# Patient Record
Sex: FEMALE | Race: BLACK OR AFRICAN AMERICAN | NOT HISPANIC OR LATINO | Employment: OTHER | URBAN - METROPOLITAN AREA
[De-identification: names, ages, dates, MRNs, and addresses within clinical notes are randomized per-mention and may not be internally consistent; named-entity substitution may affect disease eponyms.]

---

## 2017-01-10 ENCOUNTER — HOSPITAL ENCOUNTER (OUTPATIENT)
Dept: RADIOLOGY | Facility: HOSPITAL | Age: 77
Discharge: HOME | End: 2017-01-10
Attending: INTERNAL MEDICINE
Payer: MEDICARE

## 2017-01-10 DIAGNOSIS — E34.9 ENDOCRINE DISORDER: ICD-10-CM

## 2017-01-10 PROCEDURE — 77080 DXA BONE DENSITY AXIAL: CPT | Mod: 26,,, | Performed by: RADIOLOGY

## 2017-01-10 PROCEDURE — 77080 DXA BONE DENSITY AXIAL: CPT | Mod: TC

## 2017-01-17 RX ORDER — NIFEDIPINE 90 MG/1
TABLET, EXTENDED RELEASE ORAL
Qty: 90 TABLET | Refills: 0 | Status: SHIPPED | OUTPATIENT
Start: 2017-01-17 | End: 2017-04-18 | Stop reason: SDUPTHER

## 2017-01-17 RX ORDER — ATORVASTATIN CALCIUM 10 MG/1
TABLET, FILM COATED ORAL
Qty: 90 TABLET | Refills: 0 | Status: SHIPPED | OUTPATIENT
Start: 2017-01-17 | End: 2017-04-18 | Stop reason: SDUPTHER

## 2017-01-19 RX ORDER — SYRINGE,SAFETY WITH NEEDLE,1ML 25GX1"
SYRINGE (EA) MISCELLANEOUS
Qty: 100 EACH | Refills: 0 | Status: SHIPPED | OUTPATIENT
Start: 2017-01-19 | End: 2017-03-24 | Stop reason: SDUPTHER

## 2017-03-01 ENCOUNTER — OFFICE VISIT (OUTPATIENT)
Dept: PODIATRY | Facility: CLINIC | Age: 77
End: 2017-03-01
Payer: MEDICARE

## 2017-03-01 VITALS — BODY MASS INDEX: 29.71 KG/M2 | HEIGHT: 64 IN | WEIGHT: 174 LBS

## 2017-03-01 DIAGNOSIS — L84 CORN OR CALLUS: ICD-10-CM

## 2017-03-01 DIAGNOSIS — B35.1 ONYCHOMYCOSIS DUE TO DERMATOPHYTE: ICD-10-CM

## 2017-03-01 DIAGNOSIS — M20.41 HAMMER TOES OF BOTH FEET: ICD-10-CM

## 2017-03-01 DIAGNOSIS — M20.42 HAMMER TOES OF BOTH FEET: ICD-10-CM

## 2017-03-01 DIAGNOSIS — E11.49 TYPE II DIABETES MELLITUS WITH NEUROLOGICAL MANIFESTATIONS: Primary | ICD-10-CM

## 2017-03-01 DIAGNOSIS — M20.10 HALLUX ABDUCTO VALGUS, UNSPECIFIED LATERALITY: ICD-10-CM

## 2017-03-01 PROCEDURE — 99999 PR PBB SHADOW E&M-EST. PATIENT-LVL III: CPT | Mod: PBBFAC,,, | Performed by: PODIATRIST

## 2017-03-01 PROCEDURE — 11056 PARNG/CUTG B9 HYPRKR LES 2-4: CPT | Mod: Q9,S$GLB,, | Performed by: PODIATRIST

## 2017-03-01 PROCEDURE — 99499 UNLISTED E&M SERVICE: CPT | Mod: S$GLB,,, | Performed by: PODIATRIST

## 2017-03-01 PROCEDURE — 99499 UNLISTED E&M SERVICE: CPT | Mod: S$PBB,,, | Performed by: PODIATRIST

## 2017-03-01 PROCEDURE — 11721 DEBRIDE NAIL 6 OR MORE: CPT | Mod: 59,Q9,S$GLB, | Performed by: PODIATRIST

## 2017-03-01 NOTE — PROGRESS NOTES
Subjective:      Patient ID: Joel Condon is a 76 y.o. female.    Chief Complaint: Diabetes Mellitus (pcp Dr. Ley 12-19-16); Diabetic Foot Exam; and Nail Care    Joel is a 76 y.o. female who presents to the clinic for evaluation and treatment of high risk feet. Joel has a past medical history of Arthritis; Hyperlipidemia LDL goal < 100; Hypertension; Hypothyroidism; Osteoporosis, post-menopausal; Overweight(278.02); Proteinuria; and Type II or unspecified type diabetes mellitus with renal manifestations, uncontrolled. The patient's chief complaint is long, thick toenails. She relates that she has been doing vinegar soaks and vicks vapor rub for relief of nails. This patient has documented high risk feet requiring routine maintenance secondary to diabetes mellitis and those secondary complications of diabetes, as mentioned..    PCP: Shelby Ley MD    Date Last Seen by PCP:   Chief Complaint   Patient presents with    Diabetes Mellitus     pcp Dr. Ley 12-19-16    Diabetic Foot Exam    Nail Care     Current shoe gear:  rx marilyn lagos    Hemoglobin A1C   Date Value Ref Range Status   12/15/2016 8.9 (H) 4.5 - 6.2 % Final     Comment:     According to ADA guidelines, hemoglobin A1C <7.0% represents  optimal control in non-pregnant diabetic patients.  Different  metrics may apply to specific populations.   Standards of Medical Care in Diabetes - 2016.  For the purpose of screening for the presence of diabetes:  <5.7%     Consistent with the absence of diabetes  5.7-6.4%  Consistent with increasing risk for diabetes   (prediabetes)  >or=6.5%  Consistent with diabetes  Currently no consensus exists for use of hemoglobin A1C  for diagnosis of diabetes for children.     09/16/2016 8.5 (H) 4.5 - 6.2 % Final     Comment:     According to ADA guidelines, hemoglobin A1C <7.0% represents  optimal control in non-pregnant diabetic patients.  Different  metrics may apply to specific populations.   Standards of Medical  "Care in Diabetes - 2016.  For the purpose of screening for the presence of diabetes:  <5.7%     Consistent with the absence of diabetes  5.7-6.4%  Consistent with increasing risk for diabetes   (prediabetes)  >or=6.5%  Consistent with diabetes  Currently no consensus exists for use of hemoglobin A1C  for diagnosis of diabetes for children.     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 90 tablet 3    atorvastatin (LIPITOR) 10 MG tablet TAKE 1 TABLET BY MOUTH DAILY 90 tablet 0    BD INSULIN PEN NEEDLE UF SHORT 31 gauge x 5/16" Ndle USE THREE TIMES DAILY AS DIRECTED 100 each 11    blood sugar diagnostic Strp True Results; Test tid 300 strip 11    insulin aspart (NOVOLOG FLEXPEN) 100 unit/mL InPn pen ADMINISTER 28 UNITS UNDER THE SKIN THREE TIMES DAILY WITH MEALS 30 mL 3    insulin detemir (LEVEMIR) 100 unit/mL injection ADMINISTER 50 UNITS UNDER THE SKIN EVERY EVENING 30 mL 2    insulin syringe-needle U-100 1 mL 31 gauge x 5/16 Syrg USE THREE TIMES DAILY AS DIRECTED 100 each 0    insulin syringe-needle U-100 1/2 mL 30 Syrg USE NIGHTLY 100 each 0    insulin syringe-needle U-100 1/2 mL 31 x 5/16" Syrg 1 Device by Misc.(Non-Drug; Combo Route) route " nightly. 100 each 6    lancets 26 gauge Misc 1 lancet by Misc.(Non-Drug; Combo Route) route 3 (three) times daily. 200 each 11    levothyroxine (SYNTHROID) 50 MCG tablet TAKE 1 TABLET(50 MCG) BY MOUTH EVERY DAY 90 tablet 0    losartan (COZAAR) 100 MG tablet Take 1 tablet (100 mg total) by mouth once daily. 90 tablet 3    metoprolol succinate (TOPROL-XL) 50 MG 24 hr tablet Take 3 tablets (150 mg total) by mouth once daily. 270 tablet 3    nifedipine (PROCARDIA-XL) 90 MG (OSM) TR24 TAKE 1 TABLET BY MOUTH EVERY DAY 90 tablet 0    NOVOLOG FLEXPEN 100 unit/mL InPn pen ADMINISTER 28 UNITS UNDER THE SKIN THREE TIMES DAILY WITH MEALS 30 mL 2    omeprazole (PRILOSEC) 20 MG capsule Take 1 capsule (20 mg total) by mouth once daily. 90 capsule 1     No current facility-administered medications on file prior to visit.      Review of patient's allergies indicates:   Allergen Reactions    Lisinopril Other (See Comments)     Cough      Pravastatin Other (See Comments)     headaches     Past Surgical History:   Procedure Laterality Date    APPENDECTOMY      CATARACT EXTRACTION W/  INTRAOCULAR LENS IMPLANT Left 4/24/14    OS (Dr. Arce)    CATARACT EXTRACTION W/  INTRAOCULAR LENS IMPLANT Right 06/12/2014    OD (Dr. Arce)    CHOLECYSTECTOMY      EYE SURGERY  04/24/2014 & 06/12/2014    HYSTERECTOMY      total    left eye cataract surgery  4/24/14     Family History   Problem Relation Age of Onset    Diabetes Mother     Cancer Mother     Heart disease Mother     Hypertension Mother     Diabetes Sister     Hypertension Sister     Diabetes Sister     Hypertension Sister     Diabetes Sister     Hypertension Sister     Thyroid disease Sister     Stroke Sister     Hypertension Sister     Diabetes Sister     Heart disease Sister     Diabetes Brother     Diabetes Brother     Amblyopia Neg Hx     Blindness Neg Hx     Cataracts Neg Hx     Glaucoma Neg Hx     Macular degeneration Neg Hx      "Retinal detachment Neg Hx     Strabismus Neg Hx      Social History     Social History    Marital status:      Spouse name: N/A    Number of children: 3    Years of education: N/A     Occupational History    retired      Social History Main Topics    Smoking status: Never Smoker    Smokeless tobacco: Never Used    Alcohol use No    Drug use: No    Sexual activity: Not Currently     Partners: Male     Other Topics Concern    Not on file     Social History Narrative       Review of Systems   Constitution: Negative for chills, fever and weakness.   Cardiovascular: Negative for chest pain, claudication and leg swelling.   Respiratory: Negative for cough and shortness of breath.    Skin: Positive for dry skin and nail changes. Negative for itching and rash.   Musculoskeletal: Positive for arthritis and joint pain. Negative for falls, joint swelling, muscle cramps and muscle weakness.   Gastrointestinal: Negative for diarrhea, nausea and vomiting.   Neurological: Positive for numbness and paresthesias. Negative for tremors.   Psychiatric/Behavioral: Negative for altered mental status and hallucinations.           Objective:       Vitals:    03/01/17 0745   Weight: 78.9 kg (174 lb)   Height: 5' 4" (1.626 m)   PainSc: 0-No pain     Physical Exam   Constitutional:   General: Pt. is well-developed, well-nourished, appears stated age, in no acute distress, alert and oriented x 3. No evidence of depression, anxiety, or agitation. Calm, cooperative, and communicative. Appropriate interactions and affect.       Cardiovascular:   Pulses:       Dorsalis pedis pulses are 1+ on the right side, and 1+ on the left side.        Posterior tibial pulses are 1+ on the right side, and 1+ on the left side.   Dorsalis pedis and posterior tibial pulses are diminished Bilaterally. Toes are cool to touch. Feet are warm proximally.There is decreased digital hair. Skin is atrophic   Musculoskeletal:        Right foot: There is " normal range of motion.        Left foot: There is normal range of motion.   Adequate joint range of motion without pain, limitation, nor crepitation Bilateral feet and ankle joints. Muscle strength is 5/5 in all groups bilaterally.    Patient has hammertoes of digits 2-5 bilateral partially reducible without symptom today.     Neurological: A sensory deficit is present.   Paresthesias, and hyperesthesia bilateral feet with no clearly identified trigger or source.    Decreased/absent vibratory sensation bilateral feet to 128Hz tuning fork.    Summit-Jennifer 5.07 monofilament is intact bilateral feet. Sharp/dull sensation is also intact Bilateral feet.       Skin: Skin is warm, dry and intact. No abrasion, no ecchymosis, no lesion and no rash noted. She is not diaphoretic. No cyanosis or erythema. No pallor. Nails show no clubbing.   Toenails 1-5 bilaterally are elongated by 2-3 mm, thickened by 2-3 mm, discolored/grey, dystrophic, brittle with subungual debris.    Focal hyperkeratotic lesion consisting entirely of hyperkeratotic tissue without open skin, drainage, pus, fluctuance, malodor, or signs of infection distal medial right hallux and sub 3rd MTPJ     Interdigital Spaces clean, dry and without evidence of break in skin integrity   Psychiatric: She has a normal mood and affect. Her speech is normal.   Nursing note and vitals reviewed.        Assessment:       Encounter Diagnoses   Name Primary?    Type II diabetes mellitus with neurological manifestations Yes    Hammer toes of both feet     Hallux abducto valgus, unspecified laterality     Corn or callus     Onychomycosis due to dermatophyte          Plan:       Joel was seen today for diabetes mellitus, diabetic foot exam and nail care.    Diagnoses and all orders for this visit:    Type II diabetes mellitus with neurological manifestations    Hammer toes of both feet    Hallux abducto valgus, unspecified laterality    Corn or callus    Onychomycosis  due to dermatophyte    I counseled the patient on her conditions, their implications and medical management.    - Shoe inspection. Diabetic Foot Education. Patient reminded of the importance of good nutrition and blood sugar control to help prevent podiatric complications of diabetes. Patient instructed on proper foot hygeine. We discussed wearing proper shoe gear, daily foot inspections, never walking without protective shoe gear, never putting sharp instruments to feet. Discussed tight glycemic control.    - With patient's permission, nails were aggressively reduced and debrided x 10 to their soft tissue attachment mechanically and with electric , removing all offending nail and debris. Patient relates relief following the procedure. After cleansing the  area w/ alcohol prep pad the above mentioned hyperkeratosis was trimmed utilizing No 15 scapel, to a smooth base with out incident. Patient tolerated this  well and reported comfort to the area of  distal medial right hallux and sub 3rd MTPJ      She will continue to monitor the areas daily, inspect her feet, wear protective shoe gear when ambulatory, moisturizer to maintain skin integrity and follow in this office in approximately 3-5 months, sooner p.r.n.

## 2017-03-14 ENCOUNTER — LAB VISIT (OUTPATIENT)
Dept: LAB | Facility: HOSPITAL | Age: 77
End: 2017-03-14
Attending: INTERNAL MEDICINE
Payer: MEDICARE

## 2017-03-14 DIAGNOSIS — E78.5 HYPERLIPIDEMIA LDL GOAL <100: ICD-10-CM

## 2017-03-14 DIAGNOSIS — I10 ESSENTIAL HYPERTENSION: ICD-10-CM

## 2017-03-14 DIAGNOSIS — E03.9 HYPOTHYROIDISM (ACQUIRED): ICD-10-CM

## 2017-03-14 LAB
ALBUMIN SERPL BCP-MCNC: 3.5 G/DL
ALP SERPL-CCNC: 81 U/L
ALT SERPL W/O P-5'-P-CCNC: 20 U/L
ANION GAP SERPL CALC-SCNC: 10 MMOL/L
AST SERPL-CCNC: 19 U/L
BASOPHILS # BLD AUTO: 0.06 K/UL
BASOPHILS NFR BLD: 0.6 %
BILIRUB SERPL-MCNC: 0.6 MG/DL
BUN SERPL-MCNC: 16 MG/DL
CALCIUM SERPL-MCNC: 9.4 MG/DL
CHLORIDE SERPL-SCNC: 107 MMOL/L
CHOLEST/HDLC SERPL: 3 {RATIO}
CO2 SERPL-SCNC: 25 MMOL/L
CREAT SERPL-MCNC: 0.9 MG/DL
DIFFERENTIAL METHOD: ABNORMAL
EOSINOPHIL # BLD AUTO: 0.5 K/UL
EOSINOPHIL NFR BLD: 4.8 %
ERYTHROCYTE [DISTWIDTH] IN BLOOD BY AUTOMATED COUNT: 14.6 %
EST. GFR  (AFRICAN AMERICAN): >60 ML/MIN/1.73 M^2
EST. GFR  (NON AFRICAN AMERICAN): >60 ML/MIN/1.73 M^2
GLUCOSE SERPL-MCNC: 154 MG/DL
HCT VFR BLD AUTO: 42.9 %
HDL/CHOLESTEROL RATIO: 33.3 %
HDLC SERPL-MCNC: 144 MG/DL
HDLC SERPL-MCNC: 48 MG/DL
HGB BLD-MCNC: 13.6 G/DL
LDLC SERPL CALC-MCNC: 73.6 MG/DL
LYMPHOCYTES # BLD AUTO: 3.5 K/UL
LYMPHOCYTES NFR BLD: 36.4 %
MCH RBC QN AUTO: 29.9 PG
MCHC RBC AUTO-ENTMCNC: 31.7 %
MCV RBC AUTO: 94 FL
MONOCYTES # BLD AUTO: 0.6 K/UL
MONOCYTES NFR BLD: 6.3 %
NEUTROPHILS # BLD AUTO: 4.9 K/UL
NEUTROPHILS NFR BLD: 51.7 %
NONHDLC SERPL-MCNC: 96 MG/DL
PLATELET # BLD AUTO: 276 K/UL
PMV BLD AUTO: 11.3 FL
POTASSIUM SERPL-SCNC: 3.8 MMOL/L
PROT SERPL-MCNC: 7.3 G/DL
RBC # BLD AUTO: 4.55 M/UL
SODIUM SERPL-SCNC: 142 MMOL/L
TRIGL SERPL-MCNC: 112 MG/DL
TSH SERPL DL<=0.005 MIU/L-ACNC: 3.31 UIU/ML
WBC # BLD AUTO: 9.51 K/UL

## 2017-03-14 PROCEDURE — 80053 COMPREHEN METABOLIC PANEL: CPT

## 2017-03-14 PROCEDURE — 85025 COMPLETE CBC W/AUTO DIFF WBC: CPT

## 2017-03-14 PROCEDURE — 80061 LIPID PANEL: CPT

## 2017-03-14 PROCEDURE — 84443 ASSAY THYROID STIM HORMONE: CPT

## 2017-03-14 PROCEDURE — 36415 COLL VENOUS BLD VENIPUNCTURE: CPT | Mod: PO

## 2017-03-14 PROCEDURE — 83036 HEMOGLOBIN GLYCOSYLATED A1C: CPT

## 2017-03-14 RX ORDER — LEVOTHYROXINE SODIUM 50 UG/1
TABLET ORAL
Qty: 90 TABLET | Refills: 0 | Status: SHIPPED | OUTPATIENT
Start: 2017-03-14 | End: 2017-06-05 | Stop reason: SDUPTHER

## 2017-03-14 NOTE — TELEPHONE ENCOUNTER
----- Message from Chelsey Downs sent at 3/14/2017  9:46 AM CDT -----  Contact: Pharmacy  Pt. Need refill for levothyroxine (SYNTHROID) 50 MCG tablet , sent to [facility]

## 2017-03-15 ENCOUNTER — OFFICE VISIT (OUTPATIENT)
Dept: CARDIOLOGY | Facility: CLINIC | Age: 77
End: 2017-03-15
Payer: MEDICARE

## 2017-03-15 VITALS
WEIGHT: 176 LBS | BODY MASS INDEX: 30.05 KG/M2 | HEART RATE: 71 BPM | HEIGHT: 64 IN | RESPIRATION RATE: 15 BRPM | OXYGEN SATURATION: 95 % | SYSTOLIC BLOOD PRESSURE: 158 MMHG | DIASTOLIC BLOOD PRESSURE: 70 MMHG

## 2017-03-15 DIAGNOSIS — E66.9 NON MORBID OBESITY, UNSPECIFIED OBESITY TYPE: ICD-10-CM

## 2017-03-15 DIAGNOSIS — I10 ESSENTIAL HYPERTENSION: Primary | ICD-10-CM

## 2017-03-15 DIAGNOSIS — E11.319 UNCONTROLLED TYPE 2 DIABETES MELLITUS WITH RETINOPATHY WITHOUT MACULAR EDEMA, WITH LONG-TERM CURRENT USE OF INSULIN, UNSPECIFIED LATERALITY, UNSPECIFIED RETINOPATHY SEVERITY: ICD-10-CM

## 2017-03-15 DIAGNOSIS — E11.65 UNCONTROLLED TYPE 2 DIABETES MELLITUS WITH RETINOPATHY WITHOUT MACULAR EDEMA, WITH LONG-TERM CURRENT USE OF INSULIN, UNSPECIFIED LATERALITY, UNSPECIFIED RETINOPATHY SEVERITY: ICD-10-CM

## 2017-03-15 DIAGNOSIS — Z79.4 UNCONTROLLED TYPE 2 DIABETES MELLITUS WITH RETINOPATHY WITHOUT MACULAR EDEMA, WITH LONG-TERM CURRENT USE OF INSULIN, UNSPECIFIED LATERALITY, UNSPECIFIED RETINOPATHY SEVERITY: ICD-10-CM

## 2017-03-15 DIAGNOSIS — E78.5 HYPERLIPIDEMIA LDL GOAL <100: ICD-10-CM

## 2017-03-15 LAB
ESTIMATED AVG GLUCOSE: 226 MG/DL
HBA1C MFR BLD HPLC: 9.5 %

## 2017-03-15 PROCEDURE — 4010F ACE/ARB THERAPY RXD/TAKEN: CPT | Mod: S$GLB,,, | Performed by: INTERNAL MEDICINE

## 2017-03-15 PROCEDURE — 3046F HEMOGLOBIN A1C LEVEL >9.0%: CPT | Mod: S$GLB,,, | Performed by: INTERNAL MEDICINE

## 2017-03-15 PROCEDURE — 3077F SYST BP >= 140 MM HG: CPT | Mod: S$GLB,,, | Performed by: INTERNAL MEDICINE

## 2017-03-15 PROCEDURE — 1160F RVW MEDS BY RX/DR IN RCRD: CPT | Mod: S$GLB,,, | Performed by: INTERNAL MEDICINE

## 2017-03-15 PROCEDURE — 3078F DIAST BP <80 MM HG: CPT | Mod: S$GLB,,, | Performed by: INTERNAL MEDICINE

## 2017-03-15 PROCEDURE — 1159F MED LIST DOCD IN RCRD: CPT | Mod: S$GLB,,, | Performed by: INTERNAL MEDICINE

## 2017-03-15 PROCEDURE — 99999 PR PBB SHADOW E&M-EST. PATIENT-LVL III: CPT | Mod: PBBFAC,,, | Performed by: INTERNAL MEDICINE

## 2017-03-15 PROCEDURE — 1126F AMNT PAIN NOTED NONE PRSNT: CPT | Mod: S$GLB,,, | Performed by: INTERNAL MEDICINE

## 2017-03-15 PROCEDURE — 99214 OFFICE O/P EST MOD 30 MIN: CPT | Mod: S$GLB,,, | Performed by: INTERNAL MEDICINE

## 2017-03-15 PROCEDURE — 1157F ADVNC CARE PLAN IN RCRD: CPT | Mod: S$GLB,,, | Performed by: INTERNAL MEDICINE

## 2017-03-15 PROCEDURE — 99499 UNLISTED E&M SERVICE: CPT | Mod: S$PBB,,, | Performed by: INTERNAL MEDICINE

## 2017-03-15 RX ORDER — METOPROLOL SUCCINATE 200 MG/1
200 TABLET, EXTENDED RELEASE ORAL DAILY
Qty: 90 TABLET | Refills: 3 | Status: SHIPPED | OUTPATIENT
Start: 2017-03-15 | End: 2018-04-16 | Stop reason: SDUPTHER

## 2017-03-15 NOTE — MR AVS SNAPSHOT
[facility]  [address]  [place] [address]  Phone: [phone]                  Joel Condon   3/15/2017 1:00 PM   Office Visit    Description:  Female : 1940   Provider:  Juan Pablo Ordoñez MD   Department:  [facility]           Reason for Visit     Hypertension     Follow-up                To Do List           Future Appointments        Provider Department Dept Phone    3/15/2017 1:00 PM Juan Pablo Ordoñez MD [facility] [phone]    3/24/2017 8:20 AM Shelby Ley MD [facility] Family Medicine [phone]    2017 8:00 AM Jose R Salazar OD [facility] Optometry [phone]    2017 7:30 AM Sumi Francis DPM 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  Call for this free service at [phone].             Medications           Message regarding Medications     Verify the changes and/or additions to your medication regime listed below are the same as discussed with your clinician today.  If any of these changes or additions are incorrect, please notify your healthcare provider.             Verify that the below list of medications is an accurate representation of the medications you are currently taking.  If none reported, the list may be blank. If incorrect, please contact your healthcare provider. Carry this list with you in case of emergency.           Current Medications     alendronate (FOSAMAX) 70 MG tablet TAKE 1 TABLET BY MOUTH EVERY 7 DAYS    aspirin (ECOTRIN) 81 MG EC tablet Take 1 tablet (81 mg total) by mouth once daily.  "   atorvastatin (LIPITOR) 10 MG tablet TAKE 1 TABLET BY MOUTH DAILY    BD INSULIN PEN NEEDLE UF SHORT 31 gauge x 5/16" Ndle USE THREE TIMES DAILY AS DIRECTED    blood sugar diagnostic Strp True Results; Test tid    insulin aspart (NOVOLOG FLEXPEN) 100 unit/mL InPn pen ADMINISTER 28 UNITS UNDER THE SKIN THREE TIMES DAILY WITH MEALS    insulin detemir (LEVEMIR) 100 unit/mL injection ADMINISTER 50 UNITS UNDER THE SKIN EVERY EVENING    insulin syringe-needle U-100 1 mL 31 gauge x 5/16 Syrg USE THREE TIMES DAILY AS DIRECTED    insulin syringe-needle U-100 1/2 mL 30 Syrg USE NIGHTLY    insulin syringe-needle U-100 1/2 mL 31 x 5/16" Syrg 1 Device by Misc.(Non-Drug; Combo Route) route nightly.    lancets 26 gauge Misc 1 lancet by Misc.(Non-Drug; Combo Route) route 3 (three) times daily.    levothyroxine (SYNTHROID) 50 MCG tablet TAKE 1 TABLET(50 MCG) BY MOUTH EVERY DAY    losartan (COZAAR) 100 MG tablet Take 1 tablet (100 mg total) by mouth once daily.    metoprolol succinate (TOPROL-XL) 50 MG 24 hr tablet Take 3 tablets (150 mg total) by mouth once daily.    nifedipine (PROCARDIA-XL) 90 MG (OSM) TR24 TAKE 1 TABLET BY MOUTH EVERY DAY    NOVOLOG FLEXPEN 100 unit/mL InPn pen ADMINISTER 28 UNITS UNDER THE SKIN THREE TIMES DAILY WITH MEALS    omeprazole (PRILOSEC) 20 MG capsule Take 1 capsule (20 mg total) by mouth once daily.           Clinical Reference Information           Your Vitals Were     BP Pulse Resp Height Weight SpO2    158/70 71 15 5' 4" (1.626 m) 79.8 kg (176 lb) 95%    BMI                30.21 kg/m2          Blood Pressure          Most Recent Value    BP  (!)  158/70      Allergies as of 3/15/2017     Lisinopril    Pravastatin      Immunizations Administered on Date of Encounter - 3/15/2017     None      Language Assistance Services     ATTENTION: Language assistance services are available, free of charge. Please call [phone].      ATENCIÓN: Si habla español, tiene a jara disposición servicios gratuitos de " asistencia lingüística. [employer] lowe [phone].     ELSA Ý: N?u b?n nói Ti?ng Vi?t, có các d?ch v? h? tr? ngôn ng? mi?n phí dành cho b?n. G?i s? [phone].         [facility] complies with applicable Federal civil rights laws and does not discriminate on the basis of race, color, national origin, age, disability, or sex.

## 2017-03-15 NOTE — PROGRESS NOTES
"CARDIOVASCULAR PROGRESS NOTE    REASON FOR CONSULT:   Joel Condon is a 76 y.o. female who presents for follow up of CP/Palps/htn.    PCP: Av  HISTORY OF PRESENT ILLNESS:   The patient returns for follow-up.  She's feeling well and reports no intercurrent angina or dyspnea.  She denies lightheadedness, dizziness, or syncope.  There's been no PND, orthopnea, or lower extremity edema.  She's had no melena, hematuria, or claudicant symptoms.  Her main complaint appears to be related to joint aches and pains today.    CARDIOVASCULAR HISTORY:   PVCs  HTN  DM    PAST MEDICAL HISTORY:     Past Medical History:   Diagnosis Date    Arthritis     Hyperlipidemia LDL goal < 100     Hypertension     Hypothyroidism     Osteoporosis, post-menopausal     Overweight(278.02)     Proteinuria     Type II or unspecified type diabetes mellitus with renal manifestations, uncontrolled        PAST SURGICAL HISTORY:     Past Surgical History:   Procedure Laterality Date    APPENDECTOMY      CATARACT EXTRACTION W/  INTRAOCULAR LENS IMPLANT Left 4/24/14    OS (Dr. Arce)    CATARACT EXTRACTION W/  INTRAOCULAR LENS IMPLANT Right 06/12/2014    OD (Dr. Arce)    CHOLECYSTECTOMY      EYE SURGERY  04/24/2014 & 06/12/2014    HYSTERECTOMY      total    left eye cataract surgery  4/24/14       ALLERGIES AND MEDICATION:     Review of patient's allergies indicates:   Allergen Reactions    Lisinopril Other (See Comments)     Cough      Pravastatin Other (See Comments)     headaches     Previous Medications    ALENDRONATE (FOSAMAX) 70 MG TABLET    TAKE 1 TABLET BY MOUTH EVERY 7 DAYS    ASPIRIN (ECOTRIN) 81 MG EC TABLET    Take 1 tablet (81 mg total) by mouth once daily.    ATORVASTATIN (LIPITOR) 10 MG TABLET    TAKE 1 TABLET BY MOUTH DAILY    BD INSULIN PEN NEEDLE UF SHORT 31 GAUGE X 5/16" NDLE    USE THREE TIMES DAILY AS DIRECTED    BLOOD SUGAR DIAGNOSTIC STRP    True Results; Test tid    INSULIN ASPART (NOVOLOG FLEXPEN) " "100 UNIT/ML INPN PEN    ADMINISTER 28 UNITS UNDER THE SKIN THREE TIMES DAILY WITH MEALS    INSULIN DETEMIR (LEVEMIR) 100 UNIT/ML INJECTION    ADMINISTER 50 UNITS UNDER THE SKIN EVERY EVENING    INSULIN SYRINGE-NEEDLE U-100 1 ML 31 GAUGE X 5/16 SYRG    USE THREE TIMES DAILY AS DIRECTED    INSULIN SYRINGE-NEEDLE U-100 1/2 ML 30 SYRG    USE NIGHTLY    INSULIN SYRINGE-NEEDLE U-100 1/2 ML 31 X 5/16" SYRG    1 Device by Misc.(Non-Drug; Combo Route) route nightly.    LANCETS 26 GAUGE MISC    1 lancet by Misc.(Non-Drug; Combo Route) route 3 (three) times daily.    LEVOTHYROXINE (SYNTHROID) 50 MCG TABLET    TAKE 1 TABLET(50 MCG) BY MOUTH EVERY DAY    LOSARTAN (COZAAR) 100 MG TABLET    Take 1 tablet (100 mg total) by mouth once daily.    METOPROLOL SUCCINATE (TOPROL-XL) 50 MG 24 HR TABLET    Take 3 tablets (150 mg total) by mouth once daily.    NIFEDIPINE (PROCARDIA-XL) 90 MG (OSM) TR24    TAKE 1 TABLET BY MOUTH EVERY DAY    NOVOLOG FLEXPEN 100 UNIT/ML INPN PEN    ADMINISTER 28 UNITS UNDER THE SKIN THREE TIMES DAILY WITH MEALS    OMEPRAZOLE (PRILOSEC) 20 MG CAPSULE    Take 1 capsule (20 mg total) by mouth once daily.       SOCIAL HISTORY:     Social History     Social History    Marital status:      Spouse name: N/A    Number of children: 3    Years of education: N/A     Occupational History    retired      Social History Main Topics    Smoking status: Never Smoker    Smokeless tobacco: Never Used    Alcohol use No    Drug use: No    Sexual activity: Not Currently     Partners: Male     Other Topics Concern    Not on file     Social History Narrative       FAMILY HISTORY:     Family History   Problem Relation Age of Onset    Diabetes Mother     Cancer Mother     Heart disease Mother     Hypertension Mother     Diabetes Sister     Hypertension Sister     Diabetes Sister     Hypertension Sister     Diabetes Sister     Hypertension Sister     Thyroid disease Sister     Stroke Sister     Hypertension " "Sister     Diabetes Sister     Heart disease Sister     Diabetes Brother     Diabetes Brother     Amblyopia Neg Hx     Blindness Neg Hx     Cataracts Neg Hx     Glaucoma Neg Hx     Macular degeneration Neg Hx     Retinal detachment Neg Hx     Strabismus Neg Hx        REVIEW OF SYSTEMS:   Review of Systems   Constitutional: Negative for chills, diaphoresis and fever.   HENT: Negative for nosebleeds.    Eyes: Negative for blurred vision, double vision and photophobia.   Respiratory: Negative for hemoptysis, shortness of breath and wheezing.    Cardiovascular: Negative for chest pain, palpitations, orthopnea, claudication, leg swelling and PND.   Gastrointestinal: Negative for abdominal pain, blood in stool, heartburn, melena, nausea and vomiting.   Genitourinary: Negative for flank pain and hematuria.   Musculoskeletal: Positive for joint pain. Negative for falls, myalgias and neck pain.   Skin: Negative for rash.   Neurological: Negative for dizziness, seizures, loss of consciousness, weakness and headaches.   Endo/Heme/Allergies: Negative for polydipsia. Does not bruise/bleed easily.   Psychiatric/Behavioral: Negative for depression and memory loss. The patient is not nervous/anxious.        PHYSICAL EXAM:     Vitals:    03/15/17 1244   BP: (!) 158/70   Pulse: 71   Resp: 15    Body mass index is 30.21 kg/(m^2).  Weight: 79.8 kg (176 lb)   Height: 5' 4" (162.6 cm)     Physical Exam   Constitutional: She is oriented to person, place, and time. She appears well-developed and well-nourished. She is cooperative.  Non-toxic appearance. No distress.   HENT:   Head: Normocephalic and atraumatic.   Eyes: Conjunctivae and EOM are normal. Pupils are equal, round, and reactive to light. No scleral icterus.   Neck: Trachea normal and normal range of motion. Neck supple. Normal carotid pulses and no JVD present. Carotid bruit is not present. No rigidity. No edema present. No thyromegaly present.   Cardiovascular: " Normal rate, regular rhythm, S1 normal and S2 normal.  PMI is not displaced.  Exam reveals no gallop and no friction rub.    No murmur heard.  Pulses:       Carotid pulses are 2+ on the right side, and 2+ on the left side.  Pulmonary/Chest: Effort normal and breath sounds normal. No respiratory distress. She has no wheezes. She has no rales. She exhibits no tenderness.   Abdominal: Soft. Bowel sounds are normal. She exhibits no distension and no mass. There is no hepatosplenomegaly. There is no tenderness.   Musculoskeletal: She exhibits no edema or tenderness.   Feet:   Right Foot:   Skin Integrity: Negative for ulcer.   Left Foot:   Skin Integrity: Negative for ulcer.   Neurological: She is alert and oriented to person, place, and time. No cranial nerve deficit.   Skin: Skin is warm and dry. No rash noted. No erythema.   Psychiatric: She has a normal mood and affect. Her speech is normal and behavior is normal.   Vitals reviewed.      DATA:   EKG: (personally reviewed tracing)  12/2/16 NSR 66, LAD, PRWP, NSSTTW changes.  Similar to 10/17/16    Laboratory:  CBC:    Recent Labs  Lab 05/18/15  1041 06/15/16  0844 03/14/17  0722   WHITE BLOOD CELL COUNT 10.18 9.95 9.51   HEMOGLOBIN 13.9 13.4 13.6   HEMATOCRIT 42.2 40.6 42.9   PLATELETS 313 315 276       CHEMISTRIES:    Recent Labs  Lab 06/15/16  0844 10/31/16  0805 03/14/17  0722   GLUCOSE 206 H 164 H 154 H   SODIUM 141 144 142   POTASSIUM 4.2 4.0 3.8   BUN BLD 14 14 16   CREATININE 0.9 0.9 0.9   EGFR IF AFRICAN AMERICAN >60.0 >60.0 >60.0   EGFR IF NON- >60.0 >60.0 >60.0   CALCIUM 9.3 9.2 9.4       CARDIAC BIOMARKERS:        COAGS:        LIPIDS/LFTS:    Recent Labs  Lab 12/14/15  0846 06/15/16  0844 03/14/17  0722   CHOLESTEROL 144 139 144   TRIGLYCERIDES 112 114 112   HDL 53 53 48   LDL CHOLESTEROL 68.6 63.2 73.6   NON-HDL CHOLESTEROL 91 86 96   AST 27 20 19   ALT 30 20 20       Cardiovascular Testing:  Holter 3/28/16:  PREDOMINANT RHYTHM  1. Sinus  rhythm with heart rates varying between 65 and 111 bpm with an average of 80 bpm.   VENTRICULAR ARRHYTHMIAS  1. There were occasional PVCs totalling 697 and averaging 29 per hour. There was 1 couplet.  2. There were no episodes of ventricular tachycardia.  SUPRA VENTRICULAR ARRHYTHMIAS  1. There were very rare PACs recorded totalling 1 and averaging less than 1 per hour.   2. There were no episodes of sustained supraventricular tachycardia.  SINUS NODE FUNCTION  1. There was no evidence of high grade SA cristian block.   AV CONDUCTION  1. There was no evidence of high grade AV block.   DIARY  1. The diary was not returned  MISCELLANEOUS  1. There were occasional hookup related artifacts. Overall, the study was of good quality.   2. This was a tape of adequate length (24 hrs).    Echo 3/28/16:  1 - Normal left ventricular systolic function (EF 60-65%). Normal wall motion.   2 - Concentric remodeling.   3 - Trivial tricuspid regurgitation.   4 - The estimated PA systolic pressure is 27 mmHg.     Ex-MPI 3/28/16   Jignesh 2min, 86% MPHR, -CP/EKG  Nuclear Quantitative Functional Analysis:   Quantitative measurements of LV function are over estimated secondary to small ventricular volumes.   Visually estimated LV function is hyperdynamic.   Impression: NORMAL MYOCARDIAL PERFUSION  1. The perfusion scan is free of evidence for myocardial ischemia or injury.   2. Resting wall motion is physiologic.   3. Visually estimated LV function is hyperdynamic.   4. The ventricular volumes are normal at rest and stress.   5. The extracardiac distribution of radioactivity is normal.    ASSESSMENT:   # HTN, uncontrolled  # DM  # HLP, LDL acceptable  # BMI 30    PLAN:   Cont med rx  Titrate toprol XL to 200mg qd  Diet/exercise/weight loss  RTC 3 months  PASCUAL eval left as a contingency    Juan Pablo Ordoñez MD, FACC

## 2017-03-24 ENCOUNTER — OFFICE VISIT (OUTPATIENT)
Dept: FAMILY MEDICINE | Facility: CLINIC | Age: 77
End: 2017-03-24
Payer: MEDICARE

## 2017-03-24 VITALS
WEIGHT: 176.69 LBS | HEART RATE: 64 BPM | SYSTOLIC BLOOD PRESSURE: 140 MMHG | BODY MASS INDEX: 30.17 KG/M2 | OXYGEN SATURATION: 97 % | HEIGHT: 64 IN | DIASTOLIC BLOOD PRESSURE: 60 MMHG | TEMPERATURE: 98 F

## 2017-03-24 DIAGNOSIS — E66.9 OBESITY (BMI 30-39.9): ICD-10-CM

## 2017-03-24 DIAGNOSIS — Z79.4 UNCONTROLLED TYPE 2 DIABETES MELLITUS WITH RETINOPATHY WITHOUT MACULAR EDEMA, WITH LONG-TERM CURRENT USE OF INSULIN, UNSPECIFIED LATERALITY, UNSPECIFIED RETINOPATHY SEVERITY: ICD-10-CM

## 2017-03-24 DIAGNOSIS — E11.65 UNCONTROLLED TYPE 2 DIABETES MELLITUS WITH RETINOPATHY WITHOUT MACULAR EDEMA, WITH LONG-TERM CURRENT USE OF INSULIN, UNSPECIFIED LATERALITY, UNSPECIFIED RETINOPATHY SEVERITY: ICD-10-CM

## 2017-03-24 DIAGNOSIS — E78.5 HYPERLIPIDEMIA LDL GOAL <100: ICD-10-CM

## 2017-03-24 DIAGNOSIS — I10 ESSENTIAL HYPERTENSION: ICD-10-CM

## 2017-03-24 DIAGNOSIS — E03.9 HYPOTHYROIDISM (ACQUIRED): ICD-10-CM

## 2017-03-24 DIAGNOSIS — E11.319 UNCONTROLLED TYPE 2 DIABETES MELLITUS WITH RETINOPATHY WITHOUT MACULAR EDEMA, WITH LONG-TERM CURRENT USE OF INSULIN, UNSPECIFIED LATERALITY, UNSPECIFIED RETINOPATHY SEVERITY: ICD-10-CM

## 2017-03-24 DIAGNOSIS — Z12.11 COLON CANCER SCREENING: ICD-10-CM

## 2017-03-24 PROCEDURE — 1157F ADVNC CARE PLAN IN RCRD: CPT | Mod: S$GLB,,, | Performed by: INTERNAL MEDICINE

## 2017-03-24 PROCEDURE — 99999 PR PBB SHADOW E&M-EST. PATIENT-LVL III: CPT | Mod: PBBFAC,,, | Performed by: INTERNAL MEDICINE

## 2017-03-24 PROCEDURE — 3078F DIAST BP <80 MM HG: CPT | Mod: S$GLB,,, | Performed by: INTERNAL MEDICINE

## 2017-03-24 PROCEDURE — 1126F AMNT PAIN NOTED NONE PRSNT: CPT | Mod: S$GLB,,, | Performed by: INTERNAL MEDICINE

## 2017-03-24 PROCEDURE — 99215 OFFICE O/P EST HI 40 MIN: CPT | Mod: S$GLB,,, | Performed by: INTERNAL MEDICINE

## 2017-03-24 PROCEDURE — 3046F HEMOGLOBIN A1C LEVEL >9.0%: CPT | Mod: S$GLB,,, | Performed by: INTERNAL MEDICINE

## 2017-03-24 PROCEDURE — 4010F ACE/ARB THERAPY RXD/TAKEN: CPT | Mod: S$GLB,,, | Performed by: INTERNAL MEDICINE

## 2017-03-24 PROCEDURE — 3077F SYST BP >= 140 MM HG: CPT | Mod: S$GLB,,, | Performed by: INTERNAL MEDICINE

## 2017-03-24 PROCEDURE — 99499 UNLISTED E&M SERVICE: CPT | Mod: S$PBB,,, | Performed by: INTERNAL MEDICINE

## 2017-03-24 PROCEDURE — 1159F MED LIST DOCD IN RCRD: CPT | Mod: S$GLB,,, | Performed by: INTERNAL MEDICINE

## 2017-03-24 PROCEDURE — 1160F RVW MEDS BY RX/DR IN RCRD: CPT | Mod: S$GLB,,, | Performed by: INTERNAL MEDICINE

## 2017-03-24 RX ORDER — PEN NEEDLE, DIABETIC 30 GX3/16"
NEEDLE, DISPOSABLE MISCELLANEOUS
Qty: 100 EACH | Refills: 11 | Status: SHIPPED | OUTPATIENT
Start: 2017-03-24 | End: 2017-07-31 | Stop reason: SDUPTHER

## 2017-03-24 RX ORDER — SYRINGE,SAFETY WITH NEEDLE,1ML 25GX1"
SYRINGE (EA) MISCELLANEOUS
Qty: 100 EACH | Refills: 0 | Status: SHIPPED | OUTPATIENT
Start: 2017-03-24 | End: 2017-06-29 | Stop reason: SDUPTHER

## 2017-03-24 RX ORDER — INSULIN ASPART 100 [IU]/ML
INJECTION, SOLUTION INTRAVENOUS; SUBCUTANEOUS
Qty: 30 ML | Refills: 2 | Status: SHIPPED | OUTPATIENT
Start: 2017-03-24 | End: 2017-04-26 | Stop reason: SDUPTHER

## 2017-03-24 NOTE — TELEPHONE ENCOUNTER
----- Message from Elisa Bolaños sent at 3/24/2017  9:35 AM CDT -----  Contact: naveen.  Pt is requesting a refill for insulin aspart (NOVOLOG FLEXPEN) 100 unit/mL InPn pen. [phone]

## 2017-03-24 NOTE — Clinical Note
Dr. Ordoñez, I saw our mutual patient today. She tells me that you changed her from metoprolol 50 3 pills a day to the metoprolol  mg once a day. She states this pill is too big for her to swallow. I told her to go back to the other metoprolol but take 2 pills twice a day. Please let me know if this is not ok. Thanks! Shelby

## 2017-03-24 NOTE — PROGRESS NOTES
HISTORY OF PRESENT ILLNESS:  Joel Condon is a 76 y.o. female who presents to the clinic today for Diabetes; Hypertension; and Hyperlipidemia  .  The patient presents to clinic today for follow-up of her type 2 diabetes mellitus complicated by proteinuria, neuropathy, and retinopathy, hypertension, hyperlipidemia, and hypothyroidism.  She reports overall fair dietary habits.  She is active, but does not exercise regularly.  She denies cardiac chest pain or shortness of breath.  She did recently see cardiology.  They changed her metoprolol from 50 mg daily to metoprolol  mg once daily.  She states the pills are too big for her to swallow.  She states blood pressures at home are fair.  They tend to fluctuate.  Usually systolic is in the 140s.  She states her blood sugars have been elevated in the low 200s.  She denies any problems with low blood sugars.  She denies abdominal pain, temperature intolerance, or unexplained changes in her weight.  Her only complaint today is some arthralgia pain, especially in the left hip and hands.      PAST MEDICAL HISTORY:  Past Medical History:   Diagnosis Date    Arthritis     Hyperlipidemia LDL goal < 100     Hypertension     Hypothyroidism     Osteoporosis, post-menopausal     Overweight(278.02)     Proteinuria     Type II or unspecified type diabetes mellitus with renal manifestations, uncontrolled        PAST SURGICAL HISTORY:  Past Surgical History:   Procedure Laterality Date    APPENDECTOMY      CATARACT EXTRACTION W/  INTRAOCULAR LENS IMPLANT Left 4/24/14    OS (Dr. Arce)    CATARACT EXTRACTION W/  INTRAOCULAR LENS IMPLANT Right 06/12/2014    OD (Dr. Arce)    CHOLECYSTECTOMY      EYE SURGERY  04/24/2014 & 06/12/2014    HYSTERECTOMY      total    left eye cataract surgery  4/24/14       SOCIAL HISTORY:  Social History     Social History    Marital status:      Spouse name: N/A    Number of children: 3    Years of education: N/A  "    Occupational History    retired      Social History Main Topics    Smoking status: Never Smoker    Smokeless tobacco: Never Used    Alcohol use No    Drug use: No    Sexual activity: Not Currently     Partners: Male     Other Topics Concern    Not on file     Social History Narrative       FAMILY HISTORY:  Family History   Problem Relation Age of Onset    Diabetes Mother     Cancer Mother     Heart disease Mother     Hypertension Mother     Diabetes Sister     Hypertension Sister     Diabetes Sister     Hypertension Sister     Diabetes Sister     Hypertension Sister     Thyroid disease Sister     Stroke Sister     Hypertension Sister     Diabetes Sister     Heart disease Sister     Diabetes Brother     Diabetes Brother     Amblyopia Neg Hx     Blindness Neg Hx     Cataracts Neg Hx     Glaucoma Neg Hx     Macular degeneration Neg Hx     Retinal detachment Neg Hx     Strabismus Neg Hx        ALLERGIES AND MEDICATIONS: updated and reviewed.  Review of patient's allergies indicates:   Allergen Reactions    Lisinopril Other (See Comments)     Cough      Pravastatin Other (See Comments)     headaches     Medication List with Changes/Refills   Current Medications    ALENDRONATE (FOSAMAX) 70 MG TABLET    TAKE 1 TABLET BY MOUTH EVERY 7 DAYS    ASPIRIN (ECOTRIN) 81 MG EC TABLET    Take 1 tablet (81 mg total) by mouth once daily.    ATORVASTATIN (LIPITOR) 10 MG TABLET    TAKE 1 TABLET BY MOUTH DAILY    BLOOD SUGAR DIAGNOSTIC STRP    True Results; Test tid    INSULIN ASPART (NOVOLOG FLEXPEN) 100 UNIT/ML INPN PEN    ADMINISTER 28 UNITS UNDER THE SKIN THREE TIMES DAILY WITH MEALS    INSULIN SYRINGE-NEEDLE U-100 1 ML 31 GAUGE X 5/16 SYRG    USE THREE TIMES DAILY AS DIRECTED    INSULIN SYRINGE-NEEDLE U-100 1/2 ML 30 SYRG    USE NIGHTLY    INSULIN SYRINGE-NEEDLE U-100 1/2 ML 31 X 5/16" SYRG    1 Device by Misc.(Non-Drug; Combo Route) route nightly.    LANCETS 26 GAUGE MISC    1 lancet by " "Misc.(Non-Drug; Combo Route) route 3 (three) times daily.    LEVOTHYROXINE (SYNTHROID) 50 MCG TABLET    TAKE 1 TABLET(50 MCG) BY MOUTH EVERY DAY    LOSARTAN (COZAAR) 100 MG TABLET    Take 1 tablet (100 mg total) by mouth once daily.    METOPROLOL SUCCINATE (TOPROL-XL) 200 MG 24 HR TABLET    Take 1 tablet (200 mg total) by mouth once daily.    NIFEDIPINE (PROCARDIA-XL) 90 MG (OSM) TR24    TAKE 1 TABLET BY MOUTH EVERY DAY    NOVOLOG FLEXPEN 100 UNIT/ML INPN PEN    ADMINISTER 28 UNITS UNDER THE SKIN THREE TIMES DAILY WITH MEALS    OMEPRAZOLE (PRILOSEC) 20 MG CAPSULE    Take 1 capsule (20 mg total) by mouth once daily.   Changed and/or Refilled Medications    Modified Medication Previous Medication    INSULIN DETEMIR (LEVEMIR) 100 UNIT/ML INJECTION insulin detemir (LEVEMIR) 100 unit/mL injection       ADMINISTER 50 UNITS UNDER THE SKIN EVERY EVENING    ADMINISTER 50 UNITS UNDER THE SKIN EVERY EVENING    PEN NEEDLE, DIABETIC (BD INSULIN PEN NEEDLE UF SHORT) 31 GAUGE X 5/16" NDLE BD INSULIN PEN NEEDLE UF SHORT 31 gauge x 5/16" Ndle       Use 3 times daily    USE THREE TIMES DAILY AS DIRECTED            REVIEW OF SYSTEMS:  General ROS: negative for - chills, fever or malaise  Psychological ROS: negative for - anxiety, depression, sleep disturbances or suicidal ideation  Ophthalmic ROS: negative for - blurry vision or eye pain  ENT ROS: negative for - epistaxis, headaches, nasal congestion, oral lesions or sore throat  Allergy and Immunology ROS: negative for - hives  Hematological and Lymphatic ROS: negative for - swollen lymph nodes  Endocrine ROS: negative for - polydipsia/polyuria or temperature intolerance  Respiratory ROS: no cough, shortness of breath, or wheezing  Cardiovascular ROS: no chest pain or dyspnea on exertion  Gastrointestinal ROS: no abdominal pain, change in bowel habits, or black or bloody stools  Dermatological ROS: negative for mole changes and rash  Musculoskeletal ROS: negative for - gait " "disturbance or muscle pain  Neurological ROS: no TIA or stroke symptoms  Genito-Urinary ROS: no dysuria, trouble voiding, or hematuria      PHYSICAL EXAM:   Vitals:    03/24/17 0744   BP: (!) 140/60   Pulse: 64   Temp: 98 °F (36.7 °C)     Weight: 80.2 kg (176 lb 11.2 oz)   Height: 5' 4" (162.6 cm)   Body mass index is 30.33 kg/(m^2).     General appearance - alert, well appearing, and in no distress and obese  Mental status - alert, oriented to person, place, and time, normal mood, behavior, speech, dress, motor activity, and thought processes  Eyes - pupils equal and reactive, extraocular eye movements intact, sclera anicteric  Mouth - mucous membranes moist, pharynx normal without lesions  Neck - supple, no significant adenopathy, carotids upstroke normal bilaterally, no bruits, thyroid exam: thyroid is normal in size without nodules or tenderness  Lymphatics - no palpable lymphadenopathy  Chest - clear to auscultation, no wheezes, rales or rhonchi, symmetric air entry  Heart - normal rate and regular rhythm, no gallops noted  Neurological - alert, oriented, normal speech, no focal findings or movement disorder noted, cranial nerves II through XII intact  Musculoskeletal - no muscular tenderness noted, osteoarthritic changes noted in both hands  Extremities - pedal edema trace +  Skin - normal coloration and turgor, no rashes, no suspicious skin lesions noted      Results for orders placed or performed in visit on 03/14/17   CBC auto differential   Result Value Ref Range    WBC 9.51 3.90 - 12.70 K/uL    RBC 4.55 4.00 - 5.40 M/uL    Hemoglobin 13.6 12.0 - 16.0 g/dL    Hematocrit 42.9 37.0 - 48.5 %    MCV 94 82 - 98 fL    MCH 29.9 27.0 - 31.0 pg    MCHC 31.7 (L) 32.0 - 36.0 %    RDW 14.6 (H) 11.5 - 14.5 %    Platelets 276 150 - 350 K/uL    MPV 11.3 9.2 - 12.9 fL    Gran # 4.9 1.8 - 7.7 K/uL    Lymph # 3.5 1.0 - 4.8 K/uL    Mono # 0.6 0.3 - 1.0 K/uL    Eos # 0.5 0.0 - 0.5 K/uL    Baso # 0.06 0.00 - 0.20 K/uL    Gran% " 51.7 38.0 - 73.0 %    Lymph% 36.4 18.0 - 48.0 %    Mono% 6.3 4.0 - 15.0 %    Eosinophil% 4.8 0.0 - 8.0 %    Basophil% 0.6 0.0 - 1.9 %    Differential Method Automated    Comprehensive metabolic panel   Result Value Ref Range    Sodium 142 136 - 145 mmol/L    Potassium 3.8 3.5 - 5.1 mmol/L    Chloride 107 95 - 110 mmol/L    CO2 25 23 - 29 mmol/L    Glucose 154 (H) 70 - 110 mg/dL    BUN, Bld 16 8 - 23 mg/dL    Creatinine 0.9 0.5 - 1.4 mg/dL    Calcium 9.4 8.7 - 10.5 mg/dL    Total Protein 7.3 6.0 - 8.4 g/dL    Albumin 3.5 3.5 - 5.2 g/dL    Total Bilirubin 0.6 0.1 - 1.0 mg/dL    Alkaline Phosphatase 81 55 - 135 U/L    AST 19 10 - 40 U/L    ALT 20 10 - 44 U/L    Anion Gap 10 8 - 16 mmol/L    eGFR if African American >60.0 >60 mL/min/1.73 m^2    eGFR if non African American >60.0 >60 mL/min/1.73 m^2   Lipid panel   Result Value Ref Range    Cholesterol 144 120 - 199 mg/dL    Triglycerides 112 30 - 150 mg/dL    HDL 48 40 - 75 mg/dL    LDL Cholesterol 73.6 63.0 - 159.0 mg/dL    HDL/Chol Ratio 33.3 20.0 - 50.0 %    Total Cholesterol/HDL Ratio 3.0 2.0 - 5.0    Non-HDL Cholesterol 96 mg/dL   Hemoglobin A1c   Result Value Ref Range    Hemoglobin A1C 9.5 (H) 4.5 - 6.2 %    Estimated Avg Glucose 226 (H) 68 - 131 mg/dL   TSH   Result Value Ref Range    TSH 3.315 0.400 - 4.000 uIU/mL          ASSESSMENT AND PLAN:  1. 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 Type 2 diabetes, uncontrolled, with neuropathy/3. 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  She will continue current regimen for now.  We will consult diabetes management for further evaluation and treatment.  Neuropathy pain control.  She is up-to-date on her eye exam.  - insulin detemir (LEVEMIR) 100 unit/mL injection; ADMINISTER 50 UNITS UNDER THE SKIN EVERY EVENING   "Dispense: 60 mL; Refill: 2  - pen needle, diabetic (BD INSULIN PEN NEEDLE UF SHORT) 31 gauge x 5/16" Ndle; Use 3 times daily  Dispense: 100 each; Refill: 11  - Ambulatory referral to Endocrinology    4. Essential hypertension  Diabetes is borderline control.  We will see if cardiology is okay with her taking 250 mg tablets twice a day on the metoprolol.  She will follow-up with cardiology as per their recommendations for continued help with blood pressure management.  We discussed low-salt diet and regular exercise.    5. Hyperlipidemia LDL goal <100  We discussed low fat diet and regular exercise.The current medical regimen is effective;  continue present plan and medications.      6. Hypothyroidism (acquired)  Patient is clinically euthyroid. Continue current regimen.     7. Obesity (BMI 30-39.9)  The patient is asked to make an attempt to improve diet and exercise patterns to aid in medical management of this problem.     8. Colon cancer screening    - Case request GI: COLONOSCOPY          Return in about 3 months (around 6/24/2017), or if symptoms worsen or fail to improve, for follow up chronic medical conditions.. or sooner as needed.  "

## 2017-03-24 NOTE — MR AVS SNAPSHOT
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, with long-term current use of insulin, unspecified laterality, unspecified retinopathy severity         Essential hypertension         Hyperlipidemia LDL goal <100         Hypothyroidism (acquired)         Obesity (BMI 30-39.9)         Colon cancer screening                To Do List           Future Appointments        Provider Department Dept Phone    2017 8:00 AM Jose R Salazar OD [facility] - Optometry [phone]    2017 7:30 AM Sumi Francis DPM [facility] Podiatry [phone]    2017 1:20 PM Juan Pablo Ordoñez MD [facility] - Cardiology [phone]      Goals (5 Years of Data)              3/14/17    12/15/16    9/16/16    HEMOGLOBIN A1C < 7.0   9.5  8.9  8.5    Related Problems    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       These Medications        Disp Refills Start End    insulin detemir (LEVEMIR) 100 unit/mL injection 60 mL 2 3/24/2017     ADMINISTER 50 UNITS UNDER THE SKIN EVERY EVENING    Pharmacy: [id number] - [place] - [address] AT [facility] Ph #: [phone]       pen needle, diabetic (BD INSULIN PEN NEEDLE UF  "SHORT) 31 gauge x 5/16" Ndle 100 each 11 3/24/2017     Use 3 times daily    Pharmacy: [id number]  [place] [facility] #: [phone]         [facility] On Call     [facility] On Call Nurse Care Line - 24/7 Assistance  Registered nurses in the [facility] Center provide clinical advisement, health education, appointment booking, and other advisory services.  Call for this free service at [phone].             Medications           Message regarding Medications     Verify the changes and/or additions to your medication regime listed below are the same as discussed with your clinician today.  If any of these changes or additions are incorrect, please notify your healthcare provider.        START taking these NEW medications        Refills    pen needle, diabetic (BD INSULIN PEN NEEDLE UF SHORT) 31 gauge x 5/16" Ndle 11    Sig: Use 3 times daily    Class: Normal           Verify that the below list of medications is an accurate representation of the medications you are currently taking.  If none reported, the list may be blank. If incorrect, please contact your healthcare provider. Carry this list with you in case of emergency.           Current Medications     alendronate (FOSAMAX) 70 MG tablet TAKE 1 TABLET BY MOUTH EVERY 7 DAYS    aspirin (ECOTRIN) 81 MG EC tablet Take 1 tablet (81 mg total) by mouth once daily.    atorvastatin (LIPITOR) 10 MG tablet TAKE 1 TABLET BY MOUTH DAILY    levothyroxine (SYNTHROID) 50 MCG tablet TAKE 1 TABLET(50 MCG) BY MOUTH EVERY DAY    losartan (COZAAR) 100 MG tablet Take 1 tablet (100 mg total) by mouth once daily.    nifedipine (PROCARDIA-XL) 90 MG (OSM) TR24 TAKE 1 TABLET BY MOUTH EVERY DAY    NOVOLOG FLEXPEN 100 unit/mL InPn pen ADMINISTER 28 UNITS UNDER THE SKIN THREE TIMES DAILY WITH MEALS    omeprazole (PRILOSEC) 20 MG capsule Take 1 capsule (20 mg total) by mouth once daily.    blood sugar diagnostic Strp True Results; " "Test tid    insulin aspart (NOVOLOG FLEXPEN) 100 unit/mL InPn pen ADMINISTER 28 UNITS UNDER THE SKIN THREE TIMES DAILY WITH MEALS    insulin detemir (LEVEMIR) 100 unit/mL injection ADMINISTER 50 UNITS UNDER THE SKIN EVERY EVENING    insulin syringe-needle U-100 1 mL 31 gauge x 5/16 Syrg USE THREE TIMES DAILY AS DIRECTED    insulin syringe-needle U-100 1/2 mL 30 Syrg USE NIGHTLY    insulin syringe-needle U-100 1/2 mL 31 x 5/16" Syrg 1 Device by Misc.(Non-Drug; Combo Route) route nightly.    lancets 26 gauge Misc 1 lancet by Misc.(Non-Drug; Combo Route) route 3 (three) times daily.    metoprolol succinate (TOPROL-XL) 200 MG 24 hr tablet Take 1 tablet (200 mg total) by mouth once daily.    pen needle, diabetic (BD INSULIN PEN NEEDLE UF SHORT) 31 gauge x 5/16" Ndle Use 3 times daily           Clinical Reference Information           Your Vitals Were     BP Pulse Temp Height Weight SpO2    140/60 64 98 °F (36.7 °C) 5' 4" (1.626 m) 80.2 kg (176 lb 11.2 oz) 97%    BMI                30.33 kg/m2          Blood Pressure          Most Recent Value    BP  (!)  140/60      Allergies as of 3/24/2017     Lisinopril    Pravastatin      Immunizations Administered on Date of Encounter - 3/24/2017     None      Orders Placed During Today's Visit      Normal Orders This Visit    Ambulatory referral to Endocrinology     Case request GI: COLONOSCOPY       Language Assistance Services     ATTENTION: Language assistance services are available, free of charge. Please call [phone].      ATENCIÓN: Si habla español, tiene a jara disposición servicios gratuitos de asistencia lingüística. Llame al [phone].     [facility] Ý: N?u b?n nói Ti?ng Vi?t, có các d?ch v? h? tr? ngôn ng? mi?n phí dành cho b?n. G?i s? [phone].         [facility] complies with applicable Federal civil rights laws and does not discriminate on the basis of race, color, national origin, age, disability, or sex.        "

## 2017-04-17 DIAGNOSIS — K21.9 GASTROESOPHAGEAL REFLUX DISEASE WITHOUT ESOPHAGITIS: Chronic | ICD-10-CM

## 2017-04-17 RX ORDER — OMEPRAZOLE 20 MG/1
20 CAPSULE, DELAYED RELEASE ORAL DAILY PRN
Qty: 90 CAPSULE | Refills: 0 | Status: SHIPPED | OUTPATIENT
Start: 2017-04-17 | End: 2017-07-31 | Stop reason: SDUPTHER

## 2017-04-18 DIAGNOSIS — E78.5 HYPERLIPIDEMIA LDL GOAL <100: ICD-10-CM

## 2017-04-18 DIAGNOSIS — I10 ESSENTIAL HYPERTENSION: Primary | ICD-10-CM

## 2017-04-18 RX ORDER — NIFEDIPINE 90 MG/1
90 TABLET, EXTENDED RELEASE ORAL DAILY
Qty: 90 TABLET | Refills: 0 | Status: SHIPPED | OUTPATIENT
Start: 2017-04-18 | End: 2017-07-17 | Stop reason: SDUPTHER

## 2017-04-18 RX ORDER — ATORVASTATIN CALCIUM 10 MG/1
10 TABLET, FILM COATED ORAL DAILY
Qty: 90 TABLET | Refills: 0 | Status: SHIPPED | OUTPATIENT
Start: 2017-04-18 | End: 2017-07-17 | Stop reason: SDUPTHER

## 2017-04-18 NOTE — TELEPHONE ENCOUNTER
----- Message from Chelsey Downs sent at 4/18/2017  3:03 PM CDT -----  Contact: self  nifedipine (PROCARDIA-XL) 90 MG (OSM) TR24  atorvastatin (LIPITOR) 10 MG tablet    Pt calling to request a refill of the above. Pt put this request in Friday with [facility]. Pt is out of medication. Please send to [facility]. THanks

## 2017-04-21 ENCOUNTER — HOSPITAL ENCOUNTER (OUTPATIENT)
Facility: HOSPITAL | Age: 77
Discharge: HOME | End: 2017-04-21
Attending: INTERNAL MEDICINE | Admitting: INTERNAL MEDICINE
Payer: MEDICARE

## 2017-04-21 ENCOUNTER — ANESTHESIA EVENT (OUTPATIENT)
Dept: ENDOSCOPY | Facility: HOSPITAL | Age: 77
End: 2017-04-21
Payer: MEDICARE

## 2017-04-21 ENCOUNTER — ANESTHESIA (OUTPATIENT)
Dept: ENDOSCOPY | Facility: HOSPITAL | Age: 77
End: 2017-04-21
Payer: MEDICARE

## 2017-04-21 VITALS
HEART RATE: 66 BPM | OXYGEN SATURATION: 97 % | DIASTOLIC BLOOD PRESSURE: 77 MMHG | TEMPERATURE: 98 F | SYSTOLIC BLOOD PRESSURE: 162 MMHG | RESPIRATION RATE: 18 BRPM

## 2017-04-21 DIAGNOSIS — Z12.11 SCREENING FOR COLON CANCER: ICD-10-CM

## 2017-04-21 PROCEDURE — 88305 TISSUE EXAM BY PATHOLOGIST: CPT | Performed by: PATHOLOGY

## 2017-04-21 PROCEDURE — D9220A PRA ANESTHESIA: Mod: PT,ANES,, | Performed by: ANESTHESIOLOGY

## 2017-04-21 PROCEDURE — D9220A PRA ANESTHESIA: Mod: PT,CRNA,, | Performed by: NURSE ANESTHETIST, CERTIFIED REGISTERED

## 2017-04-21 PROCEDURE — 27201089 HC SNARE, DISP (ANY): Performed by: INTERNAL MEDICINE

## 2017-04-21 PROCEDURE — 63600175 PHARM REV CODE 636 W HCPCS

## 2017-04-21 PROCEDURE — 37000009 HC ANESTHESIA EA ADD 15 MINS: Performed by: INTERNAL MEDICINE

## 2017-04-21 PROCEDURE — 37000008 HC ANESTHESIA 1ST 15 MINUTES: Performed by: INTERNAL MEDICINE

## 2017-04-21 PROCEDURE — 45380 COLONOSCOPY AND BIOPSY: CPT | Performed by: INTERNAL MEDICINE

## 2017-04-21 PROCEDURE — 25000003 PHARM REV CODE 250

## 2017-04-21 PROCEDURE — 27201012 HC FORCEPS, HOT/COLD, DISP: Performed by: INTERNAL MEDICINE

## 2017-04-21 PROCEDURE — 25000003 PHARM REV CODE 250: Performed by: INTERNAL MEDICINE

## 2017-04-21 PROCEDURE — 45385 COLONOSCOPY W/LESION REMOVAL: CPT | Performed by: INTERNAL MEDICINE

## 2017-04-21 PROCEDURE — 88305 TISSUE EXAM BY PATHOLOGIST: CPT | Mod: 26,,, | Performed by: PATHOLOGY

## 2017-04-21 RX ORDER — LIDOCAINE HYDROCHLORIDE 20 MG/ML
INJECTION, SOLUTION EPIDURAL; INFILTRATION; INTRACAUDAL; PERINEURAL
Status: COMPLETED
Start: 2017-04-21 | End: 2017-04-21

## 2017-04-21 RX ORDER — SODIUM CHLORIDE 9 MG/ML
INJECTION, SOLUTION INTRAVENOUS CONTINUOUS
Status: DISCONTINUED | OUTPATIENT
Start: 2017-04-21 | End: 2017-04-21 | Stop reason: HOSPADM

## 2017-04-21 RX ORDER — PROPOFOL 10 MG/ML
VIAL (ML) INTRAVENOUS
Status: COMPLETED
Start: 2017-04-21 | End: 2017-04-21

## 2017-04-21 RX ADMIN — LIDOCAINE HYDROCHLORIDE 60 MG: 20 INJECTION, SOLUTION INTRAVENOUS at 07:04

## 2017-04-21 RX ADMIN — PROPOFOL 20 MG: 10 INJECTION, EMULSION INTRAVENOUS at 07:04

## 2017-04-21 RX ADMIN — PROPOFOL 40 MG: 10 INJECTION, EMULSION INTRAVENOUS at 07:04

## 2017-04-21 RX ADMIN — SODIUM CHLORIDE: 0.9 INJECTION, SOLUTION INTRAVENOUS at 07:04

## 2017-04-21 RX ADMIN — PROPOFOL 60 MG: 10 INJECTION, EMULSION INTRAVENOUS at 07:04

## 2017-04-21 NOTE — TRANSFER OF CARE
Anesthesia Transfer of Care Note    Patient: Joel Condon    Procedure(s) Performed: Procedure(s) (LRB):  COLONOSCOPY (N/A)    Patient location: GI    Anesthesia Type: general    Transport from OR: Transported from OR on room air with adequate spontaneous ventilation    Post pain: adequate analgesia    Post assessment: no apparent anesthetic complications    Post vital signs: stable    Level of consciousness: sedated and responds to stimulation    Nausea/Vomiting: no nausea/vomiting    Complications: none          Last vitals:   Visit Vitals    BP (!) 148/65 (BP Location: Left arm, Patient Position: Lying, BP Method: Automatic)    Pulse 66    Temp 36.4 °C (97.5 °F) (Oral)    Resp 15    SpO2 95%    Breastfeeding No

## 2017-04-21 NOTE — IP AVS SNAPSHOT
[place] [address] [place] [address]  Phone: [phone]           Patient Discharge Instructions   Our goal is to set you up for success. This packet includes information on your condition, medications, and your home care.  It will help you care for yourself to prevent having to return to the hospital.     Please ask your nurse if you have any questions.      There are many details to remember when preparing to leave the hospital. Here is what you will need to do:    1. Take your medicine. If you are prescribed medications, review your Medication List on the following pages. You may have new medications to  at the pharmacy and others that you'll need to stop taking. Review the instructions for how and when to take your medications. Talk with your doctor or nurses if you are unsure of what to do.     2. Go to your follow-up appointments. Specific follow-up information is listed in the following pages. Your may be contacted by a nurse or clinical provider about future appointments. Be sure we have all of the phone numbers to reach you. Please contact your provider's office if you are unable to make an appointment.     3. Watch for warning signs. Your doctor or nurse will give you detailed warning signs to watch for and when to call for assistance. These instructions may also include educational information about your condition. If you experience any of warning signs to your health, call your doctor.               ** Verify the list of medication(s) below is accurate and up to date. Carry this with you in case of emergency. If your medications have changed, please notify your healthcare provider.             Medication List      CONTINUE taking these medications        Additional Info                      alendronate 70 MG tablet   Commonly known as:  FOSAMAX   Quantity:  12 tablet   Refills:  3   Comments:  **Patient requests 90 days supply**    Instructions:  TAKE 1  TABLET BY MOUTH EVERY 7 DAYS     Begin Date    AM    Noon    PM    Bedtime       aspirin 81 MG EC tablet   Commonly known as:  ECOTRIN   Quantity:  90 tablet   Refills:  3   Dose:  81 mg    Instructions:  Take 1 tablet (81 mg total) by mouth once daily.     Begin Date    AM    Noon    PM    Bedtime       atorvastatin 10 MG tablet   Commonly known as:  LIPITOR   Quantity:  90 tablet   Refills:  0   Dose:  10 mg    Instructions:  Take 1 tablet (10 mg total) by mouth once daily.     Begin Date    AM    Noon    PM    Bedtime       blood sugar diagnostic Strp   Quantity:  300 strip   Refills:  11    Instructions:  True Results; Test tid     Begin Date    AM    Noon    PM    Bedtime       insulin aspart 100 unit/mL Inpn pen   Commonly known as:  NOVOLOG FLEXPEN   Quantity:  30 mL   Refills:  2    Instructions:  ADMINISTER 28 UNITS UNDER THE SKIN THREE TIMES DAILY WITH MEALS     Begin Date    AM    Noon    PM    Bedtime       insulin detemir 100 unit/mL injection   Commonly known as:  LEVEMIR   Quantity:  60 mL   Refills:  2    Instructions:  ADMINISTER 50 UNITS UNDER THE SKIN EVERY EVENING     Begin Date    AM    Noon    PM    Bedtime       * insulin syringe-needle U-100 0.5 mL 31 gauge x 5/16 Syrg   Quantity:  100 each   Refills:  6   Dose:  1 Device    Instructions:  1 Device by Misc.(Non-Drug; Combo Route) route nightly.     Begin Date    AM    Noon    PM    Bedtime       * insulin syringe-needle U-100 1/2 mL 30 gauge Syrg   Quantity:  100 each   Refills:  0    Instructions:  USE NIGHTLY     Begin Date    AM    Noon    PM    Bedtime       insulin syringe-needle U-100 1 mL 31 gauge x 5/16 Syrg   Quantity:  100 each   Refills:  0    Instructions:  USE THREE TIMES DAILY AS DIRECTED     Begin Date    AM    Noon    PM    Bedtime       lancets 26 gauge Misc   Quantity:  200 each   Refills:  11   Dose:  1 lancet    Instructions:  1 lancet by Misc.(Non-Drug; Combo Route) route 3 (three) times daily.     Begin Date    AM     "Noon    PM    Bedtime       levothyroxine 50 MCG tablet   Commonly known as:  SYNTHROID   Quantity:  90 tablet   Refills:  0    Instructions:  TAKE 1 TABLET(50 MCG) BY MOUTH EVERY DAY     Begin Date    AM    Noon    PM    Bedtime       losartan 100 MG tablet   Commonly known as:  COZAAR   Quantity:  90 tablet   Refills:  3   Dose:  100 mg    Instructions:  Take 1 tablet (100 mg total) by mouth once daily.     Begin Date    AM    Noon    PM    Bedtime       metoprolol succinate 200 MG 24 hr tablet   Commonly known as:  TOPROL-XL   Quantity:  90 tablet   Refills:  3   Dose:  200 mg    Instructions:  Take 1 tablet (200 mg total) by mouth once daily.     Begin Date    AM    Noon    PM    Bedtime       nifedipine 90 MG (OSM) Tr24   Commonly known as:  PROCARDIA-XL   Quantity:  90 tablet   Refills:  0   Dose:  90 mg    Instructions:  Take 1 tablet (90 mg total) by mouth once daily.     Begin Date    AM    Noon    PM    Bedtime       omeprazole 20 MG capsule   Commonly known as:  PRILOSEC   Quantity:  90 capsule   Refills:  0   Dose:  20 mg    Instructions:  Take 1 capsule (20 mg total) by mouth daily as needed (heartburn).     Begin Date    AM    Noon    PM    Bedtime       pen needle, diabetic 31 gauge x 5/16" Ndle   Commonly known as:  BD INSULIN PEN NEEDLE UF SHORT   Quantity:  100 each   Refills:  11    Instructions:  Use 3 times daily     Begin Date    AM    Noon    PM    Bedtime       polyethylene glycol 240-22.72-6.72 -5.84 gram Solr   Commonly known as:  COLYTE   Quantity:  1 Bottle   Refills:  0   Dose:  4 L    Instructions:  Take 4,000 mLs (4 L total) by mouth as needed.     Begin Date    AM    Noon    PM    Bedtime       * Notice:  This list has 2 medication(s) that are the same as other medications prescribed for you. Read the directions carefully, and ask your doctor or other care provider to review them with you.               Please bring to all follow up appointments:    1. A copy of your discharge " instructions.  2. All medicines you are currently taking in their original bottles.  3. Identification and insurance card.    Please arrive 15 minutes ahead of scheduled appointment time.    Please call 24 hours in advance if you must reschedule your appointment and/or time.        Your Scheduled Appointments     May 02, 2017  8:00 AM CDT   Established Patient Visit with Jose R Salazar OD   [facility] ([facility])    [address]  [place] [address]   [phone]            Jun 07, 2017  7:30 AM CDT   Established Patient Visit with Sumi Francis DPM   [facility] ([facility])    [address]  [place] [address] [phone]            Jun 22, 2017  1:20 PM CDT   Established Patient Visit with Juan Pablo Ordoñez MD   [facility] ([facility])    [address]  [place] [address] [phone]            Jul 31, 2017  8:00 AM CDT   Established Patient Visit with Shelby Ley MD   [facility] ([facility])    [address]  [place] [address] [phone]                Discharge Instructions     Future Orders    Activity as tolerated     Call MD for:  difficulty breathing, headache or visual disturbances     Call MD for:  persistent nausea and vomiting     Call MD for:  severe uncontrolled pain     Call MD for:  temperature >100.4     Diet general     Questions:    Total calories:      Fat restriction, if any:      Protein restriction, if any:      Na restriction, if any:      Fluid restriction:      Additional restrictions:          Discharge Instructions         Understanding Colon and Rectal Polyps    The colon (also called the large intestine) is a muscular tube that forms the last part of the digestive tract. It absorbs water and stores food waste. The colon is about 4 to 6 feet long. The rectum is the last 6 inches of the colon. The colon and rectum have a smooth lining composed of millions of cells. Changes in  these cells can lead to growths in the colon that can become cancerous and should be removed. Multiple tests are available to screen for colon cancer, but the colonoscopy is the most recommended test. During colonoscopy, these polyps can be removed. How often you need this test depends on many things including your condition, your family history, symptoms, and what the findings were at the previous colonoscopy.   When the colon lining changes  Changes that happen in the cells that line the colon or rectum can lead to growths called polyps. Over a period of years, polyps can turn cancerous. Removing polyps early may prevent cancer from ever forming.  Polyps  Polyps are fleshy clumps of tissue that form on the lining of the colon or rectum. Small polyps are usually benign (not cancerous). However, over time, cells in a polyp can change and become cancerous. Certain types of polyps known as adenomatous polyps are premalignant. The risk for invasive cancer increases with the size of the polyp and certain cell and gene features. This means that they can become cancerous if they're not removed. Hyperplastic polyps are benign. They can grow quite large and not turn cancerous.   Cancer  Almost all colorectal cancers start when polyp cells begin growing abnormally. As a cancerous tumor grows, it may involve more and more of the colon or rectum. In time, cancer can also grow beyond the colon or rectum and spread to nearby organs or to glands called lymph nodes. The cells can also travel to other parts of the body. This is known as metastasis. The earlier a cancerous tumor is removed, the better the chance of preventing its spread.    Date Last Reviewed: 8/1/2016  © 5762-9457 [employer]. [address] [place] [address], [place] [address]. All rights reserved. This information is not intended as a substitute for professional medical care. Always follow your healthcare professional's  instructions.        Diverticulosis    Diverticulosis means that small pouches have formed in the wall of your large intestine (colon). Most often, this problem causes no symptoms and is common as people age. But the pouches in the colon are at risk of becoming infected. When this happens, the condition is called diverticulitis. Although most people with diverticulosis never develop diverticulitis, it is still not uncommon. Rectal bleeding can also occur and in less common situations, a type of colon inflammation called colitis.  While most people do not have symptoms, some people with diverticulosis may have:  · Abdominal cramps and pain  · Bloating  · Constipation  · Change in bowel habits  Causes  The exact cause of diverticulosis (and diverticulitis) has not been proved, but a few things are associated with the condition:  · Low-fiber diet  · Constipation  · Lack of exercise  Your healthcare provider will talk with you about how to manage your condition. Diet changes may be all that are needed to help control diverticulosis and prevent progression to diverticulitis. If you develop diverticulitis, you will likely need other treatments.  Home care  You may be told to take fiber supplements daily. Fiber adds bulk to the stool so that it passes through the colon more easily. Stool softeners may be recommended. You may also be given medications for pain relief. Be sure to take all medications as directed.  In the past, people were told to avoid corn, nuts, and seeds. This is no longer necessary.  Follow these guidelines when caring for yourself at home:  · Eat unprocessed foods that are high in fiber. Whole grains, fruits, and vegetables are good choices.  · Drink 6 to 8 glasses of water every day unless your healthcare provider has you limit how much fluid you should have.  · Watch for changes in your bowel movements. Tell your provider if you notice any changes.  · Begin an exercise program. Ask your provider how  to get started. Generally, walking is the best.  · Get plenty of rest and sleep.  Follow-up care  Follow up with your healthcare provider, or as advised. Regular visits may be needed to check on your health. Sometimes special procedures such as colonoscopy, are needed after an episode of diverticulitis or blooding. Be sure to keep all your appointments.  If a stool sample was taken, or cultures were done, you should be told if they are positive, or if your treatment needs to be changed. You can call as directed for the results.  If X-rays were done, a radiologist will look at them. You will be told if there is a change in your treatment.  If antibiotics were prescribed, be sure to finish them all.  When to seek medical advice  Call your healthcare provider right away if any of these occur:  · Fever of 100.4°F (38°C) or higher, or as directed by your healthcare provider  · Severe cramps in the lower left side of the abdomen or pain that is getting worse  · Tenderness in the lower left side of the abdomen or worsening pain throughout the abdomen  · Diarrhea or constipation that doesn't get better within 24 hours  · Nausea and vomiting  · Bleeding from the rectum  Call 911  Call emergency services if any of the following occur:  · Trouble breathing  · Confusion  · Very drowsy or trouble awakening  · Fainting or loss of consciousness  · Rapid heart rate  · Chest pain  Date Last Reviewed: 12/30/2015 © 2000-2016 [employer]. [address] [place] [address]. All rights reserved. This information is not intended as a substitute for professional medical care. Always follow your healthcare professional's instructions.        High-Fiber Diet  Fiber is in fruits, vegetables, cereals, and grains. Fiber passes through your body undigested. A high-fiber diet helps food move through your intestinal tract. The added bulk is helpful in preventing constipation. In people with diverticulosis, fiber helps clean  out the pouches along the colon wall. It also prevents new pouches from forming. A high-fiber diet reduces the risk of colon cancer. It also lowers blood cholesterol and prevents high blood sugar in people with diabetes.    The fiber-rich foods listed below should be part of your diet. If you are not used to high-fiber foods, start with 1 or 2 foods from this list. Every 3 to 4 days add a new one to your diet. Do this until you are eating 4 high-fiber foods per day. This should give you 20 to 35 grams of fiber a day. It is also important to drink a lot of water when you are on this diet. You should have 6 to 8 glasses of water a day. Water makes the fiber swell and increases the benefit.  Foods high in dietary fiber  The following foods are high in dietary fiber:  · Breads. Breads made with 100% whole-wheat flour; herminia, wheat, or rye crackers; whole-grain tortillas, bran muffins.  · Cereals. Whole-grain and bran cereals with bran (shredded wheat, wheat flakes, raisin bran, corn bran); oatmeal, rolled oats, granola, and brown rice.  · Fruits. Fresh fruits and their edible skins (pears, prunes, raisins, berries, apples, and apricots); bananas, citrus fruit, mangoes, pineapple; and prune juice.  · Nuts. Any nuts and seeds.  · Vegetables. Best served raw or lightly cooked. All types, especially: green peas, celery, eggplant, potatoes, spinach, broccoli, [place] sprouts, winter squash, carrots, cauliflower, soybeans, lentils, and fresh and dried beans of all kinds.  · Other. Popcorn, any spices.  Date Last Reviewed: 8/1/2016  © 0420-9214 [employer]. [address] [place] [address], [place] [address]. All rights reserved. This information is not intended as a substitute for professional medical care. Always follow your healthcare professional's instructions.        Understanding Hemorrhoids    Hemorrhoid tissues are cushions of blood vessels that swell slightly during bowel movements. Too much pressure on the  anal canal can make these tissues remain enlarged, become inflamed, and cause symptoms. This can happen both inside and outside the anal canal.  Parts of the anal canal  The parts of the anal canal are:  · Internal hemorrhoid tissue is in the upper area of the anal canal.  · The rectum is the last several inches of the colon. This is where stool is stored prior to bowel movements.  · Anal sphincters are ring-shaped muscles that expand and contract to control the anal opening.  · External hemorrhoid tissue lies under the anal skin.  · The anus is the passage between the rectum and the outside of the body.  Normal hemorrhoid tissue  Hemorrhoid tissues play an important role in helping your body eliminate waste. Food passes from the stomach through the intestines. The waste (stool) then travels through the colon to the rectum. It is stored in the rectum until its ready to be passed from the anus. During bowel movements, hemorrhoids swell with blood and become slightly larger. This swelling helps protect and cushion the anal canal as stool passes from the body. Once the stool has passed, the tissues stop swelling and return to normal.  Problem hemorrhoids  Pressure due to straining or other factors can cause hemorrhoid tissues to remain swollen. When this happens to the hemorrhoid tissues in the anal canal theyre called internal hemorrhoids. Swollen tissues around the anal opening are called external hemorrhoids. Depending on the location, your symptoms can differ.  · Internal hemorrhoids often happen in clusters around the wall of the anal canal. They are usually painless. But they may prolapse (protrude out of the anus) due to straining or pressure from hard stool. After the bowel movement is over, they may then reduce (return inside the body). Internal hemorrhoids often bleed. They can also discharge mucus.  ·   External hemorrhoids are located at the anal opening, just beneath the skin. These tissues rarely cause  problems unless they thrombose (form a blood clot). When this happens, a hard, bluish lump may appear. A thrombosed hemorrhoid also causes sudden, severe pain. In time, the clot may go away on its own. This sometimes leaves a skin tag of tissue stretched by the clot.  Hemorrhoid symptoms  Hemorrhoid symptoms may include:  · Pain or a burning sensation  · Bleeding during bowel movements  · Protrusion of tissue from the anus  · Itching around the anus  Causes of hemorrhoids  Theres no single cause of hemorrhoids. Most often, though, they are caused by too much pressure on the anal canal. This can be due to:  · Chronic (ongoing) constipation  · Straining during bowel movements  · Sitting too long on the toilet  · Strenuous exercise or heavy lifting  · Pregnancy and childbirth  · Aging  · Diarrhea  Date Last Reviewed: 7/1/2016  © 9791-3569 [employer]. [address] [place] [address]. All rights reserved. This information is not intended as a substitute for professional medical care. Always follow your healthcare professional's instructions.            Admission Information     Date & Time Provider Department CSN    4/21/2017  6:34 AM Homar Payan MD [facility] [id number]      Care Providers     Provider Role Specialty Primary office phone    Homar Payan MD Attending Provider Gastroenterology [phone]    Mykel Boles MD Surgeon  Gastroenterology [phone]    Homar Payan MD Surgeon  Gastroenterology [phone]      Your Vitals Were     BP Pulse Temp Resp SpO2       148/65 (BP Location: Left arm, Patient Position: Lying, BP Method: Automatic) 66 97.5 °F (36.4 °C) (Oral) 15 95%       Recent Lab Values        5/18/2015 9/9/2015 12/14/2015 3/18/2016 6/15/2016 9/16/2016 12/15/2016 3/14/2017     10:41 AM  3:54 AM  8:46 AM 10:40 AM  8:44 AM  7:03 AM  7:26 AM  7:22 AM    A1C 8.8 (H) 8.6 (H) 9.4 (H) 9.2 (H) 9.2 (H) 8.5 (H) 8.9 (H) 9.5 (H)    Comment for A1C at  7:03 AM  on 9/16/2016:  According to ADA guidelines, hemoglobin A1C <7.0% represents  optimal control in non-pregnant diabetic patients.  Different  metrics may apply to specific populations.   Standards of Medical Care in Diabetes - 2016.  For the purpose of screening for the presence of diabetes:  <5.7%     Consistent with the absence of diabetes  5.7-6.4%  Consistent with increasing risk for diabetes   (prediabetes)  >or=6.5%  Consistent with diabetes  Currently no consensus exists for use of hemoglobin A1C  for diagnosis of diabetes for children.      Comment for A1C at  7:26 AM on 12/15/2016:  According to ADA guidelines, hemoglobin A1C <7.0% represents  optimal control in non-pregnant diabetic patients.  Different  metrics may apply to specific populations.   Standards of Medical Care in Diabetes - 2016.  For the purpose of screening for the presence of diabetes:  <5.7%     Consistent with the absence of diabetes  5.7-6.4%  Consistent with increasing risk for diabetes   (prediabetes)  >or=6.5%  Consistent with diabetes  Currently no consensus exists for use of hemoglobin A1C  for diagnosis of diabetes for children.      Comment for A1C at  7:22 AM on 3/14/2017:  According to ADA guidelines, hemoglobin A1C <7.0% represents  optimal control in non-pregnant diabetic patients.  Different  metrics may apply to specific populations.   Standards of Medical Care in Diabetes - 2016.  For the purpose of screening for the presence of diabetes:  <5.7%     Consistent with the absence of diabetes  5.7-6.4%  Consistent with increasing risk for diabetes   (prediabetes)  >or=6.5%  Consistent with diabetes  Currently no consensus exists for use of hemoglobin A1C  for diagnosis of diabetes for children.        Pending Labs     Order Current Status    Specimen to Pathology - Surgery Collected (04/21/17 0827)      Allergies as of 4/21/2017        Reactions    Lisinopril Other (See Comments)    Cough    Pravastatin Other (See Comments)     headaches      [facility]     [facility] Nurse Care Line - 24/7 Assistance  Unless otherwise directed by your provider, please contact [facility], our nurse care line that is available for 24/7 assistance.     Registered nurses in the [facility] provide clinical advisement, health education, appointment booking, and other advisory services.  Call for this free service at [phone].        Advance Directives     An advance directive is a document which, in the event you are no longer able to make decisions for yourself, tells your healthcare team what kind of treatment you do or do not want to receive, or who you would like to make those decisions for you.  If you do not currently have an advance directive, [facility] encourages you to create one.  For more information call:  [phone]), [phone] ([phone]),  or log on to [email or web address].        Language Assistance Services     ATTENTION: Language assistance services are available, free of charge. Please call [phone].      ATENCIÓN: Si habla español, tiene a jara disposición servicios gratuitos de asistencia lingüística. Llame al [phone].     CHÚ Ý: N?u b?n nói Ti?ng Vi?t, có các d?ch v? h? tr? ngôn ng? mi?n phí dành cho b?n. G?i s? [phone].        Diabetes Discharge Instructions                                    [facility] complies with applicable Federal civil rights laws and does not discriminate on the basis of race, color, national origin, age, disability, or sex.

## 2017-04-21 NOTE — H&P
Gastroenterology    CC: Screening    HPI 76 y.o. female with no reported polyps in past and no FH colon cancer      Past Medical History:   Diagnosis Date    Arthritis     Hyperlipidemia LDL goal < 100     Hypertension     Hypothyroidism     Osteoporosis, post-menopausal     Overweight     Proteinuria     Type II or unspecified type diabetes mellitus with renal manifestations, uncontrolled          Review of Systems  General ROS: negative for chills, fever or weight loss  Cardiovascular ROS: no chest pain or dyspnea on exertion    Physical Examination  Breastfeeding? No  General appearance: alert, cooperative, no distress  HENT: Normocephalic, atraumatic, neck symmetrical, no nasal discharge   Eyes: conjunctivae/corneas clear, no icterus, EOM's intact  Lungs: resp non-labored  Heart: regular rate   Abdomen: soft, non-tender; bowel sounds normoactive; no organomegaly  Extremities: extremities symmetric; no clubbing, cyanosis, or edema  Neurologic: Alert and oriented X 3, normal strength, normal coordination and gait    Assessment:     Screening    Plan:   Colonoscopy

## 2017-04-21 NOTE — ANESTHESIA PREPROCEDURE EVALUATION
04/21/2017  Joel Condon is a 76 y.o., female.    Anesthesia Evaluation    I have reviewed the Patient Summary Reports.    I have reviewed the Nursing Notes.   I have reviewed the Medications.     Review of Systems  Anesthesia Hx:  No problems with previous Anesthesia  History of prior surgery of interest to airway management or planning: Previous anesthesia: General  Denies Personal Hx of Anesthesia complications.   Cardiovascular:   Hypertension    Pulmonary:  Pulmonary Normal    Renal/:   Chronic Renal Disease proteinuria   Hepatic/GI:   GERD    Neurological:  Neurology Normal    Endocrine:   Diabetes, poorly controlled, type 2 Hypothyroidism  Metabolic Disorders      Physical Exam  General:  Well nourished    Airway/Jaw/Neck:  Airway Findings: Mouth Opening: Normal Tongue: Normal  General Airway Assessment: Adult  Mallampati: II  TM Distance: Normal, at least 6 cm  Jaw/Neck Findings:  Neck ROM: Normal ROM      Dental:  Dental Findings:   Chest/Lungs:  Chest/Lungs Findings: Clear to auscultation     Heart/Vascular:  Heart Findings: Rate: Normal  Rhythm: Regular Rhythm  Sounds: Normal        Mental Status:  Mental Status Findings:  Cooperative, Alert and Oriented         Anesthesia Plan  Type of Anesthesia, risks & benefits discussed:  Anesthesia Type:  general  Patient's Preference: mac  Intra-op Monitoring Plan:   Intra-op Monitoring Plan Comments:   Post Op Pain Control Plan:   Post Op Pain Control Plan Comments:   Induction:   IV  Beta Blocker:         Informed Consent: Patient understands risks and agrees with Anesthesia plan.  Questions answered. Anesthesia consent signed with patient.  ASA Score: 2     Day of Surgery Review of History & Physical:    H&P update referred to the surgeon.         Ready For Surgery From Anesthesia Perspective.

## 2017-04-21 NOTE — DISCHARGE INSTRUCTIONS
Understanding Colon and Rectal Polyps    The colon (also called the large intestine) is a muscular tube that forms the last part of the digestive tract. It absorbs water and stores food waste. The colon is about 4 to 6 feet long. The rectum is the last 6 inches of the colon. The colon and rectum have a smooth lining composed of millions of cells. Changes in these cells can lead to growths in the colon that can become cancerous and should be removed. Multiple tests are available to screen for colon cancer, but the colonoscopy is the most recommended test. During colonoscopy, these polyps can be removed. How often you need this test depends on many things including your condition, your family history, symptoms, and what the findings were at the previous colonoscopy.   When the colon lining changes  Changes that happen in the cells that line the colon or rectum can lead to growths called polyps. Over a period of years, polyps can turn cancerous. Removing polyps early may prevent cancer from ever forming.  Polyps  Polyps are fleshy clumps of tissue that form on the lining of the colon or rectum. Small polyps are usually benign (not cancerous). However, over time, cells in a polyp can change and become cancerous. Certain types of polyps known as adenomatous polyps are premalignant. The risk for invasive cancer increases with the size of the polyp and certain cell and gene features. This means that they can become cancerous if they're not removed. Hyperplastic polyps are benign. They can grow quite large and not turn cancerous.   Cancer  Almost all colorectal cancers start when polyp cells begin growing abnormally. As a cancerous tumor grows, it may involve more and more of the colon or rectum. In time, cancer can also grow beyond the colon or rectum and spread to nearby organs or to glands called lymph nodes. The cells can also travel to other parts of the body. This is known as metastasis. The earlier a cancerous  tumor is removed, the better the chance of preventing its spread.    Date Last Reviewed: 8/1/2016  © 6241-5111 [employer]. [address] [place] [address], [place] [address]. All rights reserved. This information is not intended as a substitute for professional medical care. Always follow your healthcare professional's instructions.        Diverticulosis    Diverticulosis means that small pouches have formed in the wall of your large intestine (colon). Most often, this problem causes no symptoms and is common as people age. But the pouches in the colon are at risk of becoming infected. When this happens, the condition is called diverticulitis. Although most people with diverticulosis never develop diverticulitis, it is still not uncommon. Rectal bleeding can also occur and in less common situations, a type of colon inflammation called colitis.  While most people do not have symptoms, some people with diverticulosis may have:  · Abdominal cramps and pain  · Bloating  · Constipation  · Change in bowel habits  Causes  The exact cause of diverticulosis (and diverticulitis) has not been proved, but a few things are associated with the condition:  · Low-fiber diet  · Constipation  · Lack of exercise  Your healthcare provider will talk with you about how to manage your condition. Diet changes may be all that are needed to help control diverticulosis and prevent progression to diverticulitis. If you develop diverticulitis, you will likely need other treatments.  Home care  You may be told to take fiber supplements daily. Fiber adds bulk to the stool so that it passes through the colon more easily. Stool softeners may be recommended. You may also be given medications for pain relief. Be sure to take all medications as directed.  In the past, people were told to avoid corn, nuts, and seeds. This is no longer necessary.  Follow these guidelines when caring for yourself at home:  · Eat unprocessed foods that are high in  fiber. Whole grains, fruits, and vegetables are good choices.  · Drink 6 to 8 glasses of water every day unless your healthcare provider has you limit how much fluid you should have.  · Watch for changes in your bowel movements. Tell your provider if you notice any changes.  · Begin an exercise program. Ask your provider how to get started. Generally, walking is the best.  · Get plenty of rest and sleep.  Follow-up care  Follow up with your healthcare provider, or as advised. Regular visits may be needed to check on your health. Sometimes special procedures such as colonoscopy, are needed after an episode of diverticulitis or blooding. Be sure to keep all your appointments.  If a stool sample was taken, or cultures were done, you should be told if they are positive, or if your treatment needs to be changed. You can call as directed for the results.  If X-rays were done, a radiologist will look at them. You will be told if there is a change in your treatment.  If antibiotics were prescribed, be sure to finish them all.  When to seek medical advice  Call your healthcare provider right away if any of these occur:  · Fever of 100.4°F (38°C) or higher, or as directed by your healthcare provider  · Severe cramps in the lower left side of the abdomen or pain that is getting worse  · Tenderness in the lower left side of the abdomen or worsening pain throughout the abdomen  · Diarrhea or constipation that doesn't get better within 24 hours  · Nausea and vomiting  · Bleeding from the rectum  Call 911  Call emergency services if any of the following occur:  · Trouble breathing  · Confusion  · Very drowsy or trouble awakening  · Fainting or loss of consciousness  · Rapid heart rate  · Chest pain  Date Last Reviewed: 12/30/2015  © 9090-7367 [employer]. [address] [place] [address], [place] [address]. All rights reserved. This information is not intended as a substitute for professional medical care. Always follow your  healthcare professional's instructions.        High-Fiber Diet  Fiber is in fruits, vegetables, cereals, and grains. Fiber passes through your body undigested. A high-fiber diet helps food move through your intestinal tract. The added bulk is helpful in preventing constipation. In people with diverticulosis, fiber helps clean out the pouches along the colon wall. It also prevents new pouches from forming. A high-fiber diet reduces the risk of colon cancer. It also lowers blood cholesterol and prevents high blood sugar in people with diabetes.    The fiber-rich foods listed below should be part of your diet. If you are not used to high-fiber foods, start with 1 or 2 foods from this list. Every 3 to 4 days add a new one to your diet. Do this until you are eating 4 high-fiber foods per day. This should give you 20 to 35 grams of fiber a day. It is also important to drink a lot of water when you are on this diet. You should have 6 to 8 glasses of water a day. Water makes the fiber swell and increases the benefit.  Foods high in dietary fiber  The following foods are high in dietary fiber:  · Breads. Breads made with 100% whole-wheat flour; herminia, wheat, or rye crackers; whole-grain tortillas, bran muffins.  · Cereals. Whole-grain and bran cereals with bran (shredded wheat, wheat flakes, raisin bran, corn bran); oatmeal, rolled oats, granola, and brown rice.  · Fruits. Fresh fruits and their edible skins (pears, prunes, raisins, berries, apples, and apricots); bananas, citrus fruit, mangoes, pineapple; and prune juice.  · Nuts. Any nuts and seeds.  · Vegetables. Best served raw or lightly cooked. All types, especially: green peas, celery, eggplant, potatoes, spinach, broccoli, Jacobson sprouts, winter squash, carrots, cauliflower, soybeans, lentils, and fresh and dried beans of all kinds.  · Other. Popcorn, any spices.  Date Last Reviewed: 8/1/2016  © 4156-5209 [employer]. [address] [place] [address]  [place] [address]. All rights reserved. This information is not intended as a substitute for professional medical care. Always follow your healthcare professional's instructions.        Understanding Hemorrhoids    Hemorrhoid tissues are cushions of blood vessels that swell slightly during bowel movements. Too much pressure on the anal canal can make these tissues remain enlarged, become inflamed, and cause symptoms. This can happen both inside and outside the anal canal.  Parts of the anal canal  The parts of the anal canal are:  · Internal hemorrhoid tissue is in the upper area of the anal canal.  · The rectum is the last several inches of the colon. This is where stool is stored prior to bowel movements.  · Anal sphincters are ring-shaped muscles that expand and contract to control the anal opening.  · External hemorrhoid tissue lies under the anal skin.  · The anus is the passage between the rectum and the outside of the body.  Normal hemorrhoid tissue  Hemorrhoid tissues play an important role in helping your body eliminate waste. Food passes from the stomach through the intestines. The waste (stool) then travels through the colon to the rectum. It is stored in the rectum until its ready to be passed from the anus. During bowel movements, hemorrhoids swell with blood and become slightly larger. This swelling helps protect and cushion the anal canal as stool passes from the body. Once the stool has passed, the tissues stop swelling and return to normal.  Problem hemorrhoids  Pressure due to straining or other factors can cause hemorrhoid tissues to remain swollen. When this happens to the hemorrhoid tissues in the anal canal theyre called internal hemorrhoids. Swollen tissues around the anal opening are called external hemorrhoids. Depending on the location, your symptoms can differ.  · Internal hemorrhoids often happen in clusters around the wall of the anal canal. They are usually painless. But they may prolapse  (protrude out of the anus) due to straining or pressure from hard stool. After the bowel movement is over, they may then reduce (return inside the body). Internal hemorrhoids often bleed. They can also discharge mucus.  ·   External hemorrhoids are located at the anal opening, just beneath the skin. These tissues rarely cause problems unless they thrombose (form a blood clot). When this happens, a hard, bluish lump may appear. A thrombosed hemorrhoid also causes sudden, severe pain. In time, the clot may go away on its own. This sometimes leaves a skin tag of tissue stretched by the clot.  Hemorrhoid symptoms  Hemorrhoid symptoms may include:  · Pain or a burning sensation  · Bleeding during bowel movements  · Protrusion of tissue from the anus  · Itching around the anus  Causes of hemorrhoids  Theres no single cause of hemorrhoids. Most often, though, they are caused by too much pressure on the anal canal. This can be due to:  · Chronic (ongoing) constipation  · Straining during bowel movements  · Sitting too long on the toilet  · Strenuous exercise or heavy lifting  · Pregnancy and childbirth  · Aging  · Diarrhea  Date Last Reviewed: 7/1/2016  © 0457-7857 [employer]. [address] [place] [address], [place] [address]. All rights reserved. This information is not intended as a substitute for professional medical care. Always follow your healthcare professional's instructions.

## 2017-04-24 LAB — POCT GLUCOSE: 195 MG/DL (ref 70–110)

## 2017-04-24 NOTE — ANESTHESIA POSTPROCEDURE EVALUATION
Anesthesia Post Evaluation    Patient: Joel Condon    Procedure(s) Performed: Procedure(s) (LRB):  COLONOSCOPY (N/A)    Final Anesthesia Type: general  Patient location during evaluation: GI PACU  Patient participation: Yes- Able to Participate  Level of consciousness: awake and alert, oriented and awake  Post-procedure vital signs: reviewed and stable  Airway patency: patent  PONV status at discharge: No PONV  Anesthetic complications: no      Cardiovascular status: blood pressure returned to baseline  Respiratory status: unassisted, spontaneous ventilation and room air  Hydration status: euvolemic  Follow-up not needed.        Visit Vitals    BP (!) 162/77    Pulse 66    Temp 36.4 °C (97.5 °F) (Oral)    Resp 18    SpO2 97%    Breastfeeding No       Pain/Maribeth Score: No Data Recorded

## 2017-04-26 RX ORDER — INSULIN ASPART 100 [IU]/ML
INJECTION, SOLUTION INTRAVENOUS; SUBCUTANEOUS
Qty: 30 ML | Refills: 1 | Status: SHIPPED | OUTPATIENT
Start: 2017-04-26 | End: 2017-07-31 | Stop reason: SDUPTHER

## 2017-04-26 NOTE — TELEPHONE ENCOUNTER
----- Message from Kayla ROBE Fortune sent at 4/26/2017 10:39 AM CDT -----  Contact: self   Request a refill on insulin aspart (NOVOLOG FLEXPEN) 100 unit/mL InPn pen (2 box).     [facility] [id number] [place] [address] [id number] AT [employer] [facility]    Please contact the pt at [phone] if any question. Thanks!

## 2017-05-02 ENCOUNTER — OFFICE VISIT (OUTPATIENT)
Dept: OPTOMETRY | Facility: CLINIC | Age: 77
End: 2017-05-02
Payer: MEDICARE

## 2017-05-02 DIAGNOSIS — H52.203 MYOPIA WITH ASTIGMATISM, BILATERAL: ICD-10-CM

## 2017-05-02 DIAGNOSIS — E11.9 DIABETES MELLITUS TYPE 2 WITHOUT RETINOPATHY: Primary | ICD-10-CM

## 2017-05-02 DIAGNOSIS — H52.13 MYOPIA WITH ASTIGMATISM, BILATERAL: ICD-10-CM

## 2017-05-02 DIAGNOSIS — Z96.1 PSEUDOPHAKIA OF BOTH EYES: ICD-10-CM

## 2017-05-02 DIAGNOSIS — Z13.5 SCREENING FOR GLAUCOMA: ICD-10-CM

## 2017-05-02 PROCEDURE — 92015 DETERMINE REFRACTIVE STATE: CPT | Mod: S$GLB,,, | Performed by: OPTOMETRIST

## 2017-05-02 PROCEDURE — 99999 PR PBB SHADOW E&M-EST. PATIENT-LVL III: CPT | Mod: PBBFAC,,, | Performed by: OPTOMETRIST

## 2017-05-02 PROCEDURE — 92014 COMPRE OPH EXAM EST PT 1/>: CPT | Mod: S$GLB,,, | Performed by: OPTOMETRIST

## 2017-05-02 NOTE — PROGRESS NOTES
HPI     DSL- 4/24/16     Pt states that no [facility] change. Pt did not get last MRX filled. Pt has eye   allergies. Pt occas has floaters. Glare is a bother. BSL was 206 last   night. Pt wear OTC +2.75.     Eyemed  At's OU PRN     Hemoglobin A1C       Date                     Value               Ref Range             Status                03/14/2017               9.5 (H)             4.5 - 6.2 %           Final                 12/15/2016               8.9 (H)             4.5 - 6.2 %           Final                09/16/2016               8.5 (H)             4.5 - 6.2 %           Final            ----------         Last edited by Jose R Salazar, OD on 5/2/2017  7:51 AM.         Assessment /Plan     For exam results, see Encounter Report.    Diabetes mellitus type 2 without retinopathy  -No retinopathy noted today.  Continued control with primary care physician and annual comprehensive eye exam.    Screening for glaucoma  -Monitor with annual eye exam and IOP check    Pseudophakia of both eyes  -Clear, centered    Myopia with astigmatism, bilateral  Eyeglass Final Rx     Eyeglass Final Rx      Sphere Cylinder Axis Add   Right -0.25 +1.00 170 +2.50   Left -0.50 +1.00 155 +2.50       Type:  Bifocal    Expiration Date:  5/3/2018                  RTC 1 yr

## 2017-05-02 NOTE — MR AVS SNAPSHOT
[facility]  [address] [place] [address]  Phone: [phone]  Fax: [phone]                  Joel Condon   2017 8:00 AM   Office Visit    Description:  Female : 1940   Provider:  Jose R Salazar OD   Department:  [facility]           Reason for Visit     Diabetic Eye Exam           Diagnoses this Visit        Comments    Diabetes mellitus type 2 without retinopathy    -  Primary     Screening for glaucoma         Pseudophakia of both eyes         Myopia with astigmatism, bilateral                To Do List           Future Appointments        Provider Department Dept Phone    2017 7:30 AM Sumi Francis DPM [facility] [phone]    2017 1:20 PM Juan Pablo Ordoñez MD [facility] - Cardiology [phone]    2017 8:00 AM Shelby Ley MD [facility] [phone]      Goals (5 Years of Data)              3/14/17    12/15/16    9/16/16    HEMOGLOBIN A1C < 7.0   9.5  8.9  8.5    Related Problems    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      [facility] On Call     [facility] Nurse Care Line -  Assistance  Unless otherwise directed by your provider, please contact [facility], our nurse care line that is available for  assistance.     Registered nurses in the [facility] On Call Center provide: appointment scheduling, clinical advisement, health education, and other advisory services.  Call: [phone] (toll free)               Medications           Message regarding Medications     Verify the changes and/or additions to your medication regime listed below are the same as discussed with your clinician today.  If any of these changes or additions are incorrect, please notify your healthcare provider.             Verify that the below list of medications is an accurate representation of the medications you are  "currently taking.  If none reported, the list may be blank. If incorrect, please contact your healthcare provider. Carry this list with you in case of emergency.           Current Medications     alendronate (FOSAMAX) 70 MG tablet TAKE 1 TABLET BY MOUTH EVERY 7 DAYS    aspirin (ECOTRIN) 81 MG EC tablet Take 1 tablet (81 mg total) by mouth once daily.    atorvastatin (LIPITOR) 10 MG tablet Take 1 tablet (10 mg total) by mouth once daily.    blood sugar diagnostic Strp True Results; Test tid    insulin aspart (NOVOLOG FLEXPEN) 100 unit/mL InPn pen ADMINISTER 28 UNITS UNDER THE SKIN THREE TIMES DAILY WITH MEALS    insulin detemir (LEVEMIR) 100 unit/mL injection ADMINISTER 50 UNITS UNDER THE SKIN EVERY EVENING    insulin syringe-needle U-100 1 mL 31 gauge x 5/16 Syrg USE THREE TIMES DAILY AS DIRECTED    insulin syringe-needle U-100 1/2 mL 30 Syrg USE NIGHTLY    insulin syringe-needle U-100 1/2 mL 31 x 5/16" Syrg 1 Device by Misc.(Non-Drug; Combo Route) route nightly.    lancets 26 gauge Misc 1 lancet by Misc.(Non-Drug; Combo Route) route 3 (three) times daily.    levothyroxine (SYNTHROID) 50 MCG tablet TAKE 1 TABLET(50 MCG) BY MOUTH EVERY DAY    losartan (COZAAR) 100 MG tablet Take 1 tablet (100 mg total) by mouth once daily.    metoprolol succinate (TOPROL-XL) 200 MG 24 hr tablet Take 1 tablet (200 mg total) by mouth once daily.    nifedipine (PROCARDIA-XL) 90 MG (OSM) TR24 Take 1 tablet (90 mg total) by mouth once daily.    omeprazole (PRILOSEC) 20 MG capsule Take 1 capsule (20 mg total) by mouth daily as needed (heartburn).    pen needle, diabetic (BD INSULIN PEN NEEDLE UF SHORT) 31 gauge x 5/16" Ndle Use 3 times daily    polyethylene glycol (COLYTE) 240-22.72-6.72 -5.84 gram SolR Take 4,000 mLs (4 L total) by mouth as needed.           Clinical Reference Information           Allergies as of 5/2/2017     Lisinopril    Pravastatin      Immunizations Administered on Date of Encounter - 5/2/2017     None      Language " Assistance Services     ATTENTION: Language assistance services are available, free of charge. Please call [phone].      ATENCIÓN: Si habla mague, tiene a jara disposición servicios gratuitos de asistencia lingüística. Llame al [phone].     CHÚ Ý: N?u b?n nói Ti?ng Vi?t, có các d?ch v? h? tr? ngôn ng? mi?n phí dành cho b?n. G?i s? [phone].         [facility] complies with applicable Federal civil rights laws and does not discriminate on the basis of race, color, national origin, age, disability, or sex.

## 2017-06-05 DIAGNOSIS — E03.9 HYPOTHYROIDISM (ACQUIRED): ICD-10-CM

## 2017-06-05 RX ORDER — LEVOTHYROXINE SODIUM 50 UG/1
TABLET ORAL
Qty: 90 TABLET | Refills: 0 | Status: SHIPPED | OUTPATIENT
Start: 2017-06-05 | End: 2017-09-11 | Stop reason: SDUPTHER

## 2017-06-08 RX ORDER — ALENDRONATE SODIUM 70 MG/1
TABLET ORAL
Qty: 12 TABLET | Refills: 0 | Status: SHIPPED | OUTPATIENT
Start: 2017-06-08 | End: 2017-06-28 | Stop reason: SDUPTHER

## 2017-06-28 ENCOUNTER — OFFICE VISIT (OUTPATIENT)
Dept: PODIATRY | Facility: CLINIC | Age: 77
End: 2017-06-28
Payer: MEDICARE

## 2017-06-28 VITALS
WEIGHT: 176 LBS | HEIGHT: 64 IN | BODY MASS INDEX: 30.05 KG/M2 | DIASTOLIC BLOOD PRESSURE: 52 MMHG | SYSTOLIC BLOOD PRESSURE: 140 MMHG

## 2017-06-28 DIAGNOSIS — B35.1 ONYCHOMYCOSIS DUE TO DERMATOPHYTE: ICD-10-CM

## 2017-06-28 DIAGNOSIS — M20.42 HAMMER TOES OF BOTH FEET: ICD-10-CM

## 2017-06-28 DIAGNOSIS — L84 CORN OR CALLUS: ICD-10-CM

## 2017-06-28 DIAGNOSIS — M20.41 HAMMER TOES OF BOTH FEET: ICD-10-CM

## 2017-06-28 DIAGNOSIS — M20.10 HALLUX ABDUCTO VALGUS, UNSPECIFIED LATERALITY: ICD-10-CM

## 2017-06-28 DIAGNOSIS — E11.49 TYPE II DIABETES MELLITUS WITH NEUROLOGICAL MANIFESTATIONS: Primary | ICD-10-CM

## 2017-06-28 PROCEDURE — 11056 PARNG/CUTG B9 HYPRKR LES 2-4: CPT | Mod: Q9,S$GLB,, | Performed by: PODIATRIST

## 2017-06-28 PROCEDURE — 11721 DEBRIDE NAIL 6 OR MORE: CPT | Mod: 59,Q9,S$GLB, | Performed by: PODIATRIST

## 2017-06-28 PROCEDURE — 99499 UNLISTED E&M SERVICE: CPT | Mod: S$PBB,,, | Performed by: PODIATRIST

## 2017-06-28 PROCEDURE — 99999 PR PBB SHADOW E&M-EST. PATIENT-LVL III: CPT | Mod: PBBFAC,,, | Performed by: PODIATRIST

## 2017-06-28 PROCEDURE — 99499 UNLISTED E&M SERVICE: CPT | Mod: S$GLB,,, | Performed by: PODIATRIST

## 2017-06-28 RX ORDER — METOPROLOL SUCCINATE 50 MG/1
TABLET, EXTENDED RELEASE ORAL
COMMUNITY
Start: 2017-05-09 | End: 2017-07-31 | Stop reason: SDUPTHER

## 2017-06-28 RX ORDER — ALENDRONATE SODIUM 70 MG/1
TABLET ORAL
Qty: 12 TABLET | Refills: 0 | Status: SHIPPED | OUTPATIENT
Start: 2017-06-28 | End: 2017-07-31 | Stop reason: ALTCHOICE

## 2017-06-28 NOTE — PATIENT INSTRUCTIONS
Recommend lotions: eucerin, aquaphor, A&D ointment, gold bond for diabetics        Shoe recommendations: (try 6pm.com, zappos.com , nordstromrack.com, or shoes.com for discounted prices) you can visit [employer] shoes in [place] as well    Asics (GT 1000 or gel foundations), new balance, saucony (stabil c3),  Hargrove (transcend), vionic, propet (tennis shoe)    soft brand, clarks, crocs, aerosoles, naturalizers, SAS, ecco, thien, fer richmond, rockports (dress shoes)    Vionic, volitiles, burkenstocks, fitflops, propet (sandals)    Nike comfort thong sandals, crocs (house shoes)    Nail Home remedy:  Vicks Vapor rub to cuticles  Listerine and apple cider vinegar in a spray bottle to nails        Diabetes: Inspecting Your Feet    Diabetes increases your chances of developing foot problems. So inspect your feet every day. This helps you find small skin irritations before they become serious ulcers or infections. If you have trouble seeing the bottoms of your feet, use a mirror or ask a family member or friend to help.  How to check your feet  Below are tips to help you look for foot problems. Try to check your feet at the same time each day, such as when you get out of bed in the morning:  · Check the top of each foot. The tops of toes, back of the heel, and outer edge of the foot can get a lot of rubbing from poor-fitting shoes.  · Check the bottom of each foot. Daily wear and tear often leads to problems at pressure spots.  · Check the toes and nails. Fungal infections often occur between toes. Toenail problems can also be a sign of fungal infections or lead to breaks in the skin.  · Check your shoes, too. Loose objects inside a shoe can injure the foot. Use your hand to feel inside your shoes for things like bay, loose stitching, or rough areas that could irritate your skin.  Warning signs  Look for any color changes in the foot. Redness with streaks can signal a severe infection, which needs immediate medical attention.  Tell your healthcare provider right away if you have any of these problems:  · Swelling, sometimes with color changes, may be a sign of poor blood flow or infection. Symptoms include tenderness and an increase in the size of your foot.  · Warm or hot areas on your feet may be signs of infection. A foot that is cold may not be getting enough blood.  · Sensations such as burning, tingling, or pins and needles can be signs of a problem. Also check for areas that may be numb.  · Hot spots are caused by friction or pressure. Look for hot spots in areas that get a lot of rubbing. Hot spots can turn into blisters, calluses, or sores.  · Cracks and sores are caused by dry or irritated skin. They are a sign that the skin is breaking down, which can lead to infection.  · Toenail problems to watch for include nails growing into the skin (ingrown toenail) and causing redness or pain. Thick, yellow, or discolored nails can signal a fungal infection.  · Drainage and odor can develop from untreated sores and ulcers. Call your healthcare provider right away if you notice white or yellow drainage, bleeding, or unpleasant odor.   Date Last Reviewed: 6/1/2016  © 4117-4421 [employer]. [address] [place] [address], [place] [address]. All rights reserved. This information is not intended as a substitute for professional medical care. Always follow your healthcare professional's instructions.

## 2017-06-28 NOTE — PROGRESS NOTES
Subjective:      Patient ID: Joel Condon is a 76 y.o. female.    Chief Complaint: Diabetes Mellitus (pcp Dr. Ley 3-24-17); Diabetic Foot Exam; and Nail Care    Joel is a 76 y.o. female who presents to the clinic for evaluation and treatment of high risk feet. Joel has a past medical history of Arthritis; Hyperlipidemia LDL goal < 100; Hypertension; Hypothyroidism; Osteoporosis, post-menopausal; Overweight; Proteinuria; and Type II or unspecified type diabetes mellitus with renal manifestations, uncontrolled. The patient's chief complaint is long, thick toenails.  This patient has documented high risk feet requiring routine maintenance secondary to diabetes mellitis and those secondary complications of diabetes, as mentioned..    PCP: Shelby Ley MD    Date Last Seen by PCP:   Chief Complaint   Patient presents with    Diabetes Mellitus     pcp Dr. Ley 3-24-17    Diabetic Foot Exam    Nail Care     Current shoe gear:  Flat casual shoe    Hemoglobin A1C   Date Value Ref Range Status   03/14/2017 9.5 (H) 4.5 - 6.2 % Final     Comment:     According to ADA guidelines, hemoglobin A1C <7.0% represents  optimal control in non-pregnant diabetic patients.  Different  metrics may apply to specific populations.   Standards of Medical Care in Diabetes - 2016.  For the purpose of screening for the presence of diabetes:  <5.7%     Consistent with the absence of diabetes  5.7-6.4%  Consistent with increasing risk for diabetes   (prediabetes)  >or=6.5%  Consistent with diabetes  Currently no consensus exists for use of hemoglobin A1C  for diagnosis of diabetes for children.     12/15/2016 8.9 (H) 4.5 - 6.2 % Final     Comment:     According to ADA guidelines, hemoglobin A1C <7.0% represents  optimal control in non-pregnant diabetic patients.  Different  metrics may apply to specific populations.   Standards of Medical Care in Diabetes - 2016.  For the purpose of screening for the presence of diabetes:  <5.7%      Consistent with the absence of diabetes  5.7-6.4%  Consistent with increasing risk for diabetes   (prediabetes)  >or=6.5%  Consistent with diabetes  Currently no consensus exists for use of hemoglobin A1C  for diagnosis of diabetes for children.     09/16/2016 8.5 (H) 4.5 - 6.2 % Final     Comment:     According to ADA guidelines, hemoglobin A1C <7.0% represents  optimal control in non-pregnant diabetic patients.  Different  metrics may apply to specific populations.   Standards of Medical Care in Diabetes - 2016.  For the purpose of screening for the presence of diabetes:  <5.7%     Consistent with the absence of diabetes  5.7-6.4%  Consistent with increasing risk for diabetes   (prediabetes)  >or=6.5%  Consistent with diabetes  Currently no consensus exists for use of hemoglobin A1C  for diagnosis of diabetes for children.       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 90 tablet 3    atorvastatin (LIPITOR) 10 MG tablet Take 1 tablet (10 mg total) by mouth once daily. 90 tablet 0    blood sugar diagnostic Strp True Results; Test tid 300 strip 11    insulin aspart (NOVOLOG FLEXPEN) 100 unit/mL InPn pen ADMINISTER 28 UNITS UNDER THE SKIN THREE TIMES DAILY WITH MEALS  "30 mL 1    insulin detemir (LEVEMIR) 100 unit/mL injection ADMINISTER 50 UNITS UNDER THE SKIN EVERY EVENING 60 mL 2    insulin syringe-needle U-100 1 mL 31 gauge x 5/16 Syrg USE THREE TIMES DAILY AS DIRECTED 100 each 0    insulin syringe-needle U-100 1/2 mL 30 Syrg USE NIGHTLY 100 each 0    insulin syringe-needle U-100 1/2 mL 31 x 5/16" Syrg 1 Device by Misc.(Non-Drug; Combo Route) route nightly. 100 each 6    lancets 26 gauge Misc 1 lancet by Misc.(Non-Drug; Combo Route) route 3 (three) times daily. 200 each 11    levothyroxine (SYNTHROID) 50 MCG tablet TAKE 1 TABLET(50 MCG) BY MOUTH EVERY DAY 90 tablet 0    losartan (COZAAR) 100 MG tablet Take 1 tablet (100 mg total) by mouth once daily. 90 tablet 3    metoprolol succinate (TOPROL-XL) 200 MG 24 hr tablet Take 1 tablet (200 mg total) by mouth once daily. 90 tablet 3    nifedipine (PROCARDIA-XL) 90 MG (OSM) TR24 Take 1 tablet (90 mg total) by mouth once daily. 90 tablet 0    omeprazole (PRILOSEC) 20 MG capsule Take 1 capsule (20 mg total) by mouth daily as needed (heartburn). 90 capsule 0    pen needle, diabetic (BD INSULIN PEN NEEDLE UF SHORT) 31 gauge x 5/16" Ndle Use 3 times daily 100 each 11    polyethylene glycol (COLYTE) 240-22.72-6.72 -5.84 gram SolR Take 4,000 mLs (4 L total) by mouth as needed. 1 Bottle 0     No current facility-administered medications on file prior to visit.      Review of patient's allergies indicates:   Allergen Reactions    Lisinopril Other (See Comments)     Cough      Pravastatin Other (See Comments)     headaches     Past Surgical History:   Procedure Laterality Date    APPENDECTOMY      CATARACT EXTRACTION W/  INTRAOCULAR LENS IMPLANT Left 4/24/14    OS (Dr. Arce)    CATARACT EXTRACTION W/  INTRAOCULAR LENS IMPLANT Right 06/12/2014    OD (Dr. Arce)    CHOLECYSTECTOMY      COLONOSCOPY N/A 4/21/2017    Procedure: COLONOSCOPY;  Surgeon: Homar Payan MD;  Location[facility];  Service: Endoscopy;  " Laterality: N/A;    EYE SURGERY  04/24/2014 & 06/12/2014    HYSTERECTOMY      total    left eye cataract surgery  4/24/14     Family History   Problem Relation Age of Onset    Diabetes Mother     Cancer Mother     Heart disease Mother     Hypertension Mother     Diabetes Sister     Hypertension Sister     Diabetes Sister     Hypertension Sister     Diabetes Sister     Hypertension Sister     Thyroid disease Sister     Stroke Sister     Hypertension Sister     Diabetes Sister     Heart disease Sister     Diabetes Brother     Diabetes Brother     Amblyopia Neg Hx     Blindness Neg Hx     Cataracts Neg Hx     Glaucoma Neg Hx     Macular degeneration Neg Hx     Retinal detachment Neg Hx     Strabismus Neg Hx      Social History     Social History    Marital status:      Spouse name: N/A    Number of children: 3    Years of education: N/A     Occupational History    retired      Social History Main Topics    Smoking status: Never Smoker    Smokeless tobacco: Never Used    Alcohol use No    Drug use: No    Sexual activity: Not Currently     Partners: Male     Other Topics Concern    Not on file     Social History Narrative    No narrative on file       Review of Systems   Constitution: Negative for chills, fever and weakness.   Cardiovascular: Negative for chest pain, claudication and leg swelling.   Respiratory: Negative for cough and shortness of breath.    Skin: Positive for dry skin and nail changes. Negative for itching and rash.   Musculoskeletal: Positive for arthritis and joint pain. Negative for falls, joint swelling, muscle cramps and muscle weakness.   Gastrointestinal: Negative for diarrhea, nausea and vomiting.   Neurological: Positive for numbness and paresthesias. Negative for tremors.   Psychiatric/Behavioral: Negative for altered mental status and hallucinations.           Objective:       Vitals:    06/28/17 0830   BP: (!) 140/52   Weight: 79.8 kg (176 lb)  "  Height: 5' 4" (1.626 m)   PainSc: 0-No pain     Physical Exam   Constitutional:   General: Pt. is well-developed, well-nourished, appears stated age, in no acute distress, alert and oriented x 3. No evidence of depression, anxiety, or agitation. Calm, cooperative, and communicative. Appropriate interactions and affect.       Cardiovascular:   Pulses:       Dorsalis pedis pulses are 1+ on the right side, and 1+ on the left side.        Posterior tibial pulses are 1+ on the right side, and 1+ on the left side.   Dorsalis pedis and posterior tibial pulses are diminished Bilaterally. Toes are cool to touch. Feet are warm proximally.There is decreased digital hair. Skin is atrophic   Musculoskeletal:        Right foot: There is normal range of motion.        Left foot: There is normal range of motion.   Adequate joint range of motion without pain, limitation, nor crepitation Bilateral feet and ankle joints. Muscle strength is 5/5 in all groups bilaterally.    Patient has hammertoes of digits 2-5 bilateral partially reducible without symptom today.     Neurological: A sensory deficit is present.   Paresthesias, and hyperesthesia bilateral feet with no clearly identified trigger or source.    Decreased/absent vibratory sensation bilateral feet to 128Hz tuning fork.    [place]-Jennifer 5.07 monofilament is intact bilateral feet. Sharp/dull sensation is also intact Bilateral feet.       Skin: Skin is warm, dry and intact. No abrasion, no ecchymosis, no lesion and no rash noted. She is not diaphoretic. No cyanosis or erythema. No pallor. Nails show no clubbing.   Toenails 1-5 bilaterally are elongated by 2-3 mm, thickened by 2-3 mm, discolored/grey, dystrophic, brittle with subungual debris.    Focal hyperkeratotic lesion consisting entirely of hyperkeratotic tissue without open skin, drainage, pus, fluctuance, malodor, or signs of infection distal medial right hallux and sub 3rd MTPJ     Interdigital Spaces clean, dry and " without evidence of break in skin integrity   Psychiatric: She has a normal mood and affect. Her speech is normal.   Nursing note and vitals reviewed.        Assessment:       Encounter Diagnoses   Name Primary?    Type II diabetes mellitus with neurological manifestations Yes    Corn or callus     Onychomycosis due to dermatophyte     Hammer toes of both feet     Hallux abducto valgus, unspecified laterality          Plan:       Joel was seen today for diabetes mellitus, diabetic foot exam and nail care.    Diagnoses and all orders for this visit:    Type II diabetes mellitus with neurological manifestations    Corn or callus    Onychomycosis due to dermatophyte    Hammer toes of both feet    Hallux abducto valgus, unspecified laterality      I counseled the patient on her conditions, their implications and medical management.    - Shoe inspection. Diabetic Foot Education. Patient reminded of the importance of good nutrition and blood sugar control to help prevent podiatric complications of diabetes. Patient instructed on proper foot hygeine. We discussed wearing proper shoe gear, daily foot inspections, never walking without protective shoe gear, never putting sharp instruments to feet. Discussed tight glycemic control.    - With patient's permission, nails were aggressively reduced and debrided x 10 to their soft tissue attachment mechanically and with electric , removing all offending nail and debris. Patient relates relief following the procedure. After cleansing the  area w/ alcohol prep pad the above mentioned hyperkeratosis was trimmed utilizing No 15 scapel, to a smooth base with out incident. Patient tolerated this  well and reported comfort to the area of  distal medial right hallux and sub 3rd MTPJ      She will continue to monitor the areas daily, inspect her feet, wear protective shoe gear when ambulatory, moisturizer to maintain skin integrity and follow in this office in approximately  3-5 months, sooner p.r.n.

## 2017-06-29 ENCOUNTER — OFFICE VISIT (OUTPATIENT)
Dept: CARDIOLOGY | Facility: CLINIC | Age: 77
End: 2017-06-29
Payer: MEDICARE

## 2017-06-29 VITALS
HEIGHT: 64 IN | SYSTOLIC BLOOD PRESSURE: 168 MMHG | DIASTOLIC BLOOD PRESSURE: 71 MMHG | BODY MASS INDEX: 30.38 KG/M2 | OXYGEN SATURATION: 95 % | RESPIRATION RATE: 15 BRPM | HEART RATE: 64 BPM | WEIGHT: 177.94 LBS

## 2017-06-29 DIAGNOSIS — E66.9 OBESITY (BMI 30-39.9): ICD-10-CM

## 2017-06-29 DIAGNOSIS — G47.33 OSA (OBSTRUCTIVE SLEEP APNEA): ICD-10-CM

## 2017-06-29 DIAGNOSIS — I10 ESSENTIAL HYPERTENSION: Primary | ICD-10-CM

## 2017-06-29 DIAGNOSIS — E78.5 HYPERLIPIDEMIA LDL GOAL <100: ICD-10-CM

## 2017-06-29 PROCEDURE — 99999 PR PBB SHADOW E&M-EST. PATIENT-LVL IV: CPT | Mod: PBBFAC,,, | Performed by: INTERNAL MEDICINE

## 2017-06-29 PROCEDURE — 99499 UNLISTED E&M SERVICE: CPT | Mod: S$PBB,,, | Performed by: INTERNAL MEDICINE

## 2017-06-29 PROCEDURE — 93000 ELECTROCARDIOGRAM COMPLETE: CPT | Mod: S$GLB,,, | Performed by: INTERNAL MEDICINE

## 2017-06-29 PROCEDURE — 1159F MED LIST DOCD IN RCRD: CPT | Mod: S$GLB,,, | Performed by: INTERNAL MEDICINE

## 2017-06-29 PROCEDURE — 1126F AMNT PAIN NOTED NONE PRSNT: CPT | Mod: S$GLB,,, | Performed by: INTERNAL MEDICINE

## 2017-06-29 PROCEDURE — 4010F ACE/ARB THERAPY RXD/TAKEN: CPT | Mod: S$GLB,,, | Performed by: INTERNAL MEDICINE

## 2017-06-29 PROCEDURE — 99214 OFFICE O/P EST MOD 30 MIN: CPT | Mod: S$GLB,,, | Performed by: INTERNAL MEDICINE

## 2017-06-29 PROCEDURE — 3046F HEMOGLOBIN A1C LEVEL >9.0%: CPT | Mod: S$GLB,,, | Performed by: INTERNAL MEDICINE

## 2017-06-29 RX ORDER — SYRINGE,SAFETY WITH NEEDLE,1ML 25GX1"
SYRINGE (EA) MISCELLANEOUS
Qty: 100 EACH | Refills: 0 | Status: SHIPPED | OUTPATIENT
Start: 2017-06-29 | End: 2017-11-15 | Stop reason: SDUPTHER

## 2017-06-29 RX ORDER — HYDROCHLOROTHIAZIDE 25 MG/1
25 TABLET ORAL DAILY
Qty: 90 TABLET | Refills: 3 | Status: SHIPPED | OUTPATIENT
Start: 2017-06-29 | End: 2017-07-31

## 2017-06-29 NOTE — PROGRESS NOTES
CARDIOVASCULAR PROGRESS NOTE    REASON FOR CONSULT:   Joel Condon is a 76 y.o. female who presents for follow up of CP/Palps/htn.    PCP: Av  HISTORY OF PRESENT ILLNESS:   The patient returns for follow-up.  She's feeling well and reports no intercurrent angina or dyspnea.  She denies lightheadedness, dizziness, or syncope.  There's been no PND, orthopnea, or lower extremity edema.  She's had no melena, hematuria, or claudicant symptoms.  Her main complaint appears to be related to joint aches and pains today.  She reports some difficulty swallowing the 200 mg tablets of Toprol-XL, and I've advised her that she may cut them in half and take both half tablets at once. These tablets are scored.    CARDIOVASCULAR HISTORY:   PVCs  HTN  DM    PAST MEDICAL HISTORY:     Past Medical History:   Diagnosis Date    Arthritis     Hyperlipidemia LDL goal < 100     Hypertension     Hypothyroidism     Osteoporosis, post-menopausal     Overweight     Proteinuria     Type II or unspecified type diabetes mellitus with renal manifestations, uncontrolled        PAST SURGICAL HISTORY:     Past Surgical History:   Procedure Laterality Date    APPENDECTOMY      CATARACT EXTRACTION W/  INTRAOCULAR LENS IMPLANT Left 4/24/14    OS (Dr. Arce)    CATARACT EXTRACTION W/  INTRAOCULAR LENS IMPLANT Right 06/12/2014    OD (Dr. Arce)    CHOLECYSTECTOMY      COLONOSCOPY N/A 4/21/2017    Procedure: COLONOSCOPY;  Surgeon: Homar Payan MD;  Location: [facility];  Service: Endoscopy;  Laterality: N/A;    EYE SURGERY  04/24/2014 & 06/12/2014    HYSTERECTOMY      total    left eye cataract surgery  4/24/14       ALLERGIES AND MEDICATION:     Review of patient's allergies indicates:   Allergen Reactions    Lisinopril Other (See Comments)     Cough      Pravastatin Other (See Comments)     headaches     Previous Medications    ALENDRONATE (FOSAMAX) 70 MG TABLET    TAKE 1 TABLET BY MOUTH EVERY 7 DAYS    ASPIRIN (ECOTRIN) 81  "MG EC TABLET    Take 1 tablet (81 mg total) by mouth once daily.    ATORVASTATIN (LIPITOR) 10 MG TABLET    Take 1 tablet (10 mg total) by mouth once daily.    BLOOD SUGAR DIAGNOSTIC STRP    True Results; Test tid    INSULIN ASPART (NOVOLOG FLEXPEN) 100 UNIT/ML INPN PEN    ADMINISTER 28 UNITS UNDER THE SKIN THREE TIMES DAILY WITH MEALS    INSULIN DETEMIR (LEVEMIR) 100 UNIT/ML INJECTION    ADMINISTER 50 UNITS UNDER THE SKIN EVERY EVENING    INSULIN SYRINGE-NEEDLE U-100 1 ML 31 GAUGE X 5/16 SYRG    USE THREE TIMES DAILY AS DIRECTED    INSULIN SYRINGE-NEEDLE U-100 1/2 ML 30 SYRG    USE NIGHTLY    INSULIN SYRINGE-NEEDLE U-100 1/2 ML 31 X 5/16" SYRG    1 Device by Misc.(Non-Drug; Combo Route) route nightly.    LANCETS 26 GAUGE MISC    1 lancet by Misc.(Non-Drug; Combo Route) route 3 (three) times daily.    LEVOTHYROXINE (SYNTHROID) 50 MCG TABLET    TAKE 1 TABLET(50 MCG) BY MOUTH EVERY DAY    LOSARTAN (COZAAR) 100 MG TABLET    Take 1 tablet (100 mg total) by mouth once daily.    METOPROLOL SUCCINATE (TOPROL-XL) 200 MG 24 HR TABLET    Take 1 tablet (200 mg total) by mouth once daily.    METOPROLOL SUCCINATE (TOPROL-XL) 50 MG 24 HR TABLET        NIFEDIPINE (PROCARDIA-XL) 90 MG (OSM) TR24    Take 1 tablet (90 mg total) by mouth once daily.    OMEPRAZOLE (PRILOSEC) 20 MG CAPSULE    Take 1 capsule (20 mg total) by mouth daily as needed (heartburn).    PEN NEEDLE, DIABETIC (BD INSULIN PEN NEEDLE UF SHORT) 31 GAUGE X 5/16" NDLE    Use 3 times daily    POLYETHYLENE GLYCOL (COLYTE) 240-22.72-6.72 -5.84 GRAM SOLR    Take 4,000 mLs (4 L total) by mouth as needed.       SOCIAL HISTORY:     Social History     Social History    Marital status:      Spouse name: N/A    Number of children: 3    Years of education: N/A     Occupational History    retired      Social History Main Topics    Smoking status: Never Smoker    Smokeless tobacco: Never Used    Alcohol use No    Drug use: No    Sexual activity: Not Currently     " "Partners: Male     Other Topics Concern    Not on file     Social History Narrative    No narrative on file       FAMILY HISTORY:     Family History   Problem Relation Age of Onset    Diabetes Mother     Cancer Mother     Heart disease Mother     Hypertension Mother     Diabetes Sister     Hypertension Sister     Diabetes Sister     Hypertension Sister     Diabetes Sister     Hypertension Sister     Thyroid disease Sister     Stroke Sister     Hypertension Sister     Diabetes Sister     Heart disease Sister     Diabetes Brother     Diabetes Brother     Amblyopia Neg Hx     Blindness Neg Hx     Cataracts Neg Hx     Glaucoma Neg Hx     Macular degeneration Neg Hx     Retinal detachment Neg Hx     Strabismus Neg Hx        REVIEW OF SYSTEMS:   Review of Systems   Constitutional: Negative for chills, diaphoresis and fever.   HENT: Negative for nosebleeds.    Eyes: Negative for blurred vision, double vision and photophobia.   Respiratory: Negative for hemoptysis, shortness of breath and wheezing.    Cardiovascular: Negative for chest pain, palpitations, orthopnea, claudication, leg swelling and PND.   Gastrointestinal: Negative for abdominal pain, blood in stool, heartburn, melena, nausea and vomiting.   Genitourinary: Negative for flank pain and hematuria.   Musculoskeletal: Positive for joint pain. Negative for falls, myalgias and neck pain.   Skin: Negative for rash.   Neurological: Negative for dizziness, seizures, loss of consciousness, weakness and headaches.   Endo/Heme/Allergies: Negative for polydipsia. Does not bruise/bleed easily.   Psychiatric/Behavioral: Negative for depression and memory loss. The patient is not nervous/anxious.        PHYSICAL EXAM:     Vitals:    06/29/17 1240   BP: (!) 168/71   Pulse: 64   Resp: 15    Body mass index is 30.54 kg/m².  Weight: 80.7 kg (177 lb 14.6 oz)   Height: 5' 4" (162.6 cm)     Physical Exam   Constitutional: She is oriented to person, place, " and time. She appears well-developed and well-nourished. She is cooperative.  Non-toxic appearance. No distress.   HENT:   Head: Normocephalic and atraumatic.   Eyes: Conjunctivae and EOM are normal. Pupils are equal, round, and reactive to light. No scleral icterus.   Neck: Trachea normal and normal range of motion. Neck supple. Normal carotid pulses and no JVD present. Carotid bruit is not present. No neck rigidity. No edema present. No thyromegaly present.   Cardiovascular: Normal rate, regular rhythm, S1 normal and S2 normal.  PMI is not displaced.  Exam reveals no gallop and no friction rub.    No murmur heard.  Pulses:       Carotid pulses are 2+ on the right side, and 2+ on the left side.  Pulmonary/Chest: Effort normal and breath sounds normal. No respiratory distress. She has no wheezes. She has no rales. She exhibits no tenderness.   Abdominal: Soft. Bowel sounds are normal. She exhibits no distension and no mass. There is no hepatosplenomegaly. There is no tenderness.   Musculoskeletal: She exhibits no edema or tenderness.   Feet:   Right Foot:   Skin Integrity: Negative for ulcer.   Left Foot:   Skin Integrity: Negative for ulcer.   Neurological: She is alert and oriented to person, place, and time. No cranial nerve deficit.   Skin: Skin is warm and dry. No rash noted. No erythema.   Psychiatric: She has a normal mood and affect. Her speech is normal and behavior is normal.   Vitals reviewed.      DATA:   EKG: (personally reviewed tracing)  6/29/17 SR 61 LAD, PRWP, NSSTTW changes.  Similar to 12/2/16    Laboratory:  CBC:    Recent Labs  Lab 05/18/15  1041 06/15/16  0844 03/14/17  0722   WHITE BLOOD CELL COUNT 10.18 9.95 9.51   HEMOGLOBIN 13.9 13.4 13.6   HEMATOCRIT 42.2 40.6 42.9   PLATELETS 313 315 276       CHEMISTRIES:    Recent Labs  Lab 06/15/16  0844 10/31/16  0805 03/14/17  0722   GLUCOSE 206 H 164 H 154 H   SODIUM 141 144 142   POTASSIUM 4.2 4.0 3.8   BUN BLD 14 14 16   CREATININE 0.9 0.9 0.9    EGFR IF  >60.0 >60.0 >60.0   EGFR IF NON- >60.0 >60.0 >60.0   CALCIUM 9.3 9.2 9.4       CARDIAC BIOMARKERS:        COAGS:        LIPIDS/LFTS:    Recent Labs  Lab 12/14/15  0846 06/15/16  0844 03/14/17  0722   CHOLESTEROL 144 139 144   TRIGLYCERIDES 112 114 112   HDL 53 53 48   LDL CHOLESTEROL 68.6 63.2 73.6   NON-HDL CHOLESTEROL 91 86 96   AST 27 20 19   ALT 30 20 20       Cardiovascular Testing:  Holter 3/28/16:  PREDOMINANT RHYTHM  1. Sinus rhythm with heart rates varying between 65 and 111 bpm with an average of 80 bpm.   VENTRICULAR ARRHYTHMIAS  1. There were occasional PVCs totalling 697 and averaging 29 per hour. There was 1 couplet.  2. There were no episodes of ventricular tachycardia.  SUPRA VENTRICULAR ARRHYTHMIAS  1. There were very rare PACs recorded totalling 1 and averaging less than 1 per hour.   2. There were no episodes of sustained supraventricular tachycardia.  SINUS NODE FUNCTION  1. There was no evidence of high grade SA cristian block.   AV CONDUCTION  1. There was no evidence of high grade AV block.   DIARY  1. The diary was not returned  MISCELLANEOUS  1. There were occasional hookup related artifacts. Overall, the study was of good quality.   2. This was a tape of adequate length (24 hrs).    Echo 3/28/16:  1 - Normal left ventricular systolic function (EF 60-65%). Normal wall motion.   2 - Concentric remodeling.   3 - Trivial tricuspid regurgitation.   4 - The estimated PA systolic pressure is 27 mmHg.     Ex-MPI 3/28/16   Jignesh 2min, 86% MPHR, -CP/EKG  Nuclear Quantitative Functional Analysis:   Quantitative measurements of LV function are over estimated secondary to small ventricular volumes.   Visually estimated LV function is hyperdynamic.   Impression: NORMAL MYOCARDIAL PERFUSION  1. The perfusion scan is free of evidence for myocardial ischemia or injury.   2. Resting wall motion is physiologic.   3. Visually estimated LV function is hyperdynamic.   4.  The ventricular volumes are normal at rest and stress.   5. The extracardiac distribution of radioactivity is normal.    ASSESSMENT:   # HTN, uncontrolled  # DM  # HLP, LDL acceptable  # BMI 30    PLAN:   Cont med rx  Add HCTZ 25mg qd  Check BMP 2 weeks  Diet/exercise/weight loss  PASCUAL eval  Check Renal art US  RTC 1 month      Juan Pablo Ordoñez MD, FACC

## 2017-07-17 DIAGNOSIS — I10 ESSENTIAL HYPERTENSION: ICD-10-CM

## 2017-07-17 DIAGNOSIS — E78.5 HYPERLIPIDEMIA LDL GOAL <100: ICD-10-CM

## 2017-07-17 RX ORDER — NIFEDIPINE 90 MG/1
TABLET, EXTENDED RELEASE ORAL
Qty: 90 TABLET | Refills: 0 | Status: SHIPPED | OUTPATIENT
Start: 2017-07-17 | End: 2017-10-13 | Stop reason: SDUPTHER

## 2017-07-17 RX ORDER — ATORVASTATIN CALCIUM 10 MG/1
TABLET, FILM COATED ORAL
Qty: 90 TABLET | Refills: 0 | Status: SHIPPED | OUTPATIENT
Start: 2017-07-17 | End: 2017-10-13 | Stop reason: SDUPTHER

## 2017-07-26 RX ORDER — METOPROLOL SUCCINATE 50 MG/1
50 TABLET, EXTENDED RELEASE ORAL DAILY
Qty: 90 TABLET | Refills: 3 | OUTPATIENT
Start: 2017-07-26

## 2017-07-31 ENCOUNTER — OFFICE VISIT (OUTPATIENT)
Dept: FAMILY MEDICINE | Facility: CLINIC | Age: 77
End: 2017-07-31
Payer: MEDICARE

## 2017-07-31 ENCOUNTER — LAB VISIT (OUTPATIENT)
Dept: LAB | Facility: HOSPITAL | Age: 77
End: 2017-07-31
Attending: INTERNAL MEDICINE
Payer: MEDICARE

## 2017-07-31 ENCOUNTER — TELEPHONE (OUTPATIENT)
Dept: FAMILY MEDICINE | Facility: CLINIC | Age: 77
End: 2017-07-31

## 2017-07-31 VITALS
SYSTOLIC BLOOD PRESSURE: 128 MMHG | OXYGEN SATURATION: 95 % | TEMPERATURE: 98 F | DIASTOLIC BLOOD PRESSURE: 56 MMHG | BODY MASS INDEX: 30.75 KG/M2 | HEART RATE: 65 BPM | HEIGHT: 64 IN | WEIGHT: 180.13 LBS

## 2017-07-31 DIAGNOSIS — E78.5 HYPERLIPIDEMIA LDL GOAL <100: ICD-10-CM

## 2017-07-31 DIAGNOSIS — R10.9 RIGHT SIDED ABDOMINAL PAIN: ICD-10-CM

## 2017-07-31 DIAGNOSIS — E87.6 HYPOKALEMIA: Primary | ICD-10-CM

## 2017-07-31 DIAGNOSIS — E03.9 HYPOTHYROIDISM (ACQUIRED): ICD-10-CM

## 2017-07-31 DIAGNOSIS — K21.9 GASTROESOPHAGEAL REFLUX DISEASE WITHOUT ESOPHAGITIS: Chronic | ICD-10-CM

## 2017-07-31 DIAGNOSIS — E66.9 OBESITY (BMI 30-39.9): ICD-10-CM

## 2017-07-31 DIAGNOSIS — R30.0 DYSURIA: ICD-10-CM

## 2017-07-31 DIAGNOSIS — Z00.00 ROUTINE MEDICAL EXAM: Primary | ICD-10-CM

## 2017-07-31 DIAGNOSIS — Z79.4 UNCONTROLLED TYPE 2 DIABETES MELLITUS WITH RETINOPATHY WITHOUT MACULAR EDEMA, WITH LONG-TERM CURRENT USE OF INSULIN, UNSPECIFIED LATERALITY, UNSPECIFIED RETINOPATHY SEVERITY: ICD-10-CM

## 2017-07-31 DIAGNOSIS — M85.80 OSTEOPENIA, UNSPECIFIED LOCATION: ICD-10-CM

## 2017-07-31 DIAGNOSIS — E11.65 UNCONTROLLED TYPE 2 DIABETES MELLITUS WITH RETINOPATHY WITHOUT MACULAR EDEMA, WITH LONG-TERM CURRENT USE OF INSULIN, UNSPECIFIED LATERALITY, UNSPECIFIED RETINOPATHY SEVERITY: ICD-10-CM

## 2017-07-31 DIAGNOSIS — E11.319 UNCONTROLLED TYPE 2 DIABETES MELLITUS WITH RETINOPATHY WITHOUT MACULAR EDEMA, WITH LONG-TERM CURRENT USE OF INSULIN, UNSPECIFIED LATERALITY, UNSPECIFIED RETINOPATHY SEVERITY: ICD-10-CM

## 2017-07-31 DIAGNOSIS — I10 ESSENTIAL HYPERTENSION: ICD-10-CM

## 2017-07-31 LAB
ALBUMIN SERPL BCP-MCNC: 3.7 G/DL
ALP SERPL-CCNC: 82 U/L
ALT SERPL W/O P-5'-P-CCNC: 20 U/L
ANION GAP SERPL CALC-SCNC: 11 MMOL/L
AST SERPL-CCNC: 21 U/L
BILIRUB SERPL-MCNC: 0.8 MG/DL
BUN SERPL-MCNC: 16 MG/DL
CALCIUM SERPL-MCNC: 9.2 MG/DL
CHLORIDE SERPL-SCNC: 107 MMOL/L
CO2 SERPL-SCNC: 23 MMOL/L
CREAT SERPL-MCNC: 1 MG/DL
EST. GFR  (AFRICAN AMERICAN): >60 ML/MIN/1.73 M^2
EST. GFR  (NON AFRICAN AMERICAN): 55.2 ML/MIN/1.73 M^2
ESTIMATED AVG GLUCOSE: 203 MG/DL
GLUCOSE SERPL-MCNC: 210 MG/DL
HBA1C MFR BLD HPLC: 8.7 %
POTASSIUM SERPL-SCNC: 3.4 MMOL/L
PROT SERPL-MCNC: 7.7 G/DL
SODIUM SERPL-SCNC: 141 MMOL/L

## 2017-07-31 PROCEDURE — 99499 UNLISTED E&M SERVICE: CPT | Mod: S$PBB,,, | Performed by: INTERNAL MEDICINE

## 2017-07-31 PROCEDURE — 80053 COMPREHEN METABOLIC PANEL: CPT

## 2017-07-31 PROCEDURE — 36415 COLL VENOUS BLD VENIPUNCTURE: CPT | Mod: PO

## 2017-07-31 PROCEDURE — 99397 PER PM REEVAL EST PAT 65+ YR: CPT | Mod: S$GLB,,, | Performed by: INTERNAL MEDICINE

## 2017-07-31 PROCEDURE — 83036 HEMOGLOBIN GLYCOSYLATED A1C: CPT

## 2017-07-31 PROCEDURE — 99999 PR PBB SHADOW E&M-EST. PATIENT-LVL IV: CPT | Mod: PBBFAC,,, | Performed by: INTERNAL MEDICINE

## 2017-07-31 RX ORDER — ALENDRONATE SODIUM 70 MG/1
TABLET ORAL
Qty: 12 TABLET | Refills: 0 | Status: CANCELLED | OUTPATIENT
Start: 2017-07-31

## 2017-07-31 RX ORDER — INSULIN ASPART 100 [IU]/ML
INJECTION, SOLUTION INTRAVENOUS; SUBCUTANEOUS
Qty: 30 ML | Refills: 1 | Status: SHIPPED | OUTPATIENT
Start: 2017-07-31 | End: 2017-08-17 | Stop reason: SDUPTHER

## 2017-07-31 RX ORDER — PEN NEEDLE, DIABETIC 30 GX3/16"
NEEDLE, DISPOSABLE MISCELLANEOUS
Qty: 100 EACH | Refills: 11 | Status: SHIPPED | OUTPATIENT
Start: 2017-07-31 | End: 2019-08-15 | Stop reason: SDUPTHER

## 2017-07-31 RX ORDER — OMEPRAZOLE 20 MG/1
20 CAPSULE, DELAYED RELEASE ORAL DAILY PRN
Qty: 90 CAPSULE | Refills: 0 | Status: SHIPPED | OUTPATIENT
Start: 2017-07-31 | End: 2018-01-16 | Stop reason: SDUPTHER

## 2017-07-31 NOTE — PROGRESS NOTES
HISTORY OF PRESENT ILLNESS:  Joel Condon is a 76 y.o. female who presents to the clinic today for a routine medical physical exam. Her last physical exam was approximately 1 years(s) ago.        PAST MEDICAL HISTORY:  Past Medical History:   Diagnosis Date    Arthritis     Hyperlipidemia LDL goal < 100     Hypertension     Hypothyroidism     Osteoporosis, post-menopausal     Overweight(278.02)     Proteinuria     Type II or unspecified type diabetes mellitus with renal manifestations, uncontrolled        PAST SURGICAL HISTORY:  Past Surgical History:   Procedure Laterality Date    APPENDECTOMY      CATARACT EXTRACTION W/  INTRAOCULAR LENS IMPLANT Left 4/24/14    OS (Dr. Arce)    CATARACT EXTRACTION W/  INTRAOCULAR LENS IMPLANT Right 06/12/2014    OD (Dr. Arce)    CHOLECYSTECTOMY      COLONOSCOPY N/A 4/21/2017    Procedure: COLONOSCOPY;  Surgeon: Homar Payan MD;  Location: [facility];  Service: Endoscopy;  Laterality: N/A;    EYE SURGERY  04/24/2014 & 06/12/2014    HYSTERECTOMY      total    left eye cataract surgery  4/24/14       SOCIAL HISTORY:  Social History     Social History    Marital status:      Spouse name: N/A    Number of children: 3    Years of education: N/A     Occupational History    retired      Social History Main Topics    Smoking status: Never Smoker    Smokeless tobacco: Never Used    Alcohol use No    Drug use: No    Sexual activity: Not Currently     Partners: Male     Other Topics Concern    Not on file     Social History Narrative    No narrative on file       FAMILY HISTORY:  Family History   Problem Relation Age of Onset    Diabetes Mother     Cancer Mother     Heart disease Mother     Hypertension Mother     Diabetes Sister     Hypertension Sister     Diabetes Sister     Hypertension Sister     Diabetes Sister     Hypertension Sister     Thyroid disease Sister     Stroke Sister     Hypertension Sister     Diabetes Sister      "Heart disease Sister     Diabetes Brother     Diabetes Brother     Amblyopia Neg Hx     Blindness Neg Hx     Cataracts Neg Hx     Glaucoma Neg Hx     Macular degeneration Neg Hx     Retinal detachment Neg Hx     Strabismus Neg Hx        ALLERGIES AND MEDICATIONS: updated and reviewed.  Review of patient's allergies indicates:   Allergen Reactions    Lisinopril Other (See Comments)     Cough      Hydrochlorothiazide (bulk) Other (See Comments)     Elevated pt's blood pressure making her feel bad    Pravastatin Other (See Comments)     headaches     Medication List with Changes/Refills   Current Medications    ASPIRIN (ECOTRIN) 81 MG EC TABLET    Take 1 tablet (81 mg total) by mouth once daily.    ATORVASTATIN (LIPITOR) 10 MG TABLET    TAKE 1 TABLET(10 MG) BY MOUTH EVERY DAY    BLOOD SUGAR DIAGNOSTIC STRP    True Results; Test tid    INSULIN SYRINGE-NEEDLE U-100 1 ML 31 GAUGE X 5/16 SYRG    USE THREE TIMES DAILY AS DIRECTED    INSULIN SYRINGE-NEEDLE U-100 1/2 ML 30 SYRG    USE NIGHTLY    INSULIN SYRINGE-NEEDLE U-100 1/2 ML 31 X 5/16" SYRG    1 Device by Misc.(Non-Drug; Combo Route) route nightly.    LANCETS 26 GAUGE MISC    1 lancet by Misc.(Non-Drug; Combo Route) route 3 (three) times daily.    LEVOTHYROXINE (SYNTHROID) 50 MCG TABLET    TAKE 1 TABLET(50 MCG) BY MOUTH EVERY DAY    LOSARTAN (COZAAR) 100 MG TABLET    Take 1 tablet (100 mg total) by mouth once daily.    METOPROLOL SUCCINATE (TOPROL-XL) 200 MG 24 HR TABLET    Take 1 tablet (200 mg total) by mouth once daily.    NIFEDIPINE (PROCARDIA-XL) 90 MG (OSM) 24 HR TABLET    TAKE 1 TABLET(90 MG) BY MOUTH EVERY DAY   Changed and/or Refilled Medications    Modified Medication Previous Medication    INSULIN ASPART (NOVOLOG FLEXPEN) 100 UNIT/ML INPN PEN insulin aspart (NOVOLOG FLEXPEN) 100 unit/mL InPn pen       ADMINISTER 28 UNITS UNDER THE SKIN THREE TIMES DAILY WITH MEALS    ADMINISTER 28 UNITS UNDER THE SKIN THREE TIMES DAILY WITH MEALS    INSULIN " "DETEMIR (LEVEMIR) 100 UNIT/ML INJECTION insulin detemir (LEVEMIR) 100 unit/mL injection       ADMINISTER 50 UNITS UNDER THE SKIN EVERY EVENING    ADMINISTER 50 UNITS UNDER THE SKIN EVERY EVENING    OMEPRAZOLE (PRILOSEC) 20 MG CAPSULE omeprazole (PRILOSEC) 20 MG capsule       Take 1 capsule (20 mg total) by mouth daily as needed (heartburn).    Take 1 capsule (20 mg total) by mouth daily as needed (heartburn).    PEN NEEDLE, DIABETIC (BD INSULIN PEN NEEDLE UF SHORT) 31 GAUGE X 5/16" NDLE pen needle, diabetic (BD INSULIN PEN NEEDLE UF SHORT) 31 gauge x 5/16" Ndle       Use 3 times daily    Use 3 times daily   Discontinued Medications    ALENDRONATE (FOSAMAX) 70 MG TABLET    TAKE 1 TABLET BY MOUTH EVERY 7 DAYS    HYDROCHLOROTHIAZIDE (HYDRODIURIL) 25 MG TABLET    Take 1 tablet (25 mg total) by mouth once daily.    METOPROLOL SUCCINATE (TOPROL-XL) 50 MG 24 HR TABLET        POLYETHYLENE GLYCOL (COLYTE) 240-22.72-6.72 -5.84 GRAM SOLR    Take 4,000 mLs (4 L total) by mouth as needed.             SCREENING HISTORY:  Health Maintenance       Date Due Completion Date    Urine Microalbumin 06/15/2017 6/15/2016    Influenza Vaccine 08/01/2017 9/19/2016    Override on 11/25/2014: Done    Hemoglobin A1c 09/14/2017 3/14/2017    Mammogram 10/26/2017 10/26/2016    Lipid Panel 03/14/2018 3/14/2017    Colonoscopy 04/21/2018 4/21/2017    Override on 4/1/2007: Done    Eye Exam 05/02/2018 5/2/2017 (Done)    Override on 5/2/2017: Done    Override on 11/4/2012: Done    Foot Exam 06/28/2018 6/28/2017 (Done)    Override on 6/28/2017: Done    Override on 3/1/2017: Done    Override on 5/20/2015: Done    DEXA SCAN 01/10/2019 1/10/2017    TETANUS VACCINE 06/15/2026 6/15/2016            REVIEW OF SYSTEMS:   The patient reports fair dietary habits.  The patient does not exercise regularly.  General ROS: negative for - chills, fever, malaise or weight loss  Psychological ROS: negative for - anxiety, depression, sleep disturbances or suicidal " "ideation  Ophthalmic ROS: negative for - blurry vision or eye pain  ENT ROS: negative for - epistaxis, headaches, nasal congestion, oral lesions or sore throat  Allergy and Immunology ROS: negative for - hives  Hematological and Lymphatic ROS: negative for - swollen lymph nodes  Endocrine ROS: negative for - polydipsia/polyuria or temperature intolerance  Respiratory ROS: no cough, shortness of breath, or wheezing  Cardiovascular ROS: no chest pain or dyspnea on exertion  Gastrointestinal ROS: positive for - intermittent right upper quadrant/side pain - since gallbladder surgery  negative for - appetite loss, blood in stools, hematemesis or melena  Genito-Urinary ROS: positive for - dysuria for the last few days  negative for - hematuria, incontinence or pelvic pain  Breast ROS: negative for breast lumps  Musculoskeletal ROS: negative for - gait disturbance, joint swelling or muscle pain  Neurological ROS: no TIA or stroke symptoms  Dermatological ROS: negative for mole changes and rash    Physical Examination:   Vitals:    07/31/17 0802   BP: (!) 128/56   Pulse: 65   Temp: 98.3 °F (36.8 °C)     Weight: 81.7 kg (180 lb 1.9 oz)   Height: 5' 4" (162.6 cm)   Body mass index is 30.92 kg/m².    General appearance - alert, well appearing, and in no distress and obese  Mental status - alert, oriented to person, place, and time, normal mood, behavior, speech, dress, motor activity, and thought processes  Eyes - pupils equal and reactive, extraocular eye movements intact, sclera anicteric  Mouth - mucous membranes moist, pharynx normal without lesions  Neck - supple, no significant adenopathy, carotids upstroke normal bilaterally, no bruits, thyroid exam: thyroid is normal in size without nodules or tenderness  Lymphatics - no palpable lymphadenopathy  Chest - clear to auscultation, no wheezes, rales or rhonchi, symmetric air entry  Heart - normal rate and regular rhythm, no gallops noted  Abdomen - soft, nondistended, no " "masses or organomegaly  tenderness noted right upper side - some tenderness to palpation/movement  no rebound tenderness noted  Breasts - not examined  Back exam -mild limited range of motion, no tenderness, palpable spasm or pain on motion  Neurological - alert, oriented, normal speech, no focal findings or movement disorder noted, cranial nerves II through XII intact  Musculoskeletal - no muscular tenderness noted, Mild-Moderate osteoarthritic changes noted to both knee joints. No joint effusions noted.   Extremities - no pedal edema noted  Skin - normal coloration and turgor, no rashes, no suspicious skin lesions noted      ASSESSMENT AND PLAN:  1. Routine medical exam  Counseled on age appropriate medical preventative services including age appropriate cancer screenings, age appropriate eye and dental exams, over all nutritional health, need for a consistent exercise regimen, and an over all push towards maintaining a vigorous and active lifestyle.  Counseled on age appropriate vaccines and discussed upcoming health care needs based on age/gender. Discussed good sleep hygiene and stress management.     2. 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 Type 2 diabetes, uncontrolled, with neuropathy  Diabetes currently is not controlled. We discussed diabetic diet and regular exercise. We discussed home blood sugar monitoring, if appropriate. I will refer her to endocrinology for further evaluation/treatment. She is up to date on her eye exam. Neuropathy pain controlled.  - pen needle, diabetic (BD INSULIN PEN NEEDLE UF SHORT) 31 gauge x 5/16" Ndle; Use 3 times daily  Dispense: 100 each; Refill: 11  - insulin detemir (LEVEMIR) 100 unit/mL injection; ADMINISTER 50 UNITS UNDER THE SKIN EVERY EVENING  Dispense: " 60 mL; Refill: 2  - insulin aspart (NOVOLOG FLEXPEN) 100 unit/mL InPn pen; ADMINISTER 28 UNITS UNDER THE SKIN THREE TIMES DAILY WITH MEALS  Dispense: 30 mL; Refill: 1  - Hemoglobin A1c; Future  - Microalbumin/creatinine urine ratio; Future  - Ambulatory referral to Endocrinology    5. Essential hypertension  Discussed sodium restriction, maintaining ideal body weight and regular exercise program as physiologic means to achieve blood pressure control. The patient will strive towards this. The current medical regimen is effective;  continue present plan and medications. Recommended patient to check home readings to monitor and see me for followup as scheduled or sooner as needed. Patient was educated that both decongestant and anti-inflammatory medication may raise blood pressure.     6. Hyperlipidemia LDL goal <100  We discussed low fat diet and regular exercise.The current medical regimen is effective;  continue present plan and medications.    - Comprehensive metabolic panel; Future    7. Hypothyroidism (acquired)  Patient is clinically euthyroid. Continue current regimen.     8. Gastroesophageal reflux disease without esophagitis  Symptoms controlled. Reflux precautions discussed (eliminate tobacco if a smoker; minimize caffeine, chocolate and red/white peppermint intake; avoid heavy and spicy meals; don't lay down within 2-3 hours after eating). Medication as needed. Patient asked to take medication breaks, if possible - discussed chronic use can limit calcium absorption, increases the risk for intestinal infections, and can cause kidney damage.    - omeprazole (PRILOSEC) 20 MG capsule; Take 1 capsule (20 mg total) by mouth daily as needed (heartburn).  Dispense: 90 capsule; Refill: 0    9. Right sided abdominal pain  I'm unsure of the etiology of her pain.  The patient is very concerned that there may be something wrong internally since the pain started after her gallbladder surgery.  I will check a CT scan and  address results accordingly.  This may just be musculoskeletal pain, however.  She can take Tylenol as needed for now.  - CT Abdomen Pelvis With Contrast; Future    10. Dysuria  The patient reports a few days of some mild dysuria.  No hematuria.  I will check a urine culture and address results accordingly.  - Urine culture; Future  - Urinalysis; Future    11. Osteopenia, unspecified location  We discussed adequate calcium and vitamin D supplementation. She is up to date on her BMD.  Her last bone density test results she can take a drug holiday.  Recheck bone density test January 2019.    12. Obesity (BMI 30-39.9)  The patient is asked to make an attempt to improve diet and exercise patterns to aid in medical management of this problem.           Return in about 3 months (around 10/31/2017), or if symptoms worsen or fail to improve, for follow up chronic medical conditions.. or sooner as needed.

## 2017-07-31 NOTE — TELEPHONE ENCOUNTER
Please call patient: her diabetes control is a little improved, but not at goal. Someone will call her to schedule an appointment with endocrinology as discussed at her office visit.  Also, her potassium level was a little low.  I need her to drink a little bit of orange juice or eat half of a banana daily.  Recheck in 1 week.

## 2017-08-01 NOTE — TELEPHONE ENCOUNTER
Spoke w/patient, informed of results and recommendations. Verbalized understanding. Lab scheduled 8/9/17.

## 2017-08-02 ENCOUNTER — TELEPHONE (OUTPATIENT)
Dept: FAMILY MEDICINE | Facility: CLINIC | Age: 77
End: 2017-08-02

## 2017-08-02 DIAGNOSIS — N39.0 URINARY TRACT INFECTION WITHOUT HEMATURIA, SITE UNSPECIFIED: Primary | ICD-10-CM

## 2017-08-02 RX ORDER — CIPROFLOXACIN 250 MG/1
250 TABLET, FILM COATED ORAL 2 TIMES DAILY
Qty: 6 TABLET | Refills: 0 | Status: SHIPPED | OUTPATIENT
Start: 2017-08-02 | End: 2017-08-05

## 2017-08-02 NOTE — TELEPHONE ENCOUNTER
Please call patient: her urine culture did show a mild infection. I sent the Rx to the pharmacy. She needs to let us know if her symptoms do not improve.

## 2017-08-03 NOTE — TELEPHONE ENCOUNTER
Spoke w/patient, informed of results, recommendation and Rx sent to pharmacy. Verbalized understanding.

## 2017-08-09 ENCOUNTER — TELEPHONE (OUTPATIENT)
Dept: FAMILY MEDICINE | Facility: CLINIC | Age: 77
End: 2017-08-09

## 2017-08-09 ENCOUNTER — LAB VISIT (OUTPATIENT)
Dept: LAB | Facility: HOSPITAL | Age: 77
End: 2017-08-09
Attending: INTERNAL MEDICINE
Payer: MEDICARE

## 2017-08-09 DIAGNOSIS — E87.6 HYPOKALEMIA: Primary | ICD-10-CM

## 2017-08-09 DIAGNOSIS — E87.6 HYPOKALEMIA: ICD-10-CM

## 2017-08-09 LAB
ANION GAP SERPL CALC-SCNC: 10 MMOL/L
BUN SERPL-MCNC: 15 MG/DL
CALCIUM SERPL-MCNC: 9.4 MG/DL
CHLORIDE SERPL-SCNC: 108 MMOL/L
CO2 SERPL-SCNC: 26 MMOL/L
CREAT SERPL-MCNC: 1 MG/DL
EST. GFR  (AFRICAN AMERICAN): >60 ML/MIN/1.73 M^2
EST. GFR  (NON AFRICAN AMERICAN): 55.2 ML/MIN/1.73 M^2
GLUCOSE SERPL-MCNC: 150 MG/DL
POTASSIUM SERPL-SCNC: 3.4 MMOL/L
SODIUM SERPL-SCNC: 144 MMOL/L

## 2017-08-09 PROCEDURE — 80048 BASIC METABOLIC PNL TOTAL CA: CPT

## 2017-08-09 PROCEDURE — 36415 COLL VENOUS BLD VENIPUNCTURE: CPT | Mod: PO

## 2017-08-09 NOTE — TELEPHONE ENCOUNTER
Please call patient: The potassium level is still a little low.  She needs to start taking an over-the-counter potassium supplement once daily.  Recheck in 2 weeks.

## 2017-08-16 ENCOUNTER — TELEPHONE (OUTPATIENT)
Dept: ENDOCRINOLOGY | Facility: CLINIC | Age: 77
End: 2017-08-16

## 2017-08-16 NOTE — TELEPHONE ENCOUNTER
Called and confirmed appointment with patient for Thurs 8/17/17 at 10:30a. Patient verbalized confirmation.

## 2017-08-17 ENCOUNTER — OFFICE VISIT (OUTPATIENT)
Dept: ENDOCRINOLOGY | Facility: CLINIC | Age: 77
End: 2017-08-17
Payer: MEDICARE

## 2017-08-17 VITALS
HEART RATE: 58 BPM | WEIGHT: 181 LBS | HEIGHT: 64 IN | DIASTOLIC BLOOD PRESSURE: 75 MMHG | SYSTOLIC BLOOD PRESSURE: 175 MMHG | BODY MASS INDEX: 30.9 KG/M2

## 2017-08-17 DIAGNOSIS — G62.9 NEUROPATHY: ICD-10-CM

## 2017-08-17 DIAGNOSIS — E78.5 HYPERLIPIDEMIA LDL GOAL <100: ICD-10-CM

## 2017-08-17 DIAGNOSIS — E11.649 HYPOGLYCEMIA ASSOCIATED WITH DIABETES: ICD-10-CM

## 2017-08-17 DIAGNOSIS — I10 ESSENTIAL HYPERTENSION: ICD-10-CM

## 2017-08-17 LAB — GLUCOSE SERPL-MCNC: 204 MG/DL (ref 70–110)

## 2017-08-17 PROCEDURE — 3008F BODY MASS INDEX DOCD: CPT | Mod: S$GLB,,, | Performed by: NURSE PRACTITIONER

## 2017-08-17 PROCEDURE — 3077F SYST BP >= 140 MM HG: CPT | Mod: S$GLB,,, | Performed by: NURSE PRACTITIONER

## 2017-08-17 PROCEDURE — 3045F PR MOST RECENT HEMOGLOBIN A1C LEVEL 7.0-9.0%: CPT | Mod: S$GLB,,, | Performed by: NURSE PRACTITIONER

## 2017-08-17 PROCEDURE — 1126F AMNT PAIN NOTED NONE PRSNT: CPT | Mod: S$GLB,,, | Performed by: NURSE PRACTITIONER

## 2017-08-17 PROCEDURE — 99999 PR PBB SHADOW E&M-EST. PATIENT-LVL IV: CPT | Mod: PBBFAC,,, | Performed by: NURSE PRACTITIONER

## 2017-08-17 PROCEDURE — 1159F MED LIST DOCD IN RCRD: CPT | Mod: S$GLB,,, | Performed by: NURSE PRACTITIONER

## 2017-08-17 PROCEDURE — 99499 UNLISTED E&M SERVICE: CPT | Mod: S$PBB,,, | Performed by: NURSE PRACTITIONER

## 2017-08-17 PROCEDURE — 4010F ACE/ARB THERAPY RXD/TAKEN: CPT | Mod: S$GLB,,, | Performed by: NURSE PRACTITIONER

## 2017-08-17 PROCEDURE — 82948 REAGENT STRIP/BLOOD GLUCOSE: CPT | Mod: S$GLB,,, | Performed by: NURSE PRACTITIONER

## 2017-08-17 PROCEDURE — 3078F DIAST BP <80 MM HG: CPT | Mod: S$GLB,,, | Performed by: NURSE PRACTITIONER

## 2017-08-17 PROCEDURE — 99204 OFFICE O/P NEW MOD 45 MIN: CPT | Mod: S$GLB,,, | Performed by: NURSE PRACTITIONER

## 2017-08-17 RX ORDER — INSULIN ASPART 100 [IU]/ML
INJECTION, SOLUTION INTRAVENOUS; SUBCUTANEOUS
Qty: 30 ML | Refills: 1
Start: 2017-08-17 | End: 2017-09-01 | Stop reason: SDUPTHER

## 2017-08-17 RX ORDER — METFORMIN HYDROCHLORIDE 500 MG/1
1000 TABLET, EXTENDED RELEASE ORAL 2 TIMES DAILY WITH MEALS
Qty: 120 TABLET | Refills: 0 | Status: SHIPPED | OUTPATIENT
Start: 2017-08-17 | End: 2017-09-25 | Stop reason: SDUPTHER

## 2017-08-17 RX ORDER — METFORMIN HYDROCHLORIDE 500 MG/1
TABLET, EXTENDED RELEASE ORAL
Qty: 360 TABLET | Refills: 0 | OUTPATIENT
Start: 2017-08-17

## 2017-08-17 NOTE — LETTER
August 17, 2017      Shelby Ley MD  [address]  [place] [address]           [facility]  [address]  [place] [address]  Phone: [phone]  Fax: [phone]          Patient: Joel Condon   MR Number: [id number]   YOB: 1940   Date of Visit: 8/17/2017       Dear Dr. Shelby Ley:    Thank you for referring Joel Condon to me for evaluation. Attached you will find relevant portions of my assessment and plan of care.    If you have questions, please do not hesitate to call me. I look forward to following Joel Condon along with you.    Sincerely,    Rosy Mcknight NP    Enclosure  CC:  No Recipients    If you would like to receive this communication electronically, please contact [email or web address] or [phone] to request more information on [facility] access.    For providers and/or their staff who would like to refer a patient to [facility], please contact us through our one-stop-shop provider referral line, [facility], at [phone].    If you feel you have received this communication in error or would no longer like to receive these types of communications, please e-mail [email or web address]

## 2017-08-17 NOTE — PATIENT INSTRUCTIONS
Travel with Novolog pen and take Novolog before meals.   Bring a written glucose log to every visit.     Continue to take Levemir 50 units nightly.   Start metformin  mg tablet - 1 tablet twice daily with meals for 1 week. Then, if no upset stomach, increase to 2 tablets twice daily with breakfast and with dinner.   Decrease Novolog to 20 units before meals.   Test glucose 4x/day before meals and bedtime.   Return to clinic in 2 weeks with written glucose log.     RULE OF 15 FOR TREATMENT OF LOW BLOOD GLUCOSE:    If your blood glucose is < 80 mg/dL or you are experiencing  symptoms of low blood sugar (shaking, sweating, blurred vision)  treat with 15 grams of glucose, wait 15 minutes, recheck and repeat if needed.      Carry foods with you that you can use for quick treatment.  This could include glucose tablets, peppermints or other hard candies (not sugar free), juice packs.    Should note possible cause of hypoglycemia in blood glucose logs so that you may keep these incidents to a minimum.

## 2017-08-17 NOTE — PROGRESS NOTES
"CC: This 76 y.o. Black or  female  is here for evaluation of  T2DM along with comorbidities indicated in the Visit Diagnosis section of this encounter.    HPI: Joel Condon was diagnosed with T2DM in 1994.  Oral agents started at diagnosis.     New to Endocrine. Referred by Dr. Ley for high A1c of 8.7%.     LAST DIABETES EDUCATION: years ago at [facility]. And also a class done by [employer] early 2017    HOSPITALIZED FOR DIABETES  -  No.    PRESCRIBED DIABETES MEDICATIONS: Levemir (vial) 50 units every night at 10 pm, Novolog Flexpen 30 units ac   Misses medication doses - No  - but takes her Novolog late when she goes out to eat and waits to take Novolog at home.     DM COMPLICATIONS: peripheral neuropathy    SIGNIFICANT DIABETES MED HISTORY: denies     SELF MONITORING BLOOD GLUCOSE: Checks blood glucose at home 4x/day. No logs. Recalls:    Fasting blood glucose: mid to high 100s  Pre-lunch readings: 200s mg/dL  Pre-dinner/bedtime readings: 200s  Mg/dL    POCT glucose - fasting today -  was 204    HYPOGLYCEMIC EPISODES: wakes up about 2-3x week overnight w/ dizziness and shakes, treats with a Glucerna. Feels like her bg drops during the afternoon as well and corrects w/ OJ or a Glucerna. Doesn't test her bg when it drops. Feels like her bg will drop unless she eats her meals.     CURRENT DIET: drinks diet cranberry juice, diet soda, tea w/ splenda. 3 meals/day. occ snack on a banana or apple or avocado.   Diet recall: supper was chicken breast, mixed vegetables (broccoli, cauliflower, carrots). Lunch was 1 ham sandwich on wheat. 1 apple before supper. Breakfast was 1 scrambled egg, 1 toast slice, and coffee.     CURRENT EXERCISE:  None d/t hip pain       BP (!) 175/75 (BP Location: Right arm, Patient Position: Sitting, BP Method: Large (Automatic))   Pulse (!) 58   Ht 5' 4" (1.626 m)   Wt 82.1 kg (181 lb)   BMI 31.07 kg/m²       ROS:   CONSTITUTIONAL: Appetite good, denies " fatigue  SKIN: No rash or pruritis   EYES: + visual disturbances  RESPIRATORY: No shortness of breath or cough  CARDIAC: No chest pain or palpitations  GI: No nausea, vomiting, or diarrhea  : No urinary frequency or dysuria   MS: + arthralgias   NEURO: No paresthesias or tremors.   OTHER: some hearing loss       PHYSICAL EXAM:  GENERAL: Well developed, well nourished. No acute distress.   PSYCH: AAOx3, appropriate mood and affect, conversant, well-groomed. Judgement and insight good.   NEURO: Cranial nerves grossly intact. Speech clear, no tremor.   NECK: Trachea midline, no thyromegaly or lymphadenopathy.   CHEST: Respirations even and unlabored. CTA bilaterally.  CARDIOVASCULAR: Regular rate and rhythm. No bruits. No murmur. No edema.   ABDOMEN: Soft, non-tender, non-distended. Bowel sounds present.   MS: Gait steady. No clubbing.   SKIN: Normal skin turgor. Skin warm and dry. No areas of breakdown. + nuchal acanthosis nigricans.          Hemoglobin A1C   Date Value Ref Range Status   07/31/2017 8.7 (H) 4.0 - 5.6 % Final     Comment:     According to ADA guidelines, hemoglobin A1c <7.0% represents  optimal control in non-pregnant diabetic patients. Different  metrics may apply to specific patient populations.   Standards of Medical Care in Diabetes-2016.  For the purpose of screening for the presence of diabetes:  <5.7%     Consistent with the absence of diabetes  5.7-6.4%  Consistent with increasing risk for diabetes   (prediabetes)  >or=6.5%  Consistent with diabetes  Currently, no consensus exists for use of hemoglobin A1c  for diagnosis of diabetes for children.  This Hemoglobin A1c assay has significant interference with fetal   hemoglobin   (HbF). The results are invalid for patients with abnormal amounts of   HbF,   including those with known Hereditary Persistence   of Fetal Hemoglobin. Heterozygous hemoglobin variants (HbAS, HbAC,   HbAD, HbAE, HbA2) do not significantly interfere with this assay;    however, presence of multiple variants in a sample may impact the %   interference.     03/14/2017 9.5 (H) 4.5 - 6.2 % Final     Comment:     According to ADA guidelines, hemoglobin A1C <7.0% represents  optimal control in non-pregnant diabetic patients.  Different  metrics may apply to specific populations.   Standards of Medical Care in Diabetes - 2016.  For the purpose of screening for the presence of diabetes:  <5.7%     Consistent with the absence of diabetes  5.7-6.4%  Consistent with increasing risk for diabetes   (prediabetes)  >or=6.5%  Consistent with diabetes  Currently no consensus exists for use of hemoglobin A1C  for diagnosis of diabetes for children.     12/15/2016 8.9 (H) 4.5 - 6.2 % Final     Comment:     According to ADA guidelines, hemoglobin A1C <7.0% represents  optimal control in non-pregnant diabetic patients.  Different  metrics may apply to specific populations.   Standards of Medical Care in Diabetes - 2016.  For the purpose of screening for the presence of diabetes:  <5.7%     Consistent with the absence of diabetes  5.7-6.4%  Consistent with increasing risk for diabetes   (prediabetes)  >or=6.5%  Consistent with diabetes  Currently no consensus exists for use of hemoglobin A1C  for diagnosis of diabetes for children.             Chemistry        Component Value Date/Time     08/09/2017 0717    K 3.4 (L) 08/09/2017 0717     08/09/2017 0717    CO2 26 08/09/2017 0717    BUN 15 08/09/2017 0717    CREATININE 1.0 08/09/2017 0717     (H) 08/09/2017 0717        Component Value Date/Time    CALCIUM 9.4 08/09/2017 0717    ALKPHOS 82 07/31/2017 0846    AST 21 07/31/2017 0846    ALT 20 07/31/2017 0846    BILITOT 0.8 07/31/2017 0846    ESTGFRAFRICA >60.0 08/09/2017 0717    EGFRNONAA 55.2 (A) 08/09/2017 0717          Lab Results   Component Value Date    LDLCALC 73.6 03/14/2017        Ref. Range 3/14/2017 07:22   Cholesterol Latest Ref Range: 120 - 199 mg/dL 144   HDL Latest Ref  Range: 40 - 75 mg/dL 48   LDL Cholesterol Latest Ref Range: 63.0 - 159.0 mg/dL 73.6   Total Cholesterol/HDL Ratio Latest Ref Range: 2.0 - 5.0  3.0   Triglycerides Latest Ref Range: 30 - 150 mg/dL 112     Lab Results   Component Value Date    MICALBCREAT 34.5 (H) 07/31/2017           STANDARDS of CARE:        ASA:       yes        Last eye exam:       ASSESSMENT and PLAN:    A1C GOAL: <8%    1. Uncontrolled type 2 diabetes mellitus with complication, with long-term current use of insulin  We will add in metformin to the insulin regimen to minimize her insulin requirement and hence hypoglycemia risk.     Travel with Novolog pen and take Novolog before meals.   Bring a written glucose log to every visit.     Continue to take Levemir 50 units nightly.   Start metformin  mg tablet - 1 tablet twice daily with meals for 1 week. Then, if no upset stomach, increase to 2 tablets twice daily with breakfast and with dinner.   Decrease Novolog to 20 units before meals.   Test glucose 4x/day before meals and bedtime.   Return to clinic in 2 weeks with written glucose log.       POCT glucose   2. Neuropathy  Improve glycemic control.      3. Hypoglycemia associated with diabetes  Reviewed hypoglycemia s/sx and tx. Important to confirm hypoglycemic symptoms by testing glucoses. Unsure if she has been having true hypo or not.    4. Hyperlipidemia LDL goal <100  At goal.    5. Essential hypertension  Suboptimal. Will reassess BP at next visit.          Orders Placed This Encounter   Procedures    POCT glucose        Return in about 2 weeks (around 8/31/2017).     Thank you very much for allowing me to participate in Joel Condon's care.

## 2017-08-28 ENCOUNTER — LAB VISIT (OUTPATIENT)
Dept: LAB | Facility: HOSPITAL | Age: 77
End: 2017-08-28
Attending: INTERNAL MEDICINE
Payer: MEDICARE

## 2017-08-28 ENCOUNTER — TELEPHONE (OUTPATIENT)
Dept: FAMILY MEDICINE | Facility: CLINIC | Age: 77
End: 2017-08-28

## 2017-08-28 DIAGNOSIS — E87.6 HYPOKALEMIA: ICD-10-CM

## 2017-08-28 LAB
ANION GAP SERPL CALC-SCNC: 10 MMOL/L
BUN SERPL-MCNC: 18 MG/DL
CALCIUM SERPL-MCNC: 10.1 MG/DL
CHLORIDE SERPL-SCNC: 108 MMOL/L
CO2 SERPL-SCNC: 25 MMOL/L
CREAT SERPL-MCNC: 1 MG/DL
EST. GFR  (AFRICAN AMERICAN): >60 ML/MIN/1.73 M^2
EST. GFR  (NON AFRICAN AMERICAN): 54.9 ML/MIN/1.73 M^2
GLUCOSE SERPL-MCNC: 127 MG/DL
POTASSIUM SERPL-SCNC: 4 MMOL/L
SODIUM SERPL-SCNC: 143 MMOL/L

## 2017-08-28 PROCEDURE — 36415 COLL VENOUS BLD VENIPUNCTURE: CPT | Mod: PO

## 2017-08-28 PROCEDURE — 80048 BASIC METABOLIC PNL TOTAL CA: CPT

## 2017-08-31 ENCOUNTER — TELEPHONE (OUTPATIENT)
Dept: ENDOCRINOLOGY | Facility: CLINIC | Age: 77
End: 2017-08-31

## 2017-08-31 NOTE — TELEPHONE ENCOUNTER
Called and confirmed appointment with patient for Fri 9/1/17 at 10:00a. Patient verbalized confirmation.

## 2017-09-01 ENCOUNTER — OFFICE VISIT (OUTPATIENT)
Dept: ENDOCRINOLOGY | Facility: CLINIC | Age: 77
End: 2017-09-01
Payer: MEDICARE

## 2017-09-01 VITALS
HEIGHT: 64 IN | DIASTOLIC BLOOD PRESSURE: 70 MMHG | BODY MASS INDEX: 30.38 KG/M2 | HEART RATE: 66 BPM | WEIGHT: 177.94 LBS | SYSTOLIC BLOOD PRESSURE: 167 MMHG

## 2017-09-01 DIAGNOSIS — E78.5 HYPERLIPIDEMIA LDL GOAL <100: ICD-10-CM

## 2017-09-01 DIAGNOSIS — E11.649 HYPOGLYCEMIA ASSOCIATED WITH DIABETES: ICD-10-CM

## 2017-09-01 DIAGNOSIS — I10 ESSENTIAL HYPERTENSION: ICD-10-CM

## 2017-09-01 DIAGNOSIS — R19.7 DIARRHEA, UNSPECIFIED TYPE: ICD-10-CM

## 2017-09-01 PROCEDURE — 99214 OFFICE O/P EST MOD 30 MIN: CPT | Mod: S$GLB,,, | Performed by: NURSE PRACTITIONER

## 2017-09-01 PROCEDURE — 1159F MED LIST DOCD IN RCRD: CPT | Mod: S$GLB,,, | Performed by: NURSE PRACTITIONER

## 2017-09-01 PROCEDURE — 99999 PR PBB SHADOW E&M-EST. PATIENT-LVL IV: CPT | Mod: PBBFAC,,, | Performed by: NURSE PRACTITIONER

## 2017-09-01 PROCEDURE — 1126F AMNT PAIN NOTED NONE PRSNT: CPT | Mod: S$GLB,,, | Performed by: NURSE PRACTITIONER

## 2017-09-01 PROCEDURE — 3008F BODY MASS INDEX DOCD: CPT | Mod: S$GLB,,, | Performed by: NURSE PRACTITIONER

## 2017-09-01 PROCEDURE — 3077F SYST BP >= 140 MM HG: CPT | Mod: S$GLB,,, | Performed by: NURSE PRACTITIONER

## 2017-09-01 PROCEDURE — 99499 UNLISTED E&M SERVICE: CPT | Mod: S$PBB,,, | Performed by: NURSE PRACTITIONER

## 2017-09-01 PROCEDURE — 3078F DIAST BP <80 MM HG: CPT | Mod: S$GLB,,, | Performed by: NURSE PRACTITIONER

## 2017-09-01 RX ORDER — INSULIN ASPART 100 [IU]/ML
INJECTION, SOLUTION INTRAVENOUS; SUBCUTANEOUS
Qty: 30 ML | Refills: 1
Start: 2017-09-01 | End: 2017-11-01 | Stop reason: SDUPTHER

## 2017-09-01 NOTE — PROGRESS NOTES
CC: This 77 y.o. Black or  female  is here for evaluation of  T2DM along with comorbidities indicated in the Visit Diagnosis section of this encounter.    HPI: Joel Condon was diagnosed with T2DM in .  Oral agents started at diagnosis.     Initial visit on 17  New to Endocrine. Referred by Dr. Ley for high A1c of 8.7%.   Plan We will add in metformin to the insulin regimen to minimize her insulin requirement and hence hypoglycemia risk.   Travel with Novolog pen and take Novolog before meals.   Bring a written glucose log to every visit.   Continue to take Levemir 50 units nightly.   Start metformin  mg tablet - 1 tablet twice daily with meals for 1 week. Then, if no upset stomach, increase to 2 tablets twice daily with breakfast and with dinner.   Decrease Novolog to 20 units before meals.   Test glucose 4x/day before meals and bedtime.   Return to clinic in 2 weeks with written glucose log.       Interval history  She is doing well with metformin.   Her bg was low at 99 one night so she didn't take her Levemir with subsequently higher bgs the next day 190-287. Another low of 78 predinner after eating turkey sandwich, a typical meal, and no increased physical activity.     She lost about 3 lb in the last 2 wks.     She does suffer from periods of diarrhea and recently had an incontinent episode.     LAST DIABETES EDUCATION: years ago at [facility]. And also a class done by [employer] early 2017    HOSPITALIZED FOR DIABETES  -  No.    PRESCRIBED DIABETES MEDICATIONS: Levemir (vial) 50 units every night at 10 pm, Novolog Flexpen 20 units ac, metformin xr 1000 mg bid.   Misses medication doses - No      DM COMPLICATIONS: peripheral neuropathy    SIGNIFICANT DIABETES MED HISTORY: denies     SELF MONITORING BLOOD GLUCOSE: Checks blood glucose at home 4x/day. Brings logs. In the last week since titrating up to metformin xr 1000 mg bid:   Fastin-199  Lunch 139-199  Dinner  "125, 100, 113, 123, 112, 147, 78, 162  -204, 246     HYPOGLYCEMIC EPISODES: felt shaky when her bg was 78. She does get hot flashes overnight. And also gets dizzy when she stands up to go to the bathroom overnight.      CURRENT DIET: drinks diet cranberry juice, diet soda, tea w/ splenda. 3 meals/day.     CURRENT EXERCISE:  None d/t hip pain       BP (!) 167/70 (BP Location: Right arm, Patient Position: Sitting, BP Method: Large (Automatic))   Pulse 66   Ht 5' 4" (1.626 m)   Wt 80.7 kg (177 lb 14.6 oz)   BMI 30.54 kg/m²       ROS:   CONSTITUTIONAL: Appetite good, denies fatigue  RESPIRATORY: No shortness of breath or cough  CARDIAC: No chest pain or palpitations  MS: arthralgias to hip, back, and hands.         PHYSICAL EXAM:  GENERAL: Well developed, well nourished. No acute distress.   PSYCH: AAOx3, appropriate mood and affect, conversant, well-groomed. Judgement and insight good.   NEURO: Cranial nerves grossly intact. Speech clear, no tremor.       Hemoglobin A1C   Date Value Ref Range Status   07/31/2017 8.7 (H) 4.0 - 5.6 % Final     Comment:     According to ADA guidelines, hemoglobin A1c <7.0% represents  optimal control in non-pregnant diabetic patients. Different  metrics may apply to specific patient populations.   Standards of Medical Care in Diabetes-2016.  For the purpose of screening for the presence of diabetes:  <5.7%     Consistent with the absence of diabetes  5.7-6.4%  Consistent with increasing risk for diabetes   (prediabetes)  >or=6.5%  Consistent with diabetes  Currently, no consensus exists for use of hemoglobin A1c  for diagnosis of diabetes for children.  This Hemoglobin A1c assay has significant interference with fetal   hemoglobin   (HbF). The results are invalid for patients with abnormal amounts of   HbF,   including those with known Hereditary Persistence   of Fetal Hemoglobin. Heterozygous hemoglobin variants (HbAS, HbAC,   HbAD, HbAE, HbA2) do not significantly interfere " with this assay;   however, presence of multiple variants in a sample may impact the %   interference.     03/14/2017 9.5 (H) 4.5 - 6.2 % Final     Comment:     According to ADA guidelines, hemoglobin A1C <7.0% represents  optimal control in non-pregnant diabetic patients.  Different  metrics may apply to specific populations.   Standards of Medical Care in Diabetes - 2016.  For the purpose of screening for the presence of diabetes:  <5.7%     Consistent with the absence of diabetes  5.7-6.4%  Consistent with increasing risk for diabetes   (prediabetes)  >or=6.5%  Consistent with diabetes  Currently no consensus exists for use of hemoglobin A1C  for diagnosis of diabetes for children.     12/15/2016 8.9 (H) 4.5 - 6.2 % Final     Comment:     According to ADA guidelines, hemoglobin A1C <7.0% represents  optimal control in non-pregnant diabetic patients.  Different  metrics may apply to specific populations.   Standards of Medical Care in Diabetes - 2016.  For the purpose of screening for the presence of diabetes:  <5.7%     Consistent with the absence of diabetes  5.7-6.4%  Consistent with increasing risk for diabetes   (prediabetes)  >or=6.5%  Consistent with diabetes  Currently no consensus exists for use of hemoglobin A1C  for diagnosis of diabetes for children.             Chemistry        Component Value Date/Time     08/28/2017 0711    K 4.0 08/28/2017 0711     08/28/2017 0711    CO2 25 08/28/2017 0711    BUN 18 08/28/2017 0711    CREATININE 1.0 08/28/2017 0711     (H) 08/28/2017 0711        Component Value Date/Time    CALCIUM 10.1 08/28/2017 0711    ALKPHOS 82 07/31/2017 0846    AST 21 07/31/2017 0846    ALT 20 07/31/2017 0846    BILITOT 0.8 07/31/2017 0846    ESTGFRAFRICA >60.0 08/28/2017 0711    EGFRNONAA 54.9 (A) 08/28/2017 0711          Lab Results   Component Value Date    LDLCALC 73.6 03/14/2017        Ref. Range 3/14/2017 07:22   Cholesterol Latest Ref Range: 120 - 199 mg/dL 144    HDL Latest Ref Range: 40 - 75 mg/dL 48   LDL Cholesterol Latest Ref Range: 63.0 - 159.0 mg/dL 73.6   Total Cholesterol/HDL Ratio Latest Ref Range: 2.0 - 5.0  3.0   Triglycerides Latest Ref Range: 30 - 150 mg/dL 112     Lab Results   Component Value Date    MICALBCREAT 34.5 (H) 07/31/2017           STANDARDS of CARE:        ASA:       yes        Last eye exam:       ASSESSMENT and PLAN:    A1C GOAL: <8%    1. Type 2 diabetes, uncontrolled, with neuropathy  1. Continue to take metformin 2 tablets with breakfast and 2 tablets with dinner.   2. Continue Levemir 50 units every night.   3. Take Novolog 20 units before breakfast, 18 units before lunch, 20 units before dinner.   4. Continue to test blood sugar 4x/day and keep a glucose log.   5. If you get sweats or shaky overnight, please test your blood sugar. If it's less than 90, drink 1/2 cup of juice and retest blood sugar in 15 minutes.   6. Call with any issues, especially if your blood sugars are less than 90.   7. Return to clinic in 2 months with labs prior.        Basic metabolic panel    Hemoglobin A1c   2. Hypoglycemia associated with diabetes  As above.    3. Essential hypertension  F/u with Dr. Ordoñez. appt pending this  Month.    4. Hyperlipidemia LDL goal <100  At goal.    5. Diarrhea, unspecified type  We may need to reduce dose of metformin if it worsens.          Orders Placed This Encounter   Procedures    Basic metabolic panel     Standing Status:   Future     Standing Expiration Date:   10/31/2018    Hemoglobin A1c     Standing Status:   Future     Standing Expiration Date:   10/31/2018        Return in about 2 months (around 11/1/2017).

## 2017-09-01 NOTE — PATIENT INSTRUCTIONS
1. Continue to take metformin 2 tablets with breakfast and 2 tablets with dinner.   2. Continue Levemir 50 units every night.   3. Take Novolog 20 units before breakfast, 18 units before lunch, 20 units before dinner.   4. Continue to test blood sugar 4x/day and keep a glucose log.   5. If you get sweats or shaky overnight, please test your blood sugar. If it's less than 90, drink 1/2 cup of juice and retest blood sugar in 15 minutes.   6. Call with any issues, especially if your blood sugars are less than 90.   7. Return to clinic in 2 months with labs prior.

## 2017-09-08 DIAGNOSIS — I10 ESSENTIAL HYPERTENSION: ICD-10-CM

## 2017-09-09 RX ORDER — LOSARTAN POTASSIUM 100 MG/1
TABLET ORAL
Qty: 90 TABLET | Refills: 3 | Status: SHIPPED | OUTPATIENT
Start: 2017-09-09 | End: 2018-09-04 | Stop reason: SDUPTHER

## 2017-09-11 DIAGNOSIS — E03.9 HYPOTHYROIDISM (ACQUIRED): ICD-10-CM

## 2017-09-11 RX ORDER — LEVOTHYROXINE SODIUM 50 UG/1
TABLET ORAL
Qty: 90 TABLET | Refills: 1 | Status: SHIPPED | OUTPATIENT
Start: 2017-09-11 | End: 2018-03-08 | Stop reason: SDUPTHER

## 2017-09-11 NOTE — TELEPHONE ENCOUNTER
----- Message from Tiffanie Irizarry sent at 9/11/2017 12:32 PM CDT -----  Contact: self  Refill request: levothyroxine (SYNTHROID) 50 MCG tablet  Requesting a 3 mth refill  [facility] [address] in [facility]-[phone]    Thanks

## 2017-09-25 RX ORDER — METFORMIN HYDROCHLORIDE 500 MG/1
TABLET, EXTENDED RELEASE ORAL
Qty: 360 TABLET | Refills: 1 | Status: SHIPPED | OUTPATIENT
Start: 2017-09-25 | End: 2017-11-01 | Stop reason: SDUPTHER

## 2017-10-10 ENCOUNTER — OFFICE VISIT (OUTPATIENT)
Dept: CARDIOLOGY | Facility: CLINIC | Age: 77
End: 2017-10-10
Payer: MEDICARE

## 2017-10-10 VITALS
BODY MASS INDEX: 29.66 KG/M2 | OXYGEN SATURATION: 96 % | HEART RATE: 69 BPM | HEIGHT: 64 IN | SYSTOLIC BLOOD PRESSURE: 157 MMHG | DIASTOLIC BLOOD PRESSURE: 82 MMHG | RESPIRATION RATE: 15 BRPM | WEIGHT: 173.75 LBS

## 2017-10-10 DIAGNOSIS — I10 ESSENTIAL HYPERTENSION: Primary | ICD-10-CM

## 2017-10-10 DIAGNOSIS — E78.5 HYPERLIPIDEMIA LDL GOAL <100: ICD-10-CM

## 2017-10-10 DIAGNOSIS — E66.9 OBESITY (BMI 30-39.9): ICD-10-CM

## 2017-10-10 PROCEDURE — 99214 OFFICE O/P EST MOD 30 MIN: CPT | Mod: S$GLB,,, | Performed by: INTERNAL MEDICINE

## 2017-10-10 PROCEDURE — 99999 PR PBB SHADOW E&M-EST. PATIENT-LVL III: CPT | Mod: PBBFAC,,, | Performed by: INTERNAL MEDICINE

## 2017-10-10 PROCEDURE — 99499 UNLISTED E&M SERVICE: CPT | Mod: S$PBB,,, | Performed by: INTERNAL MEDICINE

## 2017-10-10 RX ORDER — SPIRONOLACTONE 25 MG/1
25 TABLET ORAL DAILY
Qty: 90 TABLET | Refills: 3 | Status: SHIPPED | OUTPATIENT
Start: 2017-10-10 | End: 2017-11-16 | Stop reason: SDUPTHER

## 2017-10-10 NOTE — PROGRESS NOTES
CARDIOVASCULAR PROGRESS NOTE    REASON FOR CONSULT:   Joel Condon is a 77 y.o. female who presents for follow up of HTN.    PCP: Av  HISTORY OF PRESENT ILLNESS:   The patient returns for follow-up.  She denies intercurrent angina or dyspnea.  She's had no palpitations, lightheadedness, dizziness, or syncope.  She denies PND, orthopnea, or lower extremity edema.  There's been no melena, hematuria, or claudicant symptoms.    The patient does admit to dietary indiscretion and apparently had to pork sausages for breakfast this morning.  Her blood pressures uncontrolled on today's office visit.    It appears the patient did not tolerate the hydrochlorothiazide, and has been taken off of this medication.  Note is also made of hypokalemia for which she takes potassium supplementation.    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 Arce)    CATARACT EXTRACTION W/  INTRAOCULAR LENS IMPLANT Right 06/12/2014    OD (Dr. Arce)    CHOLECYSTECTOMY      COLONOSCOPY N/A 4/21/2017    Procedure: COLONOSCOPY;  Surgeon: Homar Payan MD;  Location: [facility];  Service: Endoscopy;  Laterality: N/A;    EYE SURGERY  04/24/2014 & 06/12/2014    HYSTERECTOMY      total    left eye cataract surgery  4/24/14       ALLERGIES AND MEDICATION:     Review of patient's allergies indicates:   Allergen Reactions    Lisinopril Other (See Comments)     Cough      Pravastatin Other (See Comments)     headaches     Previous Medications  "   ASPIRIN (ECOTRIN) 81 MG EC TABLET    Take 1 tablet (81 mg total) by mouth once daily.    ATORVASTATIN (LIPITOR) 10 MG TABLET    TAKE 1 TABLET(10 MG) BY MOUTH EVERY DAY    BLOOD SUGAR DIAGNOSTIC STRP    True Results; Test tid    INSULIN ASPART (NOVOLOG FLEXPEN) 100 UNIT/ML INPN PEN    ADMINISTER 20-18-20 UNITS UNDER THE SKIN THREE TIMES DAILY WITH MEALS    INSULIN DETEMIR (LEVEMIR) 100 UNIT/ML INJECTION    ADMINISTER 50 UNITS UNDER THE SKIN EVERY EVENING    INSULIN SYRINGE-NEEDLE U-100 1 ML 31 GAUGE X 5/16 SYRG    USE THREE TIMES DAILY AS DIRECTED    INSULIN SYRINGE-NEEDLE U-100 1/2 ML 30 SYRG    USE NIGHTLY    INSULIN SYRINGE-NEEDLE U-100 1/2 ML 31 X 5/16" SYRG    1 Device by Misc.(Non-Drug; Combo Route) route nightly.    LANCETS 26 GAUGE MISC    1 lancet by Misc.(Non-Drug; Combo Route) route 3 (three) times daily.    LEVOTHYROXINE (SYNTHROID) 50 MCG TABLET    TAKE 1 TABLET(50 MCG) BY MOUTH EVERY DAY    LOSARTAN (COZAAR) 100 MG TABLET    TAKE 1 TABLET BY MOUTH DAILY    METFORMIN (GLUCOPHAGE-XR) 500 MG 24 HR TABLET    TAKE 2 TABLETS(1000 MG) BY MOUTH TWICE DAILY WITH MEALS    METOPROLOL SUCCINATE (TOPROL-XL) 200 MG 24 HR TABLET    Take 1 tablet (200 mg total) by mouth once daily.    NIFEDIPINE (PROCARDIA-XL) 90 MG (OSM) 24 HR TABLET    TAKE 1 TABLET(90 MG) BY MOUTH EVERY DAY    OMEPRAZOLE (PRILOSEC) 20 MG CAPSULE    Take 1 capsule (20 mg total) by mouth daily as needed (heartburn).    PEN NEEDLE, DIABETIC (BD INSULIN PEN NEEDLE UF SHORT) 31 GAUGE X 5/16" NDLE    Use 3 times daily       SOCIAL HISTORY:     Social History     Social History    Marital status:      Spouse name: N/A    Number of children: 3    Years of education: N/A     Occupational History    retired      Social History Main Topics    Smoking status: Never Smoker    Smokeless tobacco: Never Used    Alcohol use No    Drug use: No    Sexual activity: Not Currently     Partners: Male     Other Topics Concern    Not on file     Social " "History Narrative    No narrative on file       FAMILY HISTORY:     Family History   Problem Relation Age of Onset    Diabetes Mother     Cancer Mother     Heart disease Mother     Hypertension Mother     Diabetes Sister     Hypertension Sister     Diabetes Sister     Hypertension Sister     Diabetes Sister     Hypertension Sister     Thyroid disease Sister     Stroke Sister     Hypertension Sister     Diabetes Sister     Heart disease Sister     Diabetes Brother     Diabetes Brother     Amblyopia Neg Hx     Blindness Neg Hx     Cataracts Neg Hx     Glaucoma Neg Hx     Macular degeneration Neg Hx     Retinal detachment Neg Hx     Strabismus Neg Hx        REVIEW OF SYSTEMS:   Review of Systems   Constitutional: Negative for chills, diaphoresis and fever.   HENT: Negative for nosebleeds.    Eyes: Negative for blurred vision, double vision and photophobia.   Respiratory: Negative for hemoptysis, shortness of breath and wheezing.    Cardiovascular: Negative for chest pain, palpitations, orthopnea, claudication, leg swelling and PND.   Gastrointestinal: Negative for abdominal pain, blood in stool, heartburn, melena, nausea and vomiting.   Genitourinary: Negative for flank pain and hematuria.   Musculoskeletal: Negative for falls, joint pain, myalgias and neck pain.   Skin: Negative for rash.   Neurological: Negative for dizziness, seizures, loss of consciousness, weakness and headaches.   Endo/Heme/Allergies: Negative for polydipsia. Does not bruise/bleed easily.   Psychiatric/Behavioral: Negative for depression and memory loss. The patient is not nervous/anxious.        PHYSICAL EXAM:     Vitals:    10/10/17 1246   BP: (!) 157/82   Pulse: 69   Resp: 15    Body mass index is 29.82 kg/m².  Weight: 78.8 kg (173 lb 11.6 oz)   Height: 5' 4" (162.6 cm)     Physical Exam   Constitutional: She is oriented to person, place, and time. She appears well-developed and well-nourished. She is cooperative.  " Non-toxic appearance. No distress.   HENT:   Head: Normocephalic and atraumatic.   Eyes: Conjunctivae and EOM are normal. Pupils are equal, round, and reactive to light. No scleral icterus.   Neck: Trachea normal and normal range of motion. Neck supple. Normal carotid pulses and no JVD present. Carotid bruit is not present. No neck rigidity. No edema present. No thyromegaly present.   Cardiovascular: Normal rate, regular rhythm, S1 normal and S2 normal.  PMI is not displaced.  Exam reveals no gallop and no friction rub.    No murmur heard.  Pulses:       Carotid pulses are 2+ on the right side, and 2+ on the left side.  Pulmonary/Chest: Effort normal and breath sounds normal. No respiratory distress. She has no wheezes. She has no rales. She exhibits no tenderness.   Abdominal: Soft. Bowel sounds are normal. She exhibits no distension and no mass. There is no hepatosplenomegaly. There is no tenderness.   Musculoskeletal: She exhibits no edema or tenderness.   Feet:   Right Foot:   Skin Integrity: Negative for ulcer.   Left Foot:   Skin Integrity: Negative for ulcer.   Neurological: She is alert and oriented to person, place, and time. No cranial nerve deficit.   Skin: Skin is warm and dry. No rash noted. No erythema.   Psychiatric: She has a normal mood and affect. Her speech is normal and behavior is normal.   Vitals reviewed.      DATA:   EKG: (personally reviewed tracing)  6/29/17 SR 61 LAD, PRWP, NSSTTW changes.  Similar to 12/2/16    Laboratory:  CBC:    Recent Labs  Lab 05/18/15  1041 06/15/16  0844 03/14/17  0722   WHITE BLOOD CELL COUNT 10.18 9.95 9.51   HEMOGLOBIN 13.9 13.4 13.6   HEMATOCRIT 42.2 40.6 42.9   PLATELETS 313 315 276       CHEMISTRIES:    Recent Labs  Lab 07/31/17  0846 08/09/17  0717 08/28/17  0711   GLUCOSE 210 H 150 H 127 H   SODIUM 141 144 143   POTASSIUM 3.4 L 3.4 L 4.0   BUN BLD 16 15 18   CREATININE 1.0 1.0 1.0   EGFR IF AFRICAN AMERICAN >60.0 >60.0 >60.0   EGFR IF NON-  55.2 A 55.2 A 54.9 A   CALCIUM 9.2 9.4 10.1       CARDIAC BIOMARKERS:        COAGS:        LIPIDS/LFTS:    Recent Labs  Lab 12/14/15  0846 06/15/16  0844 03/14/17  0722 07/31/17  0846   CHOLESTEROL 144 139 144  --    TRIGLYCERIDES 112 114 112  --    HDL 53 53 48  --    LDL CHOLESTEROL 68.6 63.2 73.6  --    NON-HDL CHOLESTEROL 91 86 96  --    AST 27 20 19 21   ALT 30 20 20 20       Cardiovascular Testing:  Holter 3/28/16:  PREDOMINANT RHYTHM  1. Sinus rhythm with heart rates varying between 65 and 111 bpm with an average of 80 bpm.   VENTRICULAR ARRHYTHMIAS  1. There were occasional PVCs totalling 697 and averaging 29 per hour. There was 1 couplet.  2. There were no episodes of ventricular tachycardia.  SUPRA VENTRICULAR ARRHYTHMIAS  1. There were very rare PACs recorded totalling 1 and averaging less than 1 per hour.   2. There were no episodes of sustained supraventricular tachycardia.  SINUS NODE FUNCTION  1. There was no evidence of high grade SA cristian block.   AV CONDUCTION  1. There was no evidence of high grade AV block.   DIARY  1. The diary was not returned  MISCELLANEOUS  1. There were occasional hookup related artifacts. Overall, the study was of good quality.   2. This was a tape of adequate length (24 hrs).    Echo 3/28/16:  1 - Normal left ventricular systolic function (EF 60-65%). Normal wall motion.   2 - Concentric remodeling.   3 - Trivial tricuspid regurgitation.   4 - The estimated PA systolic pressure is 27 mmHg.     Ex-MPI 3/28/16   Jignesh 2min, 86% MPHR, -CP/EKG  Nuclear Quantitative Functional Analysis:   Quantitative measurements of LV function are over estimated secondary to small ventricular volumes.   Visually estimated LV function is hyperdynamic.   Impression: NORMAL MYOCARDIAL PERFUSION  1. The perfusion scan is free of evidence for myocardial ischemia or injury.   2. Resting wall motion is physiologic.   3. Visually estimated LV function is hyperdynamic.   4. The ventricular volumes are  normal at rest and stress.   5. The extracardiac distribution of radioactivity is normal.    ASSESSMENT:   # HTN, uncontrolled.  Pt intol of HCTZ.  Dietary indiscretion noted.  # DM  # HLP, LDL acceptable  # BMI 30    PLAN:   Cont med rx  Add spironolactone 25mg qd  Pt to stop potassium supplementation  Check BMP 2 weeks  Diet/exercise/weight loss  PASCUAL eval  Check Renal art US  RTC 1 month      Juan Pablo Ordoñez MD, FACC

## 2017-10-13 DIAGNOSIS — I10 ESSENTIAL HYPERTENSION: ICD-10-CM

## 2017-10-13 DIAGNOSIS — E78.5 HYPERLIPIDEMIA LDL GOAL <100: ICD-10-CM

## 2017-10-13 RX ORDER — ATORVASTATIN CALCIUM 10 MG/1
TABLET, FILM COATED ORAL
Qty: 90 TABLET | Refills: 0 | Status: SHIPPED | OUTPATIENT
Start: 2017-10-13 | End: 2017-11-06 | Stop reason: SDUPTHER

## 2017-10-13 RX ORDER — PEN NEEDLE, DIABETIC 31 GX5/16"
NEEDLE, DISPOSABLE MISCELLANEOUS
Qty: 100 EACH | Refills: 0 | Status: SHIPPED | OUTPATIENT
Start: 2017-10-13 | End: 2020-08-06

## 2017-10-13 RX ORDER — NIFEDIPINE 90 MG/1
TABLET, EXTENDED RELEASE ORAL
Qty: 90 TABLET | Refills: 0 | Status: SHIPPED | OUTPATIENT
Start: 2017-10-13 | End: 2017-11-06 | Stop reason: SDUPTHER

## 2017-10-16 ENCOUNTER — TELEPHONE (OUTPATIENT)
Dept: FAMILY MEDICINE | Facility: CLINIC | Age: 77
End: 2017-10-16

## 2017-10-16 DIAGNOSIS — Z12.31 ENCOUNTER FOR SCREENING MAMMOGRAM FOR BREAST CANCER: Primary | ICD-10-CM

## 2017-10-16 NOTE — TELEPHONE ENCOUNTER
----- Message from Tami Law sent at 10/16/2017 11:04 AM CDT -----  Contact: Self   Patient need Mammogram orders. Please call at [phone].

## 2017-10-20 RX ORDER — INSULIN ASPART 100 [IU]/ML
INJECTION, SOLUTION INTRAVENOUS; SUBCUTANEOUS
Qty: 30 ML | Refills: 0 | Status: SHIPPED | OUTPATIENT
Start: 2017-10-20 | End: 2017-11-01 | Stop reason: SDUPTHER

## 2017-10-25 ENCOUNTER — HOSPITAL ENCOUNTER (OUTPATIENT)
Dept: RADIOLOGY | Facility: HOSPITAL | Age: 77
Discharge: HOME | End: 2017-10-25
Attending: INTERNAL MEDICINE
Payer: MEDICARE

## 2017-10-25 DIAGNOSIS — I10 ESSENTIAL HYPERTENSION: ICD-10-CM

## 2017-10-25 PROCEDURE — 93975 VASCULAR STUDY: CPT | Mod: 26,,, | Performed by: RADIOLOGY

## 2017-10-25 PROCEDURE — 76770 US EXAM ABDO BACK WALL COMP: CPT | Mod: 26,59,, | Performed by: RADIOLOGY

## 2017-10-25 PROCEDURE — 76770 US EXAM ABDO BACK WALL COMP: CPT | Mod: TC

## 2017-10-26 ENCOUNTER — LAB VISIT (OUTPATIENT)
Dept: LAB | Facility: HOSPITAL | Age: 77
End: 2017-10-26
Attending: INTERNAL MEDICINE
Payer: MEDICARE

## 2017-10-26 LAB
ANION GAP SERPL CALC-SCNC: 9 MMOL/L
BUN SERPL-MCNC: 11 MG/DL
CALCIUM SERPL-MCNC: 10 MG/DL
CHLORIDE SERPL-SCNC: 107 MMOL/L
CO2 SERPL-SCNC: 26 MMOL/L
CREAT SERPL-MCNC: 0.9 MG/DL
EST. GFR  (AFRICAN AMERICAN): >60 ML/MIN/1.73 M^2
EST. GFR  (NON AFRICAN AMERICAN): >60 ML/MIN/1.73 M^2
ESTIMATED AVG GLUCOSE: 160 MG/DL
GLUCOSE SERPL-MCNC: 167 MG/DL
HBA1C MFR BLD HPLC: 7.2 %
POTASSIUM SERPL-SCNC: 3.7 MMOL/L
SODIUM SERPL-SCNC: 142 MMOL/L

## 2017-10-26 PROCEDURE — 83036 HEMOGLOBIN GLYCOSYLATED A1C: CPT

## 2017-10-26 PROCEDURE — 80048 BASIC METABOLIC PNL TOTAL CA: CPT

## 2017-10-26 PROCEDURE — 36415 COLL VENOUS BLD VENIPUNCTURE: CPT | Mod: PO

## 2017-10-27 ENCOUNTER — HOSPITAL ENCOUNTER (OUTPATIENT)
Dept: RADIOLOGY | Facility: HOSPITAL | Age: 77
Discharge: HOME | End: 2017-10-27
Attending: INTERNAL MEDICINE
Payer: MEDICARE

## 2017-10-27 DIAGNOSIS — Z12.31 ENCOUNTER FOR SCREENING MAMMOGRAM FOR BREAST CANCER: ICD-10-CM

## 2017-10-27 PROCEDURE — 77067 SCR MAMMO BI INCL CAD: CPT | Mod: 26,,, | Performed by: RADIOLOGY

## 2017-10-27 PROCEDURE — 77063 BREAST TOMOSYNTHESIS BI: CPT | Mod: 26,,, | Performed by: RADIOLOGY

## 2017-10-27 PROCEDURE — 77067 SCR MAMMO BI INCL CAD: CPT | Mod: TC

## 2017-10-31 ENCOUNTER — TELEPHONE (OUTPATIENT)
Dept: ENDOCRINOLOGY | Facility: CLINIC | Age: 77
End: 2017-10-31

## 2017-11-01 ENCOUNTER — OFFICE VISIT (OUTPATIENT)
Dept: ENDOCRINOLOGY | Facility: CLINIC | Age: 77
End: 2017-11-01
Payer: MEDICARE

## 2017-11-01 VITALS
WEIGHT: 175.69 LBS | HEART RATE: 69 BPM | HEIGHT: 64 IN | BODY MASS INDEX: 29.99 KG/M2 | SYSTOLIC BLOOD PRESSURE: 147 MMHG | DIASTOLIC BLOOD PRESSURE: 72 MMHG

## 2017-11-01 DIAGNOSIS — I10 ESSENTIAL HYPERTENSION: ICD-10-CM

## 2017-11-01 DIAGNOSIS — E78.5 HYPERLIPIDEMIA LDL GOAL <100: ICD-10-CM

## 2017-11-01 DIAGNOSIS — E11.649 HYPOGLYCEMIA ASSOCIATED WITH DIABETES: ICD-10-CM

## 2017-11-01 DIAGNOSIS — R19.7 DIARRHEA, UNSPECIFIED TYPE: ICD-10-CM

## 2017-11-01 PROCEDURE — 99999 PR PBB SHADOW E&M-EST. PATIENT-LVL IV: CPT | Mod: PBBFAC,,, | Performed by: NURSE PRACTITIONER

## 2017-11-01 PROCEDURE — 99499 UNLISTED E&M SERVICE: CPT | Mod: S$PBB,,, | Performed by: NURSE PRACTITIONER

## 2017-11-01 PROCEDURE — 99214 OFFICE O/P EST MOD 30 MIN: CPT | Mod: S$GLB,,, | Performed by: NURSE PRACTITIONER

## 2017-11-01 RX ORDER — INSULIN ASPART 100 [IU]/ML
INJECTION, SOLUTION INTRAVENOUS; SUBCUTANEOUS
Qty: 30 ML | Refills: 1
Start: 2017-11-01 | End: 2018-01-13 | Stop reason: SDUPTHER

## 2017-11-01 RX ORDER — METFORMIN HYDROCHLORIDE 500 MG/1
TABLET, EXTENDED RELEASE ORAL
Qty: 360 TABLET | Refills: 1
Start: 2017-11-01 | End: 2018-04-30 | Stop reason: SDUPTHER

## 2017-11-01 NOTE — PATIENT INSTRUCTIONS
Because of urgent loose stools, reduce metformin to just 1 tablet with breakfast and continue 2 tablets with dinner. If you continue to have diarrhea, then we can reduce to just 2 tablets with dinner and nothing with breakfast.     Cut down on Levemir to 40 units every night.   If blood sugar at bedtime is less than 100, eat a snack like 1/2 sandwich or 4 peanut butter crackers.     If blood sugar is too low like 70s or lower, then eat  skittles or juice or other hard candy like peppermints (4); then follow that with peanut butter crackers or a meal. Then take Novolog 10 units before your normal meal.     Reduce Novolog to 18 units before meals. If blood sugar is less than 100 before a meal, then reduce Novolog to just 10 units.     Test blood sugar 4x/day.     Return to clinic in 3 months with labs prior. Call especially if blood sugars continue to be less than 80 or if you continue to feel shaky/lightheaded/sweaty.

## 2017-11-01 NOTE — PROGRESS NOTES
CC: This 77 y.o. Black or  female  is here for evaluation of  T2DM along with comorbidities indicated in the Visit Diagnosis section of this encounter.    HPI: Joel Condon was diagnosed with T2DM in 1994.  Oral agents started at diagnosis.       Prior visit on 9/1/17  She is doing well with metformin.   Her bg was low at 99 one night so she didn't take her Levemir with subsequently higher bgs the next day 190-287. Another low of 78 predinner after eating turkey sandwich, a typical meal, and no increased physical activity.   She lost about 3 lb in the last 2 wks.   She does suffer from periods of diarrhea and recently had an incontinent episode.   Plan 1. Continue to take metformin 2 tablets with breakfast and 2 tablets with dinner.   2. Continue Levemir 50 units every night.   3. Take Novolog 20 units before breakfast, 18 units before lunch, 20 units before dinner.   4. Continue to test blood sugar 4x/day and keep a glucose log.   5. If you get sweats or shaky overnight, please test your blood sugar. If it's less than 90, drink 1/2 cup of juice and retest blood sugar in 15 minutes.   6. Call with any issues, especially if your blood sugars are less than 90.   7. Return to clinic in 2 months with labs prior.      Interval history  a1c down from 8.7 to 7.2%.   Still having intermittent urgent loose BM's. Usually after breakfast.   Skips Novolog or Levemir if bg is less than 100.       LAST DIABETES EDUCATION: years ago at [facility]. And also a class done by [employer] early 2017    HOSPITALIZED FOR DIABETES  -  No.    PRESCRIBED DIABETES MEDICATIONS: Levemir (vial) 50 units every night at 10 pm, Novolog Flexpen 20 units ac, metformin xr 1000 mg bid.   Misses medication doses - No      DM COMPLICATIONS: peripheral neuropathy    SIGNIFICANT DIABETES MED HISTORY:   Metformin started at initial ov 8/17/17    SELF MONITORING BLOOD GLUCOSE: Checks blood glucose at home 4x/day. Brings logs.  "            HYPOGLYCEMIC EPISODES: she wakes up a few times a week with night sweats. Does not test bg when that happens. Corrects with juice. bg also drops during the day to 70s about 1-2x/week; starts feeling bad in the 80s.      CURRENT DIET: drinks diet cranberry juice, diet soda, tea w/ splenda. 3 meals/day.     CURRENT EXERCISE:  None d/t hip pain       BP (!) 147/72 (BP Location: Right arm, Patient Position: Sitting, BP Method: Large (Automatic))   Pulse 69   Ht 5' 4" (1.626 m)   Wt 79.7 kg (175 lb 11.3 oz)   BMI 30.16 kg/m²       ROS:   CONSTITUTIONAL: Appetite good, denies fatigue  RESPIRATORY: No shortness of breath or cough  CARDIAC: No chest pain or palpitations  GI: + diarrhea  MS: generalized arthralgia      PHYSICAL EXAM:  GENERAL: Well developed, well nourished. No acute distress.   PSYCH: AAOx3, appropriate mood and affect, conversant, well-groomed. Judgement and insight good.   NEURO: Cranial nerves grossly intact. Speech clear, no tremor.       Hemoglobin A1C   Date Value Ref Range Status   10/26/2017 7.2 (H) 4.0 - 5.6 % Final     Comment:     According to ADA guidelines, hemoglobin A1c <7.0% represents  optimal control in non-pregnant diabetic patients. Different  metrics may apply to specific patient populations.   Standards of Medical Care in Diabetes-2016.  For the purpose of screening for the presence of diabetes:  <5.7%     Consistent with the absence of diabetes  5.7-6.4%  Consistent with increasing risk for diabetes   (prediabetes)  >or=6.5%  Consistent with diabetes  Currently, no consensus exists for use of hemoglobin A1c  for diagnosis of diabetes for children.  This Hemoglobin A1c assay has significant interference with fetal   hemoglobin   (HbF). The results are invalid for patients with abnormal amounts of   HbF,   including those with known Hereditary Persistence   of Fetal Hemoglobin. Heterozygous hemoglobin variants (HbAS, HbAC,   HbAD, HbAE, HbA2) do not significantly " interfere with this assay;   however, presence of multiple variants in a sample may impact the %   interference.     07/31/2017 8.7 (H) 4.0 - 5.6 % Final     Comment:     According to ADA guidelines, hemoglobin A1c <7.0% represents  optimal control in non-pregnant diabetic patients. Different  metrics may apply to specific patient populations.   Standards of Medical Care in Diabetes-2016.  For the purpose of screening for the presence of diabetes:  <5.7%     Consistent with the absence of diabetes  5.7-6.4%  Consistent with increasing risk for diabetes   (prediabetes)  >or=6.5%  Consistent with diabetes  Currently, no consensus exists for use of hemoglobin A1c  for diagnosis of diabetes for children.  This Hemoglobin A1c assay has significant interference with fetal   hemoglobin   (HbF). The results are invalid for patients with abnormal amounts of   HbF,   including those with known Hereditary Persistence   of Fetal Hemoglobin. Heterozygous hemoglobin variants (HbAS, HbAC,   HbAD, HbAE, HbA2) do not significantly interfere with this assay;   however, presence of multiple variants in a sample may impact the %   interference.     03/14/2017 9.5 (H) 4.5 - 6.2 % Final     Comment:     According to ADA guidelines, hemoglobin A1C <7.0% represents  optimal control in non-pregnant diabetic patients.  Different  metrics may apply to specific populations.   Standards of Medical Care in Diabetes - 2016.  For the purpose of screening for the presence of diabetes:  <5.7%     Consistent with the absence of diabetes  5.7-6.4%  Consistent with increasing risk for diabetes   (prediabetes)  >or=6.5%  Consistent with diabetes  Currently no consensus exists for use of hemoglobin A1C  for diagnosis of diabetes for children.             Chemistry        Component Value Date/Time     10/26/2017 0738    K 3.7 10/26/2017 0738     10/26/2017 0738    CO2 26 10/26/2017 0738    BUN 11 10/26/2017 0738    CREATININE 0.9 10/26/2017  0738     (H) 10/26/2017 0738        Component Value Date/Time    CALCIUM 10.0 10/26/2017 0738    ALKPHOS 82 07/31/2017 0846    AST 21 07/31/2017 0846    ALT 20 07/31/2017 0846    BILITOT 0.8 07/31/2017 0846    ESTGFRAFRICA >60.0 10/26/2017 0738    EGFRNONAA >60.0 10/26/2017 0738          Lab Results   Component Value Date    LDLCALC 73.6 03/14/2017        Ref. Range 3/14/2017 07:22   Cholesterol Latest Ref Range: 120 - 199 mg/dL 144   HDL Latest Ref Range: 40 - 75 mg/dL 48   LDL Cholesterol Latest Ref Range: 63.0 - 159.0 mg/dL 73.6   Total Cholesterol/HDL Ratio Latest Ref Range: 2.0 - 5.0  3.0   Triglycerides Latest Ref Range: 30 - 150 mg/dL 112     Lab Results   Component Value Date    MICALBCREAT 34.5 (H) 07/31/2017           STANDARDS of CARE:        ASA:       yes        Last eye exam:       ASSESSMENT and PLAN:    A1C GOAL: <8%    1. 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 If you continue to have diarrhea, then we can reduce to just 2 tablets with dinner and nothing with breakfast. (considered starting on psyllium fiber supplement like Metamucil but she reports diarrhea on this as well)    Cut down on Levemir to 40 units every night.   If blood sugar at bedtime is less than 100, eat a snack like 1/2 sandwich or 4 peanut butter crackers.     If blood sugar is too low like 70s or lower, then eat  skittles or juice or other hard candy like peppermints (4); then follow that with peanut butter crackers or a meal. Then take Novolog 10 units before your normal meal.     Reduce Novolog to 18 units before meals. If blood sugar is less than 100 before a meal, then reduce Novolog to just 10 units.     Test blood sugar 4x/day.     Return to clinic in 3 months with labs prior. Call especially if blood sugars continue to be less than 80 or if you continue to feel  shaky/lightheaded/sweaty.       insulin detemir (LEVEMIR) 100 unit/mL injection    Hemoglobin A1c    Basic metabolic panel   2. Hypoglycemia associated with diabetes  As above   3. Essential hypertension  Continue current rx   4. Hyperlipidemia LDL goal <100  At goal   5. Diarrhea, unspecified type  As above.          Orders Placed This Encounter   Procedures    Hemoglobin A1c     Standing Status:   Future     Standing Expiration Date:   12/31/2018    Basic metabolic panel     Standing Status:   Future     Standing Expiration Date:   12/31/2018        Return in about 3 months (around 2/1/2018).

## 2017-11-06 ENCOUNTER — OFFICE VISIT (OUTPATIENT)
Dept: FAMILY MEDICINE | Facility: CLINIC | Age: 77
End: 2017-11-06
Payer: MEDICARE

## 2017-11-06 VITALS
OXYGEN SATURATION: 96 % | SYSTOLIC BLOOD PRESSURE: 130 MMHG | TEMPERATURE: 98 F | HEART RATE: 63 BPM | BODY MASS INDEX: 29.7 KG/M2 | DIASTOLIC BLOOD PRESSURE: 70 MMHG | HEIGHT: 64 IN | WEIGHT: 173.94 LBS

## 2017-11-06 DIAGNOSIS — Z23 FLU VACCINE NEED: ICD-10-CM

## 2017-11-06 DIAGNOSIS — E66.3 OVERWEIGHT (BMI 25.0-29.9): ICD-10-CM

## 2017-11-06 DIAGNOSIS — E03.9 HYPOTHYROIDISM (ACQUIRED): ICD-10-CM

## 2017-11-06 DIAGNOSIS — E11.319 CONTROLLED TYPE 2 DIABETES MELLITUS WITH RETINOPATHY, WITH LONG-TERM CURRENT USE OF INSULIN, MACULAR EDEMA PRESENCE UNSPECIFIED, UNSPECIFIED LATERALITY, UNSPECIFIED RETINOPATHY SEVERITY: ICD-10-CM

## 2017-11-06 DIAGNOSIS — I10 ESSENTIAL HYPERTENSION: ICD-10-CM

## 2017-11-06 DIAGNOSIS — R92.8 ABNORMAL MAMMOGRAM: ICD-10-CM

## 2017-11-06 DIAGNOSIS — E11.40 TYPE 2 DIABETES, CONTROLLED, WITH NEUROPATHY: ICD-10-CM

## 2017-11-06 DIAGNOSIS — E78.5 HYPERLIPIDEMIA LDL GOAL <100: ICD-10-CM

## 2017-11-06 DIAGNOSIS — E11.21 CONTROLLED TYPE 2 DIABETES MELLITUS WITH PROTEINURIC DIABETIC NEPHROPATHY: Primary | ICD-10-CM

## 2017-11-06 DIAGNOSIS — Z79.4 CONTROLLED TYPE 2 DIABETES MELLITUS WITH RETINOPATHY, WITH LONG-TERM CURRENT USE OF INSULIN, MACULAR EDEMA PRESENCE UNSPECIFIED, UNSPECIFIED LATERALITY, UNSPECIFIED RETINOPATHY SEVERITY: ICD-10-CM

## 2017-11-06 PROCEDURE — 90662 IIV NO PRSV INCREASED AG IM: CPT | Mod: S$GLB,,, | Performed by: INTERNAL MEDICINE

## 2017-11-06 PROCEDURE — 99999 PR PBB SHADOW E&M-EST. PATIENT-LVL III: CPT | Mod: PBBFAC,,, | Performed by: INTERNAL MEDICINE

## 2017-11-06 PROCEDURE — G0008 ADMIN INFLUENZA VIRUS VAC: HCPCS | Mod: S$GLB,,, | Performed by: INTERNAL MEDICINE

## 2017-11-06 PROCEDURE — 99499 UNLISTED E&M SERVICE: CPT | Mod: S$PBB,,, | Performed by: INTERNAL MEDICINE

## 2017-11-06 PROCEDURE — 99214 OFFICE O/P EST MOD 30 MIN: CPT | Mod: 25,S$GLB,, | Performed by: INTERNAL MEDICINE

## 2017-11-06 RX ORDER — NIFEDIPINE 90 MG/1
TABLET, EXTENDED RELEASE ORAL
Qty: 90 TABLET | Refills: 1 | Status: SHIPPED | OUTPATIENT
Start: 2017-11-06 | End: 2018-05-25 | Stop reason: SDUPTHER

## 2017-11-06 RX ORDER — ATORVASTATIN CALCIUM 10 MG/1
TABLET, FILM COATED ORAL
Qty: 90 TABLET | Refills: 1 | Status: SHIPPED | OUTPATIENT
Start: 2017-11-06 | End: 2018-05-25 | Stop reason: SDUPTHER

## 2017-11-06 NOTE — PROGRESS NOTES
Patient tolerated injection well.  Instructed to wait 15 minutes after injection for monitoring. Verbalized understanding. VIS given

## 2017-11-06 NOTE — PROGRESS NOTES
HISTORY OF PRESENT ILLNESS:  Joel Condon is a 77 y.o. female who presents to the clinic today for Hyperlipidemia (3 month f/u ) and Hypertension  .  The patient presents to clinic today for follow-up of her type 2 diabetes mellitus complicated by proteinuria, retinopathy, neuropathy as well as her hypertension and hyperlipidemia.  She has been seeing the endocrinologist.  She has made a couple of dietary changes.  She is also had some insulin changes made.  Recent A1c test results was significantly improved.  She states blood pressures at home are well controlled. The patient denies any problems with cardiac chest pain, dizziness, palpitations, orthopnea, or lower extremity edema.  The patient reports compliance with current medication- patient denies missing any  medication doses.  She denies any problems with temperature intolerance.  No unexplained changes in her weight.  She has lost a few pounds with decrease in her insulin dosing.  She had a mammogram done last week.  She received a letter in the mail stating she needs follow-up.  She has not yet called to schedule this.  She wants my recommendation.  She states she does not feel anything herself.      PAST MEDICAL HISTORY:  Past Medical History:   Diagnosis Date    Arthritis     Hyperlipidemia LDL goal < 100     Hypertension     Hypothyroidism     Osteoporosis, post-menopausal     Overweight(278.02)     Proteinuria     Type II or unspecified type diabetes mellitus with renal manifestations, uncontrolled(250.42)        PAST SURGICAL HISTORY:  Past Surgical History:   Procedure Laterality Date    APPENDECTOMY      CATARACT EXTRACTION W/  INTRAOCULAR LENS IMPLANT Left 4/24/14    OS (Dr. Arce)    CATARACT EXTRACTION W/  INTRAOCULAR LENS IMPLANT Right 06/12/2014    OD (Dr. Arce)    CHOLECYSTECTOMY      COLONOSCOPY N/A 4/21/2017    Procedure: COLONOSCOPY;  Surgeon: Homar Payan MD;  Location: [facility];  Service: Endoscopy;  Laterality:  "N/A;    EYE SURGERY  04/24/2014 & 06/12/2014    HYSTERECTOMY      total    left eye cataract surgery  4/24/14    OOPHORECTOMY         SOCIAL HISTORY:  Social History     Social History    Marital status:      Spouse name: N/A    Number of children: 3    Years of education: N/A     Occupational History    retired      Social History Main Topics    Smoking status: Never Smoker    Smokeless tobacco: Never Used    Alcohol use No    Drug use: No    Sexual activity: Not Currently     Partners: Male     Other Topics Concern    Not on file     Social History Narrative    No narrative on file       FAMILY HISTORY:  Family History   Problem Relation Age of Onset    Diabetes Mother     Cancer Mother     Heart disease Mother     Hypertension Mother     Diabetes Sister     Hypertension Sister     Diabetes Sister     Hypertension Sister     Diabetes Sister     Hypertension Sister     Thyroid disease Sister     Stroke Sister     Hypertension Sister     Diabetes Sister     Heart disease Sister     Diabetes Brother     Diabetes Brother     Amblyopia Neg Hx     Blindness Neg Hx     Cataracts Neg Hx     Glaucoma Neg Hx     Macular degeneration Neg Hx     Retinal detachment Neg Hx     Strabismus Neg Hx        ALLERGIES AND MEDICATIONS: updated and reviewed.  Review of patient's allergies indicates:   Allergen Reactions    Lisinopril Other (See Comments)     Cough      Hydrochlorothiazide (bulk) Other (See Comments)     Elevated pt's blood pressure making her feel bad    Pravastatin Other (See Comments)     headaches     Medication List with Changes/Refills   Current Medications    ASPIRIN (ECOTRIN) 81 MG EC TABLET    Take 1 tablet (81 mg total) by mouth once daily.    BD INSULIN PEN NEEDLE UF SHORT 31 GAUGE X 5/16" NDLE    USE THREE TIMES DAILY AS DIRECTED    BLOOD SUGAR DIAGNOSTIC STRP    True Results; Test tid    INSULIN ASPART (NOVOLOG FLEXPEN) 100 UNIT/ML INPN PEN    ADMINISTER 18 " "UNITS UNDER THE SKIN THREE TIMES DAILY WITH MEALS    INSULIN DETEMIR (LEVEMIR) 100 UNIT/ML INJECTION    ADMINISTER 40 UNITS UNDER THE SKIN EVERY EVENING    INSULIN SYRINGE-NEEDLE U-100 1 ML 31 GAUGE X 5/16 SYRG    USE THREE TIMES DAILY AS DIRECTED    INSULIN SYRINGE-NEEDLE U-100 1/2 ML 30 SYRG    USE NIGHTLY    INSULIN SYRINGE-NEEDLE U-100 1/2 ML 31 X 5/16" SYRG    1 Device by Misc.(Non-Drug; Combo Route) route nightly.    LANCETS 26 GAUGE MISC    1 lancet by Misc.(Non-Drug; Combo Route) route 3 (three) times daily.    LEVOTHYROXINE (SYNTHROID) 50 MCG TABLET    TAKE 1 TABLET(50 MCG) BY MOUTH EVERY DAY    LOSARTAN (COZAAR) 100 MG TABLET    TAKE 1 TABLET BY MOUTH DAILY    METFORMIN (GLUCOPHAGE-XR) 500 MG 24 HR TABLET    Take 1 tablet with breakfast and 2 tablets with dinner    METOPROLOL SUCCINATE (TOPROL-XL) 200 MG 24 HR TABLET    Take 1 tablet (200 mg total) by mouth once daily.    OMEPRAZOLE (PRILOSEC) 20 MG CAPSULE    Take 1 capsule (20 mg total) by mouth daily as needed (heartburn).    PEN NEEDLE, DIABETIC (BD INSULIN PEN NEEDLE UF SHORT) 31 GAUGE X 5/16" NDLE    Use 3 times daily    SPIRONOLACTONE (ALDACTONE) 25 MG TABLET    Take 1 tablet (25 mg total) by mouth once daily.   Changed and/or Refilled Medications    Modified Medication Previous Medication    ATORVASTATIN (LIPITOR) 10 MG TABLET atorvastatin (LIPITOR) 10 MG tablet       TAKE 1 TABLET(10 MG) BY MOUTH EVERY DAY    TAKE 1 TABLET(10 MG) BY MOUTH EVERY DAY    NIFEDIPINE (PROCARDIA-XL) 90 MG (OSM) 24 HR TABLET nifedipine (PROCARDIA-XL) 90 MG (OSM) 24 hr tablet       TAKE 1 TABLET(90 MG) BY MOUTH EVERY DAY    TAKE 1 TABLET(90 MG) BY MOUTH EVERY DAY          CARE TEAM:  Patient Care Team:  Shelby Ley MD as PCP - General (Internal Medicine)         REVIEW OF SYSTEMS:  General ROS: negative for - chills, fever or malaise  Psychological ROS: negative for - anxiety, depression, obsessive thoughts, sleep disturbances or suicidal ideation  Ophthalmic ROS: " "negative for - blurry vision or eye pain  ENT ROS: negative for - epistaxis, oral lesions or sore throat  Allergy and Immunology ROS: negative for - hives  Hematological and Lymphatic ROS: negative for - swollen lymph nodes  Endocrine ROS: negative for - polydipsia/polyuria or temperature intolerance  Respiratory ROS: no cough, shortness of breath, or wheezing  Cardiovascular ROS: no chest pain or dyspnea on exertion  Gastrointestinal ROS: no abdominal pain, change in bowel habits, or black or bloody stools  Dermatological ROS: negative  Musculoskeletal ROS: negative for - gait disturbance, joint swelling or muscle pain  Neurological ROS: no TIA or stroke symptoms  Genito-Urinary ROS: no dysuria, trouble voiding, or hematuria      PHYSICAL EXAM:   Vitals:    11/06/17 0949   BP: 130/70   Pulse: 63   Temp: 98.3 °F (36.8 °C)     Weight: 78.9 kg (173 lb 15.1 oz)   Height: 5' 4.02" (162.6 cm)   Body mass index is 29.84 kg/m².     General appearance - alert, well appearing, and in no distress and overweight  Mental status - alert, oriented to person, place, and time, normal mood, behavior, speech, dress, motor activity, and thought processes  Eyes - pupils equal and reactive, extraocular eye movements intact, sclera anicteric  Mouth - mucous membranes moist, pharynx normal without lesions  Neck - supple, no significant adenopathy, carotids upstroke normal bilaterally, no bruits, thyroid exam: thyroid is normal in size without nodules or tenderness  Lymphatics - no palpable lymphadenopathy  Chest - clear to auscultation, no wheezes, rales or rhonchi, symmetric air entry  Heart - normal rate and regular rhythm, no gallops noted  Neurological - alert, oriented, normal speech, no focal findings or movement disorder noted, cranial nerves II through XII intact  Musculoskeletal - no muscular tenderness noted, Mild-Moderate osteoarthritic changes noted to both knee joints. No joint effusions noted.   Extremities - no pedal edema " noted  Skin - normal coloration and turgor, no rashes, no suspicious skin lesions noted      ASSESSMENT AND PLAN:  1. 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 Controlled type 2 diabetes mellitus with retinopathy, with long-term current use of insulin, macular edema presence unspecified, unspecified laterality, unspecified retinopathy severity/3. Type 2 diabetes, controlled, with neuropathy  Diabetes currently is controlled. We discussed diabetic diet and regular exercise. We discussed home blood sugar monitoring, if appropriate. The current medical regimen is effective;  continue present plan and medications. She is up to date on her eye exam. Neuropathy pain controlled.     4. Essential hypertension  Discussed sodium restriction, maintaining ideal body weight and regular exercise program as physiologic means to achieve blood pressure control. The patient will strive towards this. The current medical regimen is effective;  continue present plan and medications. Recommended patient to check home readings to monitor and see me for followup as scheduled or sooner as needed. Patient was educated that both decongestant and anti-inflammatory medication may raise blood pressure.   - NIFEdipine (PROCARDIA-XL) 90 MG (OSM) 24 hr tablet; TAKE 1 TABLET(90 MG) BY MOUTH EVERY DAY  Dispense: 90 tablet; Refill: 1    5. Hyperlipidemia LDL goal <100  We discussed low fat diet and regular exercise.The current medical regimen is effective;  continue present plan and medications.    - atorvastatin (LIPITOR) 10 MG tablet; TAKE 1 TABLET(10 MG) BY MOUTH EVERY DAY  Dispense: 90 tablet; Refill: 1    6. Hypothyroidism (acquired)  Patient is clinically euthyroid. Continue current regimen.     7. Overweight (BMI 25.0-29.9)  The patient is asked to make an attempt to improve diet and exercise patterns to aid in medical management of this problem.     8. Flu vaccine need    - Influenza - High Dose (65+) (PF)  (IM)    9. Abnormal mammogram  She will call today to schedule her follow up.          Return in about 6 months (around 5/6/2018), or if symptoms worsen or fail to improve, for annual exam. or sooner as needed.

## 2017-11-07 ENCOUNTER — TELEPHONE (OUTPATIENT)
Dept: FAMILY MEDICINE | Facility: CLINIC | Age: 77
End: 2017-11-07

## 2017-11-07 DIAGNOSIS — R92.8 ABNORMAL MAMMOGRAM: Primary | ICD-10-CM

## 2017-11-07 NOTE — TELEPHONE ENCOUNTER
Spoke w/patient, states no further questions. States she spoke with mammogram dept and order is needed to schedule the appt. mammo scheduled 11/14/17.

## 2017-11-07 NOTE — TELEPHONE ENCOUNTER
Spoke with patient yesterday at office visit regarding abnormal mammogram. She had a letter with her that had a phone number on it that she needed to call so she could schedule follow up on her abnormal mammogram. Does she have additional questions regarding this?

## 2017-11-07 NOTE — TELEPHONE ENCOUNTER
----- Message from Jeanne Barksdale sent at 11/7/2017  9:25 AM CST -----  Contact: self  Patient called stating that she was advised that a mass was found on left breast and additional testing need to be done. Please contact her at [phone].    Thanks!

## 2017-11-14 ENCOUNTER — HOSPITAL ENCOUNTER (OUTPATIENT)
Dept: RADIOLOGY | Facility: HOSPITAL | Age: 77
Discharge: HOME | End: 2017-11-14
Attending: INTERNAL MEDICINE
Payer: MEDICARE

## 2017-11-14 DIAGNOSIS — R92.8 ABNORMAL MAMMOGRAM: ICD-10-CM

## 2017-11-14 PROCEDURE — 77065 DX MAMMO INCL CAD UNI: CPT | Mod: 26,LT,, | Performed by: RADIOLOGY

## 2017-11-14 PROCEDURE — 76641 ULTRASOUND BREAST COMPLETE: CPT | Mod: 26,LT,, | Performed by: RADIOLOGY

## 2017-11-14 PROCEDURE — 77061 BREAST TOMOSYNTHESIS UNI: CPT | Mod: 26,LT,, | Performed by: RADIOLOGY

## 2017-11-14 PROCEDURE — 76641 ULTRASOUND BREAST COMPLETE: CPT | Mod: TC,LT

## 2017-11-14 PROCEDURE — 77061 BREAST TOMOSYNTHESIS UNI: CPT | Mod: TC,LT

## 2017-11-16 ENCOUNTER — OFFICE VISIT (OUTPATIENT)
Dept: CARDIOLOGY | Facility: CLINIC | Age: 77
End: 2017-11-16
Payer: MEDICARE

## 2017-11-16 VITALS
OXYGEN SATURATION: 97 % | BODY MASS INDEX: 29.81 KG/M2 | SYSTOLIC BLOOD PRESSURE: 157 MMHG | RESPIRATION RATE: 15 BRPM | HEIGHT: 64 IN | WEIGHT: 174.63 LBS | HEART RATE: 72 BPM | DIASTOLIC BLOOD PRESSURE: 81 MMHG

## 2017-11-16 DIAGNOSIS — E66.3 OVERWEIGHT (BMI 25.0-29.9): ICD-10-CM

## 2017-11-16 DIAGNOSIS — I10 ESSENTIAL HYPERTENSION: Primary | ICD-10-CM

## 2017-11-16 DIAGNOSIS — E78.5 HYPERLIPIDEMIA LDL GOAL <100: ICD-10-CM

## 2017-11-16 PROCEDURE — 99999 PR PBB SHADOW E&M-EST. PATIENT-LVL III: CPT | Mod: PBBFAC,,, | Performed by: INTERNAL MEDICINE

## 2017-11-16 PROCEDURE — 99214 OFFICE O/P EST MOD 30 MIN: CPT | Mod: S$GLB,,, | Performed by: INTERNAL MEDICINE

## 2017-11-16 PROCEDURE — 99499 UNLISTED E&M SERVICE: CPT | Mod: S$PBB,,, | Performed by: INTERNAL MEDICINE

## 2017-11-16 RX ORDER — SPIRONOLACTONE 50 MG/1
50 TABLET, FILM COATED ORAL DAILY
Qty: 90 TABLET | Refills: 3 | Status: SHIPPED | OUTPATIENT
Start: 2017-11-16 | End: 2019-01-16 | Stop reason: SDUPTHER

## 2017-11-16 RX ORDER — SYRINGE,SAFETY WITH NEEDLE,1ML 25GX1"
SYRINGE (EA) MISCELLANEOUS
Qty: 100 EACH | Refills: 5 | Status: SHIPPED | OUTPATIENT
Start: 2017-11-16 | End: 2018-12-14 | Stop reason: SDUPTHER

## 2017-11-16 NOTE — PROGRESS NOTES
CARDIOVASCULAR PROGRESS NOTE    REASON FOR CONSULT:   Joel Condon is a 77 y.o. female who presents for follow up of HTN.    PCP: Av  HISTORY OF PRESENT ILLNESS:   The patient returns for follow-up.  She denies intercurrent angina or dyspnea.  She's had no palpitations, lightheadedness, dizziness, or syncope.  She denies PND, orthopnea, or lower extremity edema.  There's been no melena, hematuria, or claudicant symptoms.    She reports medication compliance, and is no longer eating salty foods.  She appears to be tolerating the spironolactone.  She tells me she's somewhat nervous regarding a upcoming breast biopsy.  She's been taken off of her aspirin for this procedure.    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 Arce)    CATARACT EXTRACTION W/  INTRAOCULAR LENS IMPLANT Right 06/12/2014    OD (Dr. Arce)    CHOLECYSTECTOMY      COLONOSCOPY N/A 4/21/2017    Procedure: COLONOSCOPY;  Surgeon: Homar Payan MD;  [facility];  Service: Endoscopy;  Laterality: N/A;    EYE SURGERY  04/24/2014 & 06/12/2014    HYSTERECTOMY      total    left eye cataract surgery  4/24/14    OOPHORECTOMY         ALLERGIES AND MEDICATION:     Review of patient's allergies indicates:   Allergen Reactions    Lisinopril Other (See Comments)     Cough      Pravastatin Other (See Comments)     headaches     Previous Medications    ASPIRIN (ECOTRIN) 81 MG EC TABLET    Take 1 tablet (81 mg total) by mouth once daily.  "   ATORVASTATIN (LIPITOR) 10 MG TABLET    TAKE 1 TABLET(10 MG) BY MOUTH EVERY DAY    BD INSULIN PEN NEEDLE UF SHORT 31 GAUGE X 5/16" NDLE    USE THREE TIMES DAILY AS DIRECTED    BLOOD SUGAR DIAGNOSTIC STRP    True Results; Test tid    INSULIN ASPART (NOVOLOG FLEXPEN) 100 UNIT/ML INPN PEN    ADMINISTER 18 UNITS UNDER THE SKIN THREE TIMES DAILY WITH MEALS    INSULIN DETEMIR (LEVEMIR) 100 UNIT/ML INJECTION    ADMINISTER 40 UNITS UNDER THE SKIN EVERY EVENING    INSULIN SYRINGE-NEEDLE U-100 1 ML 31 GAUGE X 5/16 SYRG    USE THREE TIMES DAILY AS DIRECTED    INSULIN SYRINGE-NEEDLE U-100 1/2 ML 30 SYRG    USE NIGHTLY    INSULIN SYRINGE-NEEDLE U-100 1/2 ML 31 X 5/16" SYRG    1 Device by Misc.(Non-Drug; Combo Route) route nightly.    LANCETS 26 GAUGE MISC    1 lancet by Misc.(Non-Drug; Combo Route) route 3 (three) times daily.    LEVOTHYROXINE (SYNTHROID) 50 MCG TABLET    TAKE 1 TABLET(50 MCG) BY MOUTH EVERY DAY    LOSARTAN (COZAAR) 100 MG TABLET    TAKE 1 TABLET BY MOUTH DAILY    METFORMIN (GLUCOPHAGE-XR) 500 MG 24 HR TABLET    Take 1 tablet with breakfast and 2 tablets with dinner    METOPROLOL SUCCINATE (TOPROL-XL) 200 MG 24 HR TABLET    Take 1 tablet (200 mg total) by mouth once daily.    NIFEDIPINE (PROCARDIA-XL) 90 MG (OSM) 24 HR TABLET    TAKE 1 TABLET(90 MG) BY MOUTH EVERY DAY    OMEPRAZOLE (PRILOSEC) 20 MG CAPSULE    Take 1 capsule (20 mg total) by mouth daily as needed (heartburn).    PEN NEEDLE, DIABETIC (BD INSULIN PEN NEEDLE UF SHORT) 31 GAUGE X 5/16" NDLE    Use 3 times daily    SPIRONOLACTONE (ALDACTONE) 25 MG TABLET    Take 1 tablet (25 mg total) by mouth once daily.       SOCIAL HISTORY:     Social History     Social History    Marital status:      Spouse name: N/A    Number of children: 3    Years of education: N/A     Occupational History    retired      Social History Main Topics    Smoking status: Never Smoker    Smokeless tobacco: Never Used    Alcohol use No    Drug use: No    Sexual " "activity: Not Currently     Partners: Male     Other Topics Concern    Not on file     Social History Narrative    No narrative on file       FAMILY HISTORY:     Family History   Problem Relation Age of Onset    Diabetes Mother     Cancer Mother     Heart disease Mother     Hypertension Mother     Diabetes Sister     Hypertension Sister     Diabetes Sister     Hypertension Sister     Diabetes Sister     Hypertension Sister     Thyroid disease Sister     Stroke Sister     Hypertension Sister     Diabetes Sister     Heart disease Sister     Diabetes Brother     Diabetes Brother     Amblyopia Neg Hx     Blindness Neg Hx     Cataracts Neg Hx     Glaucoma Neg Hx     Macular degeneration Neg Hx     Retinal detachment Neg Hx     Strabismus Neg Hx        REVIEW OF SYSTEMS:   Review of Systems   Constitutional: Negative for chills, diaphoresis and fever.   HENT: Negative for nosebleeds.    Eyes: Negative for blurred vision, double vision and photophobia.   Respiratory: Negative for hemoptysis, shortness of breath and wheezing.    Cardiovascular: Negative for chest pain, palpitations, orthopnea, claudication, leg swelling and PND.   Gastrointestinal: Negative for abdominal pain, blood in stool, heartburn, melena, nausea and vomiting.   Genitourinary: Negative for flank pain and hematuria.   Musculoskeletal: Negative for falls, joint pain, myalgias and neck pain.   Skin: Negative for rash.   Neurological: Negative for dizziness, seizures, loss of consciousness, weakness and headaches.   Endo/Heme/Allergies: Negative for polydipsia. Does not bruise/bleed easily.   Psychiatric/Behavioral: Negative for depression and memory loss. The patient is not nervous/anxious.        PHYSICAL EXAM:     Vitals:    11/16/17 1252   BP: (!) 157/81   Pulse: 72   Resp: 15    Body mass index is 29.97 kg/m².  Weight: 79.2 kg (174 lb 9.7 oz)   Height: 5' 4" (162.6 cm)     Physical Exam   Constitutional: She is oriented to " person, place, and time. She appears well-developed and well-nourished. She is cooperative.  Non-toxic appearance. No distress.   HENT:   Head: Normocephalic and atraumatic.   Eyes: Conjunctivae and EOM are normal. Pupils are equal, round, and reactive to light. No scleral icterus.   Neck: Trachea normal and normal range of motion. Neck supple. Normal carotid pulses and no JVD present. Carotid bruit is not present. No neck rigidity. No edema present. No thyromegaly present.   Cardiovascular: Normal rate, regular rhythm, S1 normal and S2 normal.  PMI is not displaced.  Exam reveals no gallop and no friction rub.    No murmur heard.  Pulses:       Carotid pulses are 2+ on the right side, and 2+ on the left side.  Pulmonary/Chest: Effort normal and breath sounds normal. No respiratory distress. She has no wheezes. She has no rales. She exhibits no tenderness.   Abdominal: Soft. Bowel sounds are normal. She exhibits no distension and no mass. There is no hepatosplenomegaly. There is no tenderness.   Musculoskeletal: She exhibits no edema or tenderness.   Feet:   Right Foot:   Skin Integrity: Negative for ulcer.   Left Foot:   Skin Integrity: Negative for ulcer.   Neurological: She is alert and oriented to person, place, and time. No cranial nerve deficit.   Skin: Skin is warm and dry. No rash noted. No erythema.   Psychiatric: She has a normal mood and affect. Her speech is normal and behavior is normal.   Vitals reviewed.      DATA:   EKG: (personally reviewed tracing)  6/29/17 SR 61 LAD, PRWP, NSSTTW changes.  Similar to 12/2/16    Laboratory:  CBC:    Recent Labs  Lab 05/18/15  1041 06/15/16  0844 03/14/17  0722   WHITE BLOOD CELL COUNT 10.18 9.95 9.51   HEMOGLOBIN 13.9 13.4 13.6   HEMATOCRIT 42.2 40.6 42.9   PLATELETS 313 315 276       CHEMISTRIES:    Recent Labs  Lab 08/28/17  0711 10/25/17  1220 10/26/17  0738   GLUCOSE 127 H 225 H 167 H   SODIUM 143 142 142   POTASSIUM 4.0 3.8 3.7   BUN BLD 18 13 11   CREATININE  1.0 1.0 0.9   EGFR IF  >60.0 >60 >60.0   EGFR IF NON- 54.9 A 55 A >60.0   CALCIUM 10.1 9.9 10.0       CARDIAC BIOMARKERS:        COAGS:        LIPIDS/LFTS:    Recent Labs  Lab 12/14/15  0846 06/15/16  0844 03/14/17  0722 07/31/17  0846   CHOLESTEROL 144 139 144  --    TRIGLYCERIDES 112 114 112  --    HDL 53 53 48  --    LDL CHOLESTEROL 68.6 63.2 73.6  --    NON-HDL CHOLESTEROL 91 86 96  --    AST 27 20 19 21   ALT 30 20 20 20       Cardiovascular Testing:  Renal art US 10/25/17  Elevated peak systolic velocity ratio of 147 cm/sec in the proximal right renal artery with no secondary findings to suggest renal artery stenosis. Short-term followup suggested.  Borderline elevation of the renal resistive indices (0.79-0.80).  No evidence of obstructive uropathy.    Holter 3/28/16:  PREDOMINANT RHYTHM  1. Sinus rhythm with heart rates varying between 65 and 111 bpm with an average of 80 bpm.   VENTRICULAR ARRHYTHMIAS  1. There were occasional PVCs totalling 697 and averaging 29 per hour. There was 1 couplet.  2. There were no episodes of ventricular tachycardia.  SUPRA VENTRICULAR ARRHYTHMIAS  1. There were very rare PACs recorded totalling 1 and averaging less than 1 per hour.   2. There were no episodes of sustained supraventricular tachycardia.  SINUS NODE FUNCTION  1. There was no evidence of high grade SA cristian block.   AV CONDUCTION  1. There was no evidence of high grade AV block.   DIARY  1. The diary was not returned  MISCELLANEOUS  1. There were occasional hookup related artifacts. Overall, the study was of good quality.   2. This was a tape of adequate length (24 hrs).    Echo 3/28/16:  1 - Normal left ventricular systolic function (EF 60-65%). Normal wall motion.   2 - Concentric remodeling.   3 - Trivial tricuspid regurgitation.   4 - The estimated PA systolic pressure is 27 mmHg.     Ex-MPI 3/28/16   Jignesh 2min, 86% MPHR, -CP/EKG  Nuclear Quantitative Functional Analysis:    Quantitative measurements of LV function are over estimated secondary to small ventricular volumes.   Visually estimated LV function is hyperdynamic.   Impression: NORMAL MYOCARDIAL PERFUSION  1. The perfusion scan is free of evidence for myocardial ischemia or injury.   2. Resting wall motion is physiologic.   3. Visually estimated LV function is hyperdynamic.   4. The ventricular volumes are normal at rest and stress.   5. The extracardiac distribution of radioactivity is normal.    ASSESSMENT:   # HTN, uncontrolled.  Pt intol of HCTZ.  # DM  # HLP, LDL acceptable  # BMI 30    PLAN:   Cont med rx  INc spironolactone to 50mg qd  Check BMP 2 weeks  Diet/exercise/weight loss  PASCUAL eval planned 11/28/17  RTC 1 month      Juan Pablo Ordoñez MD, FACC

## 2017-11-21 ENCOUNTER — HOSPITAL ENCOUNTER (OUTPATIENT)
Dept: RADIOLOGY | Facility: HOSPITAL | Age: 77
Discharge: HOME | End: 2017-11-21
Attending: INTERNAL MEDICINE
Payer: MEDICARE

## 2017-11-21 VITALS — HEIGHT: 64 IN | BODY MASS INDEX: 29.71 KG/M2 | WEIGHT: 174 LBS

## 2017-11-21 DIAGNOSIS — N63.20 LEFT BREAST MASS: ICD-10-CM

## 2017-11-21 DIAGNOSIS — R92.8 ABNORMAL MAMMOGRAM OF LEFT BREAST: ICD-10-CM

## 2017-11-21 PROCEDURE — A4648 IMPLANTABLE TISSUE MARKER: HCPCS

## 2017-11-21 PROCEDURE — 88305 TISSUE EXAM BY PATHOLOGIST: CPT | Performed by: PATHOLOGY

## 2017-11-21 PROCEDURE — 88342 IMHCHEM/IMCYTCHM 1ST ANTB: CPT | Mod: 26,59,, | Performed by: PATHOLOGY

## 2017-11-21 PROCEDURE — 77065 DX MAMMO INCL CAD UNI: CPT | Mod: 26,LT,, | Performed by: RADIOLOGY

## 2017-11-21 PROCEDURE — 27201068 US BREAST BIOPSY WITH IMAGING 1ST SITE LEFT

## 2017-11-21 PROCEDURE — 88360 TUMOR IMMUNOHISTOCHEM/MANUAL: CPT | Mod: 26,,, | Performed by: PATHOLOGY

## 2017-11-21 PROCEDURE — 77065 DX MAMMO INCL CAD UNI: CPT | Mod: TC,LT

## 2017-11-21 PROCEDURE — 27201068 US BREAST BIOPSY WITH IMAGING EA ADDITIONAL

## 2017-11-21 PROCEDURE — 88360 TUMOR IMMUNOHISTOCHEM/MANUAL: CPT | Performed by: PATHOLOGY

## 2017-11-21 PROCEDURE — 88341 IMHCHEM/IMCYTCHM EA ADD ANTB: CPT | Mod: 26,59,, | Performed by: PATHOLOGY

## 2017-11-21 PROCEDURE — 38525 BIOPSY/REMOVAL LYMPH NODES: CPT | Mod: 59,LT,, | Performed by: RADIOLOGY

## 2017-11-21 PROCEDURE — 88305 TISSUE EXAM BY PATHOLOGIST: CPT | Mod: 26,,, | Performed by: PATHOLOGY

## 2017-11-21 PROCEDURE — 19083 BX BREAST 1ST LESION US IMAG: CPT | Mod: LT,,, | Performed by: RADIOLOGY

## 2017-11-27 ENCOUNTER — TELEPHONE (OUTPATIENT)
Dept: FAMILY MEDICINE | Facility: CLINIC | Age: 77
End: 2017-11-27

## 2017-11-27 NOTE — TELEPHONE ENCOUNTER
Attempted to call patient to discuss results of breast bx - unable to reach patient on home or cell phone.

## 2017-11-28 ENCOUNTER — TELEPHONE (OUTPATIENT)
Dept: RADIOLOGY | Facility: HOSPITAL | Age: 77
End: 2017-11-28

## 2017-11-28 ENCOUNTER — TELEPHONE (OUTPATIENT)
Dept: FAMILY MEDICINE | Facility: CLINIC | Age: 77
End: 2017-11-28

## 2017-11-28 NOTE — TELEPHONE ENCOUNTER
----- Message from Camryn Nguyen sent at 11/28/2017 11:49 AM CST -----  Contact: Milka Jarquin [phone] @ [facility] is returning the provider phone call in regards to the pt Please call  at your earliest convenience.  Thanks !

## 2017-11-28 NOTE — TELEPHONE ENCOUNTER
Called Milka and left message - I see patient has an appointment for Breast Surgery tomorrow to follow up on positive breast bx.

## 2017-11-29 ENCOUNTER — OFFICE VISIT (OUTPATIENT)
Dept: SURGERY | Facility: CLINIC | Age: 77
End: 2017-11-29
Payer: MEDICARE

## 2017-11-29 VITALS
SYSTOLIC BLOOD PRESSURE: 132 MMHG | HEART RATE: 75 BPM | WEIGHT: 175.06 LBS | DIASTOLIC BLOOD PRESSURE: 56 MMHG | BODY MASS INDEX: 29.89 KG/M2 | HEIGHT: 64 IN

## 2017-11-29 DIAGNOSIS — Z01.818 PRE-OP TESTING: ICD-10-CM

## 2017-11-29 DIAGNOSIS — C50.912 MALIGNANT NEOPLASM OF LEFT FEMALE BREAST, UNSPECIFIED ESTROGEN RECEPTOR STATUS, UNSPECIFIED SITE OF BREAST: Primary | ICD-10-CM

## 2017-11-29 PROCEDURE — 99205 OFFICE O/P NEW HI 60 MIN: CPT | Mod: S$GLB,,, | Performed by: SURGERY

## 2017-11-29 PROCEDURE — 99499 UNLISTED E&M SERVICE: CPT | Mod: S$PBB,,, | Performed by: SURGERY

## 2017-11-29 PROCEDURE — 99999 PR PBB SHADOW E&M-EST. PATIENT-LVL IV: CPT | Mod: PBBFAC,,, | Performed by: SURGERY

## 2017-11-29 NOTE — LETTER
December 5, 2017      Shelby Ley MD  [address] [facility]  [place] [address]           [facility]  [address] [facility] [address]  [place] [address]  Phone: [phone]  Fax: [phone]          Patient: Joel Condon   MR Number: [id number]   YOB: 1940   Date of Visit: 11/29/2017       Dear Dr. Shelby Ley:    Thank you for referring Joel Condon to me for evaluation. Attached you will find relevant portions of my assessment and plan of care.    If you have questions, please do not hesitate to call me. I look forward to following Joel Condon along with you.    Sincerely,    Roderick Caballero  CC:  No Recipients    If you would like to receive this communication electronically, please contact [email or web address] or [phone] to request more information on [employer] access.    For providers and/or their staff who would like to refer a patient to [facility], please contact us through our one-stop-shop provider referral line, [facility], at [phone].    If you feel you have received this communication in error or would no longer like to receive these types of communications, please e-mail [email or web address]

## 2017-11-30 ENCOUNTER — TELEPHONE (OUTPATIENT)
Dept: ENDOCRINOLOGY | Facility: CLINIC | Age: 77
End: 2017-11-30

## 2017-11-30 NOTE — TELEPHONE ENCOUNTER
Please let pt know that on the day before her surgery, continue to take Novolog before meals. Do not take evening dose of metformin. Reduce Levemir to just 24 units the night before surgery.

## 2017-11-30 NOTE — TELEPHONE ENCOUNTER
Called patient and informed her of providers diabetic medication changes before surgery. Patient verbalized understanding.

## 2017-11-30 NOTE — TELEPHONE ENCOUNTER
----- Message from Effie Colorado LPN sent at 11/30/2017 10:41 AM CST -----  Regarding: FW: surgical clearance      ----- Message -----  From: Joana Ardon RN  Sent: 11/30/2017  10:34 AM  To: [facility]leg[facility] Staff  Subject: surgical clearance                               Good morning,  Dr Caldera saw patient yesterday for breast cancer.  She will be having a lumpectomy on 12/13/17 and will need to be NPO for procedure.  Patient needs to know about insulin adjustments for NPO status.  Thank you.    Joana Ardon RN

## 2017-12-05 ENCOUNTER — LAB VISIT (OUTPATIENT)
Dept: LAB | Facility: HOSPITAL | Age: 77
End: 2017-12-05
Attending: INTERNAL MEDICINE
Payer: MEDICARE

## 2017-12-05 DIAGNOSIS — I10 ESSENTIAL HYPERTENSION: ICD-10-CM

## 2017-12-05 LAB
ANION GAP SERPL CALC-SCNC: 9 MMOL/L
BUN SERPL-MCNC: 17 MG/DL
CALCIUM SERPL-MCNC: 9.2 MG/DL
CHLORIDE SERPL-SCNC: 110 MMOL/L
CO2 SERPL-SCNC: 22 MMOL/L
CREAT SERPL-MCNC: 0.9 MG/DL
EST. GFR  (AFRICAN AMERICAN): >60 ML/MIN/1.73 M^2
EST. GFR  (NON AFRICAN AMERICAN): >60 ML/MIN/1.73 M^2
GLUCOSE SERPL-MCNC: 192 MG/DL
POTASSIUM SERPL-SCNC: 4.2 MMOL/L
SODIUM SERPL-SCNC: 141 MMOL/L

## 2017-12-05 PROCEDURE — 36415 COLL VENOUS BLD VENIPUNCTURE: CPT | Mod: PO

## 2017-12-05 PROCEDURE — 80048 BASIC METABOLIC PNL TOTAL CA: CPT

## 2017-12-07 ENCOUNTER — HOSPITAL ENCOUNTER (OUTPATIENT)
Dept: PREADMISSION TESTING | Facility: HOSPITAL | Age: 77
Discharge: HOME | End: 2017-12-07
Attending: SURGERY
Payer: MEDICARE

## 2017-12-07 VITALS
RESPIRATION RATE: 16 BRPM | OXYGEN SATURATION: 97 % | DIASTOLIC BLOOD PRESSURE: 62 MMHG | SYSTOLIC BLOOD PRESSURE: 139 MMHG | TEMPERATURE: 98 F | HEART RATE: 58 BPM | HEIGHT: 64 IN | BODY MASS INDEX: 29.24 KG/M2 | WEIGHT: 171.31 LBS

## 2017-12-07 DIAGNOSIS — C50.912 MALIGNANT NEOPLASM OF LEFT FEMALE BREAST, UNSPECIFIED ESTROGEN RECEPTOR STATUS, UNSPECIFIED SITE OF BREAST: ICD-10-CM

## 2017-12-07 DIAGNOSIS — Z01.818 PRE-OP TESTING: Primary | ICD-10-CM

## 2017-12-07 LAB
BASOPHILS # BLD AUTO: 0.04 K/UL
BASOPHILS NFR BLD: 0.5 %
DIFFERENTIAL METHOD: NORMAL
EOSINOPHIL # BLD AUTO: 0.3 K/UL
EOSINOPHIL NFR BLD: 2.9 %
ERYTHROCYTE [DISTWIDTH] IN BLOOD BY AUTOMATED COUNT: 14.3 %
HCT VFR BLD AUTO: 37 %
HGB BLD-MCNC: 12.7 G/DL
LYMPHOCYTES # BLD AUTO: 2.8 K/UL
LYMPHOCYTES NFR BLD: 32.6 %
MCH RBC QN AUTO: 30.8 PG
MCHC RBC AUTO-ENTMCNC: 34.3 G/DL
MCV RBC AUTO: 90 FL
MONOCYTES # BLD AUTO: 0.5 K/UL
MONOCYTES NFR BLD: 5.7 %
NEUTROPHILS # BLD AUTO: 5 K/UL
NEUTROPHILS NFR BLD: 58.5 %
PLATELET # BLD AUTO: 261 K/UL
PMV BLD AUTO: 10.7 FL
RBC # BLD AUTO: 4.12 M/UL
WBC # BLD AUTO: 8.64 K/UL

## 2017-12-07 PROCEDURE — 85025 COMPLETE CBC W/AUTO DIFF WBC: CPT

## 2017-12-07 PROCEDURE — 36415 COLL VENOUS BLD VENIPUNCTURE: CPT

## 2017-12-07 NOTE — PRE-PROCEDURE INSTRUCTIONS
Pre-operative instructions, medication directives and pain scales reviewed with patient.   Instructed to wash with Hibiclens  Before surgery as instructed.  All questions the patient had  were answered. Re-assurance about surgical procedure and day of surgery routine given as needed. The patient verbalized understanding of the pre-op instructions.

## 2017-12-07 NOTE — DISCHARGE INSTRUCTIONS
"  Your surgery is scheduled for _Wednesday Dec. 13, 2017___________________.    Call [phone] between 2 p.m. and 5 p.m. on   _Turesday______________ to find out your arrival time for the day of your surgery.      Please report to SAME DAY SURGERY UNIT on the 2nd FLOOR at _______ a.m.  Use front door entrance. The doors open at 0530 am.      If you need WHEELCHAIR assistance please call  [phone] from your cell phone or "[phone]"  from the  hospital courtesy phone located to the right after you enter the hospital lobby.      INSTRUCTIONS IMPORTANT!!!  ¨ Do not eat or drink after 12 midnight-including water. OK to brush teeth, no   gum, candy or mints!    ¨ Take only these medicines with a small swallow of water-morning of surgery.  Take Metoprolol  And Procarsia morning of surgery.            _x___  Prep instructions:    SHOWER     _x___  Please shower using Hibiclens soap the night before AND  the morning of your surgery/procedure. Do not use Hibiclens on your face or genitals        x       If your surgery is around your belly button (Navel) be sure to wash inside your  belly button also. Rinse hibiclens off completely.  _x___  No shaving of procedural area at least 4-5 days before surgery due to   increased risk of skin irritation and/or possible infection.  _x___  Do not wear makeup, including mascara. WEARING EYE MAKEUP MAY  LEAD TO SERIOUS EYE INJURY during surgery.  _x___  No powder, lotions or creams to your body.  _x___  You may wear only deodorant on the day of surgery.  _x___  Please remove all jewelry, including piercings and leave at home.  _x__  No money or valuables needed. Please leave at home.  You may bring your           cell phone.  ____  Please bring any documents given by your doctor.  _x___  If going home the same day, arrange for a ride home. You will not be able to   drive if Anesthesia was used.  ____  Children under 18 years require a parent / guardian present the entire time   they are in " surgery / recovery.  _x___  Wear loose fitting clothing. Allow for dressings, bandages.  ____  Stop Aspirin, Ibuprofen, Motrin and Aleve at least 3-5 days before  surgery, unless otherwise instructed by your doctor, or the nurse.              You MAY use Tylenol/acetaminophen until day of surgery.  _x___  If you take diabetic medication, do not take am of surgery unless instructed by   Doctor.  __x__  Call MD for temperature above 101 degrees.        x____ Stop taking any Fish Oil supplement or any Vitamins that contain Vitamin   E at least 5 days prior to surgery.          I have read or had read and explained to me, and understand the above information.  Additional comments or instructions:Please call   [phone] if you have any questions regarding the instructions above.

## 2017-12-12 ENCOUNTER — TELEPHONE (OUTPATIENT)
Dept: SURGERY | Facility: CLINIC | Age: 77
End: 2017-12-12

## 2017-12-12 NOTE — TELEPHONE ENCOUNTER
Spoke with pt regarding surgery, pt advised to arrive to [facility] at 0845 for wire localization for 1230 surgery, pt verbalized understanding, pre-op education reinforced, all questions answered at this time, pt given reassurance

## 2017-12-13 ENCOUNTER — HOSPITAL ENCOUNTER (OUTPATIENT)
Dept: RADIOLOGY | Facility: HOSPITAL | Age: 77
Discharge: HOME | End: 2017-12-13
Attending: SURGERY | Admitting: SURGERY
Payer: MEDICARE

## 2017-12-13 ENCOUNTER — HOSPITAL ENCOUNTER (OUTPATIENT)
Facility: HOSPITAL | Age: 77
Discharge: HOME | End: 2017-12-13
Attending: SURGERY | Admitting: SURGERY
Payer: MEDICARE

## 2017-12-13 ENCOUNTER — ANESTHESIA (OUTPATIENT)
Dept: SURGERY | Facility: HOSPITAL | Age: 77
End: 2017-12-13
Payer: MEDICARE

## 2017-12-13 ENCOUNTER — HOSPITAL ENCOUNTER (OUTPATIENT)
Dept: RADIOLOGY | Facility: HOSPITAL | Age: 77
Discharge: HOME | End: 2017-12-13
Attending: SURGERY
Payer: MEDICARE

## 2017-12-13 ENCOUNTER — ANESTHESIA EVENT (OUTPATIENT)
Dept: SURGERY | Facility: HOSPITAL | Age: 77
End: 2017-12-13
Payer: MEDICARE

## 2017-12-13 ENCOUNTER — SURGERY (OUTPATIENT)
Age: 77
End: 2017-12-13

## 2017-12-13 VITALS
HEART RATE: 67 BPM | BODY MASS INDEX: 29.24 KG/M2 | TEMPERATURE: 98 F | WEIGHT: 171.25 LBS | RESPIRATION RATE: 18 BRPM | OXYGEN SATURATION: 98 % | SYSTOLIC BLOOD PRESSURE: 132 MMHG | HEIGHT: 64 IN | DIASTOLIC BLOOD PRESSURE: 64 MMHG

## 2017-12-13 DIAGNOSIS — C50.912 BREAST CANCER, LEFT: ICD-10-CM

## 2017-12-13 DIAGNOSIS — R07.9 CHEST PAIN: ICD-10-CM

## 2017-12-13 DIAGNOSIS — C50.912 MALIGNANT NEOPLASM OF LEFT FEMALE BREAST, UNSPECIFIED ESTROGEN RECEPTOR STATUS, UNSPECIFIED SITE OF BREAST: ICD-10-CM

## 2017-12-13 DIAGNOSIS — C50.912 MALIGNANT NEOPLASM OF LEFT FEMALE BREAST, UNSPECIFIED ESTROGEN RECEPTOR STATUS, UNSPECIFIED SITE OF BREAST: Primary | ICD-10-CM

## 2017-12-13 LAB
POCT GLUCOSE: 181 MG/DL (ref 70–110)
POCT GLUCOSE: 200 MG/DL (ref 70–110)
POCT GLUCOSE: 268 MG/DL (ref 70–110)
POCT GLUCOSE: 273 MG/DL (ref 70–110)

## 2017-12-13 PROCEDURE — 38900 IO MAP OF SENT LYMPH NODE: CPT | Mod: LT,,, | Performed by: SURGERY

## 2017-12-13 PROCEDURE — 63600175 PHARM REV CODE 636 W HCPCS: Performed by: SURGERY

## 2017-12-13 PROCEDURE — 78195 LYMPH SYSTEM IMAGING: CPT | Mod: TC

## 2017-12-13 PROCEDURE — 38792 RA TRACER ID OF SENTINL NODE: CPT | Mod: TC

## 2017-12-13 PROCEDURE — D9220A PRA ANESTHESIA: Mod: CRNA,,, | Performed by: NURSE ANESTHETIST, CERTIFIED REGISTERED

## 2017-12-13 PROCEDURE — 25000003 PHARM REV CODE 250: Performed by: ANESTHESIOLOGY

## 2017-12-13 PROCEDURE — 88307 TISSUE EXAM BY PATHOLOGIST: CPT | Mod: 26,,, | Performed by: PATHOLOGY

## 2017-12-13 PROCEDURE — 63600175 PHARM REV CODE 636 W HCPCS: Performed by: NURSE ANESTHETIST, CERTIFIED REGISTERED

## 2017-12-13 PROCEDURE — 19281 PERQ DEVICE BREAST 1ST IMAG: CPT | Mod: 26,,, | Performed by: RADIOLOGY

## 2017-12-13 PROCEDURE — 88342 IMHCHEM/IMCYTCHM 1ST ANTB: CPT | Mod: 26,,, | Performed by: PATHOLOGY

## 2017-12-13 PROCEDURE — 71000033 HC RECOVERY, INTIAL HOUR: Performed by: SURGERY

## 2017-12-13 PROCEDURE — 25000003 PHARM REV CODE 250: Performed by: SURGERY

## 2017-12-13 PROCEDURE — 71000039 HC RECOVERY, EACH ADD'L HOUR: Performed by: SURGERY

## 2017-12-13 PROCEDURE — 71000015 HC POSTOP RECOV 1ST HR: Performed by: SURGERY

## 2017-12-13 PROCEDURE — 88307 TISSUE EXAM BY PATHOLOGIST: CPT | Performed by: PATHOLOGY

## 2017-12-13 PROCEDURE — 37000009 HC ANESTHESIA EA ADD 15 MINS: Performed by: SURGERY

## 2017-12-13 PROCEDURE — C1769 GUIDE WIRE: HCPCS

## 2017-12-13 PROCEDURE — 93010 ELECTROCARDIOGRAM REPORT: CPT | Mod: ,,, | Performed by: INTERNAL MEDICINE

## 2017-12-13 PROCEDURE — 88341 IMHCHEM/IMCYTCHM EA ADD ANTB: CPT | Mod: 26,,, | Performed by: PATHOLOGY

## 2017-12-13 PROCEDURE — 25000003 PHARM REV CODE 250: Performed by: NURSE ANESTHETIST, CERTIFIED REGISTERED

## 2017-12-13 PROCEDURE — 36000707: Performed by: SURGERY

## 2017-12-13 PROCEDURE — 27200651 HC AIRWAY, LMA: Performed by: NURSE ANESTHETIST, CERTIFIED REGISTERED

## 2017-12-13 PROCEDURE — D9220A PRA ANESTHESIA: Mod: ANES,,, | Performed by: ANESTHESIOLOGY

## 2017-12-13 PROCEDURE — 78195 LYMPH SYSTEM IMAGING: CPT | Mod: 26,,, | Performed by: RADIOLOGY

## 2017-12-13 PROCEDURE — 38525 BIOPSY/REMOVAL LYMPH NODES: CPT | Mod: 51,LT,, | Performed by: SURGERY

## 2017-12-13 PROCEDURE — 63600175 PHARM REV CODE 636 W HCPCS: Performed by: ANESTHESIOLOGY

## 2017-12-13 PROCEDURE — 93005 ELECTROCARDIOGRAM TRACING: CPT | Mod: 59

## 2017-12-13 PROCEDURE — 38792 RA TRACER ID OF SENTINL NODE: CPT | Mod: 26,,, | Performed by: RADIOLOGY

## 2017-12-13 PROCEDURE — 37000008 HC ANESTHESIA 1ST 15 MINUTES: Performed by: SURGERY

## 2017-12-13 PROCEDURE — 82962 GLUCOSE BLOOD TEST: CPT | Performed by: SURGERY

## 2017-12-13 PROCEDURE — 19281 PERQ DEVICE BREAST 1ST IMAG: CPT | Mod: TC

## 2017-12-13 PROCEDURE — 19301 PARTIAL MASTECTOMY: CPT | Mod: LT,,, | Performed by: SURGERY

## 2017-12-13 PROCEDURE — 36000706: Performed by: SURGERY

## 2017-12-13 RX ORDER — LIDOCAINE HCL/PF 100 MG/5ML
SYRINGE (ML) INTRAVENOUS
Status: DISCONTINUED | OUTPATIENT
Start: 2017-12-13 | End: 2017-12-13

## 2017-12-13 RX ORDER — SODIUM CHLORIDE 9 MG/ML
INJECTION, SOLUTION INTRAVENOUS CONTINUOUS
Status: DISCONTINUED | OUTPATIENT
Start: 2017-12-13 | End: 2017-12-13 | Stop reason: HOSPADM

## 2017-12-13 RX ORDER — FENTANYL CITRATE 50 UG/ML
INJECTION, SOLUTION INTRAMUSCULAR; INTRAVENOUS
Status: DISCONTINUED | OUTPATIENT
Start: 2017-12-13 | End: 2017-12-13

## 2017-12-13 RX ORDER — DIPHENHYDRAMINE HYDROCHLORIDE 50 MG/ML
25 INJECTION INTRAMUSCULAR; INTRAVENOUS EVERY 6 HOURS PRN
Status: DISCONTINUED | OUTPATIENT
Start: 2017-12-13 | End: 2017-12-13 | Stop reason: HOSPADM

## 2017-12-13 RX ORDER — PROPOFOL 10 MG/ML
VIAL (ML) INTRAVENOUS
Status: DISCONTINUED | OUTPATIENT
Start: 2017-12-13 | End: 2017-12-13

## 2017-12-13 RX ORDER — HYDROCODONE BITARTRATE AND ACETAMINOPHEN 5; 325 MG/1; MG/1
1 TABLET ORAL EVERY 4 HOURS PRN
Status: DISCONTINUED | OUTPATIENT
Start: 2017-12-13 | End: 2017-12-13 | Stop reason: HOSPADM

## 2017-12-13 RX ORDER — ONDANSETRON 2 MG/ML
4 INJECTION INTRAMUSCULAR; INTRAVENOUS EVERY 12 HOURS PRN
Status: DISCONTINUED | OUTPATIENT
Start: 2017-12-13 | End: 2017-12-13 | Stop reason: HOSPADM

## 2017-12-13 RX ORDER — METOCLOPRAMIDE HYDROCHLORIDE 5 MG/ML
10 INJECTION INTRAMUSCULAR; INTRAVENOUS EVERY 10 MIN PRN
Status: DISCONTINUED | OUTPATIENT
Start: 2017-12-13 | End: 2017-12-13 | Stop reason: HOSPADM

## 2017-12-13 RX ORDER — ISOSULFAN BLUE 50 MG/5ML
INJECTION, SOLUTION SUBCUTANEOUS
Status: DISCONTINUED | OUTPATIENT
Start: 2017-12-13 | End: 2017-12-13 | Stop reason: HOSPADM

## 2017-12-13 RX ORDER — HYDROMORPHONE HYDROCHLORIDE 2 MG/ML
0.2 INJECTION, SOLUTION INTRAMUSCULAR; INTRAVENOUS; SUBCUTANEOUS EVERY 5 MIN PRN
Status: DISCONTINUED | OUTPATIENT
Start: 2017-12-13 | End: 2017-12-13 | Stop reason: HOSPADM

## 2017-12-13 RX ORDER — SODIUM CHLORIDE, SODIUM LACTATE, POTASSIUM CHLORIDE, CALCIUM CHLORIDE 600; 310; 30; 20 MG/100ML; MG/100ML; MG/100ML; MG/100ML
INJECTION, SOLUTION INTRAVENOUS CONTINUOUS
Status: DISCONTINUED | OUTPATIENT
Start: 2017-12-13 | End: 2017-12-13 | Stop reason: HOSPADM

## 2017-12-13 RX ORDER — HYDROCODONE BITARTRATE AND ACETAMINOPHEN 5; 325 MG/1; MG/1
1 TABLET ORAL EVERY 6 HOURS PRN
Qty: 20 TABLET | Refills: 0 | Status: SHIPPED | OUTPATIENT
Start: 2017-12-13 | End: 2018-06-13

## 2017-12-13 RX ORDER — CEFAZOLIN SODIUM 2 G/50ML
2 SOLUTION INTRAVENOUS
Status: COMPLETED | OUTPATIENT
Start: 2017-12-13 | End: 2017-12-13

## 2017-12-13 RX ORDER — GLYCOPYRROLATE 0.2 MG/ML
INJECTION INTRAMUSCULAR; INTRAVENOUS
Status: DISCONTINUED | OUTPATIENT
Start: 2017-12-13 | End: 2017-12-13

## 2017-12-13 RX ORDER — LIDOCAINE HYDROCHLORIDE 10 MG/ML
INJECTION INFILTRATION; PERINEURAL
Status: DISCONTINUED | OUTPATIENT
Start: 2017-12-13 | End: 2017-12-13 | Stop reason: HOSPADM

## 2017-12-13 RX ORDER — PHENYLEPHRINE HYDROCHLORIDE 10 MG/ML
INJECTION INTRAVENOUS
Status: DISCONTINUED | OUTPATIENT
Start: 2017-12-13 | End: 2017-12-13

## 2017-12-13 RX ORDER — ACETAMINOPHEN 10 MG/ML
1000 INJECTION, SOLUTION INTRAVENOUS ONCE
Status: COMPLETED | OUTPATIENT
Start: 2017-12-13 | End: 2017-12-13

## 2017-12-13 RX ORDER — LIDOCAINE HYDROCHLORIDE 10 MG/ML
1 INJECTION, SOLUTION EPIDURAL; INFILTRATION; INTRACAUDAL; PERINEURAL ONCE
Status: DISCONTINUED | OUTPATIENT
Start: 2017-12-13 | End: 2017-12-13 | Stop reason: HOSPADM

## 2017-12-13 RX ORDER — MEPERIDINE HYDROCHLORIDE 50 MG/ML
12.5 INJECTION INTRAMUSCULAR; INTRAVENOUS; SUBCUTANEOUS ONCE AS NEEDED
Status: DISCONTINUED | OUTPATIENT
Start: 2017-12-13 | End: 2017-12-13 | Stop reason: HOSPADM

## 2017-12-13 RX ORDER — SODIUM CHLORIDE 0.9 % (FLUSH) 0.9 %
3 SYRINGE (ML) INJECTION
Status: DISCONTINUED | OUTPATIENT
Start: 2017-12-13 | End: 2017-12-13 | Stop reason: HOSPADM

## 2017-12-13 RX ADMIN — PHENYLEPHRINE HYDROCHLORIDE 100 MCG: 10 INJECTION INTRAVENOUS at 01:12

## 2017-12-13 RX ADMIN — CEFAZOLIN SODIUM 2 G: 2 SOLUTION INTRAVENOUS at 12:12

## 2017-12-13 RX ADMIN — FENTANYL CITRATE 50 MCG: 50 INJECTION INTRAMUSCULAR; INTRAVENOUS at 12:12

## 2017-12-13 RX ADMIN — INSULIN HUMAN 10 UNITS: 100 INJECTION, SOLUTION PARENTERAL at 12:12

## 2017-12-13 RX ADMIN — ACETAMINOPHEN 1000 MG: 10 INJECTION, SOLUTION INTRAVENOUS at 02:12

## 2017-12-13 RX ADMIN — HYDROMORPHONE HYDROCHLORIDE 0.2 MG: 2 INJECTION INTRAMUSCULAR; INTRAVENOUS; SUBCUTANEOUS at 02:12

## 2017-12-13 RX ADMIN — LIDOCAINE HYDROCHLORIDE 20 MG: 20 INJECTION, SOLUTION INTRAVENOUS at 12:12

## 2017-12-13 RX ADMIN — FENTANYL CITRATE 50 MCG: 50 INJECTION INTRAMUSCULAR; INTRAVENOUS at 01:12

## 2017-12-13 RX ADMIN — LIDOCAINE HYDROCHLORIDE 20 ML: 10 INJECTION, SOLUTION INFILTRATION; PERINEURAL at 01:12

## 2017-12-13 RX ADMIN — GLYCOPYRROLATE 0.2 MG: 0.2 INJECTION, SOLUTION INTRAMUSCULAR; INTRAVENOUS at 12:12

## 2017-12-13 RX ADMIN — PROPOFOL 40 MG: 10 INJECTION, EMULSION INTRAVENOUS at 12:12

## 2017-12-13 RX ADMIN — HYDROMORPHONE HYDROCHLORIDE 0.2 MG: 2 INJECTION INTRAMUSCULAR; INTRAVENOUS; SUBCUTANEOUS at 03:12

## 2017-12-13 RX ADMIN — FENTANYL CITRATE 25 MCG: 50 INJECTION INTRAMUSCULAR; INTRAVENOUS at 01:12

## 2017-12-13 RX ADMIN — ISOSULFAN BLUE 5 ML: 10 INJECTION, SOLUTION SUBCUTANEOUS at 01:12

## 2017-12-13 RX ADMIN — PROPOFOL 40 MG: 10 INJECTION, EMULSION INTRAVENOUS at 01:12

## 2017-12-13 RX ADMIN — SODIUM CHLORIDE, SODIUM LACTATE, POTASSIUM CHLORIDE, AND CALCIUM CHLORIDE: .6; .31; .03; .02 INJECTION, SOLUTION INTRAVENOUS at 12:12

## 2017-12-13 NOTE — OR NURSING
1440: Post-op bedside CBG= 200. Pt received 10 units regular insulin at 1234 pre-op. Dr. Ku notified and aware. No new orders noted, will continue to monitor pt for any changes.

## 2017-12-13 NOTE — OP NOTE
DATE OF PROCEDURE: 12/13/2017    SURGEON: Ashli Caldera M.D.    ASSISTANT:   none    PREOPERATIVE DIAGNOSIS: Invasive breast carcinoma of the left breast upper outer quadrant    POSTOPERATIVE DIAGNOSIS: same    ANESTHESIA: general    PROCEDURES PERFORMED:   1. left breast wire localization partial mastectomy (lumpectomy) with excision for clear margins   2. left axillary deep sentinel lymph node biopsy   3. injection of left breast with isosulfan Lymphazurin blue dye and   technetium-labeled radiocolloid for sentinel lymph node identification  4. Identification of sentinel lymph node       PROCEDURE IN DETAIL:   The patient underwent informed consent.  The wire was placed in the upper outer quadrant of the left breast. The wire localization films were reviewed.    The patient then underwent paravertebral block in the preop holding area. Following the block, the left breast was injected in the subareolar region with the technetium-labeled radiocolloid.     She was then brought to the Operating Room and placed in the supine position. Anesthesia with local/monitored anesthesia care with sedation was administered.  The left breast was further injected with the isosulfan Lymphazurin blue dye in the central subareolar region. The left breast, anterior chest, arm and axilla were then prepped and draped in a sterile fashion.     Using the gamma probe, activity was noted and localized in the left axilla. Local anesthetic with 1% lidocaine was injected into the skin where the incision will be placed. We made a small transverse inferior axillary incision over the area of activity. We then dissected down through the clavipectoral fascia using electrocautery, identifying a blue afferent lymphatic channel coming from the upper outer quadrant axillary tail of Hung breast tissue to a level 1 sentinel node, which was excised. It was dissected circumferentially with electrocautery. The lymph node was labeled as specimen #1 for  permanent sectioning, hot and blue with an ex vivo count of 60. SLN #1 was also noted to have the biopsy clip in it from the needle biopsy.  A total of 2 axillary sentinel lymph nodes were removed.  The probe was placed in the cavity and there was no significant residual background radioactivity or blue dye noted in the axilla indicating adequate and appropriate sentinel lymph node biopsy. The cavity was then palpated and 0 further palpable nodes were noted. Any additional palpable nodes were sent to pathology for permanent section.       We then achieved hemostasis and irrigation was performed.  The incision was then closed with an interuppted 3-0 vicryl deep dermal followed by a running 4-0 monocryl subcuticular.    Next, we turned our attention to the left breast itself. An incision was made in the upper outer quadrant of the left breast over the anticipated tract of the wire. The specimen was dissected circumferentially around the cancer.  We did dissect all the way down to, but not including the underlying pectoralis fascia. The wire localization lumpectomy specimen was inked on the back table using green ink inferiorly, blue ink superiorly, orange ink laterally, yellow ink anteriorly, black ink posteriorly, and the red ink medially.  It was then fixed with acetic acid and submitted for specimen radiograph, which confirmed the clip and area of interest within the specimen. Given the appearance and location of the lesion, no additional margins were taken.       Within the lumpectomy cavity, hemostasis was achieved with cautery. The wound was irrigated until clear. There was no evidence of bleeding. It was closed in multiple layers with deep dermal and subcutaneous interrupted Vicryl sutures and a running 4-0 Monocryl subcuticular skin closure.    Steri-Strips and sterile dressing were applied. Sterile fluff gauze was placed and a post-procedure bra was placed. She tolerated the procedure well without  complication and was turned over to Anesthesia for transport to the recovery area in a satisfactory condition. All specimens were sent to Pathology for permanent sectioning.    ESTIMATED BLOOD LOSS: 5 ml    COMPLICATIONS: none    DISPOSITION:  PACU--hemodynamically stable    ATTESTATION:  A qualified resident physician was not available. I performed the procedure.

## 2017-12-13 NOTE — TRANSFER OF CARE
"Anesthesia Transfer of Care Note    Patient: Joel Condon    Procedure(s) Performed: Procedure(s) (LRB):  MASTECTOMY-PARTIAL left with wire localization same day in mammography (CONSENT AM OF) 1.5 hr case (Left)  BIOPSY-LYMPH NODE-SENTINELleft (Left)  INJECTION-NODE-SENTINEL - MD INJECTS IN OR (Left)    Patient location: PACU    Anesthesia Type: general    Transport from OR: Transported from OR on room air with adequate spontaneous ventilation    Post pain: adequate analgesia    Post assessment: no apparent anesthetic complications    Post vital signs: stable    Level of consciousness: awake and alert    Nausea/Vomiting: no nausea/vomiting    Complications: none    Transfer of care protocol was followed      Last vitals:   Visit Vitals  BP (!) 159/73   Pulse 83   Temp 36.8 °C (98.2 °F)   Resp 16   Ht 5' 4" (1.626 m)   Wt 77.7 kg (171 lb 4 oz)   SpO2 96%   BMI 29.39 kg/m²     "

## 2017-12-13 NOTE — DISCHARGE SUMMARY
[facility]  Discharge Summary  General Surgery      Admit Date: 12/13/2017    Discharge Date and Time:  12/13/2017 2:21 PM    Attending Physician: Ashli Caldera MD     Discharge Provider: Ashli Caldera    Reason for Admission: surgery    Procedures Performed: Procedure(s) (LRB):  MASTECTOMY-PARTIAL left with wire localization same day in mammography (CONSENT AM OF) 1.5 hr case (Left)  BIOPSY-LYMPH NODE-SENTINELleft (Left)  INJECTION-NODE-SENTINEL - MD INJECTS IN OR (Left)    Final Diagnoses:   Principal Problem: left breast cancer    Discharged Condition: stable    Disposition: Home or Self Care    Follow Up/Patient Instructions:     Medications:  Reconciled Home Medications:   Current Discharge Medication List      START taking these medications    Details   hydrocodone-acetaminophen 5-325mg (NORCO) 5-325 mg per tablet Take 1 tablet by mouth every 6 (six) hours as needed for Pain.  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  Qty: 90 tablet, Refills: 3      NIFEdipine (PROCARDIA-XL) 90 MG (OSM) 24 hr tablet TAKE 1 TABLET(90 MG) BY MOUTH EVERY DAY  Qty: 90 tablet, Refills: 1    Associated Diagnoses: Essential hypertension      omeprazole (PRILOSEC) 20 MG capsule Take 1 capsule (20 mg total) by mouth daily as needed (heartburn).  Qty: 90 capsule, Refills: 0    Associated Diagnoses: Gastroesophageal reflux disease without esophagitis      spironolactone (ALDACTONE) 50 MG tablet Take 1 tablet (50 mg total) by mouth once daily.  Qty: 90 tablet, Refills: 3      aspirin (ECOTRIN) 81 MG EC tablet Take 1 tablet (81 mg total) by mouth once daily.  Qty: 90 tablet, Refills: 3      !! 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  Qty: 100 each, Refills: 6    Associated Diagnoses: Uncontrolled type 2 diabetes mellitus with proteinuria or microalbuminuria      lancets 26 gauge Misc 1 lancet by Misc.(Non-Drug; Combo Route) route 3 (three) times daily.  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 - Potential duplicate medications found. Please discuss with provider.          Discharge Procedure Orders  Diet general     Lifting restrictions     Call MD for:  temperature >100.4     Call MD for:  persistent nausea and vomiting     Call MD for:  severe uncontrolled pain     Call MD for:  difficulty breathing, headache or visual disturbances     Call MD for:  redness, tenderness, or signs of infection (pain, swelling, redness, odor or green/yellow discharge around incision site)     Call MD for:  hives     Call MD for:  persistent dizziness or light-headedness     Call MD for:  extreme fatigue     No dressing needed       Follow-up Information     Follow up In 2 weeks.    Why:  For wound check and path review               Diet: regular    Activity: as tolerated

## 2017-12-13 NOTE — ANESTHESIA PREPROCEDURE EVALUATION
12/13/2017  Joel Condon is a 77 y.o., female.    Pre-op Assessment    I have reviewed the Patient Summary Reports.     I have reviewed the Nursing Notes.   I have reviewed the Medications.     Review of Systems  Anesthesia Hx:  No problems with previous Anesthesia  History of prior surgery of interest to airway management or planning: Previous anesthesia: General  Denies Personal Hx of Anesthesia complications.   Cardiovascular:   Hypertension    Pulmonary:  Pulmonary Normal    Renal/:   Chronic Renal Disease proteinuria   Hepatic/GI:   GERD    Musculoskeletal:   Arthritis     Neurological:  Neurology Normal    Endocrine:   Diabetes, poorly controlled, type 2 Hypothyroidism  Metabolic Disorders      Physical Exam  General:  Well nourished    Airway/Jaw/Neck:  Airway Findings: Mouth Opening: Normal Tongue: Normal  General Airway Assessment: Adult  Mallampati: II  TM Distance: Normal, at least 6 cm  Jaw/Neck Findings:  Neck ROM: Normal ROM      Dental:  Dental Findings:   Chest/Lungs:  Chest/Lungs Findings: Clear to auscultation     Heart/Vascular:  Heart Findings: Rate: Normal  Rhythm: Regular Rhythm  Sounds: Normal        Mental Status:  Mental Status Findings:  Cooperative, Alert and Oriented         Anesthesia Plan  Type of Anesthesia, risks & benefits discussed:  Anesthesia Type:  general  Patient's Preference: mac  Intra-op Monitoring Plan:   Intra-op Monitoring Plan Comments:   Post Op Pain Control Plan:   Post Op Pain Control Plan Comments:   Induction:   IV  Beta Blocker:         Informed Consent: Patient understands risks and agrees with Anesthesia plan.  Questions answered. Anesthesia consent signed with patient.  ASA Score: 3     Day of Surgery Review of History & Physical:   Significant changes noted: Surgeon notified.  H&P update referred to the surgeon.         Ready For Surgery From Anesthesia  Perspective.

## 2017-12-13 NOTE — H&P (VIEW-ONLY)
New Breast Cancer  History and Physical  [facility]  Department of Surgery    REFERRING PROVIDER: Shelby Ley MD  [address] [facility] [place] [address]    CHIEF COMPLAINT: left breast cancer    Subjective:      Joel Condon is a 77 y.o. postmenopausal female referred for evaluation of recently diagnosed carcinoma of the left breast. The patient was initially referred for surgical evaluation of an abnormal mammogram first noted 10/27/2017. Follow-up mammogram and ultrasound (17) showed 6mm mass in the 2OC left breast with enlarged axillary LN measuring 17mm boderline. A ultrasound guided biopsy was performed on 2017 with pathology revealing infiltrating ductal carcinoma of the breast.     Patient does routinely do self breast exams.  Patient has not noted a change on breast exam.  Patient denies nipple discharge. Patient denies to previous breast biopsy. Patient denies a personal history of breast cancer.    Findings at that time were the following:   Tumor size: 0.6 cm   Tumor ndgndrndanddndend:nd nd2nd Estrogen Receptor: 100%+   Progesterone Receptor: +   Her-2 nimo: negative   Lymph node status: clinically negative, LN biopsy was negative       GYN History:  Age of menarche was 14. Age of menopause was 46.  Patient reports hormonal therapy for less than 5 years. Patient is . Age of first live birth was 17. Patient did breast feed for 2 years 8 months.    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 Arce)    CATARACT  "EXTRACTION W/  INTRAOCULAR LENS IMPLANT Right 06/12/2014    OD (Dr. Arce)    CHOLECYSTECTOMY      COLONOSCOPY N/A 4/21/2017    Procedure: COLONOSCOPY;  Surgeon: Homar Payan MD;  Location: [facility];  Service: Endoscopy;  Laterality: N/A;    EYE SURGERY  04/24/2014 & 06/12/2014    HYSTERECTOMY      total    left eye cataract surgery  4/24/14    OOPHORECTOMY       No current facility-administered medications on file prior to visit.      Current Outpatient Prescriptions on File Prior to Visit   Medication Sig Dispense Refill    aspirin (ECOTRIN) 81 MG EC tablet Take 1 tablet (81 mg total) by mouth once daily. 90 tablet 3    atorvastatin (LIPITOR) 10 MG tablet TAKE 1 TABLET(10 MG) BY MOUTH EVERY DAY 90 tablet 1    BD INSULIN PEN NEEDLE UF SHORT 31 gauge x 5/16" Ndle USE THREE TIMES DAILY AS DIRECTED 100 each 0    blood sugar diagnostic Strp True Results; Test tid 300 strip 11    insulin aspart (NOVOLOG FLEXPEN) 100 unit/mL InPn pen ADMINISTER 18 UNITS UNDER THE SKIN THREE TIMES DAILY WITH MEALS 30 mL 1    insulin detemir (LEVEMIR) 100 unit/mL injection ADMINISTER 40 UNITS UNDER THE SKIN EVERY EVENING 60 mL 2    insulin syringe-needle U-100 1 mL 31 gauge x 5/16 Syrg USE THREE TIMES DAILY AS DIRECTED 100 each 5    insulin syringe-needle U-100 1/2 mL 30 Syrg USE NIGHTLY 100 each 0    insulin syringe-needle U-100 1/2 mL 31 x 5/16" Syrg 1 Device by Misc.(Non-Drug; Combo Route) route nightly. 100 each 6    lancets 26 gauge Misc 1 lancet by Misc.(Non-Drug; Combo Route) route 3 (three) times daily. 200 each 11    levothyroxine (SYNTHROID) 50 MCG tablet TAKE 1 TABLET(50 MCG) BY MOUTH EVERY DAY 90 tablet 1    losartan (COZAAR) 100 MG tablet TAKE 1 TABLET BY MOUTH DAILY 90 tablet 3    metFORMIN (GLUCOPHAGE-XR) 500 MG 24 hr tablet Take 1 tablet with breakfast and 2 tablets with dinner 360 tablet 1    metoprolol succinate (TOPROL-XL) 200 MG 24 hr tablet Take 1 tablet (200 mg total) by mouth once daily. 90 " "tablet 3    NIFEdipine (PROCARDIA-XL) 90 MG (OSM) 24 hr tablet TAKE 1 TABLET(90 MG) BY MOUTH EVERY DAY 90 tablet 1    omeprazole (PRILOSEC) 20 MG capsule Take 1 capsule (20 mg total) by mouth daily as needed (heartburn). 90 capsule 0    pen needle, diabetic (BD INSULIN PEN NEEDLE UF SHORT) 31 gauge x 5/16" Ndle Use 3 times daily 100 each 11    spironolactone (ALDACTONE) 50 MG tablet Take 1 tablet (50 mg total) by mouth once daily. 90 tablet 3     Social History     Social History    Marital status:      Spouse name: N/A    Number of children: 3    Years of education: N/A     Occupational History    retired      Social History Main Topics    Smoking status: Never Smoker    Smokeless tobacco: Never Used    Alcohol use No    Drug use: No    Sexual activity: Not Currently     Partners: Male     Other Topics Concern    Not on file     Social History Narrative    No narrative on file     Family History   Problem Relation Age of Onset    Diabetes Mother     Heart disease Mother     Hypertension Mother     Diabetes Sister     Hypertension Sister     Diabetes Sister     Hypertension Sister     Diabetes Sister     Hypertension Sister     Thyroid disease Sister     Stroke Sister     Hypertension Sister     Diabetes Sister     Heart disease Sister     Diabetes Brother     Diabetes Brother     Amblyopia Neg Hx     Blindness Neg Hx     Cataracts Neg Hx     Glaucoma Neg Hx     Macular degeneration Neg Hx     Retinal detachment Neg Hx     Strabismus Neg Hx         Review of Systems  Review of Systems   Constitutional: Negative for fatigue and fever.   HENT: Negative for sore throat and trouble swallowing.    Eyes: Negative for visual disturbance.   Respiratory: Negative for cough and shortness of breath.    Cardiovascular: Negative for chest pain and palpitations.   Gastrointestinal: Negative for abdominal pain, constipation, diarrhea and nausea.   Genitourinary: Negative for difficulty " "urinating and dysuria.   Musculoskeletal: Negative for arthralgias and back pain.   Neurological: Negative for dizziness, weakness and headaches.   Hematological: Negative for adenopathy.        Objective:   PHYSICAL EXAM:  BP (!) 132/56 (BP Location: Left arm, Patient Position: Sitting, BP Method: Large (Manual))   Pulse 75   Ht 5' 4" (1.626 m)   Wt 79.4 kg (175 lb 0.7 oz)   BMI 30.05 kg/m²     Physical Exam   Pulmonary/Chest: She exhibits no tenderness and no deformity. Right breast exhibits no inverted nipple, no mass, no nipple discharge, no skin change and no tenderness. Left breast exhibits no inverted nipple, no mass, no nipple discharge, no skin change and no tenderness.   Lymphadenopathy:     She has no cervical adenopathy.     She has no axillary adenopathy.        Right: No supraclavicular adenopathy present.        Left: No supraclavicular adenopathy present.         Radiology review: Images personally reviewed by me in the clinic.       Assessment:      Joel Condon is a 77 y.o. postmenopausal female with recently diagnosed carcinoma of the left breast.      Plan:    Options for management were discussed with the patient and her family. We reviewed the existing data noting the equivalency of breast conserving surgery with radiation therapy and mastectomy. We also reviewed the guidelines of the National Comprehensive Cancer Network for Stage 1 breast carcinoma. We discussed the need for lumpectomy margins to be negative for carcinoma, the necessity for postoperative radiation therapy after breast conservation in most cases, the possibility of a failed or false negative sentinel lymph node biopsy and the potential need for complete lymphadenectomy for a failed or positive sentinel lymph node biopsy were fully discussed. In the setting of mastectomy, delayed or immediate reconstruction options are available and were discussed.     In the setting of lumpectomy, radiation therapy would be recommended " majority of the time.  The duration and treatment side effects were discussed with the patient.  This will coordinated with the radiation oncologist pending final pathology.    We also discussed the role of systemic therapy in the treatment of early stage breast cancer.  We discussed that this is based on tumor biology and cristian status and will be determined based on final pathology.  We discussed that if the cancer is hormone positive, endocrine therapy would be recommended in most cases and its use can reduce the risk of recurrence as well as improve survival. Side effects of treatment were briefly discussed. We also discussed the potential role for chemotherapy based on a number of factors such as tumor phenotype (ER+ vs. triple negative vs. Ctd0dax+) and this would be determined in coordination with the medical oncologist.    The patient, in consultation with her family, has elected to proceed with left partial mastectomy and sentinel lymph node biopsy. The operative risks of bleeding, infection, recurrence, scarring, and anesthetic complications and the possibility of requiring further surgery were all noted and informed consent obtained.    Surgery scheduled. Follow-up in clinic roughly 10 days after surgery.     Patient was educated on breast cancer, receptors, wire localization lumpectomy, mastectomy, sentinel lymph node mapping and biopsy, axillary lymph node dissection, reconstruction, breast prosthesis with post-mastectomy bra and radiation therapy. Patient was given patient information binder including [facility] breast cancer treatment brochure.  All her questions were answered.    Total time spent with the patient: 60 minutes.  45 minutes of face to face consultation and 15 minutes of chart review and coordination of care.

## 2017-12-13 NOTE — INTERVAL H&P NOTE
The patient has been examined and the H&P has been reviewed:    I concur with the findings and no changes have occurred since H&P was written.    Anesthesia/Surgery risks, benefits and alternative options discussed and understood by patient/family.          Active Hospital Problems    Diagnosis  POA    Malignant neoplasm of left female breast [C50.912]  Yes      Resolved Hospital Problems    Diagnosis Date Resolved POA   No resolved problems to display.

## 2017-12-13 NOTE — DISCHARGE INSTRUCTIONS
POSTOPERATIVE INSTRUCTIONS FOLLOWING SENTINEL   LYMPH NODE BIOPSY AND LUMPECTOMY      The following are post-operative instructions that will help you to recover from your surgery.  Please read over these instructions carefully and contact us if we can answer any of your questions or concerns.    Dressing/breast binder (surgi-bra)  A surgical bra may be placed around your chest after your surgery.  If you are given the bra, please wear it as close to 24 hours a day as possible until your post-operative clinic appointment.  If the elastic around the bra irritates your skin, you may wear a soft t-shirt underneath the bra.    You may go without wearing the bra long enough to bath, to launder and dry the bra. If you have fluffy filler placed inside the bra, the filler should be removed whenever the bra is taken off. Please reinsert the fluffy filler, or insert the new soft filler, under the bra when you put the bra back on.  If the bra is extremely uncomfortable, you may wear a supportive sports bra instead after 2 days.    You may shower after 2 days.  Do not take a tub bath and do not soak the surgical site. Please do not remove the white strips of tape (steri-strips) that cover your incision.  They will be removed at your clinic visit.    Activity   You should be able to return to your regular activities 2 to 7 days after your surgery depending on your particular procedure.  However, do not engage in strenuous activities in which you use your upper body such as:  golf, tennis, aerobics, washing windows, raking the yard, mopping, vacuuming, heavy lifting (e.g children) until you are seen for your follow-up appointment in clinic.    Medication for pain  You may find that over the counter pain medications may be sufficient for your pain.  You will be given a prescription for pain medication for more severe pain.  You should not drive or operate machinery while taking these.  Please take narcotics with food.  Narcotics  can cause, or worse, constipation.  You will need to increase your fluid intake, eat high fiber foods (such as fruits and bran) and make sure that you are up and walking. You may need to take an over the counter stool softener for constipation.    Please report the following:     Temperature greater than 101 degrees   Discharge or bad odor from the wound   Excessive bleeding, such as bloody dressing or extreme bruising   Redness at incision and/or drain sites   Swelling or buildup of fluid around incision     If blue dye was used to locate your sentinel lymph nodes, your urine and stool may be blue-green in color for 1 or 2 days.      Additional information  I will see you approximately 2 weeks following your surgery.  If this follow-up appointment has not been made, please call the office.    If you have any questions or problems, please call my office or my nurse.    Dr. Ashli Ardon, RN  [phone]    After hours and on weekends, you may call the main [facility] line at [phone] and ask to have the general surgery resident paged or have me paged.    Fall Prevention  Millions of people fall every year and injure themselves. You may have had anesthesia or sedation which may increase your risk of falling. You may have health issues that put you at an increased risk of falling.     Here are ways to reduce your risk of falling.  ·   · Make your home safe by keeping walkways clear of objects you may trip over.  · Use non-slip pads under rugs. Do not use area rugs or small throw rugs.  · Use non-slip mats in bathtubs and showers.  · Install handrails and lights on staircases.  · Do not walk in poorly lit areas.  · Do not stand on chairs or wobbly ladders.  · Use caution when reaching overhead or looking upward. This position can cause a loss of balance.  · Be sure your shoes fit properly, have non-slip bottoms and are in good condition.   · Wear shoes both inside and out. Avoid going barefoot  or wearing slippers.  · Be cautious when going up and down stairs, curbs, and when walking on uneven sidewalks.  · If your balance is poor, consider using a cane or walker.  · If your fall was related to alcohol use, stop or limit alcohol intake.   · If your fall was related to use of sleeping medicines, talk to your doctor about this. You may need to reduce your dosage at bedtime if you awaken during the night to go to the bathroom.    · To reduce the need for nighttime bathroom trips:  ¨ Avoid drinking fluids for several hours before going to bed  ¨ Empty your bladder before going to bed  ¨ Men can keep a urinal at the bedside  · Stay as active as you can. Balance, flexibility, strength, and endurance all come from exercise. They all play a role in preventing falls. Ask your healthcare provider which types of activity are right for you.  · Get your vision checked on a regular basis.  · If you have pets, know where they are before you stand up or walk so you don't trip over them.  · Use night lights.

## 2017-12-13 NOTE — PROGRESS NOTES
New Breast Cancer  History and Physical  [facility]  Department of Surgery    REFERRING PROVIDER: Shelby Ley MD  [address] [place] [address]    CHIEF COMPLAINT: left breast cancer    Subjective:      Joel Condon is a 77 y.o. postmenopausal female referred for evaluation of recently diagnosed carcinoma of the left breast. The patient was initially referred for surgical evaluation of an abnormal mammogram first noted 10/27/2017. Follow-up mammogram and ultrasound (17) showed 6mm mass in the 2OC left breast with enlarged axillary LN measuring 17mm boderline. A ultrasound guided biopsy was performed on 2017 with pathology revealing infiltrating ductal carcinoma of the breast.     Patient does routinely do self breast exams.  Patient has not noted a change on breast exam.  Patient denies nipple discharge. Patient denies to previous breast biopsy. Patient denies a personal history of breast cancer.    Findings at that time were the following:   Tumor size: 0.6 cm   Tumor ndgndrndanddndend:nd nd2nd Estrogen Receptor: 100%+   Progesterone Receptor: +   Her-2 nimo: negative   Lymph node status: clinically negative, LN biopsy was negative       GYN History:  Age of menarche was 14. Age of menopause was 46.  Patient reports hormonal therapy for less than 5 years. Patient is . Age of first live birth was 17. Patient did breast feed for 2 years 8 months.    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 Arce)    CATARACT  "EXTRACTION W/  INTRAOCULAR LENS IMPLANT Right 06/12/2014    OD (Dr. Arce)    CHOLECYSTECTOMY      COLONOSCOPY N/A 4/21/2017    Procedure: COLONOSCOPY;  Surgeon: Homar Payan MD;  Location: [facility];  Service: Endoscopy;  Laterality: N/A;    EYE SURGERY  04/24/2014 & 06/12/2014    HYSTERECTOMY      total    left eye cataract surgery  4/24/14    OOPHORECTOMY       No current facility-administered medications on file prior to visit.      Current Outpatient Prescriptions on File Prior to Visit   Medication Sig Dispense Refill    aspirin (ECOTRIN) 81 MG EC tablet Take 1 tablet (81 mg total) by mouth once daily. 90 tablet 3    atorvastatin (LIPITOR) 10 MG tablet TAKE 1 TABLET(10 MG) BY MOUTH EVERY DAY 90 tablet 1    BD INSULIN PEN NEEDLE UF SHORT 31 gauge x 5/16" Ndle USE THREE TIMES DAILY AS DIRECTED 100 each 0    blood sugar diagnostic Strp True Results; Test tid 300 strip 11    insulin aspart (NOVOLOG FLEXPEN) 100 unit/mL InPn pen ADMINISTER 18 UNITS UNDER THE SKIN THREE TIMES DAILY WITH MEALS 30 mL 1    insulin detemir (LEVEMIR) 100 unit/mL injection ADMINISTER 40 UNITS UNDER THE SKIN EVERY EVENING 60 mL 2    insulin syringe-needle U-100 1 mL 31 gauge x 5/16 Syrg USE THREE TIMES DAILY AS DIRECTED 100 each 5    insulin syringe-needle U-100 1/2 mL 30 Syrg USE NIGHTLY 100 each 0    insulin syringe-needle U-100 1/2 mL 31 x 5/16" Syrg 1 Device by Misc.(Non-Drug; Combo Route) route nightly. 100 each 6    lancets 26 gauge Misc 1 lancet by Misc.(Non-Drug; Combo Route) route 3 (three) times daily. 200 each 11    levothyroxine (SYNTHROID) 50 MCG tablet TAKE 1 TABLET(50 MCG) BY MOUTH EVERY DAY 90 tablet 1    losartan (COZAAR) 100 MG tablet TAKE 1 TABLET BY MOUTH DAILY 90 tablet 3    metFORMIN (GLUCOPHAGE-XR) 500 MG 24 hr tablet Take 1 tablet with breakfast and 2 tablets with dinner 360 tablet 1    metoprolol succinate (TOPROL-XL) 200 MG 24 hr tablet Take 1 tablet (200 mg total) by mouth once daily. 90 " "tablet 3    NIFEdipine (PROCARDIA-XL) 90 MG (OSM) 24 hr tablet TAKE 1 TABLET(90 MG) BY MOUTH EVERY DAY 90 tablet 1    omeprazole (PRILOSEC) 20 MG capsule Take 1 capsule (20 mg total) by mouth daily as needed (heartburn). 90 capsule 0    pen needle, diabetic (BD INSULIN PEN NEEDLE UF SHORT) 31 gauge x 5/16" Ndle Use 3 times daily 100 each 11    spironolactone (ALDACTONE) 50 MG tablet Take 1 tablet (50 mg total) by mouth once daily. 90 tablet 3     Social History     Social History    Marital status:      Spouse name: N/A    Number of children: 3    Years of education: N/A     Occupational History    retired      Social History Main Topics    Smoking status: Never Smoker    Smokeless tobacco: Never Used    Alcohol use No    Drug use: No    Sexual activity: Not Currently     Partners: Male     Other Topics Concern    Not on file     Social History Narrative    No narrative on file     Family History   Problem Relation Age of Onset    Diabetes Mother     Heart disease Mother     Hypertension Mother     Diabetes Sister     Hypertension Sister     Diabetes Sister     Hypertension Sister     Diabetes Sister     Hypertension Sister     Thyroid disease Sister     Stroke Sister     Hypertension Sister     Diabetes Sister     Heart disease Sister     Diabetes Brother     Diabetes Brother     Amblyopia Neg Hx     Blindness Neg Hx     Cataracts Neg Hx     Glaucoma Neg Hx     Macular degeneration Neg Hx     Retinal detachment Neg Hx     Strabismus Neg Hx         Review of Systems  Review of Systems   Constitutional: Negative for fatigue and fever.   HENT: Negative for sore throat and trouble swallowing.    Eyes: Negative for visual disturbance.   Respiratory: Negative for cough and shortness of breath.    Cardiovascular: Negative for chest pain and palpitations.   Gastrointestinal: Negative for abdominal pain, constipation, diarrhea and nausea.   Genitourinary: Negative for difficulty " "urinating and dysuria.   Musculoskeletal: Negative for arthralgias and back pain.   Neurological: Negative for dizziness, weakness and headaches.   Hematological: Negative for adenopathy.        Objective:   PHYSICAL EXAM:  BP (!) 132/56 (BP Location: Left arm, Patient Position: Sitting, BP Method: Large (Manual))   Pulse 75   Ht 5' 4" (1.626 m)   Wt 79.4 kg (175 lb 0.7 oz)   BMI 30.05 kg/m²     Physical Exam   Pulmonary/Chest: She exhibits no tenderness and no deformity. Right breast exhibits no inverted nipple, no mass, no nipple discharge, no skin change and no tenderness. Left breast exhibits no inverted nipple, no mass, no nipple discharge, no skin change and no tenderness.   Lymphadenopathy:     She has no cervical adenopathy.     She has no axillary adenopathy.        Right: No supraclavicular adenopathy present.        Left: No supraclavicular adenopathy present.         Radiology review: Images personally reviewed by me in the clinic.       Assessment:      Joel Condon is a 77 y.o. postmenopausal female with recently diagnosed carcinoma of the left breast.      Plan:    Options for management were discussed with the patient and her family. We reviewed the existing data noting the equivalency of breast conserving surgery with radiation therapy and mastectomy. We also reviewed the guidelines of the National Comprehensive Cancer Network for Stage 1 breast carcinoma. We discussed the need for lumpectomy margins to be negative for carcinoma, the necessity for postoperative radiation therapy after breast conservation in most cases, the possibility of a failed or false negative sentinel lymph node biopsy and the potential need for complete lymphadenectomy for a failed or positive sentinel lymph node biopsy were fully discussed. In the setting of mastectomy, delayed or immediate reconstruction options are available and were discussed.     In the setting of lumpectomy, radiation therapy would be recommended " majority of the time.  The duration and treatment side effects were discussed with the patient.  This will coordinated with the radiation oncologist pending final pathology.    We also discussed the role of systemic therapy in the treatment of early stage breast cancer.  We discussed that this is based on tumor biology and cristian status and will be determined based on final pathology.  We discussed that if the cancer is hormone positive, endocrine therapy would be recommended in most cases and its use can reduce the risk of recurrence as well as improve survival. Side effects of treatment were briefly discussed. We also discussed the potential role for chemotherapy based on a number of factors such as tumor phenotype (ER+ vs. triple negative vs. [id number]+) and this would be determined in coordination with the medical oncologist.    The patient, in consultation with her family, has elected to proceed with left partial mastectomy and sentinel lymph node biopsy. The operative risks of bleeding, infection, recurrence, scarring, and anesthetic complications and the possibility of requiring further surgery were all noted and informed consent obtained.    Surgery scheduled. Follow-up in clinic roughly 10 days after surgery.     Patient was educated on breast cancer, receptors, wire localization lumpectomy, mastectomy, sentinel lymph node mapping and biopsy, axillary lymph node dissection, reconstruction, breast prosthesis with post-mastectomy bra and radiation therapy. Patient was given patient information binder including [facility] breast cancer treatment brochure.  All her questions were answered.    Total time spent with the patient: 60 minutes.  45 minutes of face to face consultation and 15 minutes of chart review and coordination of care.

## 2017-12-13 NOTE — PROGRESS NOTES
"Patient c/o "heaviness" right sided chest. VSS. 2LNC supplemental O2 applied. Dr Ku notified. 12 lead EKG ordered. Will continue to monitor.  "

## 2017-12-13 NOTE — PLAN OF CARE
Problem: Patient Care Overview  Goal: Plan of Care Review  Outcome: Ongoing (interventions implemented as appropriate)   12/13/17 1555   Coping/Psychosocial   Plan Of Care Reviewed With patient;daughter     Maribeth 9/10. VSS per flow sheet. Drowsy; awakens to voice. Oriented x 3. Pain is tolerable per patient. Gauze dressing left axilla cdi with surgical bra in place. Currently attempting to void per bedpan. No n/v noted; tolerating ice chips. Family updated via telephone. Hand off report to MARCK Thomas SDS.     Problem: Breast Surgery/Reconstruction (Adult)  Goal: Signs and Symptoms of Listed Potential Problems Will be Absent, Minimized or Managed (Breast Surgery/Reconstruction)  Signs and symptoms of listed potential problems will be absent, minimized or managed by discharge/transition of care (reference Breast Surgery/Reconstruction (Adult) CPG).   Outcome: Ongoing (interventions implemented as appropriate)   12/13/17 1555   Breast Surgery/Reconstruction   Problems Assessed (Breast Surgery (Including Reconstruction)) bleeding;pain;postoperative nausea and vomiting;situational response   Problems Present (Breast Surgery (Including Reconstruction)) pain     Goal: Anesthesia/Sedation Recovery  Outcome: Outcome(s) achieved Date Met: 12/13/17 12/13/17 5365   Goal/Outcome Evaluation   Anesthesia/Sedation Recovery progressing toward baseline;criteria met for transfer

## 2017-12-14 NOTE — ANESTHESIA POSTPROCEDURE EVALUATION
"Anesthesia Post Evaluation    Patient: Joel Condon    Procedure(s) Performed: Procedure(s) (LRB):  MASTECTOMY-PARTIAL left with wire localization same day in mammography (CONSENT AM OF) 1.5 hr case (Left)  BIOPSY-LYMPH NODE-SENTINELleft (Left)  INJECTION-NODE-SENTINEL - MD INJECTS IN OR (Left)    Final Anesthesia Type: general  Patient location during evaluation: PACU  Patient participation: Yes- Able to Participate  Level of consciousness: awake and alert, oriented and awake  Post-procedure vital signs: reviewed and stable  Airway patency: patent  PONV status at discharge: No PONV  Anesthetic complications: no      Cardiovascular status: blood pressure returned to baseline  Respiratory status: unassisted, spontaneous ventilation and room air  Hydration status: euvolemic  Follow-up not needed.        Visit Vitals  /64 (BP Location: Right arm, Patient Position: Lying)   Pulse 67   Temp 36.8 °C (98.2 °F) (Oral)   Resp 18   Ht 5' 4" (1.626 m)   Wt 77.7 kg (171 lb 4 oz)   SpO2 98%   BMI 29.39 kg/m²       Pain/Maribeth Score: Pain Assessment Performed: Yes (12/13/2017  4:15 PM)  Presence of Pain: complains of pain/discomfort (12/13/2017  5:11 PM)  Pain Rating Prior to Med Admin: 8 (12/13/2017  3:30 PM)  Pain Rating Post Med Admin: 4 (12/13/2017  3:30 PM)  Maribeth Score: 10 (12/13/2017  3:30 PM)  Modified Maribeth Score: 20 (12/13/2017  4:15 PM)      "

## 2017-12-27 ENCOUNTER — OFFICE VISIT (OUTPATIENT)
Dept: SURGERY | Facility: CLINIC | Age: 77
End: 2017-12-27
Payer: MEDICARE

## 2017-12-27 VITALS
BODY MASS INDEX: 29.24 KG/M2 | DIASTOLIC BLOOD PRESSURE: 48 MMHG | HEART RATE: 50 BPM | SYSTOLIC BLOOD PRESSURE: 102 MMHG | HEIGHT: 64 IN | WEIGHT: 171.31 LBS

## 2017-12-27 DIAGNOSIS — C50.912 MALIGNANT NEOPLASM OF LEFT FEMALE BREAST, UNSPECIFIED ESTROGEN RECEPTOR STATUS, UNSPECIFIED SITE OF BREAST: Primary | ICD-10-CM

## 2017-12-27 PROCEDURE — 99024 POSTOP FOLLOW-UP VISIT: CPT | Mod: S$GLB,,, | Performed by: SURGERY

## 2017-12-27 PROCEDURE — 99999 PR PBB SHADOW E&M-EST. PATIENT-LVL III: CPT | Mod: PBBFAC,,, | Performed by: SURGERY

## 2017-12-27 NOTE — PROGRESS NOTES
"REFERRING PHYSICIAN:  No referring provider defined for this encounter.       Shelby Ley MD    MEDICAL ONCOLOGIST:    Dr. Parr  RADIATION ONCOLOGIST:   [facility]    DIAGNOSIS:    This is a 77 y.o. female with a stage 1 pT1b (6mm) N1mi M0 grade 1 ER + FL + HER2 - IDC of the left breast.    TREATMENT SUMMARY:  The patient is status post left partial mastectomy and sentinel node biopsy on 12/13/2017.  Final pathology showed 6mm IDC, 1/2 LN with 1mm micromet. 1mm anterior margin.    INTERVAL HISTORY:   Joel Condon comes in for a post-op check.  She denies fever, chills, chest pain or shortness of breath.  Her pain is well controlled.      MEDICATIONS:  Current Outpatient Prescriptions   Medication Sig Dispense Refill    aspirin (ECOTRIN) 81 MG EC tablet Take 1 tablet (81 mg total) by mouth once daily. 90 tablet 3    atorvastatin (LIPITOR) 10 MG tablet TAKE 1 TABLET(10 MG) BY MOUTH EVERY DAY 90 tablet 1    BD INSULIN PEN NEEDLE UF SHORT 31 gauge x 5/16" Ndle USE THREE TIMES DAILY AS DIRECTED 100 each 0    blood sugar diagnostic Strp True Results; Test tid 300 strip 11    insulin aspart (NOVOLOG FLEXPEN) 100 unit/mL InPn pen ADMINISTER 18 UNITS UNDER THE SKIN THREE TIMES DAILY WITH MEALS 30 mL 1    insulin detemir (LEVEMIR) 100 unit/mL injection ADMINISTER 40 UNITS UNDER THE SKIN EVERY EVENING 60 mL 2    insulin syringe-needle U-100 1 mL 31 gauge x 5/16 Syrg USE THREE TIMES DAILY AS DIRECTED 100 each 5    insulin syringe-needle U-100 1/2 mL 30 Syrg USE NIGHTLY 100 each 0    insulin syringe-needle U-100 1/2 mL 31 x 5/16" Syrg 1 Device by Misc.(Non-Drug; Combo Route) route nightly. 100 each 6    lancets 26 gauge Misc 1 lancet by Misc.(Non-Drug; Combo Route) route 3 (three) times daily. 200 each 11    levothyroxine (SYNTHROID) 50 MCG tablet TAKE 1 TABLET(50 MCG) BY MOUTH EVERY DAY 90 tablet 1    losartan (COZAAR) 100 MG tablet TAKE 1 TABLET BY MOUTH DAILY 90 tablet 3    metFORMIN " "(GLUCOPHAGE-XR) 500 MG 24 hr tablet Take 1 tablet with breakfast and 2 tablets with dinner 360 tablet 1    metoprolol succinate (TOPROL-XL) 200 MG 24 hr tablet Take 1 tablet (200 mg total) by mouth once daily. 90 tablet 3    NIFEdipine (PROCARDIA-XL) 90 MG (OSM) 24 hr tablet TAKE 1 TABLET(90 MG) BY MOUTH EVERY DAY 90 tablet 1    omeprazole (PRILOSEC) 20 MG capsule Take 1 capsule (20 mg total) by mouth daily as needed (heartburn). 90 capsule 0    pen needle, diabetic (BD INSULIN PEN NEEDLE UF SHORT) 31 gauge x 5/16" Ndle Use 3 times daily 100 each 11    spironolactone (ALDACTONE) 50 MG tablet Take 1 tablet (50 mg total) by mouth once daily. 90 tablet 3    hydrocodone-acetaminophen 5-325mg (NORCO) 5-325 mg per tablet Take 1 tablet by mouth every 6 (six) hours as needed for Pain. 20 tablet 0     No current facility-administered medications for this visit.        ALLERGIES:   Review of patient's allergies indicates:   Allergen Reactions    Lisinopril Other (See Comments)     Cough      Hydrochlorothiazide (bulk) Other (See Comments)     Elevated pt's blood pressure making her feel bad    Pravastatin Other (See Comments)     headaches       PHYSICAL EXAMINATION:   General:  This is a well appearing female with appropriate speech, affect and gait.     Breast:  Incision clean, dry, and intact    IMPRESSION:   The patient has had an uneventful postoperative course.    PLAN:   1. return in 6 months for a follow up office visit and breast exam  2. bilateral mammogram in 11 months  3. The patient is advised in continued exam of the breast chest wall and to report to this office sooner should she note any areas of abnormality or concern.   4.  She has been instructed to meet with med onc and rad onc for discussion of adjuvant treatment recommendations. Will discuss at tumor board  "

## 2017-12-28 ENCOUNTER — TELEPHONE (OUTPATIENT)
Dept: SURGERY | Facility: CLINIC | Age: 77
End: 2017-12-28

## 2017-12-28 NOTE — TELEPHONE ENCOUNTER
----- Message from Dameon Kaufman sent at 12/28/2017 12:09 PM CST -----  Contact: Jeaneth- daughter  Joana,    Claudia said that on appt yesterday Dr. Caldera said she wanted to meet with the family of pt to discuss the radiation pill or radiation for the pt. Caller said she will be out of town and wanted to know if the appt on 1/26/18 can be changed. Please call her at [phone]

## 2017-12-28 NOTE — TELEPHONE ENCOUNTER
Returned call to patient's daughter Virginia, Ms Eric had questions regarding radiation oncology, advised Ms Eric that patient has decided to have radiation therapy at [place], information for Dr Wray's office given to Ms Eric per request

## 2018-01-02 ENCOUNTER — TUMOR BOARD CONFERENCE (OUTPATIENT)
Dept: SURGERY | Facility: CLINIC | Age: 78
End: 2018-01-02

## 2018-01-02 NOTE — PROGRESS NOTES
Interdisciplinary Breast Cancer Conference    Ms. Joel Condon    Female    Date Presented to Tumor Board: 01/02/18    HOSPTIAL/CLINIC PRESENTING: [facility] [facility]    TUMOR SITE: LEFT    TUMOR SITE: UOQ (posterior position, 2:00 10cm FN, abnormal lymph node also noted but biopsy was negative on it)    Presenter: Jose M Silverio (on behalf of Ashli Caldera MD)    Reason For Consultation: Initial Presentation    Specialties Present: Medical Oncology;Radiation Oncology;Surgical Oncology;Navigation;Research;Pathology;Radiology    Patient Status: a current patient    Treatment to Date: Surgical Intervention(s) (lumpectomy with SLNB)    Clinical Trial Eligibility: None available    Estrogen Receptor Status: Positive    Progesterone Status: Positive    Her2/KATIE Status: Negative    T1b(sn)N1mi, 1 lymph node with micromet, 1 negative lymph node  Stage IA per AJCC 8th ed. pathologic prognostic staging system      RECOMMENDED PLAN: Radiation;Endocrine Therapy   No need for genomic testing in this case per med/onc    UNSTAGEABLE: No         PRESENTATION AT CANCER CONFERENCE: Prospective

## 2018-01-08 ENCOUNTER — OFFICE VISIT (OUTPATIENT)
Dept: HEMATOLOGY/ONCOLOGY | Facility: CLINIC | Age: 78
End: 2018-01-08
Payer: MEDICARE

## 2018-01-08 VITALS
WEIGHT: 172.38 LBS | HEIGHT: 64 IN | BODY MASS INDEX: 29.43 KG/M2 | DIASTOLIC BLOOD PRESSURE: 65 MMHG | SYSTOLIC BLOOD PRESSURE: 139 MMHG | OXYGEN SATURATION: 98 % | TEMPERATURE: 98 F | HEART RATE: 72 BPM

## 2018-01-08 DIAGNOSIS — Z17.0 MALIGNANT NEOPLASM OF LEFT BREAST IN FEMALE, ESTROGEN RECEPTOR POSITIVE, UNSPECIFIED SITE OF BREAST: Primary | ICD-10-CM

## 2018-01-08 DIAGNOSIS — I10 ESSENTIAL HYPERTENSION: ICD-10-CM

## 2018-01-08 DIAGNOSIS — C50.912 MALIGNANT NEOPLASM OF LEFT BREAST IN FEMALE, ESTROGEN RECEPTOR POSITIVE, UNSPECIFIED SITE OF BREAST: Primary | ICD-10-CM

## 2018-01-08 DIAGNOSIS — M85.80 OSTEOPENIA, UNSPECIFIED LOCATION: ICD-10-CM

## 2018-01-08 PROCEDURE — 1126F AMNT PAIN NOTED NONE PRSNT: CPT | Mod: S$GLB,,, | Performed by: INTERNAL MEDICINE

## 2018-01-08 PROCEDURE — 3008F BODY MASS INDEX DOCD: CPT | Mod: S$GLB,,, | Performed by: INTERNAL MEDICINE

## 2018-01-08 PROCEDURE — 99205 OFFICE O/P NEW HI 60 MIN: CPT | Mod: S$GLB,,, | Performed by: INTERNAL MEDICINE

## 2018-01-08 PROCEDURE — 99999 PR PBB SHADOW E&M-EST. PATIENT-LVL IV: CPT | Mod: PBBFAC,,, | Performed by: INTERNAL MEDICINE

## 2018-01-08 PROCEDURE — 99499 UNLISTED E&M SERVICE: CPT | Mod: S$GLB,,, | Performed by: INTERNAL MEDICINE

## 2018-01-08 PROCEDURE — 1159F MED LIST DOCD IN RCRD: CPT | Mod: S$GLB,,, | Performed by: INTERNAL MEDICINE

## 2018-01-08 NOTE — LETTER
January 11, 2018      Ashli Caldera MD  [address] [facility] [address]           [facility]  [address] [facility] [address] [place] [address]  Phone: [phone]          Patient: Joel Condon   MR Number: [id number]   YOB: 1940   Date of Visit: 1/8/2018       Dear Dr. Ashli Caldera:    Thank you for referring Joel Condon to me for evaluation. Attached you will find relevant portions of my assessment and plan of care.    If you have questions, please do not hesitate to call me. I look forward to following Joel Condon along with you.    Sincerely,    Roderick Caballero  CC:  No Recipients    If you would like to receive this communication electronically, please contact [email or web address] or [phone] to request more information on [employer] access.    For providers and/or their staff who would like to refer a patient to [facility], please contact us through our one-stop-shop provider referral line, Gillian Rios, at [phone].    If you feel you have received this communication in error or would no longer like to receive these types of communications, please e-mail [email or web address]

## 2018-01-13 RX ORDER — INSULIN ASPART 100 [IU]/ML
INJECTION, SOLUTION INTRAVENOUS; SUBCUTANEOUS
Qty: 30 ML | Refills: 0 | Status: SHIPPED | OUTPATIENT
Start: 2018-01-13 | End: 2018-03-05 | Stop reason: SDUPTHER

## 2018-01-16 DIAGNOSIS — K21.9 GASTROESOPHAGEAL REFLUX DISEASE WITHOUT ESOPHAGITIS: Chronic | ICD-10-CM

## 2018-01-16 RX ORDER — OMEPRAZOLE 20 MG/1
20 CAPSULE, DELAYED RELEASE ORAL DAILY PRN
Qty: 90 CAPSULE | Refills: 0 | Status: SHIPPED | OUTPATIENT
Start: 2018-01-16 | End: 2018-04-23 | Stop reason: SDUPTHER

## 2018-01-29 ENCOUNTER — LAB VISIT (OUTPATIENT)
Dept: LAB | Facility: HOSPITAL | Age: 78
End: 2018-01-29
Attending: INTERNAL MEDICINE
Payer: MEDICARE

## 2018-01-29 LAB
ANION GAP SERPL CALC-SCNC: 10 MMOL/L
BUN SERPL-MCNC: 18 MG/DL
CALCIUM SERPL-MCNC: 10.3 MG/DL
CHLORIDE SERPL-SCNC: 107 MMOL/L
CO2 SERPL-SCNC: 25 MMOL/L
CREAT SERPL-MCNC: 0.9 MG/DL
EST. GFR  (AFRICAN AMERICAN): >60 ML/MIN/1.73 M^2
EST. GFR  (NON AFRICAN AMERICAN): >60 ML/MIN/1.73 M^2
ESTIMATED AVG GLUCOSE: 160 MG/DL
GLUCOSE SERPL-MCNC: 68 MG/DL
HBA1C MFR BLD HPLC: 7.2 %
POTASSIUM SERPL-SCNC: 3.8 MMOL/L
SODIUM SERPL-SCNC: 142 MMOL/L

## 2018-01-29 PROCEDURE — 80048 BASIC METABOLIC PNL TOTAL CA: CPT

## 2018-01-29 PROCEDURE — 36415 COLL VENOUS BLD VENIPUNCTURE: CPT | Mod: PO

## 2018-01-29 PROCEDURE — 83036 HEMOGLOBIN GLYCOSYLATED A1C: CPT

## 2018-02-01 ENCOUNTER — TELEPHONE (OUTPATIENT)
Dept: ENDOCRINOLOGY | Facility: CLINIC | Age: 78
End: 2018-02-01

## 2018-02-02 ENCOUNTER — OFFICE VISIT (OUTPATIENT)
Dept: ENDOCRINOLOGY | Facility: CLINIC | Age: 78
End: 2018-02-02
Payer: MEDICARE

## 2018-02-02 VITALS
BODY MASS INDEX: 29.2 KG/M2 | HEART RATE: 69 BPM | HEIGHT: 64 IN | DIASTOLIC BLOOD PRESSURE: 69 MMHG | WEIGHT: 171.06 LBS | SYSTOLIC BLOOD PRESSURE: 136 MMHG

## 2018-02-02 DIAGNOSIS — C50.912 MALIGNANT NEOPLASM OF LEFT FEMALE BREAST, UNSPECIFIED ESTROGEN RECEPTOR STATUS, UNSPECIFIED SITE OF BREAST: ICD-10-CM

## 2018-02-02 DIAGNOSIS — I10 ESSENTIAL HYPERTENSION: ICD-10-CM

## 2018-02-02 DIAGNOSIS — R52 PAIN: ICD-10-CM

## 2018-02-02 DIAGNOSIS — Z79.4 CONTROLLED TYPE 2 DIABETES MELLITUS WITH COMPLICATION, WITH LONG-TERM CURRENT USE OF INSULIN: Primary | ICD-10-CM

## 2018-02-02 DIAGNOSIS — E78.5 HYPERLIPIDEMIA LDL GOAL <100: ICD-10-CM

## 2018-02-02 DIAGNOSIS — E11.8 CONTROLLED TYPE 2 DIABETES MELLITUS WITH COMPLICATION, WITH LONG-TERM CURRENT USE OF INSULIN: Primary | ICD-10-CM

## 2018-02-02 PROCEDURE — 1159F MED LIST DOCD IN RCRD: CPT | Mod: S$GLB,,, | Performed by: NURSE PRACTITIONER

## 2018-02-02 PROCEDURE — 99999 PR PBB SHADOW E&M-EST. PATIENT-LVL IV: CPT | Mod: PBBFAC,,, | Performed by: NURSE PRACTITIONER

## 2018-02-02 PROCEDURE — 3008F BODY MASS INDEX DOCD: CPT | Mod: S$GLB,,, | Performed by: NURSE PRACTITIONER

## 2018-02-02 PROCEDURE — 99214 OFFICE O/P EST MOD 30 MIN: CPT | Mod: S$GLB,,, | Performed by: NURSE PRACTITIONER

## 2018-02-02 PROCEDURE — 1126F AMNT PAIN NOTED NONE PRSNT: CPT | Mod: S$GLB,,, | Performed by: NURSE PRACTITIONER

## 2018-02-02 NOTE — PROGRESS NOTES
CC: This 77 y.o. Black or  female  is here for evaluation of  T2DM along with comorbidities indicated in the Visit Diagnosis section of this encounter.    HPI: Joel Condon was diagnosed with T2DM in 1994.  Oral agents started at diagnosis.       Prior visit on 11/1/17  a1c down from 8.7 to 7.2%.   Still having intermittent urgent loose BM's. Usually after breakfast.   Skips Novolog or Levemir if bg is less than 100.   Plan Because of urgent loose stools, reduce metformin to just 1 tablet with breakfast and continue 2 tablets with dinner. If you continue to have diarrhea, then we can reduce to just 2 tablets with dinner and nothing with breakfast.   Cut down on Levemir to 40 units every night.   If blood sugar at bedtime is less than 100, eat a snack like 1/2 sandwich or 4 peanut butter crackers.   If blood sugar is too low like 70s or lower, then eat  skittles or juice or other hard candy like peppermints (4); then follow that with peanut butter crackers or a meal. Then take Novolog 10 units before your normal meal.   Reduce Novolog to 18 units before meals. If blood sugar is less than 100 before a meal, then reduce Novolog to just 10 units.   Test blood sugar 4x/day. Return to clinic in 3 months with labs prior.    Interval history  She's been diagnosed with breast cancer now undergoing radiation and will need chemo as well.   She is tolerating metformin 1500 mg/day without diarrhea.   a1c is the same at 7.2%. Did not reduce insulin doses as above but she denies having frequent hypos.     LAST DIABETES EDUCATION: years ago at [facility]. And also a class done by [facility] early 2017    HOSPITALIZED FOR DIABETES  -  No.    PRESCRIBED DIABETES MEDICATIONS: Levemir (vial) 50 units every night at 10 pm, Novolog Flexpen 20 units ac, metformin xr 1000 mg AM and 500 mg PM     Misses medication doses - No  She takes 40-50 units depending on bedtime bg.       DM COMPLICATIONS: peripheral  "neuropathy    SIGNIFICANT DIABETES MED HISTORY:   Metformin started at initial ov 8/17/17  Diarrhea on metformin even w/ psyllium fiber supplement     SELF MONITORING BLOOD GLUCOSE: Checks blood glucose at home 4x/day. Brings logs.             HYPOGLYCEMIC EPISODES: she wakes up a few times a week with night sweats but denies any other symptoms like weakness. Symptoms start in the 80s. Occurs about 2x/month.      CURRENT DIET: drinks diet cranberry juice, diet soda, tea w/ splenda. 3 meals/day.     CURRENT EXERCISE:  None d/t hip pain       /69 (BP Location: Right arm, Patient Position: Sitting, BP Method: Large (Automatic))   Pulse 69   Ht 5' 4" (1.626 m)   Wt 77.6 kg (171 lb 1.2 oz)   BMI 29.37 kg/m²       ROS:   CONSTITUTIONAL: Appetite good, denies fatigue  RESPIRATORY: No shortness of breath or cough  CARDIAC: No chest pain or palpitations  GI: no diarrhea  MS: generalized arthralgias naheed hands       PHYSICAL EXAM:  GENERAL: Well developed, well nourished. No acute distress.   PSYCH: AAOx3, appropriate mood and affect, conversant, well-groomed. Judgement and insight good.   NEURO: Cranial nerves grossly intact. Speech clear, no tremor.       Hemoglobin A1C   Date Value Ref Range Status   01/29/2018 7.2 (H) 4.0 - 5.6 % Final     Comment:     According to ADA guidelines, hemoglobin A1c <7.0% represents  optimal control in non-pregnant diabetic patients. Different  metrics may apply to specific patient populations.   Standards of Medical Care in Diabetes-2016.  For the purpose of screening for the presence of diabetes:  <5.7%     Consistent with the absence of diabetes  5.7-6.4%  Consistent with increasing risk for diabetes   (prediabetes)  >or=6.5%  Consistent with diabetes  Currently, no consensus exists for use of hemoglobin A1c  for diagnosis of diabetes for children.  This Hemoglobin A1c assay has significant interference with fetal   hemoglobin   (HbF). The results are invalid for patients with " abnormal amounts of   HbF,   including those with known Hereditary Persistence   of Fetal Hemoglobin. Heterozygous hemoglobin variants (HbAS, HbAC,   HbAD, HbAE, HbA2) do not significantly interfere with this assay;   however, presence of multiple variants in a sample may impact the %   interference.     10/26/2017 7.2 (H) 4.0 - 5.6 % Final     Comment:     According to ADA guidelines, hemoglobin A1c <7.0% represents  optimal control in non-pregnant diabetic patients. Different  metrics may apply to specific patient populations.   Standards of Medical Care in Diabetes-2016.  For the purpose of screening for the presence of diabetes:  <5.7%     Consistent with the absence of diabetes  5.7-6.4%  Consistent with increasing risk for diabetes   (prediabetes)  >or=6.5%  Consistent with diabetes  Currently, no consensus exists for use of hemoglobin A1c  for diagnosis of diabetes for children.  This Hemoglobin A1c assay has significant interference with fetal   hemoglobin   (HbF). The results are invalid for patients with abnormal amounts of   HbF,   including those with known Hereditary Persistence   of Fetal Hemoglobin. Heterozygous hemoglobin variants (HbAS, HbAC,   HbAD, HbAE, HbA2) do not significantly interfere with this assay;   however, presence of multiple variants in a sample may impact the %   interference.     07/31/2017 8.7 (H) 4.0 - 5.6 % Final     Comment:     According to ADA guidelines, hemoglobin A1c <7.0% represents  optimal control in non-pregnant diabetic patients. Different  metrics may apply to specific patient populations.   Standards of Medical Care in Diabetes-2016.  For the purpose of screening for the presence of diabetes:  <5.7%     Consistent with the absence of diabetes  5.7-6.4%  Consistent with increasing risk for diabetes   (prediabetes)  >or=6.5%  Consistent with diabetes  Currently, no consensus exists for use of hemoglobin A1c  for diagnosis of diabetes for children.  This Hemoglobin A1c  assay has significant interference with fetal   hemoglobin   (HbF). The results are invalid for patients with abnormal amounts of   HbF,   including those with known Hereditary Persistence   of Fetal Hemoglobin. Heterozygous hemoglobin variants (HbAS, HbAC,   HbAD, HbAE, HbA2) do not significantly interfere with this assay;   however, presence of multiple variants in a sample may impact the %   interference.             Chemistry        Component Value Date/Time     01/29/2018 0755    K 3.8 01/29/2018 0755     01/29/2018 0755    CO2 25 01/29/2018 0755    BUN 18 01/29/2018 0755    CREATININE 0.9 01/29/2018 0755    GLU 68 (L) 01/29/2018 0755        Component Value Date/Time    CALCIUM 10.3 01/29/2018 0755    ALKPHOS 82 07/31/2017 0846    AST 21 07/31/2017 0846    ALT 20 07/31/2017 0846    BILITOT 0.8 07/31/2017 0846    ESTGFRAFRICA >60.0 01/29/2018 0755    EGFRNONAA >60.0 01/29/2018 0755          Lab Results   Component Value Date    LDLCALC 73.6 03/14/2017        Ref. Range 3/14/2017 07:22   Cholesterol Latest Ref Range: 120 - 199 mg/dL 144   HDL Latest Ref Range: 40 - 75 mg/dL 48   LDL Cholesterol Latest Ref Range: 63.0 - 159.0 mg/dL 73.6   Total Cholesterol/HDL Ratio Latest Ref Range: 2.0 - 5.0  3.0   Triglycerides Latest Ref Range: 30 - 150 mg/dL 112     Lab Results   Component Value Date    MICALBCREAT 34.5 (H) 07/31/2017           STANDARDS of CARE:        ASA:       yes        Last eye exam:       ASSESSMENT and PLAN:    A1C GOAL: <8%    1. Controlled type 2 diabetes mellitus with complication, with long-term current use of insulin  Continue current treatment plan.   Continue levemir 50 units hs and if bg < 100 at bedtime, eat a snack. Test bg at night if having hypoglycemia sx and call office if BGs are low.     Test bg 4x/day. She declines use of Freestyle Romeo.   rtc in 3 mo w labs prior.     Lipid panel    Hemoglobin A1c    Comprehensive metabolic panel   2. Hyperlipidemia LDL goal <100  Lipid  panel   3. Essential hypertension  controlled   4. Malignant neoplasm of left female breast, unspecified estrogen receptor status, unspecified site of breast  May require steroid with chemo treatment.    5. Pain  F/u with PCP.   Ok to proceed with steroid injections if that is a treatment option. She reports she was not given a steroid injection in the past for hip pain because of DM, but DM is stable now and we can use correction scale with Novolog prn.           Orders Placed This Encounter   Procedures    Lipid panel     Standing Status:   Future     Standing Expiration Date:   2/2/2019    Hemoglobin A1c     Standing Status:   Future     Standing Expiration Date:   4/3/2019    Comprehensive metabolic panel     Standing Status:   Future     Standing Expiration Date:   4/3/2019        Follow-up in about 3 months (around 5/2/2018).

## 2018-02-09 ENCOUNTER — LAB VISIT (OUTPATIENT)
Dept: LAB | Facility: HOSPITAL | Age: 78
End: 2018-02-09
Attending: INTERNAL MEDICINE
Payer: MEDICARE

## 2018-02-09 DIAGNOSIS — Z17.0 MALIGNANT NEOPLASM OF LEFT BREAST IN FEMALE, ESTROGEN RECEPTOR POSITIVE, UNSPECIFIED SITE OF BREAST: ICD-10-CM

## 2018-02-09 DIAGNOSIS — C50.912 MALIGNANT NEOPLASM OF LEFT BREAST IN FEMALE, ESTROGEN RECEPTOR POSITIVE, UNSPECIFIED SITE OF BREAST: ICD-10-CM

## 2018-02-09 DIAGNOSIS — M85.80 OSTEOPENIA, UNSPECIFIED LOCATION: ICD-10-CM

## 2018-02-09 LAB
25(OH)D3+25(OH)D2 SERPL-MCNC: 12 NG/ML
ALBUMIN SERPL BCP-MCNC: 3.6 G/DL
ALP SERPL-CCNC: 71 U/L
ALT SERPL W/O P-5'-P-CCNC: 18 U/L
ANION GAP SERPL CALC-SCNC: 14 MMOL/L
AST SERPL-CCNC: 18 U/L
BASOPHILS # BLD AUTO: 0.04 K/UL
BASOPHILS NFR BLD: 0.4 %
BILIRUB SERPL-MCNC: 0.5 MG/DL
BUN SERPL-MCNC: 13 MG/DL
CALCIUM SERPL-MCNC: 10.1 MG/DL
CHLORIDE SERPL-SCNC: 105 MMOL/L
CO2 SERPL-SCNC: 22 MMOL/L
CREAT SERPL-MCNC: 0.9 MG/DL
DIFFERENTIAL METHOD: NORMAL
EOSINOPHIL # BLD AUTO: 0.5 K/UL
EOSINOPHIL NFR BLD: 4.7 %
ERYTHROCYTE [DISTWIDTH] IN BLOOD BY AUTOMATED COUNT: 14.1 %
EST. GFR  (AFRICAN AMERICAN): >60 ML/MIN/1.73 M^2
EST. GFR  (NON AFRICAN AMERICAN): >60 ML/MIN/1.73 M^2
GLUCOSE SERPL-MCNC: 146 MG/DL
HCT VFR BLD AUTO: 39.4 %
HGB BLD-MCNC: 12.8 G/DL
LYMPHOCYTES # BLD AUTO: 2.6 K/UL
LYMPHOCYTES NFR BLD: 25.7 %
MCH RBC QN AUTO: 29.8 PG
MCHC RBC AUTO-ENTMCNC: 32.5 G/DL
MCV RBC AUTO: 92 FL
MONOCYTES # BLD AUTO: 0.6 K/UL
MONOCYTES NFR BLD: 5.6 %
NEUTROPHILS # BLD AUTO: 6.5 K/UL
NEUTROPHILS NFR BLD: 63.6 %
PLATELET # BLD AUTO: 304 K/UL
PMV BLD AUTO: 11.1 FL
POTASSIUM SERPL-SCNC: 3.5 MMOL/L
PROT SERPL-MCNC: 7.8 G/DL
RBC # BLD AUTO: 4.29 M/UL
SODIUM SERPL-SCNC: 141 MMOL/L
WBC # BLD AUTO: 10.18 K/UL

## 2018-02-09 PROCEDURE — 85025 COMPLETE CBC W/AUTO DIFF WBC: CPT | Mod: PO

## 2018-02-09 PROCEDURE — 80053 COMPREHEN METABOLIC PANEL: CPT

## 2018-02-09 PROCEDURE — 82306 VITAMIN D 25 HYDROXY: CPT

## 2018-02-09 PROCEDURE — 36415 COLL VENOUS BLD VENIPUNCTURE: CPT | Mod: PO

## 2018-02-16 ENCOUNTER — OFFICE VISIT (OUTPATIENT)
Dept: HEMATOLOGY/ONCOLOGY | Facility: CLINIC | Age: 78
End: 2018-02-16
Payer: MEDICARE

## 2018-02-16 VITALS
WEIGHT: 171.5 LBS | BODY MASS INDEX: 29.28 KG/M2 | TEMPERATURE: 98 F | SYSTOLIC BLOOD PRESSURE: 114 MMHG | DIASTOLIC BLOOD PRESSURE: 58 MMHG | OXYGEN SATURATION: 97 % | HEIGHT: 64 IN | HEART RATE: 59 BPM

## 2018-02-16 DIAGNOSIS — E55.9 VITAMIN D DEFICIENCY: ICD-10-CM

## 2018-02-16 DIAGNOSIS — C50.912 MALIGNANT NEOPLASM OF LEFT FEMALE BREAST, UNSPECIFIED ESTROGEN RECEPTOR STATUS, UNSPECIFIED SITE OF BREAST: Primary | ICD-10-CM

## 2018-02-16 DIAGNOSIS — M85.80 OSTEOPENIA, UNSPECIFIED LOCATION: ICD-10-CM

## 2018-02-16 PROCEDURE — 99214 OFFICE O/P EST MOD 30 MIN: CPT | Mod: S$GLB,,, | Performed by: INTERNAL MEDICINE

## 2018-02-16 PROCEDURE — 99999 PR PBB SHADOW E&M-EST. PATIENT-LVL IV: CPT | Mod: PBBFAC,,, | Performed by: INTERNAL MEDICINE

## 2018-02-16 PROCEDURE — 3078F DIAST BP <80 MM HG: CPT | Mod: CPTII,S$GLB,, | Performed by: INTERNAL MEDICINE

## 2018-02-16 PROCEDURE — 3074F SYST BP LT 130 MM HG: CPT | Mod: CPTII,S$GLB,, | Performed by: INTERNAL MEDICINE

## 2018-02-16 RX ORDER — SILVER SULFADIAZINE 10 G/1000G
CREAM TOPICAL
COMMUNITY
Start: 2018-02-12 | End: 2018-05-08

## 2018-02-16 RX ORDER — ERGOCALCIFEROL 1.25 MG/1
50000 CAPSULE ORAL
Qty: 4 CAPSULE | Refills: 4 | Status: SHIPPED | OUTPATIENT
Start: 2018-02-16 | End: 2018-03-18

## 2018-02-16 NOTE — PROGRESS NOTES
Subjective:       Patient ID: Joel Condon is a 77 y.o. female.    Chief Complaint: Follow-up    HPI   Diagnosis : Left Breast CA, IDC  S/p  left partial mastectomy and SLNB 2017. [id number] Stage 1B,grade 1, ER/OK pos [id number] negative  , closest margin anteriorly at 1mm      HPI:Patient  is a 77 y.o. postmenopausal female seen today for recently diagnosed carcinoma of the left breast. She had an abnormal mammogram first noted 10/27/2017. Follow-up mammogram and ultrasound (17) showed 6mm mass in the 2OC left breast with enlarged axillary LN measuring 17mm boderline.She underwent an  ultrasound guided biopsy  on 2017 with pathology revealing infiltrating ductal carcinoma of the breast, Grade 1, ER positive 100% ,OK positive 100% Her-2neu negative.  The patient is status post left partial mastectomy and sentinel node biopsy on 2017.  Final pathology showed 6mm IDC, 1 of 2  LN with 1mm micromet. 1mm anterior margin. She does not routinely do self breast exams. She  has not noted any skin  changes on breast exam. No  nipple discharge. No history of  previous breast biopsies. No  personal history of breast cancer or ovarian cancer.     She is currently undergoing postoperative RT under the direction of Dr. Wray  She  reports some skin changes at radiation site  No peeling at site  She reports some minor tenderness/soreness at site  She reports completion date  approx 2 wks  No SOB/CP   No N/V          GYN History: Age of menarche was 14. Age of menopause was 46.  Patient reports hormonal therapy for less than 5 years. Patient is . Age of first live birth was 17. Patient did breast feed for 2 years 8 months.         Past Medical History:   Diagnosis Date    Arthritis     Diabetes mellitus     Hyperlipidemia LDL goal < 100     Hypertension     Hypothyroidism     Osteoporosis, post-menopausal     Overweight(278.02)     Proteinuria     Type II or unspecified type diabetes mellitus with  "renal manifestations, uncontrolled(250.42)        Past Surgical History:   Procedure Laterality Date    APPENDECTOMY      BREAST SURGERY Left 12/13/2017    tumor removal    CATARACT EXTRACTION W/  INTRAOCULAR LENS IMPLANT Left 4/24/14    OS (Dr. Arce)    CATARACT EXTRACTION W/  INTRAOCULAR LENS IMPLANT Right 06/12/2014    OD (Dr. Arce)    CHOLECYSTECTOMY      COLONOSCOPY N/A 4/21/2017    Procedure: COLONOSCOPY;  Surgeon: Homar Payan MD;  Location: [facility];  Service: Endoscopy;  Laterality: N/A;    EYE SURGERY  04/24/2014 & 06/12/2014    HYSTERECTOMY      total    left eye cataract surgery  4/24/14    OOPHORECTOMY       Current MEDS; Reviewed and as per MEDCHART    Review of Systems   Constitutional: Negative for appetite change, fatigue, fever and unexpected weight change.   HENT: Negative for mouth sores.    Eyes: Negative for visual disturbance.   Respiratory: Negative for cough and shortness of breath.    Cardiovascular: Negative for chest pain.   Gastrointestinal: Negative for abdominal pain and diarrhea.   Genitourinary: Negative for frequency.   Musculoskeletal: Negative for back pain.   Skin: Negative for rash.   Neurological: Negative for headaches.   Hematological: Negative for adenopathy.   Psychiatric/Behavioral: The patient is not nervous/anxious.        Objective:       Vitals:    02/16/18 0849   BP: (!) 114/58   BP Location: Right arm   Patient Position: Sitting   BP Method: Medium (Manual)   Pulse: (!) 59   Temp: 97.6 °F (36.4 °C)   TempSrc: Oral   SpO2: 97%   Weight: 77.8 kg (171 lb 8.3 oz)   Height: 5' 4" (1.626 m)       Physical Exam   Constitutional: She is oriented to person, place, and time. She appears well-developed and well-nourished.   HENT:   Head: Normocephalic.   Mouth/Throat: Oropharynx is clear and moist. No oropharyngeal exudate.   Eyes: Conjunctivae and lids are normal. Pupils are equal, round, and reactive to light. No scleral icterus.   Neck: Normal range of " motion. Neck supple. No thyromegaly present.   Cardiovascular: Normal rate, regular rhythm and normal heart sounds.    No murmur heard.  Pulmonary/Chest: Breath sounds normal. She has no wheezes. She has no rales.   Left breast- mild skin changes at RT site, no desquamation noted   Abdominal: Soft. Bowel sounds are normal. She exhibits no distension and no mass. There is no hepatosplenomegaly. There is no tenderness. There is no rebound and no guarding.   Musculoskeletal: Normal range of motion. She exhibits no edema or tenderness.   Lymphadenopathy:     She has no cervical adenopathy.     She has no axillary adenopathy.        Right: No supraclavicular adenopathy present.        Left: No supraclavicular adenopathy present.   Neurological: She is alert and oriented to person, place, and time. No cranial nerve deficit. Coordination normal.   Skin: Skin is warm and dry. No ecchymosis, no petechiae and no rash noted. No erythema.   Psychiatric: She has a normal mood and affect.         FINAL PATHOLOGIC DIAGNOSIS 11/21/2017  1. Left breast mass 2:00:  Invasive mammary carcinoma, grade 1 (Geronimo score: Tubule formation 2, nuclear pleomorphism 2, mitotic  activity 1, total score 5), occupying at least 5 mm in one core.  2. Left breast axilla (lymph node):  Benign lymphoid tissue with no evidence of metastatic disease.  Note: Receptor studies and immunohistochemical studies will be performed with supplemental report to follow.  Intradepartmental QC/review obtained. Dr. Neil Irvin concurs with the above diagnostic impressions.    Supplemental Diagnosis  HORMONE RECEPTOR STUDIES:  Virtually 100% of the cells of the carcinoma are strongly nuclear estrogen receptor positive. 60 percent of the cells  are strongly nuclear progesterone receptor positive. There is an abrupt transition from the zone of nuclear  progesterone receptor positive cells to the zone where this receptor is negative. It is possible that there has  been  some technical artifact which has led a region of the tissue to not stain, and that actually the vast majority of the  tumor are nuclear progesterone receptor positive. The tumor is HER-2 negative. The positive and negative controls  stained appropriately.        FINAL PATHOLOGIC DIAGNOSIS 12/13/2017   Part 1  Lymph node (1, submitted as left sentinel lymph node count equals 60):  -No evidence of malignancy  Part 2  Lymph node (1, submitted as left sentinel lymph node count equals 20):  -No evidence of malignancy  Part 3  Breast excision (submitted as left partial mastectomy):  -Invasive, well differentiated (grade I of III) lobular carcinoma  -Carcinoma is a single focus measuring 0.6 x 0.65 cm (6 x 6.5 mm) located 1 mm from the nearest, anterior  resection margin. All resection margins are free of malignancy.  -No tumor necrosis, hemosiderin vascular or lymphatic permeation, or perineural involvement is identified.  -A microscopic focus of in situ carcinoma is identified immediately adjacent to the invasive tumor focus  -Carcinoma is graded I of III according to the Melissa modification of the Arraiga-Benson grading scheme  with 2 points each assigned for moderate tubule formation and moderate nuclear pleomorphism and 1.4  minimal mitotic count, a total of 5 of 9 possible points.  -AJCC TN: pT pN0(sn)  -Complete AJCC Staging Form follows:  INVASIVE CARCINOMA OF THE BREAST  Procedure: Partial mastectomy  Node sampling: Elton lymph nodes  Specimen laterality: Left  Tumor site: Not specified  Tumor size: 6.5 x 6.0 mm  Histologic type: Invasive lobular carcinoma  Histologic grade  -Glandular differentiation: Score 2  -Nuclear pleomorphism: Score 2  -Mitotic rate: Score 1  -Overall grade: Score 1  Tumor focality: Single focus of invasive carcinoma  Ductal carcinoma in situ (DCIS): No DCIS present  Margins-invasive carcinoma: Margins uninvolved by invasive carcinoma  -Distance from closest margin: 1 mm,  anterior  Lymph nodes  -Total number of lymph nodes examined (sentinel and non-sentinel): 2  -Number sentinel lymph node nodes examined: 2  Pathologic staging  -Primary tumor: pT1b pN0(sn)  -Regional lymph nodes  Modifier: (SN)  Category: pN0  Ancillary studies: E-cadherin negative in tumor cells      Assessment:       1. Malignant neoplasm of left female breast, unspecified estrogen receptor status, unspecified site of breast    2. Type 2 diabetes, uncontrolled, with neuropathy    3. Osteopenia, unspecified location    4. Vitamin D deficiency        Plan:     1-4   ECOG 0   78 y/o with multiple medical diagnoses with Stage 1B pT1b (6mm) N1mi M0 grade 1 ER + NE + [id number] IDC carcinoma  of the left breast status post left partial mastectomy and SLNB 12/13/2017  ER + NE + [id number]  12/13/2017.  1 of 2 lymph nodes positive for micromet. Closest margin anteriorly 1mm.  She is Stage IA per AJCC 8th ed. pathologic prognostic staging system.   Proceed with postoperative RT as planned  Discussed adjuvant endocrine therapy and pros vs cons of therapy .  Handout provided on planned therapy with AI  Rx for Vit D supp  Bone Density 2017- osteopenia    All questions posed answered to patient's satisfaction  Follow-up 3 wks    25 minutes spent during this visit of which greater than 50% devoted to counseling and coordination of care regarding diagnosis and management plan.    Cc Ashli Caldera M.D.         Kenney Wray M.D.

## 2018-03-05 RX ORDER — INSULIN ASPART 100 [IU]/ML
INJECTION, SOLUTION INTRAVENOUS; SUBCUTANEOUS
Qty: 30 ML | Refills: 0 | Status: SHIPPED | OUTPATIENT
Start: 2018-03-05 | End: 2018-04-11 | Stop reason: SDUPTHER

## 2018-03-08 DIAGNOSIS — E03.9 HYPOTHYROIDISM (ACQUIRED): ICD-10-CM

## 2018-03-09 RX ORDER — LEVOTHYROXINE SODIUM 50 UG/1
TABLET ORAL
Qty: 90 TABLET | Refills: 0 | Status: SHIPPED | OUTPATIENT
Start: 2018-03-09 | End: 2018-05-25 | Stop reason: SDUPTHER

## 2018-03-16 ENCOUNTER — LAB VISIT (OUTPATIENT)
Dept: LAB | Facility: HOSPITAL | Age: 78
End: 2018-03-16
Attending: INTERNAL MEDICINE
Payer: MEDICARE

## 2018-03-16 DIAGNOSIS — E55.9 VITAMIN D DEFICIENCY: ICD-10-CM

## 2018-03-16 LAB — 25(OH)D3+25(OH)D2 SERPL-MCNC: 21 NG/ML

## 2018-03-16 PROCEDURE — 36415 COLL VENOUS BLD VENIPUNCTURE: CPT | Mod: PO

## 2018-03-16 PROCEDURE — 82306 VITAMIN D 25 HYDROXY: CPT

## 2018-03-20 ENCOUNTER — HOSPITAL ENCOUNTER (OUTPATIENT)
Dept: RADIOLOGY | Facility: HOSPITAL | Age: 78
Discharge: HOME | End: 2018-03-20
Attending: INTERNAL MEDICINE
Payer: MEDICARE

## 2018-03-20 ENCOUNTER — OFFICE VISIT (OUTPATIENT)
Dept: HEMATOLOGY/ONCOLOGY | Facility: CLINIC | Age: 78
End: 2018-03-20
Payer: MEDICARE

## 2018-03-20 VITALS
HEIGHT: 64 IN | TEMPERATURE: 99 F | DIASTOLIC BLOOD PRESSURE: 52 MMHG | OXYGEN SATURATION: 95 % | WEIGHT: 171.5 LBS | HEART RATE: 66 BPM | SYSTOLIC BLOOD PRESSURE: 142 MMHG | BODY MASS INDEX: 29.28 KG/M2

## 2018-03-20 DIAGNOSIS — M85.80 OSTEOPENIA, UNSPECIFIED LOCATION: ICD-10-CM

## 2018-03-20 DIAGNOSIS — Z79.899 ENCOUNTER FOR MONITORING ADJUVANT HORMONAL THERAPY: ICD-10-CM

## 2018-03-20 DIAGNOSIS — C50.912 MALIGNANT NEOPLASM OF LEFT FEMALE BREAST, UNSPECIFIED ESTROGEN RECEPTOR STATUS, UNSPECIFIED SITE OF BREAST: Primary | ICD-10-CM

## 2018-03-20 DIAGNOSIS — E55.9 VITAMIN D DEFICIENCY: ICD-10-CM

## 2018-03-20 DIAGNOSIS — Z51.81 ENCOUNTER FOR MONITORING ADJUVANT HORMONAL THERAPY: ICD-10-CM

## 2018-03-20 PROCEDURE — 77080 DXA BONE DENSITY AXIAL: CPT | Mod: TC

## 2018-03-20 PROCEDURE — 99214 OFFICE O/P EST MOD 30 MIN: CPT | Mod: S$GLB,,, | Performed by: INTERNAL MEDICINE

## 2018-03-20 PROCEDURE — 3077F SYST BP >= 140 MM HG: CPT | Mod: CPTII,S$GLB,, | Performed by: INTERNAL MEDICINE

## 2018-03-20 PROCEDURE — 99999 PR PBB SHADOW E&M-EST. PATIENT-LVL V: CPT | Mod: PBBFAC,,, | Performed by: INTERNAL MEDICINE

## 2018-03-20 PROCEDURE — 3078F DIAST BP <80 MM HG: CPT | Mod: CPTII,S$GLB,, | Performed by: INTERNAL MEDICINE

## 2018-03-20 PROCEDURE — 77080 DXA BONE DENSITY AXIAL: CPT | Mod: 26,,, | Performed by: RADIOLOGY

## 2018-03-20 RX ORDER — LETROZOLE 2.5 MG/1
2.5 TABLET, FILM COATED ORAL DAILY
Qty: 30 TABLET | Refills: 2 | Status: SHIPPED | OUTPATIENT
Start: 2018-03-20 | End: 2018-06-05 | Stop reason: SDUPTHER

## 2018-03-20 NOTE — PROGRESS NOTES
Subjective:       Patient ID: Joel Condon is a 77 y.o. female.    Chief Complaint: Follow-up    HPI   Diagnosis : Left Breast CA, IDC  S/p  left partial mastectomy and SLNB 2017. [id number] Stage 1B,grade 1, ER/NM pos [id number] negative  , closest margin anteriorly at 1mm      HPI:Patient  is a 77 y.o. postmenopausal female seen today for recently diagnosed carcinoma of the left breast. She had an abnormal mammogram first noted 10/27/2017. Follow-up mammogram and ultrasound (17) showed 6mm mass in the 2OC left breast with enlarged axillary LN measuring 17mm boderline.She underwent an  ultrasound guided biopsy  on 2017 with pathology revealing infiltrating ductal carcinoma of the breast, Grade 1, ER positive 100% ,NM positive 100% Her-2neu negative.  The patient is status post left partial mastectomy and sentinel node biopsy on 2017.  Final pathology showed 6mm IDC, 1 of 2  LN with 1mm micromet. 1mm anterior margin. She does not routinely do self breast exams. She  has not noted any skin  changes on breast exam. No  nipple discharge. No history of  previous breast biopsies. No  personal history of breast cancer or ovarian cancer.     She is completed  postoperative RT under the direction of Dr. Wray on 3/13/2018    Today, she is doing well  No new issues  No SOB/CP   No N/V  Appetite and weight stable          GYN History: Age of menarche was 14. Age of menopause was 46.  Patient reports hormonal therapy for less than 5 years. Patient is . Age of first live birth was 17. Patient did breast feed for 2 years 8 months.         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 Arce)    CATARACT EXTRACTION W/  INTRAOCULAR LENS IMPLANT Right 06/12/2014    OD (Dr. Arce)    CHOLECYSTECTOMY      COLONOSCOPY N/A 4/21/2017    Procedure: COLONOSCOPY;  Surgeon: Homar Payan MD;  Location[facility];  Service: Endoscopy;  Laterality: N/A;    EYE SURGERY  04/24/2014 & 06/12/2014    HYSTERECTOMY      total    left eye cataract surgery  4/24/14    OOPHORECTOMY       Current MEDS; Reviewed and as per MEDCHART    Review of Systems   Constitutional: Negative for appetite change, fatigue, fever and unexpected weight change.   HENT: Negative for mouth sores.    Eyes: Negative for visual disturbance.   Respiratory: Negative for cough and shortness of breath.    Cardiovascular: Negative for chest pain.   Gastrointestinal: Negative for abdominal pain and diarrhea.   Genitourinary: Negative for frequency.   Musculoskeletal: Negative for back pain.   Skin: Negative for rash.   Neurological: Negative for headaches.   Hematological: Negative for adenopathy.   Psychiatric/Behavioral: The patient is not nervous/anxious.        Objective:       Vitals:    03/20/18 1020   BP: (!) 142/52   BP Location: Right arm   Patient Position: Sitting   BP Method: Medium (Manual)   Pulse: 66   Temp: 98.6 °F (37 °C)   TempSrc: Oral   SpO2: 95%   Weight: 77.8 kg (171 lb 8.3 oz)   Height: 5' 4" (1.626 m)       Physical Exam   Constitutional: She is oriented to person, place, and time. She appears well-developed and well-nourished.   HENT:   Head: Normocephalic.   Mouth/Throat: Oropharynx is clear and moist. No oropharyngeal exudate.   Eyes: Conjunctivae and lids are normal. Pupils are equal, round, and reactive to light. No scleral icterus.   Neck: Normal range of motion. Neck supple. No thyromegaly present.   Cardiovascular: Normal rate, regular rhythm " and normal heart sounds.    No murmur heard.  Pulmonary/Chest: Breath sounds normal. She has no wheezes. She has no rales.   Left breast- mild skin changes at RT site, no desquamation noted   Abdominal: Soft. Bowel sounds are normal. She exhibits no distension and no mass. There is no hepatosplenomegaly. There is no tenderness. There is no rebound and no guarding.   Musculoskeletal: Normal range of motion. She exhibits no edema or tenderness.   Lymphadenopathy:     She has no cervical adenopathy.     She has no axillary adenopathy.        Right: No supraclavicular adenopathy present.        Left: No supraclavicular adenopathy present.   Neurological: She is alert and oriented to person, place, and time. No cranial nerve deficit. Coordination normal.   Skin: Skin is warm and dry. No ecchymosis, no petechiae and no rash noted. No erythema.   Psychiatric: She has a normal mood and affect.         FINAL PATHOLOGIC DIAGNOSIS 11/21/2017  1. Left breast mass 2:00:  Invasive mammary carcinoma, grade 1 ([place] score: Tubule formation 2, nuclear pleomorphism 2, mitotic  activity 1, total score 5), occupying at least 5 mm in one core.  2. Left breast axilla (lymph node):  Benign lymphoid tissue with no evidence of metastatic disease.  Note: Receptor studies and immunohistochemical studies will be performed with supplemental report to follow.  Intradepartmental QC/review obtained. Dr. Neil Irvin concurs with the above diagnostic impressions.    Supplemental Diagnosis  HORMONE RECEPTOR STUDIES:  Virtually 100% of the cells of the carcinoma are strongly nuclear estrogen receptor positive. 60 percent of the cells  are strongly nuclear progesterone receptor positive. There is an abrupt transition from the zone of nuclear  progesterone receptor positive cells to the zone where this receptor is negative. It is possible that there has been  some technical artifact which has led a region of the tissue to not stain, and that  actually the vast majority of the  tumor are nuclear progesterone receptor positive. The tumor is HER-2 negative. The positive and negative controls  stained appropriately.        FINAL PATHOLOGIC DIAGNOSIS 12/13/2017   Part 1  Lymph node (1, submitted as left sentinel lymph node count equals 60):  -No evidence of malignancy  Part 2  Lymph node (1, submitted as left sentinel lymph node count equals 20):  -No evidence of malignancy  Part 3  Breast excision (submitted as left partial mastectomy):  -Invasive, well differentiated (grade I of III) lobular carcinoma  -Carcinoma is a single focus measuring 0.6 x 0.65 cm (6 x 6.5 mm) located 1 mm from the nearest, anterior  resection margin. All resection margins are free of malignancy.  -No tumor necrosis, hemosiderin vascular or lymphatic permeation, or perineural involvement is identified.  -A microscopic focus of in situ carcinoma is identified immediately adjacent to the invasive tumor focus  -Carcinoma is graded I of III according to the Melissa modification of the Arriaga-Benson grading scheme  with 2 points each assigned for moderate tubule formation and moderate nuclear pleomorphism and 1.4  minimal mitotic count, a total of 5 of 9 possible points.  -AJCC TN: pT pN0(sn)  -Complete AJCC Staging Form follows:  INVASIVE CARCINOMA OF THE BREAST  Procedure: Partial mastectomy  Node sampling: [place] lymph nodes  Specimen laterality: Left  Tumor site: Not specified  Tumor size: 6.5 x 6.0 mm  Histologic type: Invasive lobular carcinoma  Histologic grade  -Glandular differentiation: Score 2  -Nuclear pleomorphism: Score 2  -Mitotic rate: Score 1  -Overall grade: Score 1  Tumor focality: Single focus of invasive carcinoma  Ductal carcinoma in situ (DCIS): No DCIS present  Margins-invasive carcinoma: Margins uninvolved by invasive carcinoma  -Distance from closest margin: 1 mm, anterior  Lymph nodes  -Total number of lymph nodes examined (sentinel and non-sentinel):  2  -Number sentinel lymph node nodes examined: 2  Pathologic staging  -Primary tumor: pT1b pN0(sn)  -Regional lymph nodes  Modifier: (SN)  Category: pN0  Ancillary studies: E-cadherin negative in tumor cells    Results for JONG VALENTIN (MRN [id number]) as of 3/20/2018 10:29   Ref. Range 2/9/2018 07:35 3/16/2018 07:28   Vit D, 25-Hydroxy Latest Ref Range: 30 - 96 ng/mL 12 (L) 21 (L)     Assessment:       1. Malignant neoplasm of left female breast, unspecified estrogen receptor status, unspecified site of breast    2. Encounter for monitoring adjuvant hormonal therapy    3. Vitamin D deficiency    4. Osteopenia, unspecified location        Plan:     1-4   ECOG 0   78 y/o with multiple medical diagnoses with Stage 1B pT1b (6mm) N1mi M0 grade 1 ER + CA + [id number] IDC carcinoma  of the left breast status post left partial mastectomy and SLNB 12/13/2017  ER + CA + [id number]  12/13/2017.  1 of 2 lymph nodes positive for micromet. Closest margin anteriorly 1mm.  She is Stage IA per AJCC 8th ed. pathologic prognostic staging system.   S/p  postoperative RT completed 3/13/2018  Discussed adjuvant endocrine therapy and pros vs cons of therapy .  Handout provided on planned therapy with AI  Rx for Vit D supp  Bone Density 2017- osteopenia  Plan  follow-up bone density  Plan to begin letrozole 2.5 mg daily    All questions posed answered to patient's satisfaction  Follow-up  1mo    25 minutes spent during this visit of which greater than 50% devoted to counseling and coordination of care regarding diagnosis and management plan.    Cc Ashli Caldera M.D.         Kenney Wray M.D.

## 2018-03-23 ENCOUNTER — TELEPHONE (OUTPATIENT)
Dept: HEMATOLOGY/ONCOLOGY | Facility: CLINIC | Age: 78
End: 2018-03-23

## 2018-03-23 NOTE — TELEPHONE ENCOUNTER
Pt calling to see if she should start Letrozole yet, had bone density done on 3-20-18. Please advise.

## 2018-03-28 ENCOUNTER — TELEPHONE (OUTPATIENT)
Dept: HEMATOLOGY/ONCOLOGY | Facility: CLINIC | Age: 78
End: 2018-03-28

## 2018-03-28 NOTE — TELEPHONE ENCOUNTER
----- Message from Deborah Parr MD sent at 3/27/2018  5:35 PM CDT -----  Notify pt recent bone density reveals osteoporosis of the right hip     Cont Ca and Vit D ( 1488-5149 mg Ca 1000-2000Vit D)   Ok to take caltrate D bid    Plan to discuss further therapy for bone health on f/u    Ok to begin LETROZOLE

## 2018-04-11 RX ORDER — INSULIN ASPART 100 [IU]/ML
INJECTION, SOLUTION INTRAVENOUS; SUBCUTANEOUS
Qty: 30 ML | Refills: 0 | Status: SHIPPED | OUTPATIENT
Start: 2018-04-11 | End: 2018-05-14 | Stop reason: SDUPTHER

## 2018-04-16 RX ORDER — METOPROLOL SUCCINATE 200 MG/1
TABLET, EXTENDED RELEASE ORAL
Qty: 90 TABLET | Refills: 0 | Status: SHIPPED | OUTPATIENT
Start: 2018-04-16 | End: 2018-07-12 | Stop reason: SDUPTHER

## 2018-04-19 ENCOUNTER — LAB VISIT (OUTPATIENT)
Dept: LAB | Facility: HOSPITAL | Age: 78
End: 2018-04-19
Attending: INTERNAL MEDICINE
Payer: MEDICARE

## 2018-04-19 DIAGNOSIS — C50.912 MALIGNANT NEOPLASM OF LEFT FEMALE BREAST, UNSPECIFIED ESTROGEN RECEPTOR STATUS, UNSPECIFIED SITE OF BREAST: ICD-10-CM

## 2018-04-19 LAB
ALBUMIN SERPL BCP-MCNC: 3.6 G/DL
ALP SERPL-CCNC: 62 U/L
ALT SERPL W/O P-5'-P-CCNC: 18 U/L
ANION GAP SERPL CALC-SCNC: 11 MMOL/L
AST SERPL-CCNC: 20 U/L
BASOPHILS # BLD AUTO: 0.08 K/UL
BASOPHILS NFR BLD: 0.9 %
BILIRUB SERPL-MCNC: 0.6 MG/DL
BUN SERPL-MCNC: 27 MG/DL
CALCIUM SERPL-MCNC: 9.8 MG/DL
CHLORIDE SERPL-SCNC: 107 MMOL/L
CO2 SERPL-SCNC: 21 MMOL/L
CREAT SERPL-MCNC: 1.2 MG/DL
DIFFERENTIAL METHOD: NORMAL
EOSINOPHIL # BLD AUTO: 0.5 K/UL
EOSINOPHIL NFR BLD: 5.5 %
ERYTHROCYTE [DISTWIDTH] IN BLOOD BY AUTOMATED COUNT: 13.5 %
EST. GFR  (AFRICAN AMERICAN): 50.7 ML/MIN/1.73 M^2
EST. GFR  (NON AFRICAN AMERICAN): 44 ML/MIN/1.73 M^2
GLUCOSE SERPL-MCNC: 204 MG/DL
HCT VFR BLD AUTO: 37.9 %
HGB BLD-MCNC: 12.2 G/DL
IMM GRANULOCYTES # BLD AUTO: 0.01 K/UL
IMM GRANULOCYTES NFR BLD AUTO: 0.1 %
LYMPHOCYTES # BLD AUTO: 2.4 K/UL
LYMPHOCYTES NFR BLD: 28.3 %
MCH RBC QN AUTO: 30.3 PG
MCHC RBC AUTO-ENTMCNC: 32.2 G/DL
MCV RBC AUTO: 94 FL
MONOCYTES # BLD AUTO: 0.6 K/UL
MONOCYTES NFR BLD: 7.4 %
NEUTROPHILS # BLD AUTO: 5 K/UL
NEUTROPHILS NFR BLD: 57.8 %
NRBC BLD-RTO: 0 /100 WBC
PLATELET # BLD AUTO: 281 K/UL
PMV BLD AUTO: 11.2 FL
POTASSIUM SERPL-SCNC: 4.5 MMOL/L
PROT SERPL-MCNC: 7.3 G/DL
RBC # BLD AUTO: 4.02 M/UL
SODIUM SERPL-SCNC: 139 MMOL/L
WBC # BLD AUTO: 8.6 K/UL

## 2018-04-19 PROCEDURE — 36415 COLL VENOUS BLD VENIPUNCTURE: CPT | Mod: PO

## 2018-04-19 PROCEDURE — 80053 COMPREHEN METABOLIC PANEL: CPT

## 2018-04-19 PROCEDURE — 85025 COMPLETE CBC W/AUTO DIFF WBC: CPT

## 2018-04-23 DIAGNOSIS — K21.9 GASTROESOPHAGEAL REFLUX DISEASE WITHOUT ESOPHAGITIS: Chronic | ICD-10-CM

## 2018-04-23 RX ORDER — OMEPRAZOLE 20 MG/1
CAPSULE, DELAYED RELEASE ORAL
Qty: 90 CAPSULE | Refills: 0 | Status: SHIPPED | OUTPATIENT
Start: 2018-04-23 | End: 2018-08-03 | Stop reason: SDUPTHER

## 2018-04-24 ENCOUNTER — OFFICE VISIT (OUTPATIENT)
Dept: HEMATOLOGY/ONCOLOGY | Facility: CLINIC | Age: 78
End: 2018-04-24
Payer: MEDICARE

## 2018-04-24 VITALS
HEIGHT: 64 IN | TEMPERATURE: 98 F | BODY MASS INDEX: 29.15 KG/M2 | OXYGEN SATURATION: 95 % | WEIGHT: 170.75 LBS | SYSTOLIC BLOOD PRESSURE: 138 MMHG | HEART RATE: 73 BPM | DIASTOLIC BLOOD PRESSURE: 61 MMHG

## 2018-04-24 DIAGNOSIS — I10 ESSENTIAL HYPERTENSION: ICD-10-CM

## 2018-04-24 DIAGNOSIS — E55.9 VITAMIN D DEFICIENCY: ICD-10-CM

## 2018-04-24 DIAGNOSIS — M85.80 OSTEOPENIA, UNSPECIFIED LOCATION: ICD-10-CM

## 2018-04-24 DIAGNOSIS — Z79.811 ENCOUNTER FOR MONITORING AROMATASE INHIBITOR THERAPY: ICD-10-CM

## 2018-04-24 DIAGNOSIS — C50.912 MALIGNANT NEOPLASM OF LEFT FEMALE BREAST, UNSPECIFIED ESTROGEN RECEPTOR STATUS, UNSPECIFIED SITE OF BREAST: Primary | ICD-10-CM

## 2018-04-24 DIAGNOSIS — Z51.81 ENCOUNTER FOR MONITORING AROMATASE INHIBITOR THERAPY: ICD-10-CM

## 2018-04-24 PROCEDURE — 99999 PR PBB SHADOW E&M-EST. PATIENT-LVL V: CPT | Mod: PBBFAC,,, | Performed by: INTERNAL MEDICINE

## 2018-04-24 PROCEDURE — 3078F DIAST BP <80 MM HG: CPT | Mod: CPTII,S$GLB,, | Performed by: INTERNAL MEDICINE

## 2018-04-24 PROCEDURE — 99214 OFFICE O/P EST MOD 30 MIN: CPT | Mod: S$GLB,,, | Performed by: INTERNAL MEDICINE

## 2018-04-24 PROCEDURE — 3075F SYST BP GE 130 - 139MM HG: CPT | Mod: CPTII,S$GLB,, | Performed by: INTERNAL MEDICINE

## 2018-04-24 NOTE — PROGRESS NOTES
Subjective:       Patient ID: Joel Condon is a 77 y.o. female.    Chief Complaint: Follow-up    HPI   Diagnosis : Left Breast CA, IDC  S/p  left partial mastectomy and SLNB 2017. [id number] Stage 1B,grade 1, ER/OR pos [id number] negative  , closest margin anteriorly at 1mm      HPI: Patient  is a 77 y.o. postmenopausal female seen today for recently diagnosed carcinoma of the left breast. She had an abnormal mammogram first noted 10/27/2017. Follow-up mammogram and ultrasound (17) showed 6mm mass in the 2OC left breast with enlarged axillary LN measuring 17mm boderline.She underwent an  ultrasound guided biopsy  on 2017 with pathology revealing infiltrating ductal carcinoma of the breast, Grade 1, ER positive 100% ,OR positive 100% Her-2neu negative.  The patient is status post left partial mastectomy and sentinel node biopsy on 2017.  Final pathology showed 6mm IDC, 1 of 2  LN with 1mm micromet. 1mm anterior margin. She does not routinely do self breast exams. She  has not noted any skin  changes on breast exam. No  nipple discharge. No history of  previous breast biopsies. No  personal history of breast cancer or ovarian cancer.     She is completed  postoperative RT under the direction of Dr. Wray on 3/13/2018    She is taking adjuvant Letrozole daily    Today, she is doing well  No new issues  Appetite and weight stable  No SOB/CP   No lightheadedness  No N/V            GYN History: Age of menarche was 14. Age of menopause was 46.  Patient reports hormonal therapy for less than 5 years. Patient is . Age of first live birth was 17. Patient did breast feed for 2 years 8 months.         Past Medical History:   Diagnosis Date    Arthritis     Diabetes mellitus     Hyperlipidemia LDL goal < 100     Hypertension     Hypothyroidism     Osteoporosis, post-menopausal     Overweight(278.02)     Proteinuria     Type II or unspecified type diabetes mellitus with renal manifestations,  "uncontrolled(250.42)        Past Surgical History:   Procedure Laterality Date    APPENDECTOMY      BREAST SURGERY Left 12/13/2017    tumor removal    CATARACT EXTRACTION W/  INTRAOCULAR LENS IMPLANT Left 4/24/14    OS (Dr. Arce)    CATARACT EXTRACTION W/  INTRAOCULAR LENS IMPLANT Right 06/12/2014    OD (Dr. Arce)    CHOLECYSTECTOMY      COLONOSCOPY N/A 4/21/2017    Procedure: COLONOSCOPY;  Surgeon: Homar Payan MD;  Location: [facility];  Service: Endoscopy;  Laterality: N/A;    EYE SURGERY  04/24/2014 & 06/12/2014    HYSTERECTOMY      total    left eye cataract surgery  4/24/14    OOPHORECTOMY       Current MEDS; Reviewed and as per MEDCHART    Review of Systems   Constitutional: Negative for appetite change, fatigue, fever and unexpected weight change.   HENT: Negative for mouth sores.    Eyes: Negative for visual disturbance.   Respiratory: Negative for cough and shortness of breath.    Cardiovascular: Negative for chest pain.   Gastrointestinal: Negative for abdominal pain and diarrhea.   Genitourinary: Negative for frequency.   Musculoskeletal: Negative for back pain.   Skin: Negative for rash.   Neurological: Negative for headaches.   Hematological: Negative for adenopathy.   Psychiatric/Behavioral: The patient is not nervous/anxious.        Objective:       Vitals:    04/24/18 0922 04/24/18 0925   BP: (!) 147/67 138/61   BP Location: Right arm Right arm   Patient Position: Sitting Sitting   BP Method: Medium (Automatic) Medium (Automatic)   Pulse: 73    Temp: 97.6 °F (36.4 °C)    TempSrc: Oral    SpO2: 95%    Weight: 77.4 kg (170 lb 11.9 oz)    Height: 5' 4" (1.626 m)        Physical Exam   Constitutional: She is oriented to person, place, and time. She appears well-developed and well-nourished.   HENT:   Head: Normocephalic.   Mouth/Throat: Oropharynx is clear and moist. No oropharyngeal exudate.   Eyes: Conjunctivae and lids are normal. Pupils are equal, round, and reactive to light. " No scleral icterus.   Neck: Normal range of motion. Neck supple. No thyromegaly present.   Cardiovascular: Normal rate, regular rhythm and normal heart sounds.    No murmur heard.  Pulmonary/Chest: Breath sounds normal. She has no wheezes. She has no rales.   Left breast- hyperpigmentation noted at RT site    Abdominal: Soft. Bowel sounds are normal. She exhibits no distension and no mass. There is no hepatosplenomegaly. There is no tenderness. There is no rebound and no guarding.   Musculoskeletal: Normal range of motion. She exhibits no edema or tenderness.   Lymphadenopathy:     She has no cervical adenopathy.     She has no axillary adenopathy.        Right: No supraclavicular adenopathy present.        Left: No supraclavicular adenopathy present.   Neurological: She is alert and oriented to person, place, and time. No cranial nerve deficit. Coordination normal.   Skin: Skin is warm and dry. No ecchymosis, no petechiae and no rash noted. No erythema.   Psychiatric: She has a normal mood and affect.         FINAL PATHOLOGIC DIAGNOSIS 11/21/2017  1. Left breast mass 2:00:  Invasive mammary carcinoma, grade 1 ([place] score: Tubule formation 2, nuclear pleomorphism 2, mitotic  activity 1, total score 5), occupying at least 5 mm in one core.  2. Left breast axilla (lymph node):  Benign lymphoid tissue with no evidence of metastatic disease.  Note: Receptor studies and immunohistochemical studies will be performed with supplemental report to follow.  Intradepartmental QC/review obtained. Dr. Neil Irvin concurs with the above diagnostic impressions.    Supplemental Diagnosis  HORMONE RECEPTOR STUDIES:  Virtually 100% of the cells of the carcinoma are strongly nuclear estrogen receptor positive. 60 percent of the cells  are strongly nuclear progesterone receptor positive. There is an abrupt transition from the zone of nuclear  progesterone receptor positive cells to the zone where this receptor is negative. It is  possible that there has been  some technical artifact which has led a region of the tissue to not stain, and that actually the vast majority of the  tumor are nuclear progesterone receptor positive. The tumor is HER-2 negative. The positive and negative controls  stained appropriately.        FINAL PATHOLOGIC DIAGNOSIS 12/13/2017   Part 1  Lymph node (1, submitted as left sentinel lymph node count equals 60):  -No evidence of malignancy  Part 2  Lymph node (1, submitted as left sentinel lymph node count equals 20):  -No evidence of malignancy  Part 3  Breast excision (submitted as left partial mastectomy):  -Invasive, well differentiated (grade I of III) lobular carcinoma  -Carcinoma is a single focus measuring 0.6 x 0.65 cm (6 x 6.5 mm) located 1 mm from the nearest, anterior  resection margin. All resection margins are free of malignancy.  -No tumor necrosis, hemosiderin vascular or lymphatic permeation, or perineural involvement is identified.  -A microscopic focus of in situ carcinoma is identified immediately adjacent to the invasive tumor focus  -Carcinoma is graded I of III according to the Melissa modification of the Arriaga-Benson grading scheme  with 2 points each assigned for moderate tubule formation and moderate nuclear pleomorphism and 1.4  minimal mitotic count, a total of 5 of 9 possible points.  -AJCC TN: pT pN0(sn)  -Complete AJCC Staging Form follows:  INVASIVE CARCINOMA OF THE BREAST  Procedure: Partial mastectomy  Node sampling: [place] lymph nodes  Specimen laterality: Left  Tumor site: Not specified  Tumor size: 6.5 x 6.0 mm  Histologic type: Invasive lobular carcinoma  Histologic grade  -Glandular differentiation: Score 2  -Nuclear pleomorphism: Score 2  -Mitotic rate: Score 1  -Overall grade: Score 1  Tumor focality: Single focus of invasive carcinoma  Ductal carcinoma in situ (DCIS): No DCIS present  Margins-invasive carcinoma: Margins uninvolved by invasive carcinoma  -Distance from  closest margin: 1 mm, anterior  Lymph nodes  -Total number of lymph nodes examined (sentinel and non-sentinel): 2  -Number sentinel lymph node nodes examined: 2  Pathologic staging  -Primary tumor: pT1b pN0(sn)  -Regional lymph nodes  Modifier: (SN)  Category: pN0  Ancillary studies: E-cadherin negative in tumor cells    Results for JONG VALENTIN (MRN [id number]) as of 3/20/2018 10:29   Ref. Range 2/9/2018 07:35 3/16/2018 07:28   Vit D, 25-Hydroxy Latest Ref Range: 30 - 96 ng/mL 12 (L) 21 (L)     Bone Density 2018   Compared to the young normal reference, this study indicates osteopenia of the Lumbar spine and left hip with osteoporosis of the right hip as detailed above.  Compared to prior the bone mineral density of the lumbar spine and left hip has slightly improved with reduced bone mineral density of the right hip as detailed above.    Note: Degenerative changes can falsely elevate measured bone mineral density, particularly in the lumbar spine, and should be considered as part of the final clinical recommendation    Assessment:       1. Malignant neoplasm of left female breast, unspecified estrogen receptor status, unspecified site of breast    2. Encounter for monitoring aromatase inhibitor therapy    3. Osteopenia, unspecified location    4. Vitamin D deficiency    5. Essential hypertension        Plan:     1-5   ECOG 0   76 y/o with multiple medical diagnoses with Stage 1B pT1b (6mm) N1mi M0 grade 1 ER + OR + [id number] IDC carcinoma  of the left breast status post left partial mastectomy and SLNB 12/13/2017  ER + OR + [id number]  12/13/2017.  1 of 2 lymph nodes positive for micromet. Closest margin anteriorly 1mm.  She is Stage IA per AJCC 8th ed. pathologic prognostic staging system.   S/p  postoperative RT completed 3/13/2018  Cont Vit D supp  Bone Density 2018- osteopenia/osteoporosis  Cont letrozole 2.5 mg daily  Discussed PROLIA  No Dental Clearance indicated ( dentures)  Pt agreeable to begin PROLIA q 6mos      All questions posed answered to patient's satisfaction  Follow-up  2 mo      Cc Ashli Caldera M.D.         Kenney Wray M.D.

## 2018-04-25 ENCOUNTER — LAB VISIT (OUTPATIENT)
Dept: LAB | Facility: HOSPITAL | Age: 78
End: 2018-04-25
Attending: NURSE PRACTITIONER
Payer: MEDICARE

## 2018-04-25 DIAGNOSIS — E11.8 CONTROLLED TYPE 2 DIABETES MELLITUS WITH COMPLICATION, WITH LONG-TERM CURRENT USE OF INSULIN: ICD-10-CM

## 2018-04-25 DIAGNOSIS — Z79.4 CONTROLLED TYPE 2 DIABETES MELLITUS WITH COMPLICATION, WITH LONG-TERM CURRENT USE OF INSULIN: ICD-10-CM

## 2018-04-25 DIAGNOSIS — E78.5 HYPERLIPIDEMIA LDL GOAL <100: ICD-10-CM

## 2018-04-25 LAB
ALBUMIN SERPL BCP-MCNC: 3.5 G/DL
ALP SERPL-CCNC: 58 U/L
ALT SERPL W/O P-5'-P-CCNC: 16 U/L
ANION GAP SERPL CALC-SCNC: 10 MMOL/L
AST SERPL-CCNC: 22 U/L
BILIRUB SERPL-MCNC: 0.5 MG/DL
BUN SERPL-MCNC: 16 MG/DL
CALCIUM SERPL-MCNC: 9.9 MG/DL
CHLORIDE SERPL-SCNC: 108 MMOL/L
CHOLEST SERPL-MCNC: 113 MG/DL
CHOLEST/HDLC SERPL: 2.4 {RATIO}
CO2 SERPL-SCNC: 23 MMOL/L
CREAT SERPL-MCNC: 1 MG/DL
EST. GFR  (AFRICAN AMERICAN): >60 ML/MIN/1.73 M^2
EST. GFR  (NON AFRICAN AMERICAN): 54.9 ML/MIN/1.73 M^2
ESTIMATED AVG GLUCOSE: 171 MG/DL
GLUCOSE SERPL-MCNC: 150 MG/DL
HBA1C MFR BLD HPLC: 7.6 %
HDLC SERPL-MCNC: 48 MG/DL
HDLC SERPL: 42.5 %
LDLC SERPL CALC-MCNC: 49.4 MG/DL
NONHDLC SERPL-MCNC: 65 MG/DL
POTASSIUM SERPL-SCNC: 4.3 MMOL/L
PROT SERPL-MCNC: 7.3 G/DL
SODIUM SERPL-SCNC: 141 MMOL/L
TRIGL SERPL-MCNC: 78 MG/DL

## 2018-04-25 PROCEDURE — 36415 COLL VENOUS BLD VENIPUNCTURE: CPT | Mod: PO

## 2018-04-25 PROCEDURE — 80053 COMPREHEN METABOLIC PANEL: CPT

## 2018-04-25 PROCEDURE — 83036 HEMOGLOBIN GLYCOSYLATED A1C: CPT

## 2018-04-25 PROCEDURE — 80061 LIPID PANEL: CPT

## 2018-04-26 DIAGNOSIS — C50.919 BREAST CANCER: Primary | ICD-10-CM

## 2018-04-30 RX ORDER — METFORMIN HYDROCHLORIDE 500 MG/1
TABLET, EXTENDED RELEASE ORAL
Qty: 360 TABLET | Refills: 1 | Status: SHIPPED | OUTPATIENT
Start: 2018-04-30 | End: 2018-12-14 | Stop reason: SDUPTHER

## 2018-05-01 ENCOUNTER — OFFICE VISIT (OUTPATIENT)
Dept: ENDOCRINOLOGY | Facility: CLINIC | Age: 78
End: 2018-05-01
Payer: MEDICARE

## 2018-05-01 VITALS
HEIGHT: 64 IN | HEART RATE: 67 BPM | SYSTOLIC BLOOD PRESSURE: 153 MMHG | BODY MASS INDEX: 29.32 KG/M2 | WEIGHT: 171.75 LBS | DIASTOLIC BLOOD PRESSURE: 73 MMHG

## 2018-05-01 DIAGNOSIS — I10 ESSENTIAL HYPERTENSION: ICD-10-CM

## 2018-05-01 DIAGNOSIS — E11.649 HYPOGLYCEMIA ASSOCIATED WITH DIABETES: ICD-10-CM

## 2018-05-01 DIAGNOSIS — E78.5 HYPERLIPIDEMIA LDL GOAL <100: ICD-10-CM

## 2018-05-01 PROCEDURE — 3078F DIAST BP <80 MM HG: CPT | Mod: CPTII,S$GLB,, | Performed by: NURSE PRACTITIONER

## 2018-05-01 PROCEDURE — 99214 OFFICE O/P EST MOD 30 MIN: CPT | Mod: S$GLB,,, | Performed by: NURSE PRACTITIONER

## 2018-05-01 PROCEDURE — 99999 PR PBB SHADOW E&M-EST. PATIENT-LVL V: CPT | Mod: PBBFAC,,, | Performed by: NURSE PRACTITIONER

## 2018-05-01 PROCEDURE — 3077F SYST BP >= 140 MM HG: CPT | Mod: CPTII,S$GLB,, | Performed by: NURSE PRACTITIONER

## 2018-05-01 NOTE — PATIENT INSTRUCTIONS
If blood sugar at bedtime is less than 100, eat a snack like 1/2 sandwich or 4 peanut butter crackers.   If blood sugar is too low like 70s or lower, then eat  skittles or juice or other hard candy like peppermints (4); then follow that with peanut butter crackers or a meal.     Reduce Novolog to 17 units before meals. May wait until right after the meal to inject Novolog if blood sugar is less than 100. Take 20 units for a larger meal than usual like with potatoes or pasta.   Reduce Lantus to 40 units nightly. Do not take more if bedtime blood sugar is high.   Test blood sugar before meals and bedtime.  Try to eat fruit without any added sugar. Try frozen fruit.   Return to clinic in 3 months with labs prior.   Call if blood sugars are less than 80 or higher than 200s consistently for unknown reason.

## 2018-05-01 NOTE — PROGRESS NOTES
CC: This 77 y.o. Black or  female  is here for evaluation of  T2DM along with comorbidities indicated in the Visit Diagnosis section of this encounter.    HPI: Joel Condon was diagnosed with T2DM in 1994.  Oral agents started at diagnosis.       Prior visit on 2/2/18  She's been diagnosed with breast cancer now undergoing radiation and will need chemo as well.   She is tolerating metformin 1500 mg/day without diarrhea.   a1c is the same at 7.2%. Did not reduce insulin doses as above but she denies having frequent hypos.   Plan Continue current treatment plan.   Continue levemir 50 units hs and if bg < 100 at bedtime, eat a snack. Test bg at night if having hypoglycemia sx and call office if BGs are low.   Test bg 4x/day. She declines use of Freestyle Romeo.   rtc in 3 mo w labs prior.     Interval history  a1c up from 7.2 to 7.6%. She states she is eating less but eating more canned fruit as a snack. Appetite hasn't returned.   Taking oral chemo (letrazole) for breast cancer. Denies steroid therapy.       LAST DIABETES EDUCATION: years ago at [facility]. And also a class done by [facility] early 2017    HOSPITALIZED FOR DIABETES  -  No.    PRESCRIBED DIABETES MEDICATIONS: Levemir (vial) 50 units every night at 10 pm, Novolog Flexpen 20 units ac, metformin xr 1000 mg AM and 500 mg PM      Misses medication doses - No  She takes 40-50 units depending on bedtime bg, despite being advised not to previosly.   If she eats a regular meal and her BG < 150, then she takes Novolog immmediately after the meal.   If she eats just fruit and her BG is < 150, then she skips the Novolog. But will inject if BG is higher than 150.     DM COMPLICATIONS: peripheral neuropathy    SIGNIFICANT DIABETES MED HISTORY:   Metformin started at initial ov 8/17/17  Diarrhea on metformin even w/ psyllium fiber supplement     SELF MONITORING BLOOD GLUCOSE: Checks blood glucose at home 4x/day. Brings logs.  "                HYPOGLYCEMIC EPISODES: has woken up a few times overnight feeling dizzy but has not tested to confirm. She corrects with 1 slice of bread with peanut butter and 1/2 cup milk.   Recalls one episode when her BG was 44 after dinner. Doesn't recall details that led up to it.      CURRENT DIET: drinks diet cranberry juice, diet soda, tea w/ splenda. 3 meals/day.   Breakfast was 1 slice toast and 1 egg OR grits. Lunch is a sandwich with 1 slice of bread.     CURRENT EXERCISE:  None d/t hip pain       BP (!) 153/73 (BP Location: Right arm, Patient Position: Sitting, BP Method: Large (Automatic))   Pulse 67   Ht 5' 4" (1.626 m)   Wt 77.9 kg (171 lb 11.8 oz)   BMI 29.48 kg/m²       ROS:   CONSTITUTIONAL: Appetite low, denies fatigue  RESPIRATORY: No shortness of breath or cough  CARDIAC: No chest pain or palpitations  GI: no diarrhea  MS: generalized arthralgias naheed hands       PHYSICAL EXAM:  GENERAL: Well developed, well nourished. No acute distress.   PSYCH: AAOx3, appropriate mood and affect, conversant, well-groomed. Judgement and insight good.   NEURO: Cranial nerves grossly intact. Speech clear, no tremor.       Hemoglobin A1C   Date Value Ref Range Status   04/25/2018 7.6 (H) 4.0 - 5.6 % Final     Comment:     According to ADA guidelines, hemoglobin A1c <7.0% represents  optimal control in non-pregnant diabetic patients. Different  metrics may apply to specific patient populations.   Standards of Medical Care in Diabetes-2016.  For the purpose of screening for the presence of diabetes:  <5.7%     Consistent with the absence of diabetes  5.7-6.4%  Consistent with increasing risk for diabetes   (prediabetes)  >or=6.5%  Consistent with diabetes  Currently, no consensus exists for use of hemoglobin A1c  for diagnosis of diabetes for children.  This Hemoglobin A1c assay has significant interference with fetal   hemoglobin   (HbF). The results are invalid for patients with abnormal amounts of   HbF, "   including those with known Hereditary Persistence   of Fetal Hemoglobin. Heterozygous hemoglobin variants (HbAS, HbAC,   HbAD, HbAE, HbA2) do not significantly interfere with this assay;   however, presence of multiple variants in a sample may impact the %   interference.     01/29/2018 7.2 (H) 4.0 - 5.6 % Final     Comment:     According to ADA guidelines, hemoglobin A1c <7.0% represents  optimal control in non-pregnant diabetic patients. Different  metrics may apply to specific patient populations.   Standards of Medical Care in Diabetes-2016.  For the purpose of screening for the presence of diabetes:  <5.7%     Consistent with the absence of diabetes  5.7-6.4%  Consistent with increasing risk for diabetes   (prediabetes)  >or=6.5%  Consistent with diabetes  Currently, no consensus exists for use of hemoglobin A1c  for diagnosis of diabetes for children.  This Hemoglobin A1c assay has significant interference with fetal   hemoglobin   (HbF). The results are invalid for patients with abnormal amounts of   HbF,   including those with known Hereditary Persistence   of Fetal Hemoglobin. Heterozygous hemoglobin variants (HbAS, HbAC,   HbAD, HbAE, HbA2) do not significantly interfere with this assay;   however, presence of multiple variants in a sample may impact the %   interference.     10/26/2017 7.2 (H) 4.0 - 5.6 % Final     Comment:     According to ADA guidelines, hemoglobin A1c <7.0% represents  optimal control in non-pregnant diabetic patients. Different  metrics may apply to specific patient populations.   Standards of Medical Care in Diabetes-2016.  For the purpose of screening for the presence of diabetes:  <5.7%     Consistent with the absence of diabetes  5.7-6.4%  Consistent with increasing risk for diabetes   (prediabetes)  >or=6.5%  Consistent with diabetes  Currently, no consensus exists for use of hemoglobin A1c  for diagnosis of diabetes for children.  This Hemoglobin A1c assay has significant  interference with fetal   hemoglobin   (HbF). The results are invalid for patients with abnormal amounts of   HbF,   including those with known Hereditary Persistence   of Fetal Hemoglobin. Heterozygous hemoglobin variants (HbAS, HbAC,   HbAD, HbAE, HbA2) do not significantly interfere with this assay;   however, presence of multiple variants in a sample may impact the %   interference.             Chemistry        Component Value Date/Time     04/25/2018 0719    K 4.3 04/25/2018 0719     04/25/2018 0719    CO2 23 04/25/2018 0719    BUN 16 04/25/2018 0719    CREATININE 1.0 04/25/2018 0719     (H) 04/25/2018 0719        Component Value Date/Time    CALCIUM 9.9 04/25/2018 0719    ALKPHOS 58 04/25/2018 0719    AST 22 04/25/2018 0719    ALT 16 04/25/2018 0719    BILITOT 0.5 04/25/2018 0719    ESTGFRAFRICA >60.0 04/25/2018 0719    EGFRNONAA 54.9 (A) 04/25/2018 0719          Lab Results   Component Value Date    LDLCALC 49.4 (L) 04/25/2018        Ref. Range 4/25/2018 07:19   Cholesterol Latest Ref Range: 120 - 199 mg/dL 113 (L)   HDL Latest Ref Range: 40 - 75 mg/dL 48   LDL Cholesterol Latest Ref Range: 63.0 - 159.0 mg/dL 49.4 (L)   Total Cholesterol/HDL Ratio Latest Ref Range: 2.0 - 5.0  2.4   Triglycerides Latest Ref Range: 30 - 150 mg/dL 78       Lab Results   Component Value Date    MICALBCREAT 34.5 (H) 07/31/2017           STANDARDS of CARE:        ASA:       yes        Last eye exam:       ASSESSMENT and PLAN:    A1C GOAL: < 8%      1. Uncontrolled type 2 diabetes mellitus with complication, with long-term current use of insulin  If blood sugar at bedtime is less than 100, eat a snack like 1/2 sandwich or 4 peanut butter crackers.   If blood sugar is too low like 70s or lower, then eat  skittles or juice or other hard candy like peppermints (4); then follow that with peanut butter crackers or a meal.     Reduce Novolog to 17 units before meals. May wait until right after the meal to inject Novolog  if blood sugar is less than 100. Take 20 units for a larger meal than usual like with potatoes or pasta.   Reduce Lantus to 40 units nightly. Do not take more if bedtime blood sugar is high.   Test blood sugar before meals and bedtime.  Try to eat fruit without any added sugar. Try frozen fruit.   Return to clinic in 3 months with labs prior.   Call if blood sugars are less than 80 or higher than 200s consistently for unknown reason.       Hemoglobin A1c    Basic metabolic panel    Microalbumin/creatinine urine ratio   2. Essential hypertension  Suboptimal. Will reassess BP at next visit.      3. Hyperlipidemia LDL goal <100  At goal   4. Hypoglycemia associated with diabetes  As above.          Orders Placed This Encounter   Procedures    Hemoglobin A1c     Standing Status:   Future     Standing Expiration Date:   6/30/2019    Basic metabolic panel     Standing Status:   Future     Standing Expiration Date:   6/30/2019    Microalbumin/creatinine urine ratio     Standing Status:   Future     Standing Expiration Date:   6/30/2019     Order Specific Question:   Specimen Source     Answer:   Urine        Follow-up in about 3 months (around 8/1/2018).

## 2018-05-02 ENCOUNTER — INFUSION (OUTPATIENT)
Dept: INFUSION THERAPY | Facility: HOSPITAL | Age: 78
End: 2018-05-02
Attending: INTERNAL MEDICINE
Payer: MEDICARE

## 2018-05-02 VITALS — RESPIRATION RATE: 17 BRPM | SYSTOLIC BLOOD PRESSURE: 144 MMHG | DIASTOLIC BLOOD PRESSURE: 57 MMHG | HEART RATE: 75 BPM

## 2018-05-02 DIAGNOSIS — M85.80 OSTEOPENIA, UNSPECIFIED LOCATION: Primary | ICD-10-CM

## 2018-05-02 PROCEDURE — 96372 THER/PROPH/DIAG INJ SC/IM: CPT

## 2018-05-02 PROCEDURE — 63600175 PHARM REV CODE 636 W HCPCS: Mod: JG | Performed by: INTERNAL MEDICINE

## 2018-05-02 RX ADMIN — DENOSUMAB 60 MG: 60 INJECTION SUBCUTANEOUS at 08:05

## 2018-05-02 NOTE — PLAN OF CARE
Problem: Patient Care Overview  Goal: Plan of Care Review  Outcome: Ongoing (interventions implemented as appropriate)  Pt came in today for Prolia injection. Pt tolerated well. Instructed pt she may feel achy - that is sometimes normal with this injection. Pt verbalized understanding. Pt walked in and out of unit alone without difficulty.

## 2018-05-08 ENCOUNTER — OFFICE VISIT (OUTPATIENT)
Dept: OPTOMETRY | Facility: CLINIC | Age: 78
End: 2018-05-08
Payer: MEDICARE

## 2018-05-08 DIAGNOSIS — Z13.5 SCREENING FOR GLAUCOMA: ICD-10-CM

## 2018-05-08 DIAGNOSIS — H52.13 MYOPIA WITH ASTIGMATISM, BILATERAL: ICD-10-CM

## 2018-05-08 DIAGNOSIS — E11.3293 MILD NONPROLIFERATIVE DIABETIC RETINOPATHY OF BOTH EYES WITHOUT MACULAR EDEMA ASSOCIATED WITH TYPE 2 DIABETES MELLITUS: Primary | ICD-10-CM

## 2018-05-08 DIAGNOSIS — H52.203 MYOPIA WITH ASTIGMATISM, BILATERAL: ICD-10-CM

## 2018-05-08 DIAGNOSIS — Z96.1 PSEUDOPHAKIA OF BOTH EYES: ICD-10-CM

## 2018-05-08 PROCEDURE — 92014 COMPRE OPH EXAM EST PT 1/>: CPT | Mod: S$GLB,,, | Performed by: OPTOMETRIST

## 2018-05-08 PROCEDURE — 92015 DETERMINE REFRACTIVE STATE: CPT | Mod: S$GLB,,, | Performed by: OPTOMETRIST

## 2018-05-08 PROCEDURE — 99999 PR PBB SHADOW E&M-EST. PATIENT-LVL II: CPT | Mod: PBBFAC,,, | Performed by: OPTOMETRIST

## 2018-05-08 NOTE — PROGRESS NOTES
HPI     Last Eye Exam: 5/2/2017 w/ Dr. Salazar.    Pt here for diabetic eye exam w/ hx of hypertension. Pt states that she's   been wearing old bifocals only for reading. Pt denies problems with glare.   Pt states that vision gets blurry when blood sugar is high. Pt states that   she didn't eat breakfast this morning and feels like sugar is low. Pt   given crackers and apple juice.    Hemoglobin A1C       Date                     Value               Ref Range             Status                04/25/2018               7.6 (H)             4.0 - 5.6 %           Final                  01/29/2018               7.2 (H)             4.0 - 5.6 %           Final                  10/26/2017               7.2 (H)             4.0 - 5.6 %           Final                ----------      (--) Eye pain/discomfort  (+) Itchy Eyes  (+) Watery Eyes: intermittent  (+) Dry Eyes  (+) Floaters/Black Spots: lasting for 1 yr  (--) Headaches  (--) Photophobia    Eye Meds: otc drops prn  Glasses: old bifocals only for reading  Contacts: none      Last edited by Sabiha Moore on 5/8/2018  9:15 AM. (History)            Assessment /Plan     For exam results, see Encounter Report.    Mild nonproliferative diabetic retinopathy of both eyes without macular edema associated with type 2 diabetes mellitus  -Retinopathy noted today, no ocular treatment necessary.  Continued blood sugar control with primary care physician and monitor at 12 mo dilated fundus exam.    Pseudophakia of both eyes  -clear, centered    Screening for glaucoma  -Monitor with annual eye exam and IOP check    Myopia with astigmatism, bilateral  Eyeglass Final Rx     Eyeglass Final Rx       Sphere Cylinder Axis Dist VA Add    Right -0.50 +1.00 170 20/40 +2.50    Left [place] +0.75 155 20/40 +2.50    Type:  Bifocal    Expiration Date:  5/9/2019                  RTC 1 yr

## 2018-05-14 RX ORDER — INSULIN ASPART 100 [IU]/ML
INJECTION, SOLUTION INTRAVENOUS; SUBCUTANEOUS
Qty: 30 ML | Refills: 0 | Status: SHIPPED | OUTPATIENT
Start: 2018-05-14 | End: 2018-06-28 | Stop reason: SDUPTHER

## 2018-05-23 RX ORDER — SILVER SULFADIAZINE 10 G/1000G
CREAM TOPICAL
Refills: 1 | COMMUNITY
Start: 2018-04-23 | End: 2019-05-29

## 2018-05-25 ENCOUNTER — OFFICE VISIT (OUTPATIENT)
Dept: FAMILY MEDICINE | Facility: CLINIC | Age: 78
End: 2018-05-25
Payer: MEDICARE

## 2018-05-25 VITALS
SYSTOLIC BLOOD PRESSURE: 138 MMHG | TEMPERATURE: 98 F | DIASTOLIC BLOOD PRESSURE: 60 MMHG | OXYGEN SATURATION: 95 % | WEIGHT: 170.31 LBS | HEART RATE: 68 BPM | HEIGHT: 64 IN | BODY MASS INDEX: 29.08 KG/M2

## 2018-05-25 DIAGNOSIS — Z71.89 ADVANCED DIRECTIVES, COUNSELING/DISCUSSION: ICD-10-CM

## 2018-05-25 DIAGNOSIS — E03.9 HYPOTHYROIDISM (ACQUIRED): ICD-10-CM

## 2018-05-25 DIAGNOSIS — C50.912 MALIGNANT NEOPLASM OF LEFT FEMALE BREAST, UNSPECIFIED ESTROGEN RECEPTOR STATUS, UNSPECIFIED SITE OF BREAST: ICD-10-CM

## 2018-05-25 DIAGNOSIS — M85.80 OSTEOPENIA, UNSPECIFIED LOCATION: ICD-10-CM

## 2018-05-25 DIAGNOSIS — K21.9 GASTROESOPHAGEAL REFLUX DISEASE WITHOUT ESOPHAGITIS: Chronic | ICD-10-CM

## 2018-05-25 DIAGNOSIS — Z12.11 COLON CANCER SCREENING: ICD-10-CM

## 2018-05-25 DIAGNOSIS — E66.3 OVERWEIGHT (BMI 25.0-29.9): ICD-10-CM

## 2018-05-25 DIAGNOSIS — E78.5 HYPERLIPIDEMIA LDL GOAL <100: ICD-10-CM

## 2018-05-25 DIAGNOSIS — I10 ESSENTIAL HYPERTENSION: ICD-10-CM

## 2018-05-25 DIAGNOSIS — M25.552 LEFT HIP PAIN: ICD-10-CM

## 2018-05-25 PROCEDURE — 99215 OFFICE O/P EST HI 40 MIN: CPT | Mod: S$GLB,,, | Performed by: INTERNAL MEDICINE

## 2018-05-25 PROCEDURE — 3075F SYST BP GE 130 - 139MM HG: CPT | Mod: CPTII,S$GLB,, | Performed by: INTERNAL MEDICINE

## 2018-05-25 PROCEDURE — 99999 PR PBB SHADOW E&M-EST. PATIENT-LVL III: CPT | Mod: PBBFAC,,, | Performed by: INTERNAL MEDICINE

## 2018-05-25 PROCEDURE — 99499 UNLISTED E&M SERVICE: CPT | Mod: S$GLB,,, | Performed by: INTERNAL MEDICINE

## 2018-05-25 PROCEDURE — 3078F DIAST BP <80 MM HG: CPT | Mod: CPTII,S$GLB,, | Performed by: INTERNAL MEDICINE

## 2018-05-25 RX ORDER — ATORVASTATIN CALCIUM 10 MG/1
TABLET, FILM COATED ORAL
Qty: 90 TABLET | Refills: 1 | Status: SHIPPED | OUTPATIENT
Start: 2018-05-25 | End: 2018-07-12 | Stop reason: SDUPTHER

## 2018-05-25 RX ORDER — NIFEDIPINE 90 MG/1
TABLET, EXTENDED RELEASE ORAL
Qty: 90 TABLET | Refills: 1 | Status: SHIPPED | OUTPATIENT
Start: 2018-05-25 | End: 2018-07-12 | Stop reason: SDUPTHER

## 2018-05-25 RX ORDER — LEVOTHYROXINE SODIUM 50 UG/1
TABLET ORAL
Qty: 90 TABLET | Refills: 1 | Status: SHIPPED | OUTPATIENT
Start: 2018-05-25 | End: 2018-12-02 | Stop reason: SDUPTHER

## 2018-05-25 NOTE — PROGRESS NOTES
HISTORY OF PRESENT ILLNESS:  Joel Condon is a 77 y.o. female who presents to the clinic today for Annual Exam  .  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  She is seeing Endocrinology for help with control of her diabetes.  She states her blood sugars have been better in the mid 100s.  She denies any problems with low blood sugars.  Since her last office visit with me she was diagnosed with left breast cancer.  She had a lumpectomy and radiation treatment.  She is now on oral Femara.  She is followed by Oncology.  She states her skin is finally healing up from the radiation treatment.  She was also recently given a Prolia injection for her osteopenia.  She is due for a follow-up with Cardiology.  She is requesting referral.  She states overall she feels well.  Her only complaint today is left hip pain.  She has some stiffness in the air when she 1st starts walking.  She thinks this is most likely arthritis.  She takes over-the-counter Tylenol as needed for pain.  She tries to remain active. The patient denies any problems with cardiac chest pain, dizziness, palpitations, orthopnea, or lower extremity edema. The patient reports compliance with current medication- patient denies missing any  medication doses.  She denies abdominal pain, temperature intolerance, or unexplained changes in her weight.  She denies any significant problems with heartburn or reflux at this time.        PAST MEDICAL HISTORY:  Past Medical History:   Diagnosis Date    Arthritis     Cataract     Diabetes mellitus     Diabetic retinopathy     Hyperlipidemia LDL goal < 100     Hypertension     Hypothyroidism     Osteoporosis, post-menopausal     Overweight(278.02)     Proteinuria     Type II or unspecified type diabetes mellitus with renal manifestations, uncontrolled(250.42)        PAST SURGICAL HISTORY:  Past  Surgical History:   Procedure Laterality Date    APPENDECTOMY      BREAST SURGERY Left 12/13/2017    tumor removal    CATARACT EXTRACTION W/  INTRAOCULAR LENS IMPLANT Left 4/24/14    OS (Dr. Arce)    CATARACT EXTRACTION W/  INTRAOCULAR LENS IMPLANT Right 06/12/2014    OD (Dr. Arce)    CHOLECYSTECTOMY      COLONOSCOPY N/A 4/21/2017    Procedure: COLONOSCOPY;  Surgeon: Homar Payan MD;  [facility];  Service: Endoscopy;  Laterality: N/A;    EYE SURGERY  04/24/2014 & 06/12/2014    HYSTERECTOMY      total    left eye cataract surgery  4/24/14    OOPHORECTOMY         SOCIAL HISTORY:  Social History     Social History    Marital status:      Spouse name: N/A    Number of children: 3    Years of education: N/A     Occupational History    retired      Social History Main Topics    Smoking status: Never Smoker    Smokeless tobacco: Never Used    Alcohol use No    Drug use: No    Sexual activity: Not Currently     Partners: Male     Other Topics Concern    Not on file     Social History Narrative    No narrative on file       FAMILY HISTORY:  Family History   Problem Relation Age of Onset    Diabetes Mother     Heart disease Mother     Hypertension Mother     Diabetes Sister     Hypertension Sister     Diabetes Sister     Hypertension Sister     Diabetes Sister     Hypertension Sister     Thyroid disease Sister     Stroke Sister     Hypertension Sister     Diabetes Sister     Heart disease Sister     Diabetes Brother     Diabetes Brother     Amblyopia Neg Hx     Blindness Neg Hx     Cataracts Neg Hx     Glaucoma Neg Hx     Macular degeneration Neg Hx     Retinal detachment Neg Hx     Strabismus Neg Hx        ALLERGIES AND MEDICATIONS: updated and reviewed.  Review of patient's allergies indicates:   Allergen Reactions    Lisinopril Other (See Comments)     Cough      Hydrochlorothiazide (bulk) Other (See Comments)     Elevated pt's blood pressure making  "her feel bad    Pravastatin Other (See Comments)     headaches     Medication List with Changes/Refills   Current Medications    ASPIRIN (ECOTRIN) 81 MG EC TABLET    Take 1 tablet (81 mg total) by mouth once daily.    BD INSULIN PEN NEEDLE UF SHORT 31 GAUGE X 5/16" NDLE    USE THREE TIMES DAILY AS DIRECTED    BLOOD SUGAR DIAGNOSTIC STRP    True Results; Test tid    HYDROCODONE-ACETAMINOPHEN 5-325MG (NORCO) 5-325 MG PER TABLET    Take 1 tablet by mouth every 6 (six) hours as needed for Pain.    INSULIN DETEMIR (LEVEMIR) 100 UNIT/ML INJECTION    ADMINISTER 40 UNITS UNDER THE SKIN EVERY EVENING    INSULIN SYRINGE-NEEDLE U-100 1 ML 31 GAUGE X 5/16 SYRG    USE THREE TIMES DAILY AS DIRECTED    INSULIN SYRINGE-NEEDLE U-100 1/2 ML 30 SYRG    USE NIGHTLY    INSULIN SYRINGE-NEEDLE U-100 1/2 ML 31 X 5/16" SYRG    1 Device by Misc.(Non-Drug; Combo Route) route nightly.    LANCETS 26 GAUGE MISC    1 lancet by Misc.(Non-Drug; Combo Route) route 3 (three) times daily.    LETROZOLE (FEMARA) 2.5 MG TAB    Take 1 tablet (2.5 mg total) by mouth once daily.    LOSARTAN (COZAAR) 100 MG TABLET    TAKE 1 TABLET BY MOUTH DAILY    METFORMIN (GLUCOPHAGE-XR) 500 MG 24 HR TABLET    TAKE 2 TABLETS(1000 MG) BY MOUTH TWICE DAILY WITH MEALS    METOPROLOL SUCCINATE (TOPROL-XL) 200 MG 24 HR TABLET    TAKE 1 TABLET BY MOUTH DAILY    NOVOLOG FLEXPEN U-100 INSULIN 100 UNIT/ML INPN PEN    ADMINISTER 28 UNITS UNDER THE SKIN THREE TIMES DAILY WITH MEALS    OMEPRAZOLE (PRILOSEC) 20 MG CAPSULE    TAKE 1 CAPSULE(20 MG) BY MOUTH DAILY AS NEEDED FOR HEARTBURN. TAKE ONLY AS NEEDED    PEN NEEDLE, DIABETIC (BD INSULIN PEN NEEDLE UF SHORT) 31 GAUGE X 5/16" NDLE    Use 3 times daily    SPIRONOLACTONE (ALDACTONE) 50 MG TABLET    Take 1 tablet (50 mg total) by mouth once daily.    SSD 1 % CREAM    CHRISTINE TOPICALLY AA ONCE TO BID   Changed and/or Refilled Medications    Modified Medication Previous Medication    ATORVASTATIN (LIPITOR) 10 MG TABLET atorvastatin (LIPITOR) " "10 MG tablet       TAKE 1 TABLET(10 MG) BY MOUTH EVERY DAY    TAKE 1 TABLET(10 MG) BY MOUTH EVERY DAY    LEVOTHYROXINE (SYNTHROID) 50 MCG TABLET levothyroxine (SYNTHROID) 50 MCG tablet       TAKE 1 TABLET(50 MCG) BY MOUTH EVERY DAY    TAKE 1 TABLET(50 MCG) BY MOUTH EVERY DAY    NIFEDIPINE (PROCARDIA-XL) 90 MG (OSM) 24 HR TABLET NIFEdipine (PROCARDIA-XL) 90 MG (OSM) 24 hr tablet       TAKE 1 TABLET(90 MG) BY MOUTH EVERY DAY    TAKE 1 TABLET(90 MG) BY MOUTH EVERY DAY          CARE TEAM:  Patient Care Team:  Shelby Ley MD as PCP - General (Internal Medicine)         REVIEW OF SYSTEMS:  Review of Systems   Constitutional: Negative for chills, fatigue, fever and unexpected weight change.   HENT: Negative for congestion, ear discharge, ear pain and postnasal drip.    Eyes: Negative for photophobia, pain and visual disturbance.   Respiratory: Negative for cough, shortness of breath and wheezing.    Cardiovascular: Negative for chest pain, palpitations and leg swelling.   Gastrointestinal: Negative for abdominal pain, constipation, diarrhea, nausea and vomiting.   Genitourinary: Negative for dysuria, frequency, urgency and vaginal discharge.   Musculoskeletal: Positive for arthralgias (- mostly left hip pain at this time; no trauma; 10/10 today). Negative for back pain, joint swelling and neck stiffness.   Skin: Positive for rash (- dark discoloration of left breast improving since completing radiation tx 3/2018).   Neurological: Negative for weakness and headaches.   Psychiatric/Behavioral: Negative for dysphoric mood and sleep disturbance. The patient is not nervous/anxious.          PHYSICAL EXAM:   Vitals:    05/25/18 0937   BP: 138/60   Pulse: 68   Temp: 98.1 °F (36.7 °C)     Weight: 77.3 kg (170 lb 4.9 oz)   Height: 5' 4" (162.6 cm)   Body mass index is 29.23 kg/m².     General appearance - alert, well appearing, and in no distress and overweight  Mental status - alert, oriented to person, place, and time, " normal mood, behavior, speech, dress, motor activity, and thought processes  Eyes - pupils equal and reactive, extraocular eye movements intact, sclera anicteric  Mouth - mucous membranes moist, pharynx normal without lesions  Neck - supple, no significant adenopathy, carotids upstroke normal bilaterally, no bruits, thyroid exam: thyroid is normal in size without nodules or tenderness  Lymphatics - no palpable lymphadenopathy  Chest - clear to auscultation, no wheezes, rales or rhonchi, symmetric air entry  Heart - normal rate and regular rhythm, no gallops noted  Breasts - well-healed scar of the lateral aspect of the left breast.  She had some dark discoloration of the skin of the left breast but no signs of skin breakdown.  Back exam - mild limited range of motion, minimal pain with motion noted during exam  Neurological - alert, oriented, normal speech, no focal findings or movement disorder noted, cranial nerves II through XII intact  Musculoskeletal - no muscular tenderness noted, Moderate osteoarthritic changes noted to both knee joints. No joint effusions noted. There was significant stiffness in her left hip when she 1st started to get up to walk.  This improved with continued ambulation.  Extremities - pedal edema trace +  Skin - normal coloration and turgor, no rashes, no suspicious skin lesions noted      ASSESSMENT AND PLAN:  1. Uncontrolled type 2 diabetes mellitus with both eyes affected by mild nonproliferative retinopathy without macular edema, with long-term current use of insulin/2. Type 2 diabetes, uncontrolled, with neuropathy  Diabetes currently is controlled for age and comorbid conditions. We discussed diabetic diet and regular exercise. We discussed home blood sugar monitoring, if appropriate. The current medical regimen is effective;  continue present plan and medications. Followed by endocrinology.     3. Essential hypertension  Discussed sodium restriction, maintaining ideal body weight  and regular exercise program as physiologic means to achieve blood pressure control. The patient will strive towards this. The current medical regimen is effective;  continue present plan and medications. Recommended patient to check home readings to monitor and see me for followup as scheduled or sooner as needed. Patient was educated that both decongestant and anti-inflammatory medication may raise blood pressure.   - NIFEdipine (PROCARDIA-XL) 90 MG (OSM) 24 hr tablet; TAKE 1 TABLET(90 MG) BY MOUTH EVERY DAY  Dispense: 90 tablet; Refill: 1  - Ambulatory referral to Cardiology    4. Hyperlipidemia LDL goal <100  We discussed low fat diet and regular exercise.The current medical regimen is effective;  continue present plan and medications.    - atorvastatin (LIPITOR) 10 MG tablet; TAKE 1 TABLET(10 MG) BY MOUTH EVERY DAY  Dispense: 90 tablet; Refill: 1    5. Osteopenia, unspecified location  We discussed adequate calcium and vitamin D supplementation. We discussed fall precautions. She is up to date on her BMD. S/p recent prolia injection.     6. Malignant neoplasm of left female breast, unspecified estrogen receptor status, unspecified site of breast  On femara. Followed by oncology.    7. Hypothyroidism (acquired)  Patient is clinically euthyroid. Continue current regimen.   - levothyroxine (SYNTHROID) 50 MCG tablet; TAKE 1 TABLET(50 MCG) BY MOUTH EVERY DAY  Dispense: 90 tablet; Refill: 1    8. Gastroesophageal reflux disease without esophagitis  Symptoms controlled. Reflux precautions discussed (eliminate tobacco if a smoker; minimize caffeine, chocolate and red/white peppermint intake; avoid heavy and spicy meals; don't lay down within 2-3 hours after eating; minimize the intake of NSAIDs). Medication as needed. Patient asked to take medication breaks, if possible - discussed chronic use can limit calcium absorption (which can lead to osteopenia/osteoporosis), increases the risk for intestinal infections, and can  cause kidney damage. There are also some newer studies that show possible increased risk of mortality.     9. Overweight (BMI 25.0-29.9)  The patient is asked to make an attempt to improve diet and exercise patterns to aid in medical management of this problem.     10. Advanced directives, counseling/discussion  I had a discussion today with the patient regarding advanced directives.  Advanced directives were discussed due to patient's age and/or chronic medical conditions. Prognosis based on current condition: good. Paperwork was given to complete living will and medical POA. [place] was discussed only. Approximately 10 minute(s) spent on counseling/discussion regarding end of life decision making.   The patient reports that she was previously given paperwork to complete advanced directives and her daughter told her this is not necessary.  I asked that a rediscuss this with her daughter as it would be nice to have her wishes in writing.    11. Colon cancer screening    - Case request GI: COLONOSCOPY    12. Left hip pain  I suspect arthritis.  Continue Tylenol as needed for pain. Patient declined further evaluation with imaging or referral to Pain Management for possible injection at this time.  She will call me if symptoms worsen or persist.           Follow-up in about 6 months (around 11/25/2018), or if symptoms worsen or fail to improve, for annual exam. or sooner as needed.

## 2018-05-30 DIAGNOSIS — Z12.11 SCREEN FOR COLON CANCER: Primary | ICD-10-CM

## 2018-05-31 ENCOUNTER — TELEPHONE (OUTPATIENT)
Dept: FAMILY MEDICINE | Facility: CLINIC | Age: 78
End: 2018-05-31

## 2018-06-04 ENCOUNTER — TELEPHONE (OUTPATIENT)
Dept: FAMILY MEDICINE | Facility: CLINIC | Age: 78
End: 2018-06-04

## 2018-06-05 DIAGNOSIS — C50.912 MALIGNANT NEOPLASM OF LEFT FEMALE BREAST, UNSPECIFIED ESTROGEN RECEPTOR STATUS, UNSPECIFIED SITE OF BREAST: ICD-10-CM

## 2018-06-05 RX ORDER — LETROZOLE 2.5 MG/1
TABLET, FILM COATED ORAL
Qty: 30 TABLET | Refills: 0 | Status: SHIPPED | OUTPATIENT
Start: 2018-06-05 | End: 2018-06-27 | Stop reason: SDUPTHER

## 2018-06-13 ENCOUNTER — OFFICE VISIT (OUTPATIENT)
Dept: SURGERY | Facility: CLINIC | Age: 78
End: 2018-06-13
Payer: MEDICARE

## 2018-06-13 VITALS
HEIGHT: 64 IN | DIASTOLIC BLOOD PRESSURE: 60 MMHG | WEIGHT: 170.44 LBS | SYSTOLIC BLOOD PRESSURE: 138 MMHG | HEART RATE: 65 BPM | TEMPERATURE: 98 F | BODY MASS INDEX: 29.1 KG/M2

## 2018-06-13 DIAGNOSIS — N64.4 BREAST PAIN, LEFT: ICD-10-CM

## 2018-06-13 DIAGNOSIS — Z85.3 PERSONAL HISTORY OF BREAST CANCER: Primary | ICD-10-CM

## 2018-06-13 DIAGNOSIS — Z12.31 ENCOUNTER FOR SCREENING MAMMOGRAM FOR MALIGNANT NEOPLASM OF BREAST: ICD-10-CM

## 2018-06-13 PROCEDURE — 3075F SYST BP GE 130 - 139MM HG: CPT | Mod: CPTII,S$GLB,, | Performed by: SURGERY

## 2018-06-13 PROCEDURE — 3078F DIAST BP <80 MM HG: CPT | Mod: CPTII,S$GLB,, | Performed by: SURGERY

## 2018-06-13 PROCEDURE — 99999 PR PBB SHADOW E&M-EST. PATIENT-LVL III: CPT | Mod: PBBFAC,,, | Performed by: SURGERY

## 2018-06-13 PROCEDURE — 99213 OFFICE O/P EST LOW 20 MIN: CPT | Mod: S$GLB,,, | Performed by: SURGERY

## 2018-06-13 NOTE — PROGRESS NOTES
"Date of Service:6/13/2018    REFERRING PHYSICIAN:  No referring provider defined for this encounter.       Shelby Ley MD    MEDICAL ONCOLOGIST:    Dr. Parr  RADIATION ONCOLOGIST:   [facility]      TREATMENT SUMMARY:  The patient is status post left partial mastectomy and sentinel node biopsy on 12/13/2017.  Final pathology showed 6mm IDC, 1/2 LN with 1mm micromet. 1mm anterior margin.    DIAGNOSIS:   This is a 77 y.o. female with a history of stage 1A T1b(6mm)N1miM0, grade 1, ER +, MA +, HER2 - IDC of the left breast.    TREATMENT:   1. Left partial mastectomy with sentinel node biopsy on 12/13/2018. Ashli Caldera M.D. Surgical Oncology  2. Radiation therapy completed 3/13/2018. Dr. Kenney Wray M.D. Radiation Oncology   3. Adjuvant endocrine therapy (letrozole) started on 4/2018. Deborah Parr M.D. Medical Oncology     HISTORY OF PRESENT ILLNESS:   Joel Condon is a 77 y.o. female comes in for oncological follow up.  She denies change in her breast self-exam specifically denying new masses, skin or nipple changes, or nipple discharge. Past medical and surgical history is updated with new changes. There have been no changes to family history. The patient denies constitutional symptoms of night sweats, weight loss, new headaches, visual changes, new back or bony pain, chest pain, or shortness of breath.  Does reports some joint pains.    IMAGING:   Due in November 2018     MEDICATIONS/ALLERGIES:     Review of patient's allergies indicates:   Allergen Reactions    Lisinopril Other (See Comments)     Cough      Hydrochlorothiazide (bulk) Other (See Comments)     Elevated pt's blood pressure making her feel bad    Pravastatin Other (See Comments)     headaches       PHYSICAL EXAM:   /60 (BP Location: Right arm, Patient Position: Sitting, BP Method: Large (Manual))   Pulse 65   Temp 98.1 °F (36.7 °C) (Oral)   Ht 5' 4" (1.626 m)   Wt 77.3 kg (170 lb 6.7 oz)   BMI 29.25 kg/m²   General: " The patient appears well and is in no acute distress.   Neuro: Alert & oriented x3.   Breasts: The exam was done with the patient seated and supine. Left breast - within normal limits. No palpable masses and no abnormal skin or nipple findings. No supraclavicular or axillary lymphadenopathy on the left side. Well healed incision.  Right breast - within normal limits. No palpable masses and no abnormal skin or nipple findings. No supraclavicular or axillary lymphadenopathy on the right side.  Pulmonary: nonlabored breathing  Extremities: Bilateral full arm range of motion without  lymphedema.     ASSESSMENT:   This is a 77 y.o. female without evidence of recurrence by exam, history or imaging.       PLAN:   1. Continue to follow up with Dr. Parr.  2. Continue monthly self breast exams and call the clinic with any changes or problems.  3. Mammogram in November 2018.  4. Return to clinic in 6 months. The patient is in agreement with the plan. Questions were encouraged and answered to patient's satisfaction. Joel will call our office with any questions or concerns.   5. Discussed tumeric for joint pain

## 2018-06-15 ENCOUNTER — OFFICE VISIT (OUTPATIENT)
Dept: CARDIOLOGY | Facility: CLINIC | Age: 78
End: 2018-06-15
Payer: MEDICARE

## 2018-06-15 VITALS
OXYGEN SATURATION: 94 % | HEART RATE: 67 BPM | DIASTOLIC BLOOD PRESSURE: 60 MMHG | SYSTOLIC BLOOD PRESSURE: 156 MMHG | BODY MASS INDEX: 28.68 KG/M2 | HEIGHT: 64 IN | RESPIRATION RATE: 15 BRPM | WEIGHT: 168 LBS

## 2018-06-15 DIAGNOSIS — G47.33 OSA (OBSTRUCTIVE SLEEP APNEA): ICD-10-CM

## 2018-06-15 DIAGNOSIS — E78.5 HYPERLIPIDEMIA LDL GOAL <100: ICD-10-CM

## 2018-06-15 DIAGNOSIS — E66.3 OVERWEIGHT (BMI 25.0-29.9): ICD-10-CM

## 2018-06-15 DIAGNOSIS — I10 ESSENTIAL HYPERTENSION: Primary | ICD-10-CM

## 2018-06-15 PROCEDURE — 99214 OFFICE O/P EST MOD 30 MIN: CPT | Mod: S$GLB,,, | Performed by: INTERNAL MEDICINE

## 2018-06-15 PROCEDURE — 99999 PR PBB SHADOW E&M-EST. PATIENT-LVL III: CPT | Mod: PBBFAC,,, | Performed by: INTERNAL MEDICINE

## 2018-06-15 PROCEDURE — 93000 ELECTROCARDIOGRAM COMPLETE: CPT | Mod: S$GLB,,, | Performed by: INTERNAL MEDICINE

## 2018-06-15 PROCEDURE — 3077F SYST BP >= 140 MM HG: CPT | Mod: CPTII,S$GLB,, | Performed by: INTERNAL MEDICINE

## 2018-06-15 PROCEDURE — 3078F DIAST BP <80 MM HG: CPT | Mod: CPTII,S$GLB,, | Performed by: INTERNAL MEDICINE

## 2018-06-15 NOTE — PROGRESS NOTES
CARDIOVASCULAR PROGRESS NOTE    REASON FOR CONSULT:   Joel Condon is a 77 y.o. female who presents for follow up of HTN.    PCP: Av  HISTORY OF PRESENT ILLNESS:   Last seen 11/2017.    The patient comes in for follow-up.  In the interim since her last visit, she underwent surgery for breast cancer, and apparently has been cured.  She denies intercurrent angina or dyspnea.  She's had no palpitations, lightheadedness, dizziness, or syncope.  There is been no PND, orthopnea, or lower extremity edema.  She denies melena, hematuria, or claudicant symptoms.  She tells me that her blood pressure is normal at home, but cannot give me absolute figures.  Her blood pressures uncontrolled in the office today.  I discussed the need to add an additional medicine, as well as consider evaluation for sleep apnea as previously ordered.  The patient is somewhat resistant to the addition of an additional antihypertensive, and I've asked her to monitor her home blood pressure and bring a log in to her next visit, as well as bring the cuff in to the next visit to assess for accuracy.    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 Arce)    CATARACT EXTRACTION W/  INTRAOCULAR LENS IMPLANT Right 06/12/2014    OD (Dr. Arce)    CHOLECYSTECTOMY      COLONOSCOPY N/A 4/21/2017    Procedure: COLONOSCOPY;  Surgeon:  "Homar Payan MD;  Location: [facility];  Service: Endoscopy;  Laterality: N/A;    EYE SURGERY  04/24/2014 & 06/12/2014    HYSTERECTOMY      total    left eye cataract surgery  4/24/14    OOPHORECTOMY         ALLERGIES AND MEDICATION:     Review of patient's allergies indicates:   Allergen Reactions    Lisinopril Other (See Comments)     Cough      Pravastatin Other (See Comments)     headaches     Previous Medications    ASPIRIN (ECOTRIN) 81 MG EC TABLET    Take 1 tablet (81 mg total) by mouth once daily.    ATORVASTATIN (LIPITOR) 10 MG TABLET    TAKE 1 TABLET(10 MG) BY MOUTH EVERY DAY    BD INSULIN PEN NEEDLE UF SHORT 31 GAUGE X 5/16" NDLE    USE THREE TIMES DAILY AS DIRECTED    BLOOD SUGAR DIAGNOSTIC STRP    True Results; Test tid    INSULIN DETEMIR (LEVEMIR) 100 UNIT/ML INJECTION    ADMINISTER 40 UNITS UNDER THE SKIN EVERY EVENING    INSULIN SYRINGE-NEEDLE U-100 1 ML 31 GAUGE X 5/16 SYRG    USE THREE TIMES DAILY AS DIRECTED    INSULIN SYRINGE-NEEDLE U-100 1/2 ML 30 SYRG    USE NIGHTLY    INSULIN SYRINGE-NEEDLE U-100 1/2 ML 31 X 5/16" SYRG    1 Device by Misc.(Non-Drug; Combo Route) route nightly.    LANCETS 26 GAUGE MISC    1 lancet by Misc.(Non-Drug; Combo Route) route 3 (three) times daily.    LETROZOLE (FEMARA) 2.5 MG TAB    TAKE 1 TABLET(2.5 MG) BY MOUTH EVERY DAY    LEVOTHYROXINE (SYNTHROID) 50 MCG TABLET    TAKE 1 TABLET(50 MCG) BY MOUTH EVERY DAY    LOSARTAN (COZAAR) 100 MG TABLET    TAKE 1 TABLET BY MOUTH DAILY    METFORMIN (GLUCOPHAGE-XR) 500 MG 24 HR TABLET    TAKE 2 TABLETS(1000 MG) BY MOUTH TWICE DAILY WITH MEALS    METOPROLOL SUCCINATE (TOPROL-XL) 200 MG 24 HR TABLET    TAKE 1 TABLET BY MOUTH DAILY    NIFEDIPINE (PROCARDIA-XL) 90 MG (OSM) 24 HR TABLET    TAKE 1 TABLET(90 MG) BY MOUTH EVERY DAY    NOVOLOG FLEXPEN U-100 INSULIN 100 UNIT/ML INPN PEN    ADMINISTER 28 UNITS UNDER THE SKIN THREE TIMES DAILY WITH MEALS    OMEPRAZOLE (PRILOSEC) 20 MG CAPSULE    TAKE 1 CAPSULE(20 MG) BY MOUTH DAILY AS " "NEEDED FOR HEARTBURN. TAKE ONLY AS NEEDED    PEN NEEDLE, DIABETIC (BD INSULIN PEN NEEDLE UF SHORT) 31 GAUGE X 5/16" NDLE    Use 3 times daily    SPIRONOLACTONE (ALDACTONE) 50 MG TABLET    Take 1 tablet (50 mg total) by mouth once daily.    SSD 1 % CREAM    CHRISTINE TOPICALLY AA ONCE TO BID       SOCIAL HISTORY:     Social History     Social History    Marital status:      Spouse name: N/A    Number of children: 3    Years of education: N/A     Occupational History    retired      Social History Main Topics    Smoking status: Never Smoker    Smokeless tobacco: Never Used    Alcohol use No    Drug use: No    Sexual activity: Not Currently     Partners: Male     Other Topics Concern    Not on file     Social History Narrative    No narrative on file       FAMILY HISTORY:     Family History   Problem Relation Age of Onset    Diabetes Mother     Heart disease Mother     Hypertension Mother     Diabetes Sister     Hypertension Sister     Diabetes Sister     Hypertension Sister     Diabetes Sister     Hypertension Sister     Thyroid disease Sister     Stroke Sister     Hypertension Sister     Diabetes Sister     Heart disease Sister     Diabetes Brother     Diabetes Brother     Amblyopia Neg Hx     Blindness Neg Hx     Cataracts Neg Hx     Glaucoma Neg Hx     Macular degeneration Neg Hx     Retinal detachment Neg Hx     Strabismus Neg Hx        REVIEW OF SYSTEMS:   Review of Systems   Constitutional: Negative for chills, diaphoresis and fever.   HENT: Negative for nosebleeds.    Eyes: Negative for blurred vision, double vision and photophobia.   Respiratory: Negative for hemoptysis, shortness of breath and wheezing.    Cardiovascular: Negative for chest pain, palpitations, orthopnea, claudication, leg swelling and PND.   Gastrointestinal: Negative for abdominal pain, blood in stool, heartburn, melena, nausea and vomiting.   Genitourinary: Negative for flank pain and hematuria. " "  Musculoskeletal: Negative for falls, joint pain, myalgias and neck pain.   Skin: Negative for rash.   Neurological: Negative for dizziness, seizures, loss of consciousness, weakness and headaches.   Endo/Heme/Allergies: Negative for polydipsia. Does not bruise/bleed easily.   Psychiatric/Behavioral: Negative for depression and memory loss. The patient is not nervous/anxious.        PHYSICAL EXAM:     Vitals:    06/15/18 1259   BP: (!) 156/60   Pulse: 67   Resp: 15    Body mass index is 28.84 kg/m².  Weight: 76.2 kg (167 lb 15.9 oz)   Height: 5' 4" (162.6 cm)     Physical Exam   Constitutional: She is oriented to person, place, and time. She appears well-developed and well-nourished. She is cooperative.  Non-toxic appearance. No distress.   HENT:   Head: Normocephalic and atraumatic.   Eyes: Conjunctivae and EOM are normal. Pupils are equal, round, and reactive to light. No scleral icterus.   Neck: Trachea normal and normal range of motion. Neck supple. Normal carotid pulses and no JVD present. Carotid bruit is not present. No neck rigidity. No edema present. No thyromegaly present.   Cardiovascular: Normal rate, regular rhythm, S1 normal and S2 normal.  PMI is not displaced.  Exam reveals no gallop and no friction rub.    No murmur heard.  Pulses:       Carotid pulses are 2+ on the right side, and 2+ on the left side.  Pulmonary/Chest: Effort normal and breath sounds normal. No respiratory distress. She has no wheezes. She has no rales. She exhibits no tenderness.   Abdominal: Soft. Bowel sounds are normal. She exhibits no distension and no mass. There is no hepatosplenomegaly. There is no tenderness.   Musculoskeletal: She exhibits no edema or tenderness.   Feet:   Right Foot:   Skin Integrity: Negative for ulcer.   Left Foot:   Skin Integrity: Negative for ulcer.   Neurological: She is alert and oriented to person, place, and time. No cranial nerve deficit.   Skin: Skin is warm and dry. No rash noted. No " erythema.   Psychiatric: She has a normal mood and affect. Her speech is normal and behavior is normal.   Vitals reviewed.      DATA:   EKG: (personally reviewed tracing)  6/15/18 SR 64, NSSTTW changes, similar to 12/13/17    Laboratory:  CBC:    Recent Labs  Lab 12/07/17  0945 02/09/18  0839 04/19/18  0723   WHITE BLOOD CELL COUNT 8.64 10.18 8.60   HEMOGLOBIN 12.7 12.8 12.2   HEMATOCRIT 37.0 39.4 37.9   PLATELETS 261 304 281       CHEMISTRIES:    Recent Labs  Lab 02/09/18  0735 04/19/18  0723 04/25/18  0719   GLUCOSE 146 H 204 H 150 H   SODIUM 141 139 141   POTASSIUM 3.5 4.5 4.3   BUN BLD 13 27 H 16   CREATININE 0.9 1.2 1.0   EGFR IF  >60.0 50.7 A >60.0   EGFR IF NON- >60.0 44.0 A 54.9 A   CALCIUM 10.1 9.8 9.9       CARDIAC BIOMARKERS:        COAGS:        LIPIDS/LFTS:    Recent Labs  Lab 06/15/16  0844 03/14/17  0722  02/09/18  0735 04/19/18  0723 04/25/18  0719   CHOLESTEROL 139 144  --   --   --  113 L   TRIGLYCERIDES 114 112  --   --   --  78   HDL 53 48  --   --   --  48   LDL CHOLESTEROL 63.2 73.6  --   --   --  49.4 L   NON-HDL CHOLESTEROL 86 96  --   --   --  65   AST 20 19  < > 18 20 22   ALT 20 20  < > 18 18 16   < > = values in this interval not displayed.    Cardiovascular Testing:  Renal art US 10/25/17  Elevated peak systolic velocity ratio of 147 cm/sec in the proximal right renal artery with no secondary findings to suggest renal artery stenosis. Short-term followup suggested.  Borderline elevation of the renal resistive indices (0.79-0.80).  No evidence of obstructive uropathy.    Holter 3/28/16:  PREDOMINANT RHYTHM  1. Sinus rhythm with heart rates varying between 65 and 111 bpm with an average of 80 bpm.   VENTRICULAR ARRHYTHMIAS  1. There were occasional PVCs totalling 697 and averaging 29 per hour. There was 1 couplet.  2. There were no episodes of ventricular tachycardia.  SUPRA VENTRICULAR ARRHYTHMIAS  1. There were very rare PACs recorded totalling 1 and  averaging less than 1 per hour.   2. There were no episodes of sustained supraventricular tachycardia.  SINUS NODE FUNCTION  1. There was no evidence of high grade SA cristian block.   AV CONDUCTION  1. There was no evidence of high grade AV block.   DIARY  1. The diary was not returned  MISCELLANEOUS  1. There were occasional hookup related artifacts. Overall, the study was of good quality.   2. This was a tape of adequate length (24 hrs).    Echo 3/28/16:  1 - Normal left ventricular systolic function (EF 60-65%). Normal wall motion.   2 - Concentric remodeling.   3 - Trivial tricuspid regurgitation.   4 - The estimated PA systolic pressure is 27 mmHg.     Ex-MPI 3/28/16   Jignesh 2min, 86% MPHR, -CP/EKG  Nuclear Quantitative Functional Analysis:   Quantitative measurements of LV function are over estimated secondary to small ventricular volumes.   Visually estimated LV function is hyperdynamic.   Impression: NORMAL MYOCARDIAL PERFUSION  1. The perfusion scan is free of evidence for myocardial ischemia or injury.   2. Resting wall motion is physiologic.   3. Visually estimated LV function is hyperdynamic.   4. The ventricular volumes are normal at rest and stress.   5. The extracardiac distribution of radioactivity is normal.    ASSESSMENT:   # HTN, uncontrolled.  Pt prev intol of HCTZ and somewhat resistent to starting an additional BP med.  # DM  # HLP, on atorva 10mg  # BMI 29, down 1 unit vs last OV    PLAN:   Cont med rx  Diet/exercise/weight loss  PASCUAL eval reordered  Pt to bring BP log and cuff to next OV  RTC 3 months or sooner if BP >140/90 persistently  Pt to follow up with PCP/NP in next few weeks for reassessment of BP  Consider addition of hydralazine or chlorthalidone as next agent      Juan Pablo Ordoñez MD, FACC

## 2018-06-15 NOTE — LETTER
Ana 15, 2018      Shelby Ley MD  [address]  [place] [address]           [facility]  [address] [facility]  [place] [address]  Phone: [phone]          Patient: Joel Condon   MR Number: [id number]   YOB: 1940   Date of Visit: 6/15/2018       Dear Dr. Shelby Ley:    Thank you for referring Joel Condon to me for evaluation. Attached you will find relevant portions of my assessment and plan of care.    If you have questions, please do not hesitate to call me. I look forward to following Joel Condon along with you.    Sincerely,    Juan Pablo Ordoñez MD    Enclosure  CC:  No Recipients    If you would like to receive this communication electronically, please contact [email or web address] or [phone] to request more information on [facility] [employer] access.    For providers and/or their staff who would like to refer a patient to [facility], please contact us through our one-stop-shop provider referral line, [facility], at [phone].    If you feel you have received this communication in error or would no longer like to receive these types of communications, please e-mail [email or web address]

## 2018-06-22 ENCOUNTER — LAB VISIT (OUTPATIENT)
Dept: LAB | Facility: HOSPITAL | Age: 78
End: 2018-06-22
Attending: INTERNAL MEDICINE
Payer: MEDICARE

## 2018-06-22 DIAGNOSIS — M85.80 OSTEOPENIA, UNSPECIFIED LOCATION: ICD-10-CM

## 2018-06-22 DIAGNOSIS — C50.912 MALIGNANT NEOPLASM OF LEFT FEMALE BREAST, UNSPECIFIED ESTROGEN RECEPTOR STATUS, UNSPECIFIED SITE OF BREAST: ICD-10-CM

## 2018-06-22 LAB
25(OH)D3+25(OH)D2 SERPL-MCNC: 33 NG/ML
ALBUMIN SERPL BCP-MCNC: 3.5 G/DL
ALP SERPL-CCNC: 57 U/L
ALT SERPL W/O P-5'-P-CCNC: 13 U/L
ANION GAP SERPL CALC-SCNC: 9 MMOL/L
AST SERPL-CCNC: 19 U/L
BASOPHILS # BLD AUTO: 0.07 K/UL
BASOPHILS NFR BLD: 0.9 %
BILIRUB SERPL-MCNC: 0.5 MG/DL
BUN SERPL-MCNC: 17 MG/DL
CALCIUM SERPL-MCNC: 9.7 MG/DL
CHLORIDE SERPL-SCNC: 111 MMOL/L
CO2 SERPL-SCNC: 22 MMOL/L
CREAT SERPL-MCNC: 1 MG/DL
DIFFERENTIAL METHOD: ABNORMAL
EOSINOPHIL # BLD AUTO: 0.5 K/UL
EOSINOPHIL NFR BLD: 6.4 %
ERYTHROCYTE [DISTWIDTH] IN BLOOD BY AUTOMATED COUNT: 15 %
EST. GFR  (AFRICAN AMERICAN): >60 ML/MIN/1.73 M^2
EST. GFR  (NON AFRICAN AMERICAN): 54.9 ML/MIN/1.73 M^2
GLUCOSE SERPL-MCNC: 116 MG/DL
HCT VFR BLD AUTO: 35.1 %
HGB BLD-MCNC: 11.7 G/DL
IMM GRANULOCYTES # BLD AUTO: 0.02 K/UL
IMM GRANULOCYTES NFR BLD AUTO: 0.3 %
LYMPHOCYTES # BLD AUTO: 2.4 K/UL
LYMPHOCYTES NFR BLD: 31.5 %
MCH RBC QN AUTO: 31.6 PG
MCHC RBC AUTO-ENTMCNC: 33.3 G/DL
MCV RBC AUTO: 95 FL
MONOCYTES # BLD AUTO: 0.6 K/UL
MONOCYTES NFR BLD: 8 %
NEUTROPHILS # BLD AUTO: 3.9 K/UL
NEUTROPHILS NFR BLD: 52.9 %
NRBC BLD-RTO: 0 /100 WBC
PLATELET # BLD AUTO: 291 K/UL
PMV BLD AUTO: 11.2 FL
POTASSIUM SERPL-SCNC: 4.3 MMOL/L
PROT SERPL-MCNC: 7.1 G/DL
RBC # BLD AUTO: 3.7 M/UL
SODIUM SERPL-SCNC: 142 MMOL/L
WBC # BLD AUTO: 7.46 K/UL

## 2018-06-22 PROCEDURE — 85025 COMPLETE CBC W/AUTO DIFF WBC: CPT

## 2018-06-22 PROCEDURE — 82306 VITAMIN D 25 HYDROXY: CPT

## 2018-06-22 PROCEDURE — 80053 COMPREHEN METABOLIC PANEL: CPT

## 2018-06-22 PROCEDURE — 36415 COLL VENOUS BLD VENIPUNCTURE: CPT | Mod: PO

## 2018-06-27 ENCOUNTER — OFFICE VISIT (OUTPATIENT)
Dept: HEMATOLOGY/ONCOLOGY | Facility: CLINIC | Age: 78
End: 2018-06-27
Payer: MEDICARE

## 2018-06-27 VITALS
WEIGHT: 168 LBS | TEMPERATURE: 98 F | SYSTOLIC BLOOD PRESSURE: 138 MMHG | BODY MASS INDEX: 28.68 KG/M2 | HEIGHT: 64 IN | DIASTOLIC BLOOD PRESSURE: 81 MMHG | OXYGEN SATURATION: 95 % | HEART RATE: 62 BPM

## 2018-06-27 DIAGNOSIS — C50.912 MALIGNANT NEOPLASM OF LEFT FEMALE BREAST, UNSPECIFIED ESTROGEN RECEPTOR STATUS, UNSPECIFIED SITE OF BREAST: Primary | ICD-10-CM

## 2018-06-27 DIAGNOSIS — C50.912 MALIGNANT NEOPLASM OF LEFT FEMALE BREAST, UNSPECIFIED ESTROGEN RECEPTOR STATUS, UNSPECIFIED SITE OF BREAST: ICD-10-CM

## 2018-06-27 DIAGNOSIS — Z51.81 ENCOUNTER FOR MONITORING AROMATASE INHIBITOR THERAPY: ICD-10-CM

## 2018-06-27 DIAGNOSIS — M85.80 OSTEOPENIA, UNSPECIFIED LOCATION: ICD-10-CM

## 2018-06-27 DIAGNOSIS — Z79.811 ENCOUNTER FOR MONITORING AROMATASE INHIBITOR THERAPY: ICD-10-CM

## 2018-06-27 PROCEDURE — 3079F DIAST BP 80-89 MM HG: CPT | Mod: CPTII,S$GLB,, | Performed by: INTERNAL MEDICINE

## 2018-06-27 PROCEDURE — 99214 OFFICE O/P EST MOD 30 MIN: CPT | Mod: S$GLB,,, | Performed by: INTERNAL MEDICINE

## 2018-06-27 PROCEDURE — 3075F SYST BP GE 130 - 139MM HG: CPT | Mod: CPTII,S$GLB,, | Performed by: INTERNAL MEDICINE

## 2018-06-27 PROCEDURE — 99999 PR PBB SHADOW E&M-EST. PATIENT-LVL IV: CPT | Mod: PBBFAC,,, | Performed by: INTERNAL MEDICINE

## 2018-06-27 RX ORDER — LETROZOLE 2.5 MG/1
2.5 TABLET, FILM COATED ORAL DAILY
Qty: 30 TABLET | Refills: 0 | Status: SHIPPED | OUTPATIENT
Start: 2018-06-27 | End: 2018-08-03 | Stop reason: SDUPTHER

## 2018-06-27 NOTE — PROGRESS NOTES
Subjective:       Patient ID: Joel Condon is a 77 y.o. female.    Chief Complaint: Follow-up    HPI   Diagnosis : Left Breast CA, IDC  S/p  left partial mastectomy and SLNB 2017. [id number] Stage 1B,grade 1, ER/DE pos [id number] negative  , closest margin anteriorly at 1mm      HPI: Patient  is a 77 y.o. postmenopausal female seen today for recently diagnosed carcinoma of the left breast. She had an abnormal mammogram first noted 10/27/2017. Follow-up mammogram and ultrasound (17) showed 6mm mass in the 2OC left breast with enlarged axillary LN measuring 17mm boderline.She underwent an  ultrasound guided biopsy  on 2017 with pathology revealing infiltrating ductal carcinoma of the breast, Grade 1, ER positive 100% ,DE positive 100% Her-2neu negative.  The patient is status post left partial mastectomy and sentinel node biopsy on 2017.  Final pathology showed 6mm IDC, 1 of 2  LN with 1mm micromet. 1mm anterior margin. She does not routinely do self breast exams. She  has not noted any skin  changes on breast exam. No  nipple discharge. No history of  previous breast biopsies. No  personal history of breast cancer or ovarian cancer.     She is completed  postoperative RT under the direction of Dr. Wray on 3/13/2018    She is taking adjuvant Letrozole daily    Today, she is doing well except for mild arthralgias-stable  No new issues  Appetite and weight stable  No SOB/CP   No back pain             GYN History: Age of menarche was 14. Age of menopause was 46.  Patient reports hormonal therapy for less than 5 years. Patient is . Age of first live birth was 17. Patient did breast feed for 2 years 8 months.         Past Medical History:   Diagnosis Date    Arthritis     Cataract     Diabetes mellitus     Diabetic retinopathy     Hyperlipidemia LDL goal < 100     Hypertension     Hypothyroidism     Osteoporosis, post-menopausal     Overweight(278.02)     Proteinuria     Type II or  "unspecified type diabetes mellitus with renal manifestations, uncontrolled(250.42)        Past Surgical History:   Procedure Laterality Date    APPENDECTOMY      BREAST SURGERY Left 12/13/2017    tumor removal    CATARACT EXTRACTION W/  INTRAOCULAR LENS IMPLANT Left 4/24/14    OS (Dr. Arce)    CATARACT EXTRACTION W/  INTRAOCULAR LENS IMPLANT Right 06/12/2014    OD (Dr. Arce)    CHOLECYSTECTOMY      COLONOSCOPY N/A 4/21/2017    Procedure: COLONOSCOPY;  Surgeon: Homar Payan MD;  Location: [facility];  Service: Endoscopy;  Laterality: N/A;    EYE SURGERY  04/24/2014 & 06/12/2014    HYSTERECTOMY      total    left eye cataract surgery  4/24/14    OOPHORECTOMY       Current MEDS; Reviewed and as per MEDCHART    Review of Systems   Constitutional: Negative for appetite change, fatigue, fever and unexpected weight change.   HENT: Negative for mouth sores.    Eyes: Negative for visual disturbance.   Respiratory: Negative for cough and shortness of breath.    Cardiovascular: Negative for chest pain.   Gastrointestinal: Negative for abdominal pain and diarrhea.   Genitourinary: Negative for frequency.   Musculoskeletal: Positive for arthralgias. Negative for back pain.   Skin: Negative for rash.   Neurological: Negative for headaches.   Hematological: Negative for adenopathy.   Psychiatric/Behavioral: The patient is not nervous/anxious.        Objective:       Vitals:    06/27/18 0958   BP: 138/81   BP Location: Right arm   Patient Position: Sitting   BP Method: Medium (Automatic)   Pulse: 62   Temp: 98.3 °F (36.8 °C)   TempSrc: Oral   SpO2: 95%   Weight: 76.2 kg (167 lb 15.9 oz)   Height: 5' 4" (1.626 m)       Physical Exam   Constitutional: She is oriented to person, place, and time. She appears well-developed and well-nourished.   HENT:   Head: Normocephalic.   Mouth/Throat: Oropharynx is clear and moist. No oropharyngeal exudate.   Eyes: Conjunctivae and lids are normal. Pupils are equal, round, and " reactive to light. No scleral icterus.   Neck: Normal range of motion. Neck supple. No thyromegaly present.   Cardiovascular: Normal rate, regular rhythm and normal heart sounds.    No murmur heard.  Pulmonary/Chest: Breath sounds normal. She has no wheezes. She has no rales.   Left breast- hyperpigmentation noted at RT site    Abdominal: Soft. Bowel sounds are normal. She exhibits no distension and no mass. There is no hepatosplenomegaly. There is no tenderness. There is no rebound and no guarding.   Musculoskeletal: Normal range of motion. She exhibits no edema or tenderness.   Lymphadenopathy:     She has no cervical adenopathy.     She has no axillary adenopathy.        Right: No supraclavicular adenopathy present.        Left: No supraclavicular adenopathy present.   Neurological: She is alert and oriented to person, place, and time. No cranial nerve deficit. Coordination normal.   Skin: Skin is warm and dry. No ecchymosis, no petechiae and no rash noted. No erythema.   Psychiatric: She has a normal mood and affect.         FINAL PATHOLOGIC DIAGNOSIS 11/21/2017  1. Left breast mass 2:00:  Invasive mammary carcinoma, grade 1 (Geronimo score: Tubule formation 2, nuclear pleomorphism 2, mitotic  activity 1, total score 5), occupying at least 5 mm in one core.  2. Left breast axilla (lymph node):  Benign lymphoid tissue with no evidence of metastatic disease.  Note: Receptor studies and immunohistochemical studies will be performed with supplemental report to follow.  Intradepartmental QC/review obtained. Dr. Neil Irvin concurs with the above diagnostic impressions.    Supplemental Diagnosis  HORMONE RECEPTOR STUDIES:  Virtually 100% of the cells of the carcinoma are strongly nuclear estrogen receptor positive. 60 percent of the cells  are strongly nuclear progesterone receptor positive. There is an abrupt transition from the zone of nuclear  progesterone receptor positive cells to the zone where this receptor is  negative. It is possible that there has been  some technical artifact which has led a region of the tissue to not stain, and that actually the vast majority of the  tumor are nuclear progesterone receptor positive. The tumor is HER-2 negative. The positive and negative controls  stained appropriately.        FINAL PATHOLOGIC DIAGNOSIS 12/13/2017   Part 1  Lymph node (1, submitted as left sentinel lymph node count equals 60):  -No evidence of malignancy  Part 2  Lymph node (1, submitted as left sentinel lymph node count equals 20):  -No evidence of malignancy  Part 3  Breast excision (submitted as left partial mastectomy):  -Invasive, well differentiated (grade I of III) lobular carcinoma  -Carcinoma is a single focus measuring 0.6 x 0.65 cm (6 x 6.5 mm) located 1 mm from the nearest, anterior  resection margin. All resection margins are free of malignancy.  -No tumor necrosis, hemosiderin vascular or lymphatic permeation, or perineural involvement is identified.  -A microscopic focus of in situ carcinoma is identified immediately adjacent to the invasive tumor focus  -Carcinoma is graded I of III according to the Melissa modification of the Arriaga-Benson grading scheme  with 2 points each assigned for moderate tubule formation and moderate nuclear pleomorphism and 1.4  minimal mitotic count, a total of 5 of 9 possible points.  -AJCC TN: pT pN0(sn)  -Complete AJCC Staging Form follows:  INVASIVE CARCINOMA OF THE BREAST  Procedure: Partial mastectomy  Node sampling: [place] lymph nodes  Specimen laterality: Left  Tumor site: Not specified  Tumor size: 6.5 x 6.0 mm  Histologic type: Invasive lobular carcinoma  Histologic grade  -Glandular differentiation: Score 2  -Nuclear pleomorphism: Score 2  -Mitotic rate: Score 1  -Overall grade: Score 1  Tumor focality: Single focus of invasive carcinoma  Ductal carcinoma in situ (DCIS): No DCIS present  Margins-invasive carcinoma: Margins uninvolved by invasive  carcinoma  -Distance from closest margin: 1 mm, anterior  Lymph nodes  -Total number of lymph nodes examined (sentinel and non-sentinel): 2  -Number sentinel lymph node nodes examined: 2  Pathologic staging  -Primary tumor: pT1b pN0(sn)  -Regional lymph nodes  Modifier: (SN)  Category: pN0  Ancillary studies: E-cadherin negative in tumor cells        Bone Density 2018   Compared to the young normal reference, this study indicates osteopenia of the Lumbar spine and left hip with osteoporosis of the right hip as detailed above.  Compared to prior the bone mineral density of the lumbar spine and left hip has slightly improved with reduced bone mineral density of the right hip as detailed above.    Note: Degenerative changes can falsely elevate measured bone mineral density, particularly in the lumbar spine, and should be considered as part of the final clinical recommendation    Results for JONG VALENTIN (MRN [id number]) as of 6/27/2018 10:08   Ref. Range 6/22/2018 07:51   Vit D, 25-Hydroxy Latest Ref Range: 30 - 96 ng/mL 33       Assessment:       1. Malignant neoplasm of left female breast, unspecified estrogen receptor status, unspecified site of breast    2. Encounter for monitoring aromatase inhibitor therapy    3. Osteopenia/osteoporosis unspecified location        Plan:     1-3   ECOG 0   76 y/o with multiple medical diagnoses with Stage 1B pT1b (6mm) N1mi M0 grade 1 ER + OH + [id number] IDC carcinoma  of the left breast status post left partial mastectomy and SLNB 12/13/2017  ER + OH + [id number]  12/13/2017.  1 of 2 lymph nodes positive for micromet. Closest margin anteriorly 1mm.  She is Stage IA per AJCC 8th ed. pathologic prognostic staging system.   S/p  postoperative RT completed 3/13/2018  Cont Vit D supp  Bone Density 2018- osteopenia/osteoporosis  Cont letrozole 2.5 mg daily  PROLIA 5/2018   No Dental Clearance indicated ( dentures)      PROLIA q 6mos     All questions posed answered to patient's  satisfaction    Follow-up  2 mo      Cc Ashli Caldera M.D.         Kenney Wray M.D.

## 2018-06-28 RX ORDER — INSULIN ASPART 100 [IU]/ML
INJECTION, SOLUTION INTRAVENOUS; SUBCUTANEOUS
Qty: 30 ML | Refills: 2 | Status: SHIPPED | OUTPATIENT
Start: 2018-06-28 | End: 2018-10-10 | Stop reason: SDUPTHER

## 2018-06-29 ENCOUNTER — ANESTHESIA (OUTPATIENT)
Dept: ENDOSCOPY | Facility: HOSPITAL | Age: 78
End: 2018-06-29
Payer: MEDICARE

## 2018-06-29 ENCOUNTER — HOSPITAL ENCOUNTER (OUTPATIENT)
Facility: HOSPITAL | Age: 78
Discharge: HOME | End: 2018-06-29
Attending: INTERNAL MEDICINE | Admitting: INTERNAL MEDICINE
Payer: MEDICARE

## 2018-06-29 ENCOUNTER — ANESTHESIA EVENT (OUTPATIENT)
Dept: ENDOSCOPY | Facility: HOSPITAL | Age: 78
End: 2018-06-29
Payer: MEDICARE

## 2018-06-29 VITALS
DIASTOLIC BLOOD PRESSURE: 72 MMHG | SYSTOLIC BLOOD PRESSURE: 140 MMHG | HEIGHT: 64 IN | HEART RATE: 62 BPM | OXYGEN SATURATION: 97 % | RESPIRATION RATE: 18 BRPM | BODY MASS INDEX: 28.51 KG/M2 | TEMPERATURE: 98 F | WEIGHT: 167 LBS

## 2018-06-29 DIAGNOSIS — Z12.11 COLON CANCER SCREENING: ICD-10-CM

## 2018-06-29 LAB — POCT GLUCOSE: 169 MG/DL (ref 70–110)

## 2018-06-29 PROCEDURE — 25000003 PHARM REV CODE 250: Performed by: NURSE ANESTHETIST, CERTIFIED REGISTERED

## 2018-06-29 PROCEDURE — 27201089 HC SNARE, DISP (ANY): Performed by: INTERNAL MEDICINE

## 2018-06-29 PROCEDURE — 82962 GLUCOSE BLOOD TEST: CPT | Performed by: INTERNAL MEDICINE

## 2018-06-29 PROCEDURE — 45385 COLONOSCOPY W/LESION REMOVAL: CPT | Performed by: INTERNAL MEDICINE

## 2018-06-29 PROCEDURE — 25000003 PHARM REV CODE 250: Performed by: ANESTHESIOLOGY

## 2018-06-29 PROCEDURE — 88305 TISSUE EXAM BY PATHOLOGIST: CPT | Mod: 59 | Performed by: PATHOLOGY

## 2018-06-29 PROCEDURE — 37000009 HC ANESTHESIA EA ADD 15 MINS: Performed by: INTERNAL MEDICINE

## 2018-06-29 PROCEDURE — 37000008 HC ANESTHESIA 1ST 15 MINUTES: Performed by: INTERNAL MEDICINE

## 2018-06-29 PROCEDURE — 88305 TISSUE EXAM BY PATHOLOGIST: CPT | Mod: 26,,, | Performed by: PATHOLOGY

## 2018-06-29 PROCEDURE — 27201012 HC FORCEPS, HOT/COLD, DISP: Performed by: INTERNAL MEDICINE

## 2018-06-29 PROCEDURE — 63600175 PHARM REV CODE 636 W HCPCS: Performed by: NURSE ANESTHETIST, CERTIFIED REGISTERED

## 2018-06-29 PROCEDURE — D9220A PRA ANESTHESIA: Mod: PT,CRNA,, | Performed by: NURSE ANESTHETIST, CERTIFIED REGISTERED

## 2018-06-29 PROCEDURE — 45380 COLONOSCOPY AND BIOPSY: CPT | Performed by: INTERNAL MEDICINE

## 2018-06-29 PROCEDURE — D9220A PRA ANESTHESIA: Mod: PT,ANES,, | Performed by: ANESTHESIOLOGY

## 2018-06-29 RX ORDER — PROPOFOL 10 MG/ML
VIAL (ML) INTRAVENOUS
Status: COMPLETED
Start: 2018-06-29 | End: 2018-06-29

## 2018-06-29 RX ORDER — PROPOFOL 10 MG/ML
VIAL (ML) INTRAVENOUS
Status: DISCONTINUED | OUTPATIENT
Start: 2018-06-29 | End: 2018-06-29

## 2018-06-29 RX ORDER — LIDOCAINE HCL/PF 100 MG/5ML
SYRINGE (ML) INTRAVENOUS
Status: COMPLETED
Start: 2018-06-29 | End: 2018-06-29

## 2018-06-29 RX ORDER — SODIUM CHLORIDE 9 MG/ML
INJECTION, SOLUTION INTRAVENOUS ONCE
Status: COMPLETED | OUTPATIENT
Start: 2018-06-29 | End: 2018-06-29

## 2018-06-29 RX ORDER — SODIUM CHLORIDE 9 MG/ML
INJECTION, SOLUTION INTRAVENOUS CONTINUOUS PRN
Status: DISCONTINUED | OUTPATIENT
Start: 2018-06-29 | End: 2018-06-29

## 2018-06-29 RX ORDER — LIDOCAINE HCL/PF 100 MG/5ML
SYRINGE (ML) INTRAVENOUS
Status: DISCONTINUED | OUTPATIENT
Start: 2018-06-29 | End: 2018-06-29

## 2018-06-29 RX ADMIN — PROPOFOL 40 MG: 10 INJECTION, EMULSION INTRAVENOUS at 09:06

## 2018-06-29 RX ADMIN — PROPOFOL 20 MG: 10 INJECTION, EMULSION INTRAVENOUS at 09:06

## 2018-06-29 RX ADMIN — PROPOFOL 80 MG: 10 INJECTION, EMULSION INTRAVENOUS at 09:06

## 2018-06-29 RX ADMIN — LIDOCAINE HYDROCHLORIDE 40 MG: 20 INJECTION, SOLUTION INTRAVENOUS at 09:06

## 2018-06-29 RX ADMIN — SODIUM CHLORIDE: 0.9 INJECTION, SOLUTION INTRAVENOUS at 08:06

## 2018-06-29 NOTE — [employer] PATIENT INSTRUCTIONS
Discharge Summary/Instructions after an Endoscopic Procedure  Patient Name: Joel Condon  Patient MRN: [id number]  Patient YOB: 1940 Friday, June 29, 2018  Mykel Boles MD  RESTRICTIONS:  During your procedure today, you received medications for sedation.  These   medications may affect your judgment, balance and coordination.  Therefore,   for 24 hours, you have the following restrictions:   - DO NOT drive a car, operate machinery, make legal/financial decisions,   sign important papers or drink alcohol.    ACTIVITY:  Today: no heavy lifting, straining or running due to procedural   sedation/anesthesia.  The following day: return to full activity including work.  DIET:  Eat and drink normally unless instructed otherwise.     TREATMENT FOR COMMON SIDE EFFECTS:  - Mild abdominal pain, nausea, belching, bloating or excessive gas:  rest,   eat lightly and use a heating pad.  - Sore Throat: treat with throat lozenges and/or gargle with warm salt   water.  - Because air was used during the procedure, expelling large amounts of air   from your rectum or belching is normal.  - If a bowel prep was taken, you may not have a bowel movement for 1-3 days.    This is normal.  SYMPTOMS TO WATCH FOR AND REPORT TO YOUR PHYSICIAN:  1. Abdominal pain or bloating, other than gas cramps.  2. Chest pain.  3. Back pain.  4. Signs of infection such as: chills or fever occurring within 24 hours   after the procedure.  5. Rectal bleeding, which would show as bright red, maroon, or black stools.   (A tablespoon of blood from the rectum is not serious, especially if   hemorrhoids are present.)  6. Vomiting.  7. Weakness or dizziness.  GO DIRECTLY TO THE NEAREST EMERGENCY ROOM IF YOU HAVE ANY OF THE FOLLOWING:      Difficulty breathing              Chills and/or fever over 101 F   Persistent vomiting and/or vomiting blood   Severe abdominal pain   Severe chest pain   Black, tarry stools   Bleeding- more than one tablespoon   Any  other symptom or condition that you feel may need urgent attention  Your doctor recommends these additional instructions:  If any biopsies were taken, your doctors clinic will contact you in 1 to 2   weeks with any results.  - Discharge patient to home.   - High fiber diet.   - No ibuprofen, naproxen, or other non-steroidal anti-inflammatory drugs for   5 days after polyp removal.   - Await pathology results.   - Repeat colonoscopy in 3 years for surveillance based on pathology results.     - Return to primary care physician as previously scheduled.   - Return to GI clinic PRN.   - The findings and recommendations were discussed with the patient's family.     - Patient has a contact number available for emergencies.  The signs and   symptoms of potential delayed complications were discussed with the   patient.  Return to normal activities tomorrow.  Written discharge   instructions were provided to the patient.  For questions, problems or results please call your physician - Mykel Boles MD at Work:  [phone].  [facility] can be reached at [phone]     IF A COMPLICATION OR EMERGENCY SITUATION ARISES AND YOU ARE UNABLE TO REACH   YOUR PHYSICIAN - GO DIRECTLY TO THE EMERGENCY ROOM.  Mykel Boles MD  6/29/2018 9:55:27 AM  This report has been verified and signed electronically.  [employer]

## 2018-06-29 NOTE — ANESTHESIA PREPROCEDURE EVALUATION
06/29/2018  Joel Condon is a 77 y.o., female.    Anesthesia Evaluation    I have reviewed the Patient Summary Reports.    I have reviewed the Nursing Notes.   I have reviewed the Medications.     Review of Systems  Anesthesia Hx:  No problems with previous Anesthesia  History of prior surgery of interest to airway management or planning: Previous anesthesia: General  Denies Personal Hx of Anesthesia complications.   Social:  No Alcohol Use, Non-Smoker    Cardiovascular:   Hypertension Echo 3/28/16:  CONCLUSIONS     1 - Normal left ventricular systolic function (EF 60-65%).  Normal wall motion.     2 - Concentric remodeling.     3 - Trivial tricuspid regurgitation.     4 - The estimated PA systolic pressure is 27 mmHg.    Pulmonary:  Pulmonary Normal    Renal/:   Chronic Renal Disease proteinuria   Hepatic/GI:   GERD    Musculoskeletal:   Arthritis     Neurological:  Neurology Normal    Endocrine:   Diabetes, poorly controlled, type 2 Hypothyroidism  Metabolic Disorders      Physical Exam  General:  Well nourished    Airway/Jaw/Neck:  Airway Findings: Mouth Opening: Normal Tongue: Normal  General Airway Assessment: Adult  Mallampati: II  TM Distance: Normal, at least 6 cm  Jaw/Neck Findings:  Neck ROM: Normal ROM      Dental:  Dental Findings:   Chest/Lungs:  Chest/Lungs Findings: Clear to auscultation     Heart/Vascular:  Heart Findings: Rate: Normal  Rhythm: Regular Rhythm  Sounds: Normal        Mental Status:  Mental Status Findings:  Cooperative, Alert and Oriented         Anesthesia Plan  Type of Anesthesia, risks & benefits discussed:  Anesthesia Type:  general  Patient's Preference:   Intra-op Monitoring Plan: standard ASA monitors  Intra-op Monitoring Plan Comments:   Post Op Pain Control Plan: per primary service following discharge from PACU, IV/PO Opioids PRN and multimodal analgesia  Post Op Pain  Control Plan Comments:   Induction:   IV  Beta Blocker:  Patient is on a Beta-Blocker and has received one dose within the past 24 hours (No further documentation required).       Informed Consent: Patient understands risks and agrees with Anesthesia plan.  Questions answered. Anesthesia consent signed with patient.  ASA Score: 3     Day of Surgery Review of History & Physical:    H&P update referred to the surgeon.         Ready For Surgery From Anesthesia Perspective.

## 2018-06-29 NOTE — ANESTHESIA POSTPROCEDURE EVALUATION
"Anesthesia Post Evaluation    Patient: Joel Condon    Procedure(s) Performed: Procedure(s) (LRB):  COLONOSCOPY (N/A)    Final Anesthesia Type: general  Patient location during evaluation: GI PACU  Patient participation: Yes- Able to Participate  Level of consciousness: awake and alert, awake and oriented  Post-procedure vital signs: reviewed and stable  Pain management: adequate  Airway patency: patent  PONV status at discharge: No PONV  Anesthetic complications: no      Cardiovascular status: blood pressure returned to baseline and hemodynamically stable  Respiratory status: unassisted, spontaneous ventilation and room air  Hydration status: euvolemic  Follow-up not needed.        Visit Vitals  BP (!) 140/72   Pulse 62   Temp 36.5 °C (97.7 °F) (Oral)   Resp 18   Ht 5' 4" (1.626 m)   Wt 75.8 kg (167 lb)   SpO2 97%   Breastfeeding? No   BMI 28.67 kg/m²       Pain/Maribeth Score: Pain Assessment Performed: Yes (6/29/2018  8:30 AM)  Presence of Pain: denies (6/29/2018 10:05 AM)  Maribeth Score: 10 (6/29/2018 10:20 AM)      "

## 2018-06-29 NOTE — TRANSFER OF CARE
"Anesthesia Transfer of Care Note    Patient: Joel Condon    Procedure(s) Performed: Procedure(s) (LRB):  COLONOSCOPY (N/A)    Patient location: GI    Anesthesia Type: general    Transport from OR: Transported from OR on room air with adequate spontaneous ventilation    Post pain: adequate analgesia    Post assessment: no apparent anesthetic complications and tolerated procedure well    Post vital signs: stable    Level of consciousness: sedated and responds to stimulation    Nausea/Vomiting: no nausea/vomiting    Complications: none    Transfer of care protocol was followed      Last vitals:   Visit Vitals  BP (!) 145/65 (BP Location: Right arm, Patient Position: Lying)   Pulse 69   Temp 36.5 °C (97.7 °F) (Oral)   Resp 18   Ht 5' 4" (1.626 m)   Wt 75.8 kg (167 lb)   SpO2 96%   Breastfeeding? No   BMI 28.67 kg/m²     "

## 2018-06-29 NOTE — H&P (VIEW-ONLY)
CARDIOVASCULAR PROGRESS NOTE    REASON FOR CONSULT:   Joel Condon is a 77 y.o. female who presents for follow up of HTN.    PCP: Av  HISTORY OF PRESENT ILLNESS:   Last seen 11/2017.    The patient comes in for follow-up.  In the interim since her last visit, she underwent surgery for breast cancer, and apparently has been cured.  She denies intercurrent angina or dyspnea.  She's had no palpitations, lightheadedness, dizziness, or syncope.  There is been no PND, orthopnea, or lower extremity edema.  She denies melena, hematuria, or claudicant symptoms.  She tells me that her blood pressure is normal at home, but cannot give me absolute figures.  Her blood pressures uncontrolled in the office today.  I discussed the need to add an additional medicine, as well as consider evaluation for sleep apnea as previously ordered.  The patient is somewhat resistant to the addition of an additional antihypertensive, and I've asked her to monitor her home blood pressure and bring a log in to her next visit, as well as bring the cuff in to the next visit to assess for accuracy.    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 Arce)    CATARACT EXTRACTION W/  INTRAOCULAR LENS IMPLANT Right 06/12/2014    OD (Dr. Arce)    CHOLECYSTECTOMY      COLONOSCOPY N/A 4/21/2017    Procedure: COLONOSCOPY;  Surgeon:  "Homar Payan MD;  [facility];  Service: Endoscopy;  Laterality: N/A;    EYE SURGERY  04/24/2014 & 06/12/2014    HYSTERECTOMY      total    left eye cataract surgery  4/24/14    OOPHORECTOMY         ALLERGIES AND MEDICATION:     Review of patient's allergies indicates:   Allergen Reactions    Lisinopril Other (See Comments)     Cough      Pravastatin Other (See Comments)     headaches     Previous Medications    ASPIRIN (ECOTRIN) 81 MG EC TABLET    Take 1 tablet (81 mg total) by mouth once daily.    ATORVASTATIN (LIPITOR) 10 MG TABLET    TAKE 1 TABLET(10 MG) BY MOUTH EVERY DAY    BD INSULIN PEN NEEDLE UF SHORT 31 GAUGE X 5/16" NDLE    USE THREE TIMES DAILY AS DIRECTED    BLOOD SUGAR DIAGNOSTIC STRP    True Results; Test tid    INSULIN DETEMIR (LEVEMIR) 100 UNIT/ML INJECTION    ADMINISTER 40 UNITS UNDER THE SKIN EVERY EVENING    INSULIN SYRINGE-NEEDLE U-100 1 ML 31 GAUGE X 5/16 SYRG    USE THREE TIMES DAILY AS DIRECTED    INSULIN SYRINGE-NEEDLE U-100 1/2 ML 30 SYRG    USE NIGHTLY    INSULIN SYRINGE-NEEDLE U-100 1/2 ML 31 X 5/16" SYRG    1 Device by Misc.(Non-Drug; Combo Route) route nightly.    LANCETS 26 GAUGE MISC    1 lancet by Misc.(Non-Drug; Combo Route) route 3 (three) times daily.    LETROZOLE (FEMARA) 2.5 MG TAB    TAKE 1 TABLET(2.5 MG) BY MOUTH EVERY DAY    LEVOTHYROXINE (SYNTHROID) 50 MCG TABLET    TAKE 1 TABLET(50 MCG) BY MOUTH EVERY DAY    LOSARTAN (COZAAR) 100 MG TABLET    TAKE 1 TABLET BY MOUTH DAILY    METFORMIN (GLUCOPHAGE-XR) 500 MG 24 HR TABLET    TAKE 2 TABLETS(1000 MG) BY MOUTH TWICE DAILY WITH MEALS    METOPROLOL SUCCINATE (TOPROL-XL) 200 MG 24 HR TABLET    TAKE 1 TABLET BY MOUTH DAILY    NIFEDIPINE (PROCARDIA-XL) 90 MG (OSM) 24 HR TABLET    TAKE 1 TABLET(90 MG) BY MOUTH EVERY DAY    NOVOLOG FLEXPEN U-100 INSULIN 100 UNIT/ML INPN PEN    ADMINISTER 28 UNITS UNDER THE SKIN THREE TIMES DAILY WITH MEALS    OMEPRAZOLE (PRILOSEC) 20 MG CAPSULE    TAKE 1 CAPSULE(20 MG) BY MOUTH DAILY AS " "NEEDED FOR HEARTBURN. TAKE ONLY AS NEEDED    PEN NEEDLE, DIABETIC (BD INSULIN PEN NEEDLE UF SHORT) 31 GAUGE X 5/16" NDLE    Use 3 times daily    SPIRONOLACTONE (ALDACTONE) 50 MG TABLET    Take 1 tablet (50 mg total) by mouth once daily.    SSD 1 % CREAM    CHRISTINE TOPICALLY AA ONCE TO BID       SOCIAL HISTORY:     Social History     Social History    Marital status:      Spouse name: N/A    Number of children: 3    Years of education: N/A     Occupational History    retired      Social History Main Topics    Smoking status: Never Smoker    Smokeless tobacco: Never Used    Alcohol use No    Drug use: No    Sexual activity: Not Currently     Partners: Male     Other Topics Concern    Not on file     Social History Narrative    No narrative on file       FAMILY HISTORY:     Family History   Problem Relation Age of Onset    Diabetes Mother     Heart disease Mother     Hypertension Mother     Diabetes Sister     Hypertension Sister     Diabetes Sister     Hypertension Sister     Diabetes Sister     Hypertension Sister     Thyroid disease Sister     Stroke Sister     Hypertension Sister     Diabetes Sister     Heart disease Sister     Diabetes Brother     Diabetes Brother     Amblyopia Neg Hx     Blindness Neg Hx     Cataracts Neg Hx     Glaucoma Neg Hx     Macular degeneration Neg Hx     Retinal detachment Neg Hx     Strabismus Neg Hx        REVIEW OF SYSTEMS:   Review of Systems   Constitutional: Negative for chills, diaphoresis and fever.   HENT: Negative for nosebleeds.    Eyes: Negative for blurred vision, double vision and photophobia.   Respiratory: Negative for hemoptysis, shortness of breath and wheezing.    Cardiovascular: Negative for chest pain, palpitations, orthopnea, claudication, leg swelling and PND.   Gastrointestinal: Negative for abdominal pain, blood in stool, heartburn, melena, nausea and vomiting.   Genitourinary: Negative for flank pain and hematuria. " "  Musculoskeletal: Negative for falls, joint pain, myalgias and neck pain.   Skin: Negative for rash.   Neurological: Negative for dizziness, seizures, loss of consciousness, weakness and headaches.   Endo/Heme/Allergies: Negative for polydipsia. Does not bruise/bleed easily.   Psychiatric/Behavioral: Negative for depression and memory loss. The patient is not nervous/anxious.        PHYSICAL EXAM:     Vitals:    06/15/18 1259   BP: (!) 156/60   Pulse: 67   Resp: 15    Body mass index is 28.84 kg/m².  Weight: 76.2 kg (167 lb 15.9 oz)   Height: 5' 4" (162.6 cm)     Physical Exam   Constitutional: She is oriented to person, place, and time. She appears well-developed and well-nourished. She is cooperative.  Non-toxic appearance. No distress.   HENT:   Head: Normocephalic and atraumatic.   Eyes: Conjunctivae and EOM are normal. Pupils are equal, round, and reactive to light. No scleral icterus.   Neck: Trachea normal and normal range of motion. Neck supple. Normal carotid pulses and no JVD present. Carotid bruit is not present. No neck rigidity. No edema present. No thyromegaly present.   Cardiovascular: Normal rate, regular rhythm, S1 normal and S2 normal.  PMI is not displaced.  Exam reveals no gallop and no friction rub.    No murmur heard.  Pulses:       Carotid pulses are 2+ on the right side, and 2+ on the left side.  Pulmonary/Chest: Effort normal and breath sounds normal. No respiratory distress. She has no wheezes. She has no rales. She exhibits no tenderness.   Abdominal: Soft. Bowel sounds are normal. She exhibits no distension and no mass. There is no hepatosplenomegaly. There is no tenderness.   Musculoskeletal: She exhibits no edema or tenderness.   Feet:   Right Foot:   Skin Integrity: Negative for ulcer.   Left Foot:   Skin Integrity: Negative for ulcer.   Neurological: She is alert and oriented to person, place, and time. No cranial nerve deficit.   Skin: Skin is warm and dry. No rash noted. No " erythema.   Psychiatric: She has a normal mood and affect. Her speech is normal and behavior is normal.   Vitals reviewed.      DATA:   EKG: (personally reviewed tracing)  6/15/18 SR 64, NSSTTW changes, similar to 12/13/17    Laboratory:  CBC:    Recent Labs  Lab 12/07/17  0945 02/09/18  0839 04/19/18  0723   WHITE BLOOD CELL COUNT 8.64 10.18 8.60   HEMOGLOBIN 12.7 12.8 12.2   HEMATOCRIT 37.0 39.4 37.9   PLATELETS 261 304 281       CHEMISTRIES:    Recent Labs  Lab 02/09/18  0735 04/19/18  0723 04/25/18  0719   GLUCOSE 146 H 204 H 150 H   SODIUM 141 139 141   POTASSIUM 3.5 4.5 4.3   BUN BLD 13 27 H 16   CREATININE 0.9 1.2 1.0   EGFR IF  >60.0 50.7 A >60.0   EGFR IF NON- >60.0 44.0 A 54.9 A   CALCIUM 10.1 9.8 9.9       CARDIAC BIOMARKERS:        COAGS:        LIPIDS/LFTS:    Recent Labs  Lab 06/15/16  0844 03/14/17  0722  02/09/18  0735 04/19/18  0723 04/25/18  0719   CHOLESTEROL 139 144  --   --   --  113 L   TRIGLYCERIDES 114 112  --   --   --  78   HDL 53 48  --   --   --  48   LDL CHOLESTEROL 63.2 73.6  --   --   --  49.4 L   NON-HDL CHOLESTEROL 86 96  --   --   --  65   AST 20 19  < > 18 20 22   ALT 20 20  < > 18 18 16   < > = values in this interval not displayed.    Cardiovascular Testing:  Renal art US 10/25/17  Elevated peak systolic velocity ratio of 147 cm/sec in the proximal right renal artery with no secondary findings to suggest renal artery stenosis. Short-term followup suggested.  Borderline elevation of the renal resistive indices (0.79-0.80).  No evidence of obstructive uropathy.    Holter 3/28/16:  PREDOMINANT RHYTHM  1. Sinus rhythm with heart rates varying between 65 and 111 bpm with an average of 80 bpm.   VENTRICULAR ARRHYTHMIAS  1. There were occasional PVCs totalling 697 and averaging 29 per hour. There was 1 couplet.  2. There were no episodes of ventricular tachycardia.  SUPRA VENTRICULAR ARRHYTHMIAS  1. There were very rare PACs recorded totalling 1 and  averaging less than 1 per hour.   2. There were no episodes of sustained supraventricular tachycardia.  SINUS NODE FUNCTION  1. There was no evidence of high grade SA cristian block.   AV CONDUCTION  1. There was no evidence of high grade AV block.   DIARY  1. The diary was not returned  MISCELLANEOUS  1. There were occasional hookup related artifacts. Overall, the study was of good quality.   2. This was a tape of adequate length (24 hrs).    Echo 3/28/16:  1 - Normal left ventricular systolic function (EF 60-65%). Normal wall motion.   2 - Concentric remodeling.   3 - Trivial tricuspid regurgitation.   4 - The estimated PA systolic pressure is 27 mmHg.     Ex-MPI 3/28/16   Jignesh 2min, 86% MPHR, -CP/EKG  Nuclear Quantitative Functional Analysis:   Quantitative measurements of LV function are over estimated secondary to small ventricular volumes.   Visually estimated LV function is hyperdynamic.   Impression: NORMAL MYOCARDIAL PERFUSION  1. The perfusion scan is free of evidence for myocardial ischemia or injury.   2. Resting wall motion is physiologic.   3. Visually estimated LV function is hyperdynamic.   4. The ventricular volumes are normal at rest and stress.   5. The extracardiac distribution of radioactivity is normal.    ASSESSMENT:   # HTN, uncontrolled.  Pt prev intol of HCTZ and somewhat resistent to starting an additional BP med.  # DM  # HLP, on atorva 10mg  # BMI 29, down 1 unit vs last OV    PLAN:   Cont med rx  Diet/exercise/weight loss  PASCUAL eval reordered  Pt to bring BP log and cuff to next OV  RTC 3 months or sooner if BP >140/90 persistently  Pt to follow up with PCP/NP in next few weeks for reassessment of BP  Consider addition of hydralazine or chlorthalidone as next agent      Juan Pablo Ordoñez MD, FACC

## 2018-07-12 DIAGNOSIS — E78.5 HYPERLIPIDEMIA LDL GOAL <100: ICD-10-CM

## 2018-07-12 DIAGNOSIS — I10 ESSENTIAL HYPERTENSION: ICD-10-CM

## 2018-07-12 RX ORDER — METOPROLOL SUCCINATE 200 MG/1
TABLET, EXTENDED RELEASE ORAL
Qty: 90 TABLET | Refills: 3 | Status: SHIPPED | OUTPATIENT
Start: 2018-07-12 | End: 2019-07-05 | Stop reason: SDUPTHER

## 2018-07-12 RX ORDER — NIFEDIPINE 90 MG/1
TABLET, EXTENDED RELEASE ORAL
Qty: 90 TABLET | Refills: 1 | Status: SHIPPED | OUTPATIENT
Start: 2018-07-12 | End: 2019-01-16 | Stop reason: SDUPTHER

## 2018-07-12 RX ORDER — ATORVASTATIN CALCIUM 10 MG/1
TABLET, FILM COATED ORAL
Qty: 90 TABLET | Refills: 1 | Status: SHIPPED | OUTPATIENT
Start: 2018-07-12 | End: 2019-01-08 | Stop reason: SDUPTHER

## 2018-08-01 ENCOUNTER — LAB VISIT (OUTPATIENT)
Dept: LAB | Facility: HOSPITAL | Age: 78
End: 2018-08-01
Attending: NURSE PRACTITIONER
Payer: MEDICARE

## 2018-08-01 LAB
ESTIMATED AVG GLUCOSE: 148 MG/DL
HBA1C MFR BLD HPLC: 6.8 %

## 2018-08-01 PROCEDURE — 36415 COLL VENOUS BLD VENIPUNCTURE: CPT | Mod: PO

## 2018-08-01 PROCEDURE — 83036 HEMOGLOBIN GLYCOSYLATED A1C: CPT

## 2018-08-03 DIAGNOSIS — K21.9 GASTROESOPHAGEAL REFLUX DISEASE WITHOUT ESOPHAGITIS: Chronic | ICD-10-CM

## 2018-08-03 DIAGNOSIS — C50.912 MALIGNANT NEOPLASM OF LEFT FEMALE BREAST, UNSPECIFIED ESTROGEN RECEPTOR STATUS, UNSPECIFIED SITE OF BREAST: ICD-10-CM

## 2018-08-03 RX ORDER — LETROZOLE 2.5 MG/1
TABLET, FILM COATED ORAL
Qty: 30 TABLET | Refills: 0 | Status: SHIPPED | OUTPATIENT
Start: 2018-08-03 | End: 2018-09-17 | Stop reason: SDUPTHER

## 2018-08-03 RX ORDER — OMEPRAZOLE 20 MG/1
CAPSULE, DELAYED RELEASE ORAL
Qty: 90 CAPSULE | Refills: 0 | Status: SHIPPED | OUTPATIENT
Start: 2018-08-03 | End: 2018-12-19 | Stop reason: SDUPTHER

## 2018-08-15 ENCOUNTER — TELEPHONE (OUTPATIENT)
Dept: FAMILY MEDICINE | Facility: CLINIC | Age: 78
End: 2018-08-15

## 2018-08-15 ENCOUNTER — HOSPITAL ENCOUNTER (OUTPATIENT)
Dept: RADIOLOGY | Facility: HOSPITAL | Age: 78
Discharge: HOME | End: 2018-08-15
Attending: INTERNAL MEDICINE
Payer: MEDICARE

## 2018-08-15 ENCOUNTER — OFFICE VISIT (OUTPATIENT)
Dept: FAMILY MEDICINE | Facility: CLINIC | Age: 78
End: 2018-08-15
Payer: MEDICARE

## 2018-08-15 VITALS
HEART RATE: 64 BPM | HEIGHT: 64 IN | BODY MASS INDEX: 28.93 KG/M2 | DIASTOLIC BLOOD PRESSURE: 50 MMHG | TEMPERATURE: 98 F | OXYGEN SATURATION: 97 % | SYSTOLIC BLOOD PRESSURE: 122 MMHG | WEIGHT: 169.44 LBS

## 2018-08-15 DIAGNOSIS — M85.80 OSTEOPENIA, UNSPECIFIED LOCATION: ICD-10-CM

## 2018-08-15 DIAGNOSIS — E66.3 OVERWEIGHT (BMI 25.0-29.9): ICD-10-CM

## 2018-08-15 DIAGNOSIS — M25.552 LEFT HIP PAIN: ICD-10-CM

## 2018-08-15 DIAGNOSIS — E78.5 HYPERLIPIDEMIA LDL GOAL <100: ICD-10-CM

## 2018-08-15 DIAGNOSIS — Z00.00 ROUTINE MEDICAL EXAM: Primary | ICD-10-CM

## 2018-08-15 DIAGNOSIS — E03.9 HYPOTHYROIDISM (ACQUIRED): ICD-10-CM

## 2018-08-15 DIAGNOSIS — I10 ESSENTIAL HYPERTENSION: ICD-10-CM

## 2018-08-15 DIAGNOSIS — Z79.4 LONG-TERM INSULIN USE: ICD-10-CM

## 2018-08-15 DIAGNOSIS — Z79.4 CONTROLLED TYPE 2 DIABETES MELLITUS WITH BOTH EYES AFFECTED BY MILD NONPROLIFERATIVE RETINOPATHY WITHOUT MACULAR EDEMA, WITH LONG-TERM CURRENT USE OF INSULIN: ICD-10-CM

## 2018-08-15 DIAGNOSIS — C50.912 MALIGNANT NEOPLASM OF LEFT FEMALE BREAST, UNSPECIFIED ESTROGEN RECEPTOR STATUS, UNSPECIFIED SITE OF BREAST: ICD-10-CM

## 2018-08-15 DIAGNOSIS — K21.9 GASTROESOPHAGEAL REFLUX DISEASE WITHOUT ESOPHAGITIS: Chronic | ICD-10-CM

## 2018-08-15 DIAGNOSIS — E11.40 TYPE 2 DIABETES, CONTROLLED, WITH NEUROPATHY: ICD-10-CM

## 2018-08-15 DIAGNOSIS — E11.3293 CONTROLLED TYPE 2 DIABETES MELLITUS WITH BOTH EYES AFFECTED BY MILD NONPROLIFERATIVE RETINOPATHY WITHOUT MACULAR EDEMA, WITH LONG-TERM CURRENT USE OF INSULIN: ICD-10-CM

## 2018-08-15 PROCEDURE — 99214 OFFICE O/P EST MOD 30 MIN: CPT | Mod: S$GLB,,, | Performed by: INTERNAL MEDICINE

## 2018-08-15 PROCEDURE — 3074F SYST BP LT 130 MM HG: CPT | Mod: CPTII,S$GLB,, | Performed by: INTERNAL MEDICINE

## 2018-08-15 PROCEDURE — 99999 PR PBB SHADOW E&M-EST. PATIENT-LVL V: CPT | Mod: PBBFAC,,, | Performed by: INTERNAL MEDICINE

## 2018-08-15 PROCEDURE — 73502 X-RAY EXAM HIP UNI 2-3 VIEWS: CPT | Mod: 26,LT,, | Performed by: RADIOLOGY

## 2018-08-15 PROCEDURE — 3078F DIAST BP <80 MM HG: CPT | Mod: CPTII,S$GLB,, | Performed by: INTERNAL MEDICINE

## 2018-08-15 PROCEDURE — 73502 X-RAY EXAM HIP UNI 2-3 VIEWS: CPT | Mod: TC,FY,PO,LT

## 2018-08-15 NOTE — PROGRESS NOTES
HISTORY OF PRESENT ILLNESS:  Joel Condon is a 77 y.o. female who presents to the clinic today for a routine medical physical exam. Her last physical exam was approximately 1 years(s) ago.        PAST MEDICAL HISTORY:  Past Medical History:   Diagnosis Date    Arthritis     Cataract     Diabetes mellitus     Diabetic retinopathy     Hyperlipidemia LDL goal < 100     Hypertension     Hypothyroidism     Osteoporosis, post-menopausal     Overweight(278.02)     Proteinuria     Type II or unspecified type diabetes mellitus with renal manifestations, uncontrolled(250.42)        PAST SURGICAL HISTORY:  Past Surgical History:   Procedure Laterality Date    APPENDECTOMY      BREAST SURGERY Left 12/13/2017    tumor removal    CATARACT EXTRACTION W/  INTRAOCULAR LENS IMPLANT Left 4/24/14    OS (Dr. Arce)    CATARACT EXTRACTION W/  INTRAOCULAR LENS IMPLANT Right 06/12/2014    OD (Dr. Arce)    CHOLECYSTECTOMY      EYE SURGERY  04/24/2014 & 06/12/2014    HYSTERECTOMY      total    left eye cataract surgery  4/24/14    OOPHORECTOMY         SOCIAL HISTORY:  Social History     Socioeconomic History    Marital status:      Spouse name: Not on file    Number of children: 3    Years of education: Not on file    Highest education level: Not on file   Social Needs    Financial resource strain: Not on file    Food insecurity - worry: Not on file    Food insecurity - inability: Not on file    Transportation needs - medical: Not on file    Transportation needs - non-medical: Not on file   Occupational History    Occupation: retired   Tobacco Use    Smoking status: Never Smoker    Smokeless tobacco: Never Used   Substance and Sexual Activity    Alcohol use: No    Drug use: No    Sexual activity: Not Currently     Partners: Male   Other Topics Concern    Not on file   Social History Narrative    Not on file       FAMILY HISTORY:  Family History   Problem Relation Age of Onset    Diabetes  "Mother     Heart disease Mother     Hypertension Mother     Diabetes Sister     Hypertension Sister     Diabetes Sister     Hypertension Sister     Diabetes Sister     Hypertension Sister     Thyroid disease Sister     Stroke Sister     Hypertension Sister     Diabetes Sister     Heart disease Sister     Diabetes Brother     Diabetes Brother     Amblyopia Neg Hx     Blindness Neg Hx     Cataracts Neg Hx     Glaucoma Neg Hx     Macular degeneration Neg Hx     Retinal detachment Neg Hx     Strabismus Neg Hx        ALLERGIES AND MEDICATIONS: updated and reviewed.  Review of patient's allergies indicates:   Allergen Reactions    Lisinopril Other (See Comments)     Cough      Hydrochlorothiazide (bulk) Other (See Comments)     Elevated pt's blood pressure making her feel bad    Pravastatin Other (See Comments)     headaches     Medication List with Changes/Refills   Current Medications    ASPIRIN (ECOTRIN) 81 MG EC TABLET    Take 1 tablet (81 mg total) by mouth once daily.    ATORVASTATIN (LIPITOR) 10 MG TABLET    TAKE 1 TABLET(10 MG) BY MOUTH EVERY DAY    BD INSULIN PEN NEEDLE UF SHORT 31 GAUGE X 5/16" NDLE    USE THREE TIMES DAILY AS DIRECTED    BLOOD SUGAR DIAGNOSTIC STRP    True Results; Test tid    INSULIN DETEMIR (LEVEMIR) 100 UNIT/ML INJECTION    ADMINISTER 40 UNITS UNDER THE SKIN EVERY EVENING    INSULIN SYRINGE-NEEDLE U-100 1 ML 31 GAUGE X 5/16 SYRG    USE THREE TIMES DAILY AS DIRECTED    INSULIN SYRINGE-NEEDLE U-100 1/2 ML 30 SYRG    USE NIGHTLY    INSULIN SYRINGE-NEEDLE U-100 1/2 ML 31 X 5/16" SYRG    1 Device by Misc.(Non-Drug; Combo Route) route nightly.    LANCETS 26 GAUGE MISC    1 lancet by Misc.(Non-Drug; Combo Route) route 3 (three) times daily.    LETROZOLE (FEMARA) 2.5 MG TAB    TAKE 1 TABLET(2.5 MG) BY MOUTH EVERY DAY    LEVOTHYROXINE (SYNTHROID) 50 MCG TABLET    TAKE 1 TABLET(50 MCG) BY MOUTH EVERY DAY    LOSARTAN (COZAAR) 100 MG TABLET    TAKE 1 TABLET BY MOUTH DAILY    " "METFORMIN (GLUCOPHAGE-XR) 500 MG 24 HR TABLET    TAKE 2 TABLETS(1000 MG) BY MOUTH TWICE DAILY WITH MEALS    METOPROLOL SUCCINATE (TOPROL-XL) 200 MG 24 HR TABLET    TAKE 1 TABLET BY MOUTH DAILY    NIFEDIPINE (PROCARDIA-XL) 90 MG (OSM) 24 HR TABLET    TAKE 1 TABLET(90 MG) BY MOUTH EVERY DAY    NOVOLOG FLEXPEN U-100 INSULIN 100 UNIT/ML INPN PEN    ADMINISTER 28 UNITS UNDER THE SKIN THREE TIMES DAILY WITH MEALS    OMEPRAZOLE (PRILOSEC) 20 MG CAPSULE    TAKE 1 CAPSULE(20 MG) BY MOUTH DAILY AS NEEDED FOR HEARTBURN. TAKE ONLY AS NEEDED    PEN NEEDLE, DIABETIC (BD INSULIN PEN NEEDLE UF SHORT) 31 GAUGE X 5/16" NDLE    Use 3 times daily    SPIRONOLACTONE (ALDACTONE) 50 MG TABLET    Take 1 tablet (50 mg total) by mouth once daily.    SSD 1 % CREAM    CHRISTINE TOPICALLY AA ONCE TO BID         CARE TEAM:  Patient Care Team:  Shelby Ley MD as PCP - General (Internal Medicine)           SCREENING HISTORY:  Health Maintenance       Date Due Completion Date    Foot Exam 06/28/2018 6/28/2017 (Done)    Override on 6/28/2017: Done    Override on 3/1/2017: Done    Override on 5/20/2015: Done    Influenza Vaccine 08/01/2018 11/6/2017    Override on 11/25/2014: Done    Mammogram 11/21/2018 11/21/2017    Hemoglobin A1c 02/01/2019 8/1/2018    Lipid Panel 04/25/2019 4/25/2018    Eye Exam 05/08/2019 5/8/2018    Override on 5/8/2018: Done    Override on 5/2/2017: Done    Override on 11/4/2012: Done    Colonoscopy 06/29/2019 6/29/2018    Override on 4/1/2007: Done    Urine Microalbumin 08/01/2019 8/1/2018    DEXA SCAN 03/20/2020 3/20/2018    TETANUS VACCINE 06/15/2026 6/15/2016            REVIEW OF SYSTEMS:   The patient reports good dietary habits.  The patient does not exercise regularly, but stays active.  Review of Systems   Constitutional: Negative for chills, fatigue, fever and unexpected weight change.   HENT: Negative for congestion, ear discharge, ear pain and postnasal drip.    Eyes: Negative for photophobia, pain and visual " "disturbance.   Respiratory: Negative for cough, shortness of breath and wheezing.    Cardiovascular: Negative for chest pain, palpitations and leg swelling.   Gastrointestinal: Negative for abdominal pain, constipation, diarrhea, nausea and vomiting.   Endocrine:        - diabetes much improved with improved dietary habits   Genitourinary: Negative for dysuria, frequency, urgency and vaginal discharge.   Musculoskeletal: Positive for arthralgias (- pain in left hip and hands (R>L)). Negative for back pain, joint swelling and neck stiffness.   Skin: Negative for rash.   Neurological: Negative for weakness and headaches.   Psychiatric/Behavioral: Negative for dysphoric mood and sleep disturbance. The patient is not nervous/anxious.      Breast ROS: negative for breast lumps             Physical Examination:   Vitals:    08/15/18 1018   BP: (!) 122/50   Pulse: 64   Temp: 98.4 °F (36.9 °C)     Weight: 76.9 kg (169 lb 6.8 oz)   Height: 5' 4" (162.6 cm)   Body mass index is 29.08 kg/m².      Patient did not require to have a chaperone present during the exam today.  General appearance - alert, well appearing, and in no distress and overweight  Mental status - alert, oriented to person, place, and time, normal mood, behavior, speech, dress, motor activity, and thought processes  Eyes - pupils equal and reactive, extraocular eye movements intact, sclera anicteric  Mouth - mucous membranes moist, pharynx normal without lesions  Neck - supple, no significant adenopathy, carotids upstroke normal bilaterally, no bruits, thyroid exam: thyroid is normal in size without nodules or tenderness  Lymphatics - no palpable lymphadenopathy  Chest - clear to auscultation, no wheezes, rales or rhonchi, symmetric air entry  Heart - normal rate and regular rhythm, no gallops noted  Abdomen - soft, nontender, nondistended, no masses or organomegaly  Breasts - not examined  Back exam - not examined  Neurological - alert, oriented, normal " speech, no focal findings or movement disorder noted, cranial nerves II through XII intact  Musculoskeletal - no muscular tenderness noted, Moderate osteoarthritic changes noted to both knee joints. No joint effusions noted. Stiffness in hip joints (L>R) when arising from a seated position. Mild OA changes noted in both hands.  Extremities - peripheral pulses normal, no pedal edema, no clubbing or cyanosis  Skin - normal coloration and turgor, no rashes, no suspicious skin lesions noted      Protective Sensation (w/ 10 gram monofilament):  Right: Decreased  Left: Decreased    Visual Inspection:  Normal -  Bilateral    Pedal Pulses:   Right: Present  Left: Present    Posterior tibialis:   Right:Present  Left: Present         Hemoglobin A1C   Date Value Ref Range Status   08/01/2018 6.8 (H) 4.0 - 5.6 % Final     Comment:     ADA Screening Guidelines:  5.7-6.4%  Consistent with prediabetes  >or=6.5%  Consistent with diabetes  High levels of fetal hemoglobin interfere with the HbA1C  assay. Heterozygous hemoglobin variants (HbS, HgC, etc)do  not significantly interfere with this assay.   However, presence of multiple variants may affect accuracy.     04/25/2018 7.6 (H) 4.0 - 5.6 % Final     Comment:     According to ADA guidelines, hemoglobin A1c <7.0% represents  optimal control in non-pregnant diabetic patients. Different  metrics may apply to specific patient populations.   Standards of Medical Care in Diabetes-2016.  For the purpose of screening for the presence of diabetes:  <5.7%     Consistent with the absence of diabetes  5.7-6.4%  Consistent with increasing risk for diabetes   (prediabetes)  >or=6.5%  Consistent with diabetes  Currently, no consensus exists for use of hemoglobin A1c  for diagnosis of diabetes for children.  This Hemoglobin A1c assay has significant interference with fetal   hemoglobin   (HbF). The results are invalid for patients with abnormal amounts of   HbF,   including those with known  Hereditary Persistence   of Fetal Hemoglobin. Heterozygous hemoglobin variants (HbAS, HbAC,   HbAD, HbAE, HbA2) do not significantly interfere with this assay;   however, presence of multiple variants in a sample may impact the %   interference.     01/29/2018 7.2 (H) 4.0 - 5.6 % Final     Comment:     According to ADA guidelines, hemoglobin A1c <7.0% represents  optimal control in non-pregnant diabetic patients. Different  metrics may apply to specific patient populations.   Standards of Medical Care in Diabetes-2016.  For the purpose of screening for the presence of diabetes:  <5.7%     Consistent with the absence of diabetes  5.7-6.4%  Consistent with increasing risk for diabetes   (prediabetes)  >or=6.5%  Consistent with diabetes  Currently, no consensus exists for use of hemoglobin A1c  for diagnosis of diabetes for children.  This Hemoglobin A1c assay has significant interference with fetal   hemoglobin   (HbF). The results are invalid for patients with abnormal amounts of   HbF,   including those with known Hereditary Persistence   of Fetal Hemoglobin. Heterozygous hemoglobin variants (HbAS, HbAC,   HbAD, HbAE, HbA2) do not significantly interfere with this assay;   however, presence of multiple variants in a sample may impact the %   interference.          ASSESSMENT AND PLAN:  1. Routine medical exam  Counseled on age appropriate medical preventative services including age appropriate cancer screenings, age appropriate eye and dental exams, over all nutritional health, need for a consistent exercise regimen, and an over all push towards maintaining a vigorous and active lifestyle.  Counseled on age appropriate vaccines and discussed upcoming health care needs based on age/gender. Discussed good sleep hygiene and stress management.     2. 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 Type 2  diabetes, controlled, with neuropathy/4. Long-term insulin use  Diabetes currently is controlled for age and comorbid conditions. We discussed diabetic diet and regular exercise. We discussed home blood sugar monitoring, if appropriate. The current medical regimen is effective;  continue present plan and medications. She is up to date on her eye exam.   - Ambulatory referral to Podiatry    5. Essential hypertension  Discussed sodium restriction, maintaining ideal body weight and regular exercise program as physiologic means to achieve blood pressure control. The patient will strive towards this. The current medical regimen is effective;  continue present plan and medications. Recommended patient to check home readings to monitor and see me for followup as scheduled or sooner as needed. Patient was educated that both decongestant and anti-inflammatory medication may raise blood pressure.     6. Hyperlipidemia LDL goal <100  We discussed low fat diet and regular exercise.The current medical regimen is effective;  continue present plan and medications.      7. Hypothyroidism (acquired)  Patient is clinically euthyroid. Continue current regimen.     8. Gastroesophageal reflux disease without esophagitis  Symptoms controlled: yes. Reflux precautions discussed (eliminate tobacco if a smoker; minimize caffeine, chocolate and red/white peppermint intake; avoid heavy and spicy meals; don't lay down within 2-3 hours after eating; minimize the intake of NSAIDs). Medication as needed. Patient asked to take medication breaks, if possible - discussed chronic use can limit calcium absorption (which can lead to osteopenia/osteoporosis), increases the risk for intestinal infections, and can cause kidney damage. There are also some newer studies that show possible increased risk of mortality.     9. Osteopenia, unspecified location  We discussed adequate calcium and vitamin D supplementation. We discussed fall precautions. She is up  to date on her BMD. On Prolia (last injection 5/2018).     10. Malignant neoplasm of left female breast, unspecified estrogen receptor status, unspecified site of breast  The current medical regimen is effective;  continue present plan and medications. Followed by oncology.    11. Overweight (BMI 25.0-29.9)  The patient is asked to make an attempt to improve diet and exercise patterns to aid in medical management of this problem.     12. Left hip pain  Patient is not interested in hip injections.  She is very reluctant to see Orthopedics about possible hip replacement surgery.  She is requesting a cane for home use.  I will check an x-ray to rule out avascular necrosis.  She may continue with Tylenol as needed for pain.  - CANE FOR HOME USE  - X-Ray Hip 2 or 3 views Left; Future          Follow-up in about 6 months (around 2/15/2019), or if symptoms worsen or fail to improve, for follow up chronic medical conditions.. or sooner as needed.

## 2018-08-17 ENCOUNTER — TELEPHONE (OUTPATIENT)
Dept: FAMILY MEDICINE | Facility: CLINIC | Age: 78
End: 2018-08-17

## 2018-08-30 ENCOUNTER — OFFICE VISIT (OUTPATIENT)
Dept: ENDOCRINOLOGY | Facility: CLINIC | Age: 78
End: 2018-08-30
Payer: MEDICARE

## 2018-08-30 VITALS
HEIGHT: 64 IN | DIASTOLIC BLOOD PRESSURE: 69 MMHG | BODY MASS INDEX: 28.95 KG/M2 | WEIGHT: 169.56 LBS | HEART RATE: 67 BPM | SYSTOLIC BLOOD PRESSURE: 132 MMHG

## 2018-08-30 DIAGNOSIS — E78.5 HYPERLIPIDEMIA LDL GOAL <100: ICD-10-CM

## 2018-08-30 DIAGNOSIS — I10 ESSENTIAL HYPERTENSION: ICD-10-CM

## 2018-08-30 DIAGNOSIS — E11.649 HYPOGLYCEMIA ASSOCIATED WITH DIABETES: ICD-10-CM

## 2018-08-30 PROCEDURE — 99214 OFFICE O/P EST MOD 30 MIN: CPT | Mod: S$GLB,,, | Performed by: NURSE PRACTITIONER

## 2018-08-30 PROCEDURE — 3075F SYST BP GE 130 - 139MM HG: CPT | Mod: CPTII,S$GLB,, | Performed by: NURSE PRACTITIONER

## 2018-08-30 PROCEDURE — 99999 PR PBB SHADOW E&M-EST. PATIENT-LVL IV: CPT | Mod: PBBFAC,,, | Performed by: NURSE PRACTITIONER

## 2018-08-30 PROCEDURE — 3078F DIAST BP <80 MM HG: CPT | Mod: CPTII,S$GLB,, | Performed by: NURSE PRACTITIONER

## 2018-08-30 NOTE — PATIENT INSTRUCTIONS
Please check blood sugar if you are feeling dizzy or having sweats.   Reduce Novolog to 15 units before meals.   Continue Levemir 40 units every night.

## 2018-08-30 NOTE — PROGRESS NOTES
CC: This 78 y.o. Black or  female  is here for evaluation of  T2DM along with comorbidities indicated in the Visit Diagnosis section of this encounter.    HPI: Joel Condon was diagnosed with T2DM in 1994.  Oral agents started at diagnosis.         Prior visit on 5/1/18  a1c up from 7.2 to 7.6%. She states she is eating less but eating more canned fruit as a snack. Appetite hasn't returned.   Taking oral chemo (letrazole) for breast cancer. Denies steroid therapy.   Plan If blood sugar at bedtime is less than 100, eat a snack like 1/2 sandwich or 4 peanut butter crackers.   If blood sugar is too low like 70s or lower, then eat  skittles or juice or other hard candy like peppermints (4); then follow that with peanut butter crackers or a meal.   Reduce Novolog to 17 units before meals. May wait until right after the meal to inject Novolog if blood sugar is less than 100. Take 20 units for a larger meal than usual like with potatoes or pasta.   Reduce Levemir to 40 units nightly. Do not take more if bedtime blood sugar is high.   Test blood sugar before meals and bedtime.  Try to eat fruit without any added sugar. Try frozen fruit.   Return to clinic in 3 months with labs prior.   Call if blood sugars are less than 80 or higher than 200s consistently for unknown reason.     Interval history  a1c down from 7.6 to 6.8%.   She did cut down levemir from 50 to 40 units but did not decrease Novolog from 20 to 17 units ac.   She does report frequently feeling like her BGs are too low..     LAST DIABETES EDUCATION: years ago at [facility]. And also a class done by [facility] early 2017    HOSPITALIZED FOR DIABETES  -  No.    PRESCRIBED DIABETES MEDICATIONS: Levemir (vial) 40 units every night at 10 pm, Novolog Flexpen 20 units ac, metformin xr 1000 mg AM and 500 mg PM      Misses medication doses - No    She skips her insulin if her BG is less than 100 - skips levemir maybe 1-2x/week.   She skips Novolog  "if BG < 130.       DM COMPLICATIONS: peripheral neuropathy    SIGNIFICANT DIABETES MED HISTORY:   Metformin started at initial ov 8/17/17  Diarrhea on metformin even w/ psyllium fiber supplement     SELF MONITORING BLOOD GLUCOSE: Checks blood glucose at home 4x/day. Forgot her logs.   BGs are , mostly in the 100s.     HYPOGLYCEMIC EPISODES: gets dizzy and sleepy when BG is low; also occurs overnight when she gets up to urinate.      CURRENT DIET: drinks diet cranberry juice, diet soda, tea w/ splenda. 3 meals/day.   Breakfast was 1 slice toast and 1 egg OR oatmeal or grits. Lunch is a sandwich with 1 slice of bread.     CURRENT EXERCISE:  None d/t hip pain       /69 (BP Location: Right arm, Patient Position: Sitting, BP Method: Medium (Automatic))   Pulse 67   Ht 5' 4" (1.626 m)   Wt 76.9 kg (169 lb 8.5 oz)   BMI 29.10 kg/m²       ROS:   CONSTITUTIONAL: Appetite low, denies fatigue  MS: generalized arthralgias naheed left knee and hip; has walker now.       PHYSICAL EXAM:  GENERAL: Well developed, well nourished. No acute distress.   PSYCH: AAOx3, appropriate mood and affect, conversant, well-groomed. Judgement and insight good.   NEURO: Cranial nerves grossly intact. Speech clear, no tremor.       Hemoglobin A1C   Date Value Ref Range Status   08/01/2018 6.8 (H) 4.0 - 5.6 % Final     Comment:     ADA Screening Guidelines:  5.7-6.4%  Consistent with prediabetes  >or=6.5%  Consistent with diabetes  High levels of fetal hemoglobin interfere with the HbA1C  assay. Heterozygous hemoglobin variants (HbS, HgC, etc)do  not significantly interfere with this assay.   However, presence of multiple variants may affect accuracy.     04/25/2018 7.6 (H) 4.0 - 5.6 % Final     Comment:     According to ADA guidelines, hemoglobin A1c <7.0% represents  optimal control in non-pregnant diabetic patients. Different  metrics may apply to specific patient populations.   Standards of Medical Care in Diabetes-2016.  For the " purpose of screening for the presence of diabetes:  <5.7%     Consistent with the absence of diabetes  5.7-6.4%  Consistent with increasing risk for diabetes   (prediabetes)  >or=6.5%  Consistent with diabetes  Currently, no consensus exists for use of hemoglobin A1c  for diagnosis of diabetes for children.  This Hemoglobin A1c assay has significant interference with fetal   hemoglobin   (HbF). The results are invalid for patients with abnormal amounts of   HbF,   including those with known Hereditary Persistence   of Fetal Hemoglobin. Heterozygous hemoglobin variants (HbAS, HbAC,   HbAD, HbAE, HbA2) do not significantly interfere with this assay;   however, presence of multiple variants in a sample may impact the %   interference.     01/29/2018 7.2 (H) 4.0 - 5.6 % Final     Comment:     According to ADA guidelines, hemoglobin A1c <7.0% represents  optimal control in non-pregnant diabetic patients. Different  metrics may apply to specific patient populations.   Standards of Medical Care in Diabetes-2016.  For the purpose of screening for the presence of diabetes:  <5.7%     Consistent with the absence of diabetes  5.7-6.4%  Consistent with increasing risk for diabetes   (prediabetes)  >or=6.5%  Consistent with diabetes  Currently, no consensus exists for use of hemoglobin A1c  for diagnosis of diabetes for children.  This Hemoglobin A1c assay has significant interference with fetal   hemoglobin   (HbF). The results are invalid for patients with abnormal amounts of   HbF,   including those with known Hereditary Persistence   of Fetal Hemoglobin. Heterozygous hemoglobin variants (HbAS, HbAC,   HbAD, HbAE, HbA2) do not significantly interfere with this assay;   however, presence of multiple variants in a sample may impact the %   interference.             Chemistry        Component Value Date/Time     06/22/2018 0751    K 4.3 06/22/2018 0751     (H) 06/22/2018 0751    CO2 22 (L) 06/22/2018 0751    BUN 17  06/22/2018 0751    CREATININE 1.0 06/22/2018 0751     (H) 06/22/2018 0751        Component Value Date/Time    CALCIUM 9.7 06/22/2018 0751    ALKPHOS 57 06/22/2018 0751    AST 19 06/22/2018 0751    ALT 13 06/22/2018 0751    BILITOT 0.5 06/22/2018 0751    ESTGFRAFRICA >60.0 06/22/2018 0751    EGFRNONAA 54.9 (A) 06/22/2018 0751          Lab Results   Component Value Date    LDLCALC 49.4 (L) 04/25/2018        Ref. Range 4/25/2018 07:19   Cholesterol Latest Ref Range: 120 - 199 mg/dL 113 (L)   HDL Latest Ref Range: 40 - 75 mg/dL 48   LDL Cholesterol Latest Ref Range: 63.0 - 159.0 mg/dL 49.4 (L)   Total Cholesterol/HDL Ratio Latest Ref Range: 2.0 - 5.0  2.4   Triglycerides Latest Ref Range: 30 - 150 mg/dL 78       Lab Results   Component Value Date    MICALBCREAT 23.3 08/01/2018           STANDARDS of CARE:        ASA:       yes        Last eye exam:       ASSESSMENT and PLAN:    A1C GOAL: < 8%    1. Type 2 diabetes, uncontrolled, with neuropathy  Suggested stopping Novolog and starting once weekly injection to avoid hypoglycemia and patient burden, but pt declines. She feels comfortable with current regimen and is fearful of potential s/e's.   Please check blood sugar if you are feeling dizzy or having sweats.   Reduce Novolog to 15 units before meals.   Continue Levemir 40 units every night.   Test bg 4x/day.   rtc in 3 mo with labs prior. Send log in 2 wks.         Hemoglobin A1c    Basic metabolic panel   2. Essential hypertension  controlled   3. Hypoglycemia associated with diabetes  Reduce insulin as above.    4. Hyperlipidemia LDL goal <100  At goal.          Orders Placed This Encounter   Procedures    Hemoglobin A1c     Standing Status:   Future     Standing Expiration Date:   10/29/2019    Basic metabolic panel     Standing Status:   Future     Standing Expiration Date:   10/29/2019    C-peptide     Standing Status:   Future     Standing Expiration Date:   10/29/2019        Follow-up in about 3 months  (around 11/30/2018).

## 2018-09-04 DIAGNOSIS — I10 ESSENTIAL HYPERTENSION: ICD-10-CM

## 2018-09-04 RX ORDER — LOSARTAN POTASSIUM 100 MG/1
TABLET ORAL
Qty: 90 TABLET | Refills: 3 | Status: SHIPPED | OUTPATIENT
Start: 2018-09-04 | End: 2019-08-27 | Stop reason: SDUPTHER

## 2018-09-17 DIAGNOSIS — C50.912 MALIGNANT NEOPLASM OF LEFT FEMALE BREAST, UNSPECIFIED ESTROGEN RECEPTOR STATUS, UNSPECIFIED SITE OF BREAST: ICD-10-CM

## 2018-09-17 RX ORDER — LETROZOLE 2.5 MG/1
TABLET, FILM COATED ORAL
Qty: 30 TABLET | Refills: 0 | Status: SHIPPED | OUTPATIENT
Start: 2018-09-17 | End: 2018-09-27 | Stop reason: SDUPTHER

## 2018-09-24 ENCOUNTER — LAB VISIT (OUTPATIENT)
Dept: LAB | Facility: HOSPITAL | Age: 78
End: 2018-09-24
Attending: INTERNAL MEDICINE
Payer: MEDICARE

## 2018-09-24 DIAGNOSIS — C50.912 MALIGNANT NEOPLASM OF LEFT FEMALE BREAST, UNSPECIFIED ESTROGEN RECEPTOR STATUS, UNSPECIFIED SITE OF BREAST: ICD-10-CM

## 2018-09-24 LAB
ALBUMIN SERPL BCP-MCNC: 3.6 G/DL
ALP SERPL-CCNC: 60 U/L
ALT SERPL W/O P-5'-P-CCNC: 16 U/L
ANION GAP SERPL CALC-SCNC: 8 MMOL/L
AST SERPL-CCNC: 18 U/L
BASOPHILS # BLD AUTO: 0.07 K/UL
BASOPHILS NFR BLD: 0.8 %
BILIRUB SERPL-MCNC: 0.5 MG/DL
BUN SERPL-MCNC: 16 MG/DL
CALCIUM SERPL-MCNC: 9.7 MG/DL
CHLORIDE SERPL-SCNC: 108 MMOL/L
CO2 SERPL-SCNC: 24 MMOL/L
CREAT SERPL-MCNC: 0.9 MG/DL
DIFFERENTIAL METHOD: ABNORMAL
EOSINOPHIL # BLD AUTO: 0.5 K/UL
EOSINOPHIL NFR BLD: 5.9 %
ERYTHROCYTE [DISTWIDTH] IN BLOOD BY AUTOMATED COUNT: 14.2 %
EST. GFR  (AFRICAN AMERICAN): >60 ML/MIN/1.73 M^2
EST. GFR  (NON AFRICAN AMERICAN): >60 ML/MIN/1.73 M^2
GLUCOSE SERPL-MCNC: 101 MG/DL
HCT VFR BLD AUTO: 38 %
HGB BLD-MCNC: 12 G/DL
IMM GRANULOCYTES # BLD AUTO: 0.02 K/UL
IMM GRANULOCYTES NFR BLD AUTO: 0.2 %
LYMPHOCYTES # BLD AUTO: 2.6 K/UL
LYMPHOCYTES NFR BLD: 29.1 %
MCH RBC QN AUTO: 30.3 PG
MCHC RBC AUTO-ENTMCNC: 31.6 G/DL
MCV RBC AUTO: 96 FL
MONOCYTES # BLD AUTO: 0.5 K/UL
MONOCYTES NFR BLD: 5.8 %
NEUTROPHILS # BLD AUTO: 5.1 K/UL
NEUTROPHILS NFR BLD: 58.2 %
NRBC BLD-RTO: 0 /100 WBC
PLATELET # BLD AUTO: 320 K/UL
PMV BLD AUTO: 11.2 FL
POTASSIUM SERPL-SCNC: 4.1 MMOL/L
PROT SERPL-MCNC: 7.2 G/DL
RBC # BLD AUTO: 3.96 M/UL
SODIUM SERPL-SCNC: 140 MMOL/L
WBC # BLD AUTO: 8.82 K/UL

## 2018-09-24 PROCEDURE — 80053 COMPREHEN METABOLIC PANEL: CPT

## 2018-09-24 PROCEDURE — 85025 COMPLETE CBC W/AUTO DIFF WBC: CPT

## 2018-09-24 PROCEDURE — 36415 COLL VENOUS BLD VENIPUNCTURE: CPT | Mod: PO

## 2018-09-27 ENCOUNTER — OFFICE VISIT (OUTPATIENT)
Dept: HEMATOLOGY/ONCOLOGY | Facility: CLINIC | Age: 78
End: 2018-09-27
Payer: MEDICARE

## 2018-09-27 VITALS
WEIGHT: 169.75 LBS | BODY MASS INDEX: 28.98 KG/M2 | HEIGHT: 64 IN | DIASTOLIC BLOOD PRESSURE: 56 MMHG | SYSTOLIC BLOOD PRESSURE: 134 MMHG | OXYGEN SATURATION: 95 % | HEART RATE: 72 BPM | TEMPERATURE: 98 F

## 2018-09-27 DIAGNOSIS — Z51.81 ENCOUNTER FOR MONITORING AROMATASE INHIBITOR THERAPY: ICD-10-CM

## 2018-09-27 DIAGNOSIS — C50.912 MALIGNANT NEOPLASM OF LEFT BREAST IN FEMALE, ESTROGEN RECEPTOR POSITIVE, UNSPECIFIED SITE OF BREAST: Primary | ICD-10-CM

## 2018-09-27 DIAGNOSIS — Z79.811 ENCOUNTER FOR MONITORING AROMATASE INHIBITOR THERAPY: ICD-10-CM

## 2018-09-27 DIAGNOSIS — C50.912 MALIGNANT NEOPLASM OF LEFT FEMALE BREAST, UNSPECIFIED ESTROGEN RECEPTOR STATUS, UNSPECIFIED SITE OF BREAST: ICD-10-CM

## 2018-09-27 DIAGNOSIS — Z17.0 MALIGNANT NEOPLASM OF LEFT BREAST IN FEMALE, ESTROGEN RECEPTOR POSITIVE, UNSPECIFIED SITE OF BREAST: Primary | ICD-10-CM

## 2018-09-27 DIAGNOSIS — E11.3293 MILD NONPROLIFERATIVE DIABETIC RETINOPATHY OF BOTH EYES WITHOUT MACULAR EDEMA ASSOCIATED WITH TYPE 2 DIABETES MELLITUS: ICD-10-CM

## 2018-09-27 DIAGNOSIS — M85.80 OSTEOPENIA, UNSPECIFIED LOCATION: ICD-10-CM

## 2018-09-27 PROCEDURE — 1101F PT FALLS ASSESS-DOCD LE1/YR: CPT | Mod: CPTII,,, | Performed by: INTERNAL MEDICINE

## 2018-09-27 PROCEDURE — 3078F DIAST BP <80 MM HG: CPT | Mod: CPTII,,, | Performed by: INTERNAL MEDICINE

## 2018-09-27 PROCEDURE — 99215 OFFICE O/P EST HI 40 MIN: CPT | Mod: PBBFAC | Performed by: INTERNAL MEDICINE

## 2018-09-27 PROCEDURE — 3075F SYST BP GE 130 - 139MM HG: CPT | Mod: CPTII,,, | Performed by: INTERNAL MEDICINE

## 2018-09-27 PROCEDURE — 99999 PR PBB SHADOW E&M-EST. PATIENT-LVL V: CPT | Mod: PBBFAC,,, | Performed by: INTERNAL MEDICINE

## 2018-09-27 PROCEDURE — 99214 OFFICE O/P EST MOD 30 MIN: CPT | Mod: S$PBB,,, | Performed by: INTERNAL MEDICINE

## 2018-09-27 RX ORDER — LETROZOLE 2.5 MG/1
TABLET, FILM COATED ORAL
Qty: 30 TABLET | Refills: 0 | Status: SHIPPED | OUTPATIENT
Start: 2018-09-27 | End: 2018-11-14 | Stop reason: SDUPTHER

## 2018-09-27 NOTE — PROGRESS NOTES
Subjective:       Patient ID: Joel Condon is a 78 y.o. female.    Chief Complaint: Follow-up    HPI   Diagnosis : Left Breast CA, IDC  S/p  left partial mastectomy and SLNB 2017. [id number] Stage 1B,grade 1, ER/AK pos [id number] negative  , closest margin anteriorly at 1mm      HPI: Patient  is a 78 y.o. postmenopausal female seen today for recently diagnosed carcinoma of the left breast. She had an abnormal mammogram first noted 10/27/2017. Follow-up mammogram and ultrasound (17) showed 6mm mass in the 2OC left breast with enlarged axillary LN measuring 17mm boderline.She underwent an  ultrasound guided biopsy  on 2017 with pathology revealing infiltrating ductal carcinoma of the breast, Grade 1, ER positive 100% ,AK positive 100% Her-2neu negative.  The patient is status post left partial mastectomy and sentinel node biopsy on 2017.  Final pathology showed 6mm IDC, 1 of 2  LN with 1mm micromet. 1mm anterior margin. She does not routinely do self breast exams. She  has not noted any skin  changes on breast exam. No  nipple discharge. No history of  previous breast biopsies. No  personal history of breast cancer or ovarian cancer.     She completed  postoperative RT under the direction of Dr. Wray on 3/13/2018    She is taking adjuvant Letrozole daily    Today, she is doing well except for mild left breast pain  No fevers  Appetite and weight stable  No SOB/CP   No cough  No back pain     She is followed by her PCP for Type 2 DM and HTN  She reports blood glucose levels stable      GYN History: Age of menarche was 14. Age of menopause was 46.  Patient reports hormonal therapy for less than 5 years. Patient is . Age of first live birth was 17. Patient did breast feed for 2 years 8 months.         Past Medical History:   Diagnosis Date    Arthritis     Cataract     Diabetes mellitus     Diabetic retinopathy     Hyperlipidemia LDL goal < 100     Hypertension     Hypothyroidism      Osteoporosis, post-menopausal     Overweight(278.02)     Proteinuria     Type II or unspecified type diabetes mellitus with renal manifestations, uncontrolled(250.42)        Past Surgical History:   Procedure Laterality Date    APPENDECTOMY      BIOPSY-LYMPH NODE-SENTINELleft Left 12/13/2017    [facility]    BREAST SURGERY Left 12/13/2017    tumor removal    CATARACT EXTRACTION W/  INTRAOCULAR LENS IMPLANT Left 4/24/14    OS (Dr. Arce)    CATARACT EXTRACTION W/  INTRAOCULAR LENS IMPLANT Right 06/12/2014    OD (Dr. Arce)    CHOLECYSTECTOMY      COLONOSCOPY N/A 4/21/2017    Procedure: COLONOSCOPY;  Surgeon: Homar Payan MD;  Location: [facility] ENDO;  Service: Endoscopy;  Laterality: N/A;    COLONOSCOPY N/A 6/29/2018    Procedure: COLONOSCOPY;  Surgeon: Mykel Boles MD;  Location: [facility] ENDO;  Service: Endoscopy;  Laterality: N/A;    COLONOSCOPY N/A 6/29/2018    [facility] at [facility] ENDO    COLONOSCOPY N/A 4/21/2017    [facility] ENDO    EYE SURGERY  04/24/2014 & 06/12/2014    HYSTERECTOMY      total    INJECTION-NODE-SENTINEL - MD INJECTS IN OR Left 12/13/2017    [facility]    INSERTION, IOL PROSTHESIS Left 4/24/2014    [facility] at [facility] OR    INSERTION-INTRAOCULAR LENS (IOL) Right 6/12/2014    [facility] at [facility] OR    left eye cataract surgery  4/24/14    MASTECTOMY-PARTIAL left with wire localization same day in mammography (CONSENT AM OF) 1.5 hr case Left 12/13/2017    [facility] OR    OOPHORECTOMY      PHACOEMULSIFICATION, CATARACT Left 4/24/2014    [facility] at [facility] OR    PHACOEMULSIFICATION-ASPIRATION-CATARACT Right 6/12/2014    [facility] by [facility] at [facility] OR     Current MEDS; Reviewed and as per MEDCHART    Review of Systems   Constitutional:  "Negative for appetite change, fatigue, fever and unexpected weight change.   HENT: Negative for mouth sores.    Eyes: Negative for visual disturbance.   Respiratory: Negative for cough and shortness of breath.    Cardiovascular: Negative for chest pain.   Gastrointestinal: Negative for abdominal pain and diarrhea.   Genitourinary: Negative for frequency.   Musculoskeletal: Positive for arthralgias. Negative for back pain.   Skin: Negative for rash.   Neurological: Negative for headaches.   Hematological: Negative for adenopathy.   Psychiatric/Behavioral: The patient is not nervous/anxious.        Objective:       Vitals:    09/27/18 0825 09/27/18 0829   BP: (!) 153/71 (!) 134/56   BP Location: Right arm Right arm   Patient Position: Sitting Sitting   BP Method: Large (Automatic) Large (Manual)   Pulse: 72    Temp: 97.9 °F (36.6 °C)    TempSrc: Oral    SpO2: 95%    Weight: 77 kg (169 lb 12.1 oz)    Height: 5' 4" (1.626 m)        Physical Exam   Constitutional: She is oriented to person, place, and time. She appears well-developed and well-nourished.   HENT:   Head: Normocephalic.   Mouth/Throat: Oropharynx is clear and moist. No oropharyngeal exudate.   Eyes: Conjunctivae and lids are normal. Pupils are equal, round, and reactive to light. No scleral icterus.   Neck: Normal range of motion. Neck supple. No thyromegaly present.   Cardiovascular: Normal rate, regular rhythm and normal heart sounds.   No murmur heard.  Pulmonary/Chest: Breath sounds normal. She has no wheezes. She has no rales.   Left breast- hyperpigmentation noted at RT site    Abdominal: Soft. Bowel sounds are normal. She exhibits no distension and no mass. There is no hepatosplenomegaly. There is no tenderness. There is no rebound and no guarding.   Musculoskeletal: Normal range of motion. She exhibits no edema or tenderness.   Lymphadenopathy:     She has no cervical adenopathy.     She has no axillary adenopathy.        Right: No supraclavicular " adenopathy present.        Left: No supraclavicular adenopathy present.   Neurological: She is alert and oriented to person, place, and time. No cranial nerve deficit. Coordination normal.   Skin: Skin is warm and dry. No ecchymosis, no petechiae and no rash noted. No erythema.   Psychiatric: She has a normal mood and affect.         FINAL PATHOLOGIC DIAGNOSIS 11/21/2017  1. Left breast mass 2:00:  Invasive mammary carcinoma, grade 1 ([place] score: Tubule formation 2, nuclear pleomorphism 2, mitotic  activity 1, total score 5), occupying at least 5 mm in one core.  2. Left breast axilla (lymph node):  Benign lymphoid tissue with no evidence of metastatic disease.  Note: Receptor studies and immunohistochemical studies will be performed with supplemental report to follow.  Intradepartmental QC/review obtained. Dr. Neil Irvin concurs with the above diagnostic impressions.    Supplemental Diagnosis  HORMONE RECEPTOR STUDIES:  Virtually 100% of the cells of the carcinoma are strongly nuclear estrogen receptor positive. 60 percent of the cells  are strongly nuclear progesterone receptor positive. There is an abrupt transition from the zone of nuclear  progesterone receptor positive cells to the zone where this receptor is negative. It is possible that there has been  some technical artifact which has led a region of the tissue to not stain, and that actually the vast majority of the  tumor are nuclear progesterone receptor positive. The tumor is HER-2 negative. The positive and negative controls  stained appropriately.        FINAL PATHOLOGIC DIAGNOSIS 12/13/2017   Part 1  Lymph node (1, submitted as left sentinel lymph node count equals 60):  -No evidence of malignancy  Part 2  Lymph node (1, submitted as left sentinel lymph node count equals 20):  -No evidence of malignancy  Part 3  Breast excision (submitted as left partial mastectomy):  -Invasive, well differentiated (grade I of III) lobular carcinoma  -Carcinoma  is a single focus measuring 0.6 x 0.65 cm (6 x 6.5 mm) located 1 mm from the nearest, anterior  resection margin. All resection margins are free of malignancy.  -No tumor necrosis, hemosiderin vascular or lymphatic permeation, or perineural involvement is identified.  -A microscopic focus of in situ carcinoma is identified immediately adjacent to the invasive tumor focus  -Carcinoma is graded I of III according to the Melissa modification of the Arriaga-Benson grading scheme  with 2 points each assigned for moderate tubule formation and moderate nuclear pleomorphism and 1.4  minimal mitotic count, a total of 5 of 9 possible points.  -AJCC TN: pT pN0(sn)  -Complete AJCC Staging Form follows:  INVASIVE CARCINOMA OF THE BREAST  Procedure: Partial mastectomy  Node sampling: [place] lymph nodes  Specimen laterality: Left  Tumor site: Not specified  Tumor size: 6.5 x 6.0 mm  Histologic type: Invasive lobular carcinoma  Histologic grade  -Glandular differentiation: Score 2  -Nuclear pleomorphism: Score 2  -Mitotic rate: Score 1  -Overall grade: Score 1  Tumor focality: Single focus of invasive carcinoma  Ductal carcinoma in situ (DCIS): No DCIS present  Margins-invasive carcinoma: Margins uninvolved by invasive carcinoma  -Distance from closest margin: 1 mm, anterior  Lymph nodes  -Total number of lymph nodes examined (sentinel and non-sentinel): 2  -Number sentinel lymph node nodes examined: 2  Pathologic staging  -Primary tumor: pT1b pN0(sn)  -Regional lymph nodes  Modifier: (SN)  Category: pN0  Ancillary studies: E-cadherin negative in tumor cells        Bone Density 2018   Compared to the young normal reference, this study indicates osteopenia of the Lumbar spine and left hip with osteoporosis of the right hip as detailed above.  Compared to prior the bone mineral density of the lumbar spine and left hip has slightly improved with reduced bone mineral density of the right hip as detailed above.    Note: Degenerative  changes can falsely elevate measured bone mineral density, particularly in the lumbar spine, and should be considered as part of the final clinical recommendation    Results for JONG VALENTIN (MRN [id number]) as of 6/27/2018 10:08   Ref. Range 6/22/2018 07:51   Vit D, 25-Hydroxy Latest Ref Range: 30 - 96 ng/mL 33       Assessment:       1. Malignant neoplasm of left breast in female, estrogen receptor positive, unspecified site of breast    2. Encounter for monitoring aromatase inhibitor therapy    3. Osteopenia/Osteoporosis unspecified location    4. Type 2 diabetes, uncontrolled, with neuropathy        Plan:     1-4   ECOG 0   79 y/o with multiple medical diagnoses with Stage 1B pT1b (6mm) N1mi M0 grade 1 ER + AZ + [id number] IDC carcinoma  of the left breast status post left partial mastectomy and SLNB 12/13/2017  ER + AZ + [id number]  12/13/2017.  1 of 2 lymph nodes positive for micromet. Closest margin anteriorly 1mm.  She is Stage IA per AJCC 8th ed. pathologic prognostic staging system.   S/p  postoperative RT completed 3/13/2018  Cont Vit D supp  Bone Density 2018- osteopenia/osteoporosis  Cont letrozole 2.5 mg daily  Last PROLIA 5/2018  Cont PROLIA q 6mos q 6 mos   No Dental Clearance indicated ( dentures)      Follow-up with PCP for med mgmt  Cont with diabetic meds and home exercise regimen    All questions posed answered to patient's satisfaction    Follow-up  2 mo      Cc Ashli Caldera M.D.         Kenney Wray M.D.

## 2018-10-01 ENCOUNTER — TELEPHONE (OUTPATIENT)
Dept: FAMILY MEDICINE | Facility: CLINIC | Age: 78
End: 2018-10-01

## 2018-10-10 RX ORDER — INSULIN ASPART 100 [IU]/ML
INJECTION, SOLUTION INTRAVENOUS; SUBCUTANEOUS
Qty: 30 ML | Refills: 1 | Status: SHIPPED | OUTPATIENT
Start: 2018-10-10 | End: 2019-02-07 | Stop reason: SDUPTHER

## 2018-11-07 ENCOUNTER — INFUSION (OUTPATIENT)
Dept: INFUSION THERAPY | Facility: HOSPITAL | Age: 78
End: 2018-11-07
Attending: INTERNAL MEDICINE
Payer: MEDICARE

## 2018-11-07 VITALS
DIASTOLIC BLOOD PRESSURE: 65 MMHG | HEART RATE: 71 BPM | TEMPERATURE: 98 F | SYSTOLIC BLOOD PRESSURE: 148 MMHG | RESPIRATION RATE: 17 BRPM | OXYGEN SATURATION: 95 %

## 2018-11-07 DIAGNOSIS — M85.80 OSTEOPENIA, UNSPECIFIED LOCATION: Primary | ICD-10-CM

## 2018-11-07 PROCEDURE — 96372 THER/PROPH/DIAG INJ SC/IM: CPT

## 2018-11-07 PROCEDURE — 63600175 PHARM REV CODE 636 W HCPCS: Mod: JG | Performed by: INTERNAL MEDICINE

## 2018-11-07 RX ADMIN — DENOSUMAB 60 MG: 60 INJECTION SUBCUTANEOUS at 09:11

## 2018-11-07 NOTE — PLAN OF CARE
Problem: Patient Care Overview (Adult)  Goal: Plan of Care Review  Outcome: Ongoing (interventions implemented as appropriate)  Tolerated Prolia. Pt has had before. Taking calcium and vitamin d. All questions were answered. Pt discharged with family.

## 2018-11-14 DIAGNOSIS — C50.912 MALIGNANT NEOPLASM OF LEFT FEMALE BREAST, UNSPECIFIED ESTROGEN RECEPTOR STATUS, UNSPECIFIED SITE OF BREAST: ICD-10-CM

## 2018-11-14 RX ORDER — LETROZOLE 2.5 MG/1
TABLET, FILM COATED ORAL
Qty: 30 TABLET | Refills: 2 | Status: SHIPPED | OUTPATIENT
Start: 2018-11-14 | End: 2019-01-18 | Stop reason: SDUPTHER

## 2018-11-19 ENCOUNTER — HOSPITAL ENCOUNTER (OUTPATIENT)
Dept: RADIOLOGY | Facility: HOSPITAL | Age: 78
Discharge: HOME | End: 2018-11-19
Attending: SURGERY
Payer: MEDICARE

## 2018-11-19 ENCOUNTER — OFFICE VISIT (OUTPATIENT)
Dept: SURGERY | Facility: CLINIC | Age: 78
End: 2018-11-19
Payer: MEDICARE

## 2018-11-19 VITALS
SYSTOLIC BLOOD PRESSURE: 128 MMHG | HEIGHT: 64 IN | BODY MASS INDEX: 28.86 KG/M2 | DIASTOLIC BLOOD PRESSURE: 60 MMHG | WEIGHT: 169.06 LBS | HEART RATE: 89 BPM

## 2018-11-19 VITALS — BODY MASS INDEX: 28.85 KG/M2 | HEIGHT: 64 IN | WEIGHT: 169 LBS

## 2018-11-19 DIAGNOSIS — Z85.3 PERSONAL HISTORY OF BREAST CANCER: Primary | ICD-10-CM

## 2018-11-19 DIAGNOSIS — N64.4 BREAST PAIN, LEFT: ICD-10-CM

## 2018-11-19 DIAGNOSIS — Z85.3 PERSONAL HISTORY OF BREAST CANCER: ICD-10-CM

## 2018-11-19 PROCEDURE — 77062 BREAST TOMOSYNTHESIS BI: CPT | Mod: 26,,, | Performed by: RADIOLOGY

## 2018-11-19 PROCEDURE — 99999 PR PBB SHADOW E&M-EST. PATIENT-LVL III: CPT | Mod: PBBFAC,,, | Performed by: SURGERY

## 2018-11-19 PROCEDURE — 3078F DIAST BP <80 MM HG: CPT | Mod: CPTII,S$GLB,, | Performed by: SURGERY

## 2018-11-19 PROCEDURE — 99213 OFFICE O/P EST LOW 20 MIN: CPT | Mod: S$GLB,,, | Performed by: SURGERY

## 2018-11-19 PROCEDURE — 1101F PT FALLS ASSESS-DOCD LE1/YR: CPT | Mod: CPTII,S$GLB,, | Performed by: SURGERY

## 2018-11-19 PROCEDURE — 77066 DX MAMMO INCL CAD BI: CPT | Mod: 26,,, | Performed by: RADIOLOGY

## 2018-11-19 PROCEDURE — 3074F SYST BP LT 130 MM HG: CPT | Mod: CPTII,S$GLB,, | Performed by: SURGERY

## 2018-11-19 PROCEDURE — 77066 DX MAMMO INCL CAD BI: CPT | Mod: TC

## 2018-11-19 NOTE — PROGRESS NOTES
"Date of Service:11/19/2018    REFERRING PHYSICIAN:  No referring provider defined for this encounter.       Shelby Ley MD    MEDICAL ONCOLOGIST:    Dr. Parr  RADIATION ONCOLOGIST:   [facility]      TREATMENT SUMMARY:  The patient is status post left partial mastectomy and sentinel node biopsy on 12/13/2017.  Final pathology showed 6mm IDC, 1/2 LN with 1mm micromet. 1mm anterior margin.    DIAGNOSIS:   This is a 78 y.o. female with a history of stage 1A T1b(6mm)N1miM0, grade 1, ER +, AZ +, HER2 - IDC of the left breast.    TREATMENT:   1. Left partial mastectomy with sentinel node biopsy on 12/13/2018. Ashli Caldera M.D. Surgical Oncology  2. Radiation therapy completed 3/13/2018. Dr. Kenney Wray M.D. Radiation Oncology   3. Adjuvant endocrine therapy (letrozole) started on 4/2018. Deborah Parr M.D. Medical Oncology     HISTORY OF PRESENT ILLNESS:   Joel Condon is a 78 y.o. female comes in for oncological follow up.  She denies change in her breast self-exam specifically denying new masses, skin or nipple changes, or nipple discharge. Past medical and surgical history is updated with new changes. There have been no changes to family history. The patient denies constitutional symptoms of night sweats, weight loss, new headaches, visual changes, new back or bony pain, chest pain, or shortness of breath.  Does reports some joint pains. Doing very well. Reports some tenderness in the left breast.    IMAGING:   November 2018 bilateral BIRADS 2     MEDICATIONS/ALLERGIES:     Review of patient's allergies indicates:   Allergen Reactions    Lisinopril Other (See Comments)     Cough      Hydrochlorothiazide (bulk) Other (See Comments)     Elevated pt's blood pressure making her feel bad    Pravastatin Other (See Comments)     headaches       PHYSICAL EXAM:   /60   Pulse 89   Ht 5' 4" (1.626 m)   Wt 76.7 kg (169 lb 1.5 oz)   BMI 29.02 kg/m²   General: The patient appears well and is in no " acute distress.   Neuro: Alert & oriented x3.   Breasts: The exam was done with the patient seated and supine. Left breast - within normal limits. No palpable masses and no abnormal skin or nipple findings. No supraclavicular or axillary lymphadenopathy on the left side. Well healed incision.  Right breast - within normal limits. No palpable masses and no abnormal skin or nipple findings. No supraclavicular or axillary lymphadenopathy on the right side.  Pulmonary: nonlabored breathing  Extremities: Bilateral full arm range of motion without  lymphedema.     ASSESSMENT:   This is a 78 y.o. female without evidence of recurrence by exam, history or imaging.       PLAN:   1. Continue to follow up with Dr. Parr.  2. Continue monthly self breast exams and call the clinic with any changes or problems.  3. Mammogram in November 2019.  4. Return to clinic in 6 months. The patient is in agreement with the plan. Questions were encouraged and answered to patient's satisfaction. Joel will call our office with any questions or concerns.   5. Discussed tumeric for joint pain and vitamin e for breast tenderness

## 2018-11-26 ENCOUNTER — OFFICE VISIT (OUTPATIENT)
Dept: FAMILY MEDICINE | Facility: CLINIC | Age: 78
End: 2018-11-26
Payer: MEDICARE

## 2018-11-26 ENCOUNTER — LAB VISIT (OUTPATIENT)
Dept: LAB | Facility: HOSPITAL | Age: 78
End: 2018-11-26
Attending: NURSE PRACTITIONER
Payer: MEDICARE

## 2018-11-26 VITALS
HEART RATE: 73 BPM | SYSTOLIC BLOOD PRESSURE: 148 MMHG | DIASTOLIC BLOOD PRESSURE: 60 MMHG | WEIGHT: 167.25 LBS | HEIGHT: 64 IN | OXYGEN SATURATION: 95 % | BODY MASS INDEX: 28.55 KG/M2 | TEMPERATURE: 98 F

## 2018-11-26 DIAGNOSIS — C50.912 MALIGNANT NEOPLASM OF LEFT BREAST IN FEMALE, ESTROGEN RECEPTOR POSITIVE, UNSPECIFIED SITE OF BREAST: ICD-10-CM

## 2018-11-26 DIAGNOSIS — J01.90 ACUTE RHINOSINUSITIS: Primary | ICD-10-CM

## 2018-11-26 DIAGNOSIS — Z17.0 MALIGNANT NEOPLASM OF LEFT BREAST IN FEMALE, ESTROGEN RECEPTOR POSITIVE, UNSPECIFIED SITE OF BREAST: ICD-10-CM

## 2018-11-26 LAB
ALBUMIN SERPL BCP-MCNC: 3.4 G/DL
ALP SERPL-CCNC: 58 U/L
ALT SERPL W/O P-5'-P-CCNC: 15 U/L
ANION GAP SERPL CALC-SCNC: 10 MMOL/L
AST SERPL-CCNC: 20 U/L
BASOPHILS # BLD AUTO: 0.07 K/UL
BASOPHILS NFR BLD: 0.6 %
BILIRUB SERPL-MCNC: 0.4 MG/DL
BUN SERPL-MCNC: 24 MG/DL
C PEPTIDE SERPL-MCNC: 1.22 NG/ML
CALCIUM SERPL-MCNC: 10.6 MG/DL
CHLORIDE SERPL-SCNC: 105 MMOL/L
CO2 SERPL-SCNC: 25 MMOL/L
CREAT SERPL-MCNC: 0.9 MG/DL
DIFFERENTIAL METHOD: ABNORMAL
EOSINOPHIL # BLD AUTO: 0.5 K/UL
EOSINOPHIL NFR BLD: 4 %
ERYTHROCYTE [DISTWIDTH] IN BLOOD BY AUTOMATED COUNT: 13.8 %
EST. GFR  (AFRICAN AMERICAN): >60 ML/MIN/1.73 M^2
EST. GFR  (NON AFRICAN AMERICAN): >60 ML/MIN/1.73 M^2
ESTIMATED AVG GLUCOSE: 171 MG/DL
GLUCOSE SERPL-MCNC: 164 MG/DL
HBA1C MFR BLD HPLC: 7.6 %
HCT VFR BLD AUTO: 38.5 %
HGB BLD-MCNC: 12.5 G/DL
IMM GRANULOCYTES # BLD AUTO: 0.03 K/UL
IMM GRANULOCYTES NFR BLD AUTO: 0.3 %
LYMPHOCYTES # BLD AUTO: 2.7 K/UL
LYMPHOCYTES NFR BLD: 23.9 %
MCH RBC QN AUTO: 30.8 PG
MCHC RBC AUTO-ENTMCNC: 32.5 G/DL
MCV RBC AUTO: 95 FL
MONOCYTES # BLD AUTO: 0.6 K/UL
MONOCYTES NFR BLD: 5.3 %
NEUTROPHILS # BLD AUTO: 7.3 K/UL
NEUTROPHILS NFR BLD: 65.9 %
NRBC BLD-RTO: 0 /100 WBC
PLATELET # BLD AUTO: 390 K/UL
PMV BLD AUTO: 10.3 FL
POTASSIUM SERPL-SCNC: 4.3 MMOL/L
PROT SERPL-MCNC: 7.6 G/DL
RBC # BLD AUTO: 4.06 M/UL
SODIUM SERPL-SCNC: 140 MMOL/L
WBC # BLD AUTO: 11.13 K/UL

## 2018-11-26 PROCEDURE — 99214 OFFICE O/P EST MOD 30 MIN: CPT | Mod: S$GLB,,, | Performed by: FAMILY MEDICINE

## 2018-11-26 PROCEDURE — 80053 COMPREHEN METABOLIC PANEL: CPT

## 2018-11-26 PROCEDURE — 83036 HEMOGLOBIN GLYCOSYLATED A1C: CPT

## 2018-11-26 PROCEDURE — 36415 COLL VENOUS BLD VENIPUNCTURE: CPT | Mod: PO

## 2018-11-26 PROCEDURE — 1101F PT FALLS ASSESS-DOCD LE1/YR: CPT | Mod: CPTII,S$GLB,, | Performed by: FAMILY MEDICINE

## 2018-11-26 PROCEDURE — 99999 PR PBB SHADOW E&M-EST. PATIENT-LVL IV: CPT | Mod: PBBFAC,,, | Performed by: FAMILY MEDICINE

## 2018-11-26 PROCEDURE — 3078F DIAST BP <80 MM HG: CPT | Mod: CPTII,S$GLB,, | Performed by: FAMILY MEDICINE

## 2018-11-26 PROCEDURE — 3077F SYST BP >= 140 MM HG: CPT | Mod: CPTII,S$GLB,, | Performed by: FAMILY MEDICINE

## 2018-11-26 PROCEDURE — 85025 COMPLETE CBC W/AUTO DIFF WBC: CPT

## 2018-11-26 PROCEDURE — 84681 ASSAY OF C-PEPTIDE: CPT

## 2018-11-26 RX ORDER — BENZONATATE 100 MG/1
100 CAPSULE ORAL 3 TIMES DAILY PRN
Qty: 30 CAPSULE | Refills: 0 | Status: SHIPPED | OUTPATIENT
Start: 2018-11-26 | End: 2018-11-28

## 2018-11-26 RX ORDER — AMOXICILLIN AND CLAVULANATE POTASSIUM 500; 125 MG/1; MG/1
1 TABLET, FILM COATED ORAL 2 TIMES DAILY
Qty: 14 TABLET | Refills: 0 | Status: SHIPPED | OUTPATIENT
Start: 2018-11-26 | End: 2018-12-03

## 2018-11-26 RX ORDER — LORATADINE 10 MG/1
10 TABLET ORAL DAILY PRN
Qty: 30 TABLET | Refills: 0 | Status: SHIPPED | OUTPATIENT
Start: 2018-11-26 | End: 2018-11-28

## 2018-11-26 NOTE — PROGRESS NOTES
[facility]  Progress Note    SUBJECTIVE:     Chief Complaint   Patient presents with    Sore Throat    Flu Vaccine       HPI   Joel Condon  is a 78 y.o. female here for c/o cough, congestion, sore throat, malaise, sinus pressure for over 2 weeks. It is worsening. Tried otc meds without relief. No known sick contacts. Patient has no other new complaints/problems at this time.      Review of patient's allergies indicates:   Allergen Reactions    Lisinopril Other (See Comments)     Cough      Hydrochlorothiazide (bulk) Other (See Comments)     Elevated pt's blood pressure making her feel bad    Pravastatin Other (See Comments)     headaches       Past Medical History:   Diagnosis Date    Arthritis     Breast cancer     Cataract     Diabetes mellitus     Diabetic retinopathy     Hyperlipidemia LDL goal < 100     Hypertension     Hypothyroidism     Osteoporosis, post-menopausal     Overweight(278.02)     Proteinuria     Type II or unspecified type diabetes mellitus with renal manifestations, uncontrolled(250.42)      Past Surgical History:   Procedure Laterality Date    APPENDECTOMY      BIOPSY-LYMPH NODE-SENTINELleft Left 12/13/2017    [facility]    BREAST BIOPSY      BREAST LUMPECTOMY      BREAST SURGERY Left 12/13/2017    tumor removal    CATARACT EXTRACTION W/  INTRAOCULAR LENS IMPLANT Left 4/24/14    OS (Dr. Arce)    CATARACT EXTRACTION W/  INTRAOCULAR LENS IMPLANT Right 06/12/2014    OD (Dr. Arce)    CHOLECYSTECTOMY      COLONOSCOPY N/A 4/21/2017    Procedure: COLONOSCOPY;  Surgeon: Homar Payan MD;  Location: [facility];  Service: Endoscopy;  Laterality: N/A;    COLONOSCOPY N/A 6/29/2018    Procedure: COLONOSCOPY;  Surgeon: Mykel Boles MD;  Location: [facility];  Service: Endoscopy;  Laterality: N/A;    COLONOSCOPY N/A 6/29/2018    [facility] ENDO    COLONOSCOPY N/A 4/21/2017    [facility] ENDO    EYE SURGERY  04/24/2014 & 06/12/2014    HYSTERECTOMY      total    INJECTION-NODE-SENTINEL - MD INJECTS IN OR Left 12/13/2017    [facility]    INSERTION, IOL PROSTHESIS Left 4/24/2014    [facility] OR    INSERTION-INTRAOCULAR LENS (IOL) Right 6/12/2014    [facility]    left eye cataract surgery  4/24/14    MASTECTOMY-PARTIAL left with wire localization same day in mammography (CONSENT AM OF) 1.5 hr case Left 12/13/2017    [facility] OR    OOPHORECTOMY      PHACOEMULSIFICATION, CATARACT Left 4/24/2014    [facility]    PHACOEMULSIFICATION-ASPIRATION-CATARACT Right 6/12/2014    [facility]     Family History   Problem Relation Age of Onset    Diabetes Mother     Heart disease Mother     Hypertension Mother     Diabetes Sister     Hypertension Sister     Diabetes Sister     Hypertension Sister     Diabetes Sister     Hypertension Sister     Thyroid disease Sister     Stroke Sister     Hypertension Sister     Diabetes Sister     Heart disease Sister     Diabetes Brother     Diabetes Brother     Amblyopia Neg Hx     Blindness Neg Hx     Cataracts Neg Hx     Glaucoma Neg Hx     Macular degeneration Neg Hx     Retinal detachment Neg Hx     Strabismus Neg Hx      Social History     Tobacco Use    Smoking status: Never Smoker    Smokeless tobacco: Never Used   Substance Use Topics    Alcohol use: No    Drug use: No        Review of Systems   Constitutional: Positive for malaise/fatigue. Negative for chills and fever.   HENT: Positive for congestion and sore throat.    Respiratory: Positive for cough and sputum production. Negative for shortness of breath.    Cardiovascular: Negative.  Negative for chest pain.   Gastrointestinal: Negative.  Negative for  abdominal pain, nausea and vomiting.   Genitourinary: Negative.    Musculoskeletal: Negative for myalgias and neck pain.   Neurological: Negative for weakness and headaches.   All other systems reviewed and are negative.    OBJECTIVE:     Vitals:    11/26/18 0809   BP: (!) 148/60   Pulse: 73   Temp: 98.3 °F (36.8 °C)     Body mass index is 28.7 kg/m².    Physical Exam   Constitutional: She is oriented to person, place, and time. She appears distressed (mild).   HENT:   Head: Normocephalic and atraumatic.   Nose: Nose normal.   Mouth/Throat: No oropharyngeal exudate.   + erythemic posterior pharynx  + tender maxillary sinuses   Eyes: Conjunctivae and EOM are normal.   Cardiovascular: Normal rate, regular rhythm and normal heart sounds. Exam reveals no gallop and no friction rub.   No murmur heard.  Pulmonary/Chest: Effort normal and breath sounds normal. No stridor. No respiratory distress. She has no wheezes. She has no rales. She exhibits no tenderness.   Abdominal: Soft. Bowel sounds are normal. She exhibits no distension. There is no tenderness. There is no rebound.   Lymphadenopathy:     She has no cervical adenopathy.   Neurological: She is alert and oriented to person, place, and time.   Skin: Skin is warm. She is not diaphoretic.       Old records were reviewed. Labs and/or images were independently reviewed.    ASSESSMENT     1. Acute rhinosinusitis        PLAN:     Acute rhinosinusitis  -     benzonatate (TESSALON) 100 MG capsule; Take 1 capsule (100 mg total) by mouth 3 (three) times daily as needed for Cough.  Dispense: 30 capsule; Refill: 0  -     amoxicillin-clavulanate 500-125mg (AUGMENTIN) 500-125 mg Tab; Take 1 tablet (500 mg total) by mouth 2 (two) times daily. for 7 days  Dispense: 14 tablet; Refill: 0  -     loratadine (CLARITIN) 10 mg tablet; Take 1 tablet (10 mg total) by mouth daily as needed for Allergies (or runny nose).  Dispense: 30 tablet; Refill: 0  -     OK to take tylenol as needed PRN  fever. Take mucinex and or claritin to help decrease congestion. Educated patient to drink plenty of fluids and to take vitamin C to help boost immune system. Instructed patient to call or RTC if symptoms persist or worsen.      RTC PRROBE Mcknight MD  11/26/2018 8:24 AM

## 2018-11-28 ENCOUNTER — OFFICE VISIT (OUTPATIENT)
Dept: HEMATOLOGY/ONCOLOGY | Facility: CLINIC | Age: 78
End: 2018-11-28
Payer: MEDICARE

## 2018-11-28 ENCOUNTER — LAB VISIT (OUTPATIENT)
Dept: LAB | Facility: HOSPITAL | Age: 78
End: 2018-11-28
Attending: INTERNAL MEDICINE
Payer: MEDICARE

## 2018-11-28 VITALS
WEIGHT: 166.69 LBS | BODY MASS INDEX: 27.77 KG/M2 | HEIGHT: 65 IN | OXYGEN SATURATION: 96 % | HEART RATE: 70 BPM | DIASTOLIC BLOOD PRESSURE: 42 MMHG | SYSTOLIC BLOOD PRESSURE: 162 MMHG | TEMPERATURE: 98 F

## 2018-11-28 DIAGNOSIS — Z79.811 ENCOUNTER FOR MONITORING AROMATASE INHIBITOR THERAPY: ICD-10-CM

## 2018-11-28 DIAGNOSIS — Z51.81 ENCOUNTER FOR MONITORING AROMATASE INHIBITOR THERAPY: ICD-10-CM

## 2018-11-28 DIAGNOSIS — Z17.0 MALIGNANT NEOPLASM OF LEFT BREAST IN FEMALE, ESTROGEN RECEPTOR POSITIVE, UNSPECIFIED SITE OF BREAST: Primary | ICD-10-CM

## 2018-11-28 DIAGNOSIS — E83.52 HYPERCALCEMIA: ICD-10-CM

## 2018-11-28 DIAGNOSIS — C50.912 MALIGNANT NEOPLASM OF LEFT BREAST IN FEMALE, ESTROGEN RECEPTOR POSITIVE, UNSPECIFIED SITE OF BREAST: Primary | ICD-10-CM

## 2018-11-28 DIAGNOSIS — M85.80 OSTEOPENIA, UNSPECIFIED LOCATION: ICD-10-CM

## 2018-11-28 LAB
CALCIUM SERPL-MCNC: 10.7 MG/DL
PTH-INTACT SERPL-MCNC: 59.3 PG/ML

## 2018-11-28 PROCEDURE — 36415 COLL VENOUS BLD VENIPUNCTURE: CPT

## 2018-11-28 PROCEDURE — 83970 ASSAY OF PARATHORMONE: CPT

## 2018-11-28 PROCEDURE — 99214 OFFICE O/P EST MOD 30 MIN: CPT | Mod: S$GLB,,, | Performed by: INTERNAL MEDICINE

## 2018-11-28 PROCEDURE — 3078F DIAST BP <80 MM HG: CPT | Mod: CPTII,S$GLB,, | Performed by: INTERNAL MEDICINE

## 2018-11-28 PROCEDURE — 99999 PR PBB SHADOW E&M-EST. PATIENT-LVL V: CPT | Mod: PBBFAC,,, | Performed by: INTERNAL MEDICINE

## 2018-11-28 PROCEDURE — 1101F PT FALLS ASSESS-DOCD LE1/YR: CPT | Mod: CPTII,S$GLB,, | Performed by: INTERNAL MEDICINE

## 2018-11-28 PROCEDURE — 3077F SYST BP >= 140 MM HG: CPT | Mod: CPTII,S$GLB,, | Performed by: INTERNAL MEDICINE

## 2018-11-28 PROCEDURE — 82310 ASSAY OF CALCIUM: CPT

## 2018-11-28 NOTE — PROGRESS NOTES
Subjective:       Patient ID: Joel Condon is a 78 y.o. female.    Chief Complaint: Follow-up    HPI   Diagnosis : Left Breast CA, IDC  S/p  left partial mastectomy and SLNB 2017. [id number] Stage 1B,grade 1, ER/WA pos [id number] negative  , closest margin anteriorly at 1mm  S/p  postoperative RT completed 3/13/2018  Letrozole 3/2018-present    HPI: Patient  is a 78 y.o. postmenopausal female seen today for recently diagnosed carcinoma of the left breast. She had an abnormal mammogram first noted 10/27/2017. Follow-up mammogram and ultrasound (17) showed 6mm mass in the 2OC left breast with enlarged axillary LN measuring 17mm boderline.She underwent an  ultrasound guided biopsy  on 2017 with pathology revealing infiltrating ductal carcinoma of the breast, Grade 1, ER positive 100% ,WA positive 100% Her-2neu negative.  The patient is status post left partial mastectomy and sentinel node biopsy on 2017.  Final pathology showed 6mm IDC, 1 of 2  LN with 1mm micromet. 1mm anterior margin. She does not routinely do self breast exams. She  has not noted any skin  changes on breast exam. No  nipple discharge. No history of  previous breast biopsies. No  personal history of breast cancer or ovarian cancer.     She completed  postoperative RT under the direction of Dr. Wray on 3/13/2018    She is taking adjuvant Letrozole daily    Today, she is doing well except for lt hip pain   No new issues  She is in good spirits   Appetite and weight stable  No SOB/CP   No cough  No hot flashes    Biochemical profile reveals Ca level 10.6mg/dl and Alb 3.4    She is followed by her PCP for Type 2 DM and HTN  She reports blood glucose levels stable      GYN History: Age of menarche was 14. Age of menopause was 46.  Patient reports hormonal therapy for less than 5 years. Patient is . Age of first live birth was 17. Patient did breast feed for 2 years 8 months.         Past Medical History:   Diagnosis Date     Arthritis     Breast cancer     Cataract     Diabetes mellitus     Diabetic retinopathy     Hyperlipidemia LDL goal < 100     Hypertension     Hypothyroidism     Osteoporosis, post-menopausal     Overweight(278.02)     Proteinuria     Type II or unspecified type diabetes mellitus with renal manifestations, uncontrolled(250.42)        Past Surgical History:   Procedure Laterality Date    APPENDECTOMY      BIOPSY-LYMPH NODE-SENTINELleft Left 12/13/2017    [facility]formed by [facility] at [facility] OR    BREAST BIOPSY      BREAST LUMPECTOMY      BREAST SURGERY Left 12/13/2017    tumor removal    CATARACT EXTRACTION W/  INTRAOCULAR LENS IMPLANT Left 4/24/14    OS (Dr. Arce)    CATARACT EXTRACTION W/  INTRAOCULAR LENS IMPLANT Right 06/12/2014    OD (Dr. Arce)    CHOLECYSTECTOMY      COLONOSCOPY N/A 4/21/2017    Procedure: COLONOSCOPY;  Surgeon: Homar Payan MD;  Location: [facility] ENDO;  Service: Endoscopy;  Laterality: N/A;    COLONOSCOPY N/A 6/29/2018    Procedure: COLONOSCOPY;  Surgeon: Mykel Boles MD;  Location: [facility] ENDO;  Service: Endoscopy;  Laterality: N/A;    COLONOSCOPY N/A 6/29/2018    Performed by [facility]kel [facility]oles [facility] at [facility] ENDO    COLONOSCOPY N/A 4/21/2017    Performed by [facility]an [facility] at [facility] ENDO    EYE SURGERY  04/24/2014 & 06/12/2014    HYSTERECTOMY      total    INJECTION-NODE-SENTINEL - MD INJECTS IN OR Left 12/13/2017    [facility] by [facility]li [facility] at [facility] OR    INSERTION, IOL PROSTHESIS Left 4/24/2014    Performed by Adam [facility]ce MD at [facility] OR    INSERTION-INTRAOCULAR LENS (IOL) Right 6/12/2014    Performed by [facility] at [facility] OR    left eye cataract surgery  4/24/14    MASTECTOMY-PARTIAL left with wire localization same day in mammography (CONSENT AM OF) 1.5 hr case Left 12/13/2017    [facility] by [facility]li [facility] at [facility] OR    OOPHORECTOMY      PHACOEMULSIFICATION, CATARACT Left 4/24/2014    Performed by Adam CHOUDHARY  "[facility]    PHACOEMULSIFICATION-ASPIRATION-CATARACT Right 6/12/2014    Performed by [facility] at [facility] OR     Current MEDS; Reviewed and as per MEDCHART    Review of Systems   Constitutional: Negative for appetite change, fatigue, fever and unexpected weight change.   HENT: Negative for mouth sores.    Eyes: Negative for visual disturbance.   Respiratory: Negative for cough and shortness of breath.    Cardiovascular: Negative for chest pain.   Gastrointestinal: Negative for abdominal pain and diarrhea.   Genitourinary: Negative for frequency.   Musculoskeletal: Positive for arthralgias. Negative for back pain.   Skin: Negative for rash.   Neurological: Negative for headaches.   Hematological: Negative for adenopathy.   Psychiatric/Behavioral: The patient is not nervous/anxious.        Objective:       Vitals:    11/28/18 0829 11/28/18 0832   BP: (!) 195/84 (!) 162/42   BP Location: Right arm Right arm   Patient Position: Sitting Sitting   BP Method: Medium (Automatic) Medium (Manual)   Pulse: 70    Temp: 98 °F (36.7 °C)    TempSrc: Oral    SpO2: 96%    Weight: 75.6 kg (166 lb 10.7 oz)    Height: 5' 4.5" (1.638 m)        Physical Exam   Constitutional: She is oriented to person, place, and time. She appears well-developed and well-nourished.   HENT:   Head: Normocephalic.   Mouth/Throat: Oropharynx is clear and moist. No oropharyngeal exudate.   Eyes: Conjunctivae and lids are normal. Pupils are equal, round, and reactive to light. No scleral icterus.   Neck: Normal range of motion. Neck supple. No thyromegaly present.   Cardiovascular: Normal rate, regular rhythm and normal heart sounds.   No murmur heard.  Pulmonary/Chest: Breath sounds normal. She has no wheezes. She has no rales.   Left breast- hyperpigmentation noted at RT site    Abdominal: Soft. Bowel sounds are normal. She exhibits no distension and no mass. There is no hepatosplenomegaly. There is no tenderness. There is no " rebound and no guarding.   Musculoskeletal: Normal range of motion. She exhibits no edema or tenderness.   Lymphadenopathy:     She has no cervical adenopathy.     She has no axillary adenopathy.        Right: No supraclavicular adenopathy present.        Left: No supraclavicular adenopathy present.   Neurological: She is alert and oriented to person, place, and time. No cranial nerve deficit. Coordination normal.   Skin: Skin is warm and dry. No ecchymosis, no petechiae and no rash noted. No erythema.   Psychiatric: She has a normal mood and affect.         FINAL PATHOLOGIC DIAGNOSIS 11/21/2017  1. Left breast mass 2:00:  Invasive mammary carcinoma, grade 1 (Geronimo score: Tubule formation 2, nuclear pleomorphism 2, mitotic  activity 1, total score 5), occupying at least 5 mm in one core.  2. Left breast axilla (lymph node):  Benign lymphoid tissue with no evidence of metastatic disease.  Note: Receptor studies and immunohistochemical studies will be performed with supplemental report to follow.  Intradepartmental QC/review obtained. Dr. Neil Irvin concurs with the above diagnostic impressions.    Supplemental Diagnosis  HORMONE RECEPTOR STUDIES:  Virtually 100% of the cells of the carcinoma are strongly nuclear estrogen receptor positive. 60 percent of the cells  are strongly nuclear progesterone receptor positive. There is an abrupt transition from the zone of nuclear  progesterone receptor positive cells to the zone where this receptor is negative. It is possible that there has been  some technical artifact which has led a region of the tissue to not stain, and that actually the vast majority of the  tumor are nuclear progesterone receptor positive. The tumor is HER-2 negative. The positive and negative controls  stained appropriately.        FINAL PATHOLOGIC DIAGNOSIS 12/13/2017   Part 1  Lymph node (1, submitted as left sentinel lymph node count equals 60):  -No evidence of malignancy  Part 2  Lymph node  (1, submitted as left sentinel lymph node count equals 20):  -No evidence of malignancy  Part 3  Breast excision (submitted as left partial mastectomy):  -Invasive, well differentiated (grade I of III) lobular carcinoma  -Carcinoma is a single focus measuring 0.6 x 0.65 cm (6 x 6.5 mm) located 1 mm from the nearest, anterior  resection margin. All resection margins are free of malignancy.  -No tumor necrosis, hemosiderin vascular or lymphatic permeation, or perineural involvement is identified.  -A microscopic focus of in situ carcinoma is identified immediately adjacent to the invasive tumor focus  -Carcinoma is graded I of III according to the Melissa modification of the Arriaga-Benson grading scheme  with 2 points each assigned for moderate tubule formation and moderate nuclear pleomorphism and 1.4  minimal mitotic count, a total of 5 of 9 possible points.  -AJCC TN: pT pN0(sn)  -Complete AJCC Staging Form follows:  INVASIVE CARCINOMA OF THE BREAST  Procedure: Partial mastectomy  Node sampling: [place] lymph nodes  Specimen laterality: Left  Tumor site: Not specified  Tumor size: 6.5 x 6.0 mm  Histologic type: Invasive lobular carcinoma  Histologic grade  -Glandular differentiation: Score 2  -Nuclear pleomorphism: Score 2  -Mitotic rate: Score 1  -Overall grade: Score 1  Tumor focality: Single focus of invasive carcinoma  Ductal carcinoma in situ (DCIS): No DCIS present  Margins-invasive carcinoma: Margins uninvolved by invasive carcinoma  -Distance from closest margin: 1 mm, anterior  Lymph nodes  -Total number of lymph nodes examined (sentinel and non-sentinel): 2  -Number sentinel lymph node nodes examined: 2  Pathologic staging  -Primary tumor: pT1b pN0(sn)  -Regional lymph nodes  Modifier: (SN)  Category: pN0  Ancillary studies: E-cadherin negative in tumor cells        Bone Density 2018   Compared to the young normal reference, this study indicates osteopenia of the Lumbar spine and left hip with  osteoporosis of the right hip as detailed above.  Compared to prior the bone mineral density of the lumbar spine and left hip has slightly improved with reduced bone mineral density of the right hip as detailed above.    Note: Degenerative changes can falsely elevate measured bone mineral density, particularly in the lumbar spine, and should be considered as part of the final clinical recommendation    Results for JONG VALENTIN (MRN [id number]) as of 6/27/2018 10:08   Ref. Range 6/22/2018 07:51   Vit D, 25-Hydroxy Latest Ref Range: 30 - 96 ng/mL 33     Mammo 11/19/2018  Impression:  Bilateral  There is no mammographic evidence of malignancy.     BI-RADS Category:   Overall: 2 - Benign    Assessment:       1. Malignant neoplasm of left breast in female, estrogen receptor positive, unspecified site of breast    2. Encounter for monitoring aromatase inhibitor therapy    3. Hypercalcemia    4. Osteopenia/Osteoporosis unspecified location        Plan:     1-4   ECOG 0   79 y/o with multiple medical diagnoses with Stage 1B pT1b (6mm) N1mi M0 grade 1 ER + VT + [id number] IDC carcinoma  of the left breast status post left partial mastectomy and SLNB 12/13/2017  ER + VT + [id number]  12/13/2017.  1 of 2 lymph nodes positive for micromet. Closest margin anteriorly 1mm.  She is Stage IA per AJCC 8th ed. pathologic prognostic staging system.   S/p  postoperative RT completed 3/13/2018  Cont Vit D supp  Bone Density 2018- osteopenia/osteoporosis  Cont letrozole 2.5 mg daily  Last PROLIA 11/2018  No Dental Clearance indicated ( dentures)      Plan repeat Ca testing, PTH level and Bone Scan  ( pt elects to delay bone scan until after holidays)    Follow-up with PCP for med mgmt  Cont with diabetic meds and home exercise regimen    All questions posed answered to patient's satisfaction    Follow-up  2 mo      Cc Ashli Caldera M.D.         Kenney Wray M.D.

## 2018-12-02 DIAGNOSIS — E03.9 HYPOTHYROIDISM (ACQUIRED): ICD-10-CM

## 2018-12-02 RX ORDER — LEVOTHYROXINE SODIUM 50 UG/1
TABLET ORAL
Qty: 90 TABLET | Refills: 0 | Status: SHIPPED | OUTPATIENT
Start: 2018-12-02 | End: 2019-03-01 | Stop reason: SDUPTHER

## 2018-12-14 ENCOUNTER — OFFICE VISIT (OUTPATIENT)
Dept: ENDOCRINOLOGY | Facility: CLINIC | Age: 78
End: 2018-12-14
Payer: MEDICARE

## 2018-12-14 VITALS
WEIGHT: 166.88 LBS | SYSTOLIC BLOOD PRESSURE: 130 MMHG | HEART RATE: 67 BPM | DIASTOLIC BLOOD PRESSURE: 50 MMHG | BODY MASS INDEX: 27.81 KG/M2 | HEIGHT: 65 IN

## 2018-12-14 DIAGNOSIS — E11.40 TYPE 2 DIABETES, CONTROLLED, WITH NEUROPATHY: Primary | ICD-10-CM

## 2018-12-14 DIAGNOSIS — E78.5 HYPERLIPIDEMIA LDL GOAL <100: ICD-10-CM

## 2018-12-14 DIAGNOSIS — I10 ESSENTIAL HYPERTENSION: ICD-10-CM

## 2018-12-14 LAB
GLUCOSE SERPL-MCNC: 86 MG/DL (ref 70–110)
GLUCOSE SERPL-MCNC: 99 MG/DL (ref 70–110)
GLUCOSE SERPL-MCNC: <49 MG/DL (ref 70–110)

## 2018-12-14 PROCEDURE — 82962 GLUCOSE BLOOD TEST: CPT | Mod: ,,, | Performed by: NURSE PRACTITIONER

## 2018-12-14 PROCEDURE — G0008 ADMIN INFLUENZA VIRUS VAC: HCPCS | Mod: S$GLB,,, | Performed by: NURSE PRACTITIONER

## 2018-12-14 PROCEDURE — 99214 OFFICE O/P EST MOD 30 MIN: CPT | Mod: 25,S$GLB,, | Performed by: NURSE PRACTITIONER

## 2018-12-14 PROCEDURE — 1101F PT FALLS ASSESS-DOCD LE1/YR: CPT | Mod: CPTII,S$GLB,, | Performed by: NURSE PRACTITIONER

## 2018-12-14 PROCEDURE — 82962 GLUCOSE BLOOD TEST: CPT | Mod: S$GLB,,, | Performed by: NURSE PRACTITIONER

## 2018-12-14 PROCEDURE — 90662 IIV NO PRSV INCREASED AG IM: CPT | Mod: S$GLB,,, | Performed by: NURSE PRACTITIONER

## 2018-12-14 PROCEDURE — 3078F DIAST BP <80 MM HG: CPT | Mod: CPTII,S$GLB,, | Performed by: NURSE PRACTITIONER

## 2018-12-14 PROCEDURE — 3075F SYST BP GE 130 - 139MM HG: CPT | Mod: CPTII,S$GLB,, | Performed by: NURSE PRACTITIONER

## 2018-12-14 PROCEDURE — 99999 PR PBB SHADOW E&M-EST. PATIENT-LVL V: CPT | Mod: PBBFAC,,, | Performed by: NURSE PRACTITIONER

## 2018-12-14 RX ORDER — METFORMIN HYDROCHLORIDE 500 MG/1
TABLET, EXTENDED RELEASE ORAL
Qty: 270 TABLET | Refills: 1 | Status: SHIPPED | OUTPATIENT
Start: 2018-12-14 | End: 2019-07-15 | Stop reason: SDUPTHER

## 2018-12-14 NOTE — PROGRESS NOTES
CC: This 78 y.o. Black or  female  is here for evaluation of  T2DM along with comorbidities indicated in the Visit Diagnosis section of this encounter.    HPI: Joel Condon was diagnosed with T2DM in 1994.  Oral agents started at diagnosis.         Prior visit on 8/30/18  a1c down from 7.6 to 6.8%.   She did cut down levemir from 50 to 40 units but did not decrease Novolog from 20 to 17 units ac.   She does report frequently feeling like her BGs are too low.  Plan Suggested stopping Novolog and starting once weekly injection to avoid hypoglycemia and patient burden, but pt declines. She feels comfortable with current regimen and is fearful of potential s/e's.   Please check blood sugar if you are feeling dizzy or having sweats.   Reduce Novolog to 15 units before meals.   Continue Levemir 40 units every night.   Test bg 4x/day. rtc in 3 mo with labs prior. Send log in 2 wks.       Interval history  a1c up from 6.8 to 7.6%.   She has been taking novolog 17 instead of 15 units ac. Still doesn't want to try a weekly glp1-RA to replace novolog injections. Doesn't like changes to her meds bc of possible s/e.     She took her novolog injection this morning about 1-2 hours earlier without eating. POCT glucose was < 49 but denies any symptoms of dizziness, fatigue, etc. She typically waits a couple of hours in the morning between novolog injection and breakfast.     LAST DIABETES EDUCATION: years ago at [facility]. And also a class done by [employer] early 2017    HOSPITALIZED FOR DIABETES  -  No.    PRESCRIBED DIABETES MEDICATIONS: Levemir (vial) 40 units every night at 10 pm, Novolog Flexpen 20 units ac, metformin xr 1000 mg AM and 500 mg PM      Misses medication doses - No    She skips her insulin if her BG is less than 100 - skips levemir maybe 1-2x/week.   She skips Novolog if BG < 120.       DM COMPLICATIONS: peripheral neuropathy    SIGNIFICANT DIABETES MED HISTORY:   Metformin started at  "initial ov 8/17/17  Diarrhea on metformin even w/ psyllium fiber supplement     SELF MONITORING BLOOD GLUCOSE: Checks blood glucose at home 4x/day. Declines Freestyle Romeo. Does not mind fingersticks.                 HYPOGLYCEMIC EPISODES: gets dizzy and sleepy when BG is low - infrequent; symptoms start in the 80-90s. Denies overnight symptoms.      CURRENT DIET: drinks diet cranberry juice, diet soda, tea w/ splenda. 3 meals/day.     CURRENT EXERCISE:  None d/t hip pain       BP (!) 150/73 (BP Location: Right arm, Patient Position: Sitting, BP Method: Large (Automatic))   Pulse 67   Ht 5' 4.5" (1.638 m)   Wt 75.7 kg (166 lb 14.2 oz)   BMI 28.20 kg/m²       ROS:   CONSTITUTIONAL: Appetite low, denies fatigue  : no dysuria       PHYSICAL EXAM:  GENERAL: Well developed, well nourished. No acute distress.   PSYCH: AAOx3, appropriate mood and affect, conversant, well-groomed. Judgement and insight good.   NEURO: Cranial nerves grossly intact. Speech clear, no tremor.       Hemoglobin A1C   Date Value Ref Range Status   11/26/2018 7.6 (H) 4.0 - 5.6 % Final     Comment:     ADA Screening Guidelines:  5.7-6.4%  Consistent with prediabetes  >or=6.5%  Consistent with diabetes  High levels of fetal hemoglobin interfere with the HbA1C  assay. Heterozygous hemoglobin variants (HbS, HgC, etc)do  not significantly interfere with this assay.   However, presence of multiple variants may affect accuracy.     08/01/2018 6.8 (H) 4.0 - 5.6 % Final     Comment:     ADA Screening Guidelines:  5.7-6.4%  Consistent with prediabetes  >or=6.5%  Consistent with diabetes  High levels of fetal hemoglobin interfere with the HbA1C  assay. Heterozygous hemoglobin variants (HbS, HgC, etc)do  not significantly interfere with this assay.   However, presence of multiple variants may affect accuracy.     04/25/2018 7.6 (H) 4.0 - 5.6 % Final     Comment:     According to ADA guidelines, hemoglobin A1c <7.0% represents  optimal control in " non-pregnant diabetic patients. Different  metrics may apply to specific patient populations.   Standards of Medical Care in Diabetes-2016.  For the purpose of screening for the presence of diabetes:  <5.7%     Consistent with the absence of diabetes  5.7-6.4%  Consistent with increasing risk for diabetes   (prediabetes)  >or=6.5%  Consistent with diabetes  Currently, no consensus exists for use of hemoglobin A1c  for diagnosis of diabetes for children.  This Hemoglobin A1c assay has significant interference with fetal   hemoglobin   (HbF). The results are invalid for patients with abnormal amounts of   HbF,   including those with known Hereditary Persistence   of Fetal Hemoglobin. Heterozygous hemoglobin variants (HbAS, HbAC,   HbAD, HbAE, HbA2) do not significantly interfere with this assay;   however, presence of multiple variants in a sample may impact the %   interference.             Chemistry        Component Value Date/Time     11/26/2018 0801    K 4.3 11/26/2018 0801     11/26/2018 0801    CO2 25 11/26/2018 0801    BUN 24 (H) 11/26/2018 0801    CREATININE 0.9 11/26/2018 0801     (H) 11/26/2018 0801        Component Value Date/Time    CALCIUM 10.7 (H) 11/28/2018 0924    ALKPHOS 58 11/26/2018 0801    AST 20 11/26/2018 0801    ALT 15 11/26/2018 0801    BILITOT 0.4 11/26/2018 0801    ESTGFRAFRICA >60.0 11/26/2018 0801    EGFRNONAA >60.0 11/26/2018 0801          Lab Results   Component Value Date    LDLCALC 49.4 (L) 04/25/2018        Ref. Range 4/25/2018 07:19   Cholesterol Latest Ref Range: 120 - 199 mg/dL 113 (L)   HDL Latest Ref Range: 40 - 75 mg/dL 48   LDL Cholesterol Latest Ref Range: 63.0 - 159.0 mg/dL 49.4 (L)   Total Cholesterol/HDL Ratio Latest Ref Range: 2.0 - 5.0  2.4   Triglycerides Latest Ref Range: 30 - 150 mg/dL 78       Lab Results   Component Value Date    MICALBCREAT 23.3 08/01/2018           STANDARDS of CARE:        ASA:       yes        Last eye exam:       ASSESSMENT and  PLAN:    A1C GOAL: < 8%    1. Type 2 diabetes, controlled, with neuropathy  GLUCOSE liborio from 42 to 88 after 8 oz of juice; pt then was provided with herminia santos. Glucose liborio to 99 before she left the clinic and she promised she will eat as soon as she gets home.     Make sure to eat within 15 minutes of Novolog injections to avoid hypoglycemia.     Continue current insulin doses and metformin.   Test bg 4x/day. rtc in 3 mo with labs prior.     Hemoglobin A1c    Lipid panel    Basic metabolic panel    POCT Glucose, Hand-Held Device    Influenza - High Dose (65+) (PF) (IM)   2. Hyperlipidemia LDL goal <100  Lipid panel   3. Essential hypertension  Continue current rx.          Orders Placed This Encounter   Procedures    Influenza - High Dose (65+) (PF) (IM)    Hemoglobin A1c     Standing Status:   Future     Standing Expiration Date:   2/12/2020    Lipid panel     Standing Status:   Future     Standing Expiration Date:   12/14/2019    Basic metabolic panel     Standing Status:   Future     Standing Expiration Date:   2/12/2020    POCT Glucose, Hand-Held Device        Follow-up in about 3 months (around 3/14/2019).

## 2018-12-14 NOTE — PROGRESS NOTES
Administered High Dose Influenza Vaccine to patient. Patient tolerated it well, and was advised to wait 15 minutes before leaving clinic to assure no adverse effects. Patient verbalized understanding.

## 2018-12-19 DIAGNOSIS — K21.9 GASTROESOPHAGEAL REFLUX DISEASE WITHOUT ESOPHAGITIS: Chronic | ICD-10-CM

## 2018-12-19 RX ORDER — OMEPRAZOLE 20 MG/1
CAPSULE, DELAYED RELEASE ORAL
Qty: 90 CAPSULE | Refills: 0 | Status: SHIPPED | OUTPATIENT
Start: 2018-12-19 | End: 2019-03-27 | Stop reason: SDUPTHER

## 2019-01-08 DIAGNOSIS — E78.5 HYPERLIPIDEMIA LDL GOAL <100: ICD-10-CM

## 2019-01-08 RX ORDER — ATORVASTATIN CALCIUM 10 MG/1
TABLET, FILM COATED ORAL
Qty: 90 TABLET | Refills: 0 | Status: SHIPPED | OUTPATIENT
Start: 2019-01-08 | End: 2020-04-30

## 2019-01-16 DIAGNOSIS — I10 ESSENTIAL HYPERTENSION: ICD-10-CM

## 2019-01-16 RX ORDER — NIFEDIPINE 90 MG/1
TABLET, EXTENDED RELEASE ORAL
Qty: 90 TABLET | Refills: 0 | Status: SHIPPED | OUTPATIENT
Start: 2019-01-16 | End: 2019-04-07 | Stop reason: SDUPTHER

## 2019-01-16 RX ORDER — SPIRONOLACTONE 50 MG/1
TABLET, FILM COATED ORAL
Qty: 90 TABLET | Refills: 1 | Status: SHIPPED | OUTPATIENT
Start: 2019-01-16 | End: 2019-07-05 | Stop reason: SDUPTHER

## 2019-01-18 DIAGNOSIS — C50.912 MALIGNANT NEOPLASM OF LEFT FEMALE BREAST, UNSPECIFIED ESTROGEN RECEPTOR STATUS, UNSPECIFIED SITE OF BREAST: ICD-10-CM

## 2019-01-18 RX ORDER — LETROZOLE 2.5 MG/1
TABLET, FILM COATED ORAL
Qty: 90 TABLET | Refills: 1 | Status: SHIPPED | OUTPATIENT
Start: 2019-01-18 | End: 2019-01-21 | Stop reason: SDUPTHER

## 2019-01-21 DIAGNOSIS — C50.912 MALIGNANT NEOPLASM OF LEFT FEMALE BREAST, UNSPECIFIED ESTROGEN RECEPTOR STATUS, UNSPECIFIED SITE OF BREAST: ICD-10-CM

## 2019-01-21 RX ORDER — LETROZOLE 2.5 MG/1
TABLET, FILM COATED ORAL
Qty: 90 TABLET | Refills: 1 | Status: SHIPPED | OUTPATIENT
Start: 2019-01-21 | End: 2019-01-30

## 2019-01-28 ENCOUNTER — LAB VISIT (OUTPATIENT)
Dept: LAB | Facility: HOSPITAL | Age: 79
End: 2019-01-28
Attending: INTERNAL MEDICINE
Payer: MEDICARE

## 2019-01-28 DIAGNOSIS — Z17.0 MALIGNANT NEOPLASM OF LEFT BREAST IN FEMALE, ESTROGEN RECEPTOR POSITIVE, UNSPECIFIED SITE OF BREAST: ICD-10-CM

## 2019-01-28 DIAGNOSIS — C50.912 MALIGNANT NEOPLASM OF LEFT BREAST IN FEMALE, ESTROGEN RECEPTOR POSITIVE, UNSPECIFIED SITE OF BREAST: ICD-10-CM

## 2019-01-28 LAB
ALBUMIN SERPL BCP-MCNC: 3.7 G/DL
ALP SERPL-CCNC: 60 U/L
ALT SERPL W/O P-5'-P-CCNC: 15 U/L
ANION GAP SERPL CALC-SCNC: 11 MMOL/L
AST SERPL-CCNC: 17 U/L
BASOPHILS # BLD AUTO: 0.07 K/UL
BASOPHILS NFR BLD: 0.7 %
BILIRUB SERPL-MCNC: 0.6 MG/DL
BUN SERPL-MCNC: 16 MG/DL
CALCIUM SERPL-MCNC: 10.2 MG/DL
CHLORIDE SERPL-SCNC: 105 MMOL/L
CO2 SERPL-SCNC: 23 MMOL/L
CREAT SERPL-MCNC: 1 MG/DL
DIFFERENTIAL METHOD: ABNORMAL
EOSINOPHIL # BLD AUTO: 0.4 K/UL
EOSINOPHIL NFR BLD: 3.8 %
ERYTHROCYTE [DISTWIDTH] IN BLOOD BY AUTOMATED COUNT: 14.3 %
EST. GFR  (AFRICAN AMERICAN): >60 ML/MIN/1.73 M^2
EST. GFR  (NON AFRICAN AMERICAN): 54.5 ML/MIN/1.73 M^2
GLUCOSE SERPL-MCNC: 181 MG/DL
HCT VFR BLD AUTO: 39.2 %
HGB BLD-MCNC: 12.4 G/DL
IMM GRANULOCYTES # BLD AUTO: 0.03 K/UL
IMM GRANULOCYTES NFR BLD AUTO: 0.3 %
LYMPHOCYTES # BLD AUTO: 3 K/UL
LYMPHOCYTES NFR BLD: 28.9 %
MCH RBC QN AUTO: 29.7 PG
MCHC RBC AUTO-ENTMCNC: 31.6 G/DL
MCV RBC AUTO: 94 FL
MONOCYTES # BLD AUTO: 0.6 K/UL
MONOCYTES NFR BLD: 6.2 %
NEUTROPHILS # BLD AUTO: 6.2 K/UL
NEUTROPHILS NFR BLD: 60.1 %
NRBC BLD-RTO: 0 /100 WBC
PLATELET # BLD AUTO: 341 K/UL
PMV BLD AUTO: 11.1 FL
POTASSIUM SERPL-SCNC: 4.3 MMOL/L
PROT SERPL-MCNC: 7.5 G/DL
RBC # BLD AUTO: 4.18 M/UL
SODIUM SERPL-SCNC: 139 MMOL/L
WBC # BLD AUTO: 10.29 K/UL

## 2019-01-28 PROCEDURE — 80053 COMPREHEN METABOLIC PANEL: CPT

## 2019-01-28 PROCEDURE — 36415 COLL VENOUS BLD VENIPUNCTURE: CPT | Mod: PO

## 2019-01-28 PROCEDURE — 85025 COMPLETE CBC W/AUTO DIFF WBC: CPT

## 2019-01-30 ENCOUNTER — OFFICE VISIT (OUTPATIENT)
Dept: HEMATOLOGY/ONCOLOGY | Facility: CLINIC | Age: 79
End: 2019-01-30
Payer: MEDICARE

## 2019-01-30 VITALS
BODY MASS INDEX: 28.38 KG/M2 | DIASTOLIC BLOOD PRESSURE: 60 MMHG | WEIGHT: 166.25 LBS | HEART RATE: 74 BPM | OXYGEN SATURATION: 98 % | TEMPERATURE: 99 F | SYSTOLIC BLOOD PRESSURE: 168 MMHG | HEIGHT: 64 IN

## 2019-01-30 DIAGNOSIS — Z17.0 MALIGNANT NEOPLASM OF LEFT BREAST IN FEMALE, ESTROGEN RECEPTOR POSITIVE, UNSPECIFIED SITE OF BREAST: Primary | ICD-10-CM

## 2019-01-30 DIAGNOSIS — Z51.81 ENCOUNTER FOR MONITORING AROMATASE INHIBITOR THERAPY: ICD-10-CM

## 2019-01-30 DIAGNOSIS — Z79.811 ENCOUNTER FOR MONITORING AROMATASE INHIBITOR THERAPY: ICD-10-CM

## 2019-01-30 DIAGNOSIS — M85.80 OSTEOPENIA, UNSPECIFIED LOCATION: ICD-10-CM

## 2019-01-30 DIAGNOSIS — C50.912 MALIGNANT NEOPLASM OF LEFT BREAST IN FEMALE, ESTROGEN RECEPTOR POSITIVE, UNSPECIFIED SITE OF BREAST: Primary | ICD-10-CM

## 2019-01-30 PROCEDURE — 99499 RISK ADDL DX/OHS AUDIT: ICD-10-PCS | Mod: S$GLB,,, | Performed by: INTERNAL MEDICINE

## 2019-01-30 PROCEDURE — 99214 PR OFFICE/OUTPT VISIT, EST, LEVL IV, 30-39 MIN: ICD-10-PCS | Mod: S$GLB,,, | Performed by: INTERNAL MEDICINE

## 2019-01-30 PROCEDURE — 3077F PR MOST RECENT SYSTOLIC BLOOD PRESSURE >= 140 MM HG: ICD-10-PCS | Mod: CPTII,S$GLB,, | Performed by: INTERNAL MEDICINE

## 2019-01-30 PROCEDURE — 3077F SYST BP >= 140 MM HG: CPT | Mod: CPTII,S$GLB,, | Performed by: INTERNAL MEDICINE

## 2019-01-30 PROCEDURE — 99214 OFFICE O/P EST MOD 30 MIN: CPT | Mod: S$GLB,,, | Performed by: INTERNAL MEDICINE

## 2019-01-30 PROCEDURE — 99499 UNLISTED E&M SERVICE: CPT | Mod: S$GLB,,, | Performed by: INTERNAL MEDICINE

## 2019-01-30 PROCEDURE — 3078F PR MOST RECENT DIASTOLIC BLOOD PRESSURE < 80 MM HG: ICD-10-PCS | Mod: CPTII,S$GLB,, | Performed by: INTERNAL MEDICINE

## 2019-01-30 PROCEDURE — 3078F DIAST BP <80 MM HG: CPT | Mod: CPTII,S$GLB,, | Performed by: INTERNAL MEDICINE

## 2019-01-30 PROCEDURE — 1101F PT FALLS ASSESS-DOCD LE1/YR: CPT | Mod: CPTII,S$GLB,, | Performed by: INTERNAL MEDICINE

## 2019-01-30 PROCEDURE — 1101F PR PT FALLS ASSESS DOC 0-1 FALLS W/OUT INJ PAST YR: ICD-10-PCS | Mod: CPTII,S$GLB,, | Performed by: INTERNAL MEDICINE

## 2019-01-30 PROCEDURE — 99999 PR PBB SHADOW E&M-EST. PATIENT-LVL IV: CPT | Mod: PBBFAC,,, | Performed by: INTERNAL MEDICINE

## 2019-01-30 PROCEDURE — 99999 PR PBB SHADOW E&M-EST. PATIENT-LVL IV: ICD-10-PCS | Mod: PBBFAC,,, | Performed by: INTERNAL MEDICINE

## 2019-01-30 RX ORDER — LETROZOLE 2.5 MG/1
TABLET, FILM COATED ORAL
Qty: 90 TABLET | Refills: 6 | Status: SHIPPED | OUTPATIENT
Start: 2019-01-30 | End: 2020-01-28 | Stop reason: SDUPTHER

## 2019-01-30 NOTE — PROGRESS NOTES
Subjective:       Patient ID: Joel Condon is a 78 y.o. female.    Chief Complaint: Follow-up    HPI   Diagnosis : Left Breast CA, IDC  S/p  left partial mastectomy and SLNB 2017. [id number] Stage 1B,grade 1, ER/WY pos [id number] negative  , closest margin anteriorly at 1mm  S/p  postoperative RT completed 3/13/2018  Letrozole 3/2018-present    HPI: Patient  is a 78 y.o. postmenopausal female seen today for recently diagnosed carcinoma of the left breast. She had an abnormal mammogram first noted 10/27/2017. Follow-up mammogram and ultrasound (17) showed 6mm mass in the 2OC left breast with enlarged axillary LN measuring 17mm boderline.She underwent an  ultrasound guided biopsy  on 2017 with pathology revealing infiltrating ductal carcinoma of the breast, Grade 1, ER positive 100% ,WY positive 100% Her-2neu negative.  The patient is status post left partial mastectomy and sentinel node biopsy on 2017.  Final pathology showed 6mm IDC, 1 of 2  LN with 1mm micromet. 1mm anterior margin. She does not routinely do self breast exams. She  has not noted any skin  changes on breast exam. No  nipple discharge. No history of  previous breast biopsies. No  personal history of breast cancer or ovarian cancer.     She completed  postoperative RT under the direction of Dr. Wray on 3/13/2018    She is taking adjuvant Letrozole daily    Today, she is doing well   No new issues  She continues with mild arthralgias hands and hip   Appetite and weight stable  No SOB/CP   No cough  No hot flashes    Biochemical profile reveals Ca level 10.2 mg/dl and Alb 3.7    She is followed by her PCP for Type 2 DM and HTN  She reports blood glucose levels range from   She takes insulin      GYN History: Age of menarche was 14. Age of menopause was 46.  Patient reports hormonal therapy for less than 5 years. Patient is . Age of first live birth was 17. Patient did breast feed for 2 years 8 months.         Past Medical  History:   Diagnosis Date    Arthritis     Breast cancer     Cataract     Diabetes mellitus     Diabetic retinopathy     Hyperlipidemia LDL goal < 100     Hypertension     Hypothyroidism     Osteoporosis, post-menopausal     Overweight(278.02)     Proteinuria     Type II or unspecified type diabetes mellitus with renal manifestations, uncontrolled(250.42)        Past Surgical History:   Procedure Laterality Date    APPENDECTOMY      BIOPSY-LYMPH NODE-SENTINELleft Left 12/13/2017    [facility]    BREAST BIOPSY      BREAST LUMPECTOMY      BREAST SURGERY Left 12/13/2017    tumor removal    CATARACT EXTRACTION W/  INTRAOCULAR LENS IMPLANT Left 4/24/14    OS (Dr. Arce)    CATARACT EXTRACTION W/  INTRAOCULAR LENS IMPLANT Right 06/12/2014    OD (Dr. Arce)    CHOLECYSTECTOMY      COLONOSCOPY N/A 6/29/2018    [facility]oles [facility] ENDO    COLONOSCOPY N/A 4/21/2017    [facility]an [facility] at [facility] END[facility]    EYE SURGERY  04/24/2014 & 06/12/2014    HYSTERECTOMY      total    INJECTION-NODE-SENTINEL - MD INJECTS IN OR Left 12/13/2017    [facility]    INSERTION, IOL PROSTHESIS Left 4/24/2014    [facility] by [facility] at [facility] OR    INSERTION-INTRAOCULAR LENS (IOL) Right 6/12/2014    [facility] at [facility] OR    left eye cataract surgery  4/24/14    MASTECTOMY-PARTIAL left with wire localization same day in mammography (CONSENT AM OF) 1.5 hr case Left 12/13/2017    [facility] at [facility] OR    OOPHORECTOMY      PHACOEMULSIFICATION, CATARACT Left 4/24/2014    [facility]    PHACOEMULSIFICATION-ASPIRATION-CATARACT Right 6/12/2014    [facility] by [facility] at [facility] OR     Current MEDS; Reviewed and as per MEDCHART    Review of Systems   Constitutional: Negative for appetite change, fatigue, fever and  "unexpected weight change.   HENT: Negative for mouth sores.    Eyes: Negative for visual disturbance.   Respiratory: Negative for cough and shortness of breath.    Cardiovascular: Negative for chest pain.   Gastrointestinal: Negative for abdominal pain and diarrhea.   Genitourinary: Negative for frequency.   Musculoskeletal: Positive for arthralgias. Negative for back pain.   Skin: Negative for rash.   Neurological: Negative for headaches.   Hematological: Negative for adenopathy.   Psychiatric/Behavioral: The patient is not nervous/anxious.        Objective:       Vitals:    01/30/19 0838 01/30/19 0839   BP: (!) 177/72 ( did not take BP meds)  (!) 168/60   BP Location: Right arm Right arm   Patient Position: Sitting Sitting   BP Method: Large (Automatic) Large (Automatic)   Pulse: 74    Temp: 98.5 °F (36.9 °C)    TempSrc: Oral    SpO2: 98%    Weight: 75.4 kg (166 lb 3.6 oz)    Height: 5' 4" (1.626 m)        Physical Exam   Constitutional: She is oriented to person, place, and time. She appears well-developed and well-nourished.   HENT:   Head: Normocephalic.   Mouth/Throat: Oropharynx is clear and moist. No oropharyngeal exudate.   Eyes: Conjunctivae and lids are normal. Pupils are equal, round, and reactive to light. No scleral icterus.   Neck: Normal range of motion. Neck supple. No thyromegaly present.   Cardiovascular: Normal rate, regular rhythm and normal heart sounds.   No murmur heard.  Pulmonary/Chest: Breath sounds normal. She has no wheezes. She has no rales.   Left breast- hyperpigmentation noted at RT site    Abdominal: Soft. Bowel sounds are normal. She exhibits no distension and no mass. There is no hepatosplenomegaly. There is no tenderness. There is no rebound and no guarding.   Musculoskeletal: Normal range of motion. She exhibits no edema or tenderness.   Lymphadenopathy:     She has no cervical adenopathy.     She has no axillary adenopathy.        Right: No supraclavicular adenopathy present. "        Left: No supraclavicular adenopathy present.   Neurological: She is alert and oriented to person, place, and time. No cranial nerve deficit. Coordination normal.   Skin: Skin is warm and dry. No ecchymosis, no petechiae and no rash noted. No erythema.   Psychiatric: She has a normal mood and affect.         FINAL PATHOLOGIC DIAGNOSIS 11/21/2017  1. Left breast mass 2:00:  Invasive mammary carcinoma, grade 1 (Geronimo score: Tubule formation 2, nuclear pleomorphism 2, mitotic  activity 1, total score 5), occupying at least 5 mm in one core.  2. Left breast axilla (lymph node):  Benign lymphoid tissue with no evidence of metastatic disease.  Note: Receptor studies and immunohistochemical studies will be performed with supplemental report to follow.  Intradepartmental QC/review obtained. Dr. Neil Irvin concurs with the above diagnostic impressions.    Supplemental Diagnosis  HORMONE RECEPTOR STUDIES:  Virtually 100% of the cells of the carcinoma are strongly nuclear estrogen receptor positive. 60 percent of the cells  are strongly nuclear progesterone receptor positive. There is an abrupt transition from the zone of nuclear  progesterone receptor positive cells to the zone where this receptor is negative. It is possible that there has been  some technical artifact which has led a region of the tissue to not stain, and that actually the vast majority of the  tumor are nuclear progesterone receptor positive. The tumor is HER-2 negative. The positive and negative controls  stained appropriately.        FINAL PATHOLOGIC DIAGNOSIS 12/13/2017   Part 1  Lymph node (1, submitted as left sentinel lymph node count equals 60):  -No evidence of malignancy  Part 2  Lymph node (1, submitted as left sentinel lymph node count equals 20):  -No evidence of malignancy  Part 3  Breast excision (submitted as left partial mastectomy):  -Invasive, well differentiated (grade I of III) lobular carcinoma  -Carcinoma is a single focus  measuring 0.6 x 0.65 cm (6 x 6.5 mm) located 1 mm from the nearest, anterior  resection margin. All resection margins are free of malignancy.  -No tumor necrosis, hemosiderin vascular or lymphatic permeation, or perineural involvement is identified.  -A microscopic focus of in situ carcinoma is identified immediately adjacent to the invasive tumor focus  -Carcinoma is graded I of III according to the Melissa modification of the Arriaga-Benson grading scheme  with 2 points each assigned for moderate tubule formation and moderate nuclear pleomorphism and 1.4  minimal mitotic count, a total of 5 of 9 possible points.  -AJCC TN: pT pN0(sn)  -Complete AJCC Staging Form follows:  INVASIVE CARCINOMA OF THE BREAST  Procedure: Partial mastectomy  Node sampling: Valera lymph nodes  Specimen laterality: Left  Tumor site: Not specified  Tumor size: 6.5 x 6.0 mm  Histologic type: Invasive lobular carcinoma  Histologic grade  -Glandular differentiation: Score 2  -Nuclear pleomorphism: Score 2  -Mitotic rate: Score 1  -Overall grade: Score 1  Tumor focality: Single focus of invasive carcinoma  Ductal carcinoma in situ (DCIS): No DCIS present  Margins-invasive carcinoma: Margins uninvolved by invasive carcinoma  -Distance from closest margin: 1 mm, anterior  Lymph nodes  -Total number of lymph nodes examined (sentinel and non-sentinel): 2  -Number sentinel lymph node nodes examined: 2  Pathologic staging  -Primary tumor: pT1b pN0(sn)  -Regional lymph nodes  Modifier: (SN)  Category: pN0  Ancillary studies: E-cadherin negative in tumor cells        Bone Density 2018   Compared to the young normal reference, this study indicates osteopenia of the Lumbar spine and left hip with osteoporosis of the right hip as detailed above.  Compared to prior the bone mineral density of the lumbar spine and left hip has slightly improved with reduced bone mineral density of the right hip as detailed above.    Note: Degenerative changes can  falsely elevate measured bone mineral density, particularly in the lumbar spine, and should be considered as part of the final clinical recommendation    Results for JONG VALENTIN (MRN [id number]) as of 6/27/2018 10:08   Ref. Range 6/22/2018 07:51   Vit D, 25-Hydroxy Latest Ref Range: 30 - 96 ng/mL 33     Mammo 11/19/2018  Impression:  Bilateral  There is no mammographic evidence of malignancy.     BI-RADS Category:   Overall: 2 - Benign    Assessment:       1. Malignant neoplasm of left breast in female, estrogen receptor positive, unspecified site of breast    2. Encounter for monitoring aromatase inhibitor therapy    3. Osteopenia, unspecified location    4. Type 2 diabetes, uncontrolled, with neuropathy        Plan:     1-4   ECOG 0   79 y/o with multiple medical diagnoses with Stage 1B pT1b (6mm) N1mi M0 grade 1 ER + OH + [id number] IDC carcinoma  of the left breast status post left partial mastectomy and SLNB 12/13/2017  ER + OH + [id number]  12/13/2017.  1 of 2 lymph nodes positive for micromet. Closest margin anteriorly 1mm.  She is Stage IA per AJCC 8th ed. pathologic prognostic staging system.   S/p  postoperative RT completed 3/13/2018  Cont Vit D supp  Bone Density 2018- osteopenia/osteoporosis  Cont letrozole 2.5 mg daily  Last PROLIA 11/2018  No Dental Clearance indicated ( dentures)      Pt declines Bone scan  Repeat CA level wnl   PTH wnl     Follow-up with PCP for med mgmt/BP check     Cont with diabetic meds and home exercise regimen    All questions posed answered to patient's satisfaction    Follow-up  2 mo      Cc Ashli Caldera M.D.         Kenney Wray M.D.

## 2019-02-07 RX ORDER — INSULIN ASPART 100 [IU]/ML
INJECTION, SOLUTION INTRAVENOUS; SUBCUTANEOUS
Qty: 30 ML | Refills: 0 | Status: SHIPPED | OUTPATIENT
Start: 2019-02-07 | End: 2019-03-27 | Stop reason: SDUPTHER

## 2019-02-08 NOTE — TELEPHONE ENCOUNTER
Spoke with patient and advised of below. No HM to be addressed at this time    Appointment scheduled 3-1-2019 @ 10:00am    Appointment slip mailed to patient

## 2019-02-11 RX ORDER — SYRINGE,SAFETY WITH NEEDLE,1ML 25GX1"
SYRINGE (EA) MISCELLANEOUS
Qty: 100 EACH | Refills: 0 | Status: SHIPPED | OUTPATIENT
Start: 2019-02-11 | End: 2019-03-27 | Stop reason: SDUPTHER

## 2019-02-26 ENCOUNTER — LAB VISIT (OUTPATIENT)
Dept: LAB | Facility: HOSPITAL | Age: 79
End: 2019-02-26
Attending: NURSE PRACTITIONER
Payer: MEDICARE

## 2019-02-26 DIAGNOSIS — E11.40 TYPE 2 DIABETES, CONTROLLED, WITH NEUROPATHY: ICD-10-CM

## 2019-02-26 DIAGNOSIS — E78.5 HYPERLIPIDEMIA LDL GOAL <100: ICD-10-CM

## 2019-02-26 LAB
ANION GAP SERPL CALC-SCNC: 11 MMOL/L
BUN SERPL-MCNC: 14 MG/DL
CALCIUM SERPL-MCNC: 10.3 MG/DL
CHLORIDE SERPL-SCNC: 104 MMOL/L
CHOLEST SERPL-MCNC: 122 MG/DL
CHOLEST/HDLC SERPL: 2.6 {RATIO}
CO2 SERPL-SCNC: 25 MMOL/L
CREAT SERPL-MCNC: 1 MG/DL
EST. GFR  (AFRICAN AMERICAN): >60 ML/MIN/1.73 M^2
EST. GFR  (NON AFRICAN AMERICAN): 54.5 ML/MIN/1.73 M^2
ESTIMATED AVG GLUCOSE: 174 MG/DL
GLUCOSE SERPL-MCNC: 156 MG/DL
HBA1C MFR BLD HPLC: 7.7 %
HDLC SERPL-MCNC: 47 MG/DL
HDLC SERPL: 38.5 %
LDLC SERPL CALC-MCNC: 45.8 MG/DL
NONHDLC SERPL-MCNC: 75 MG/DL
POTASSIUM SERPL-SCNC: 4.1 MMOL/L
SODIUM SERPL-SCNC: 140 MMOL/L
TRIGL SERPL-MCNC: 146 MG/DL

## 2019-02-26 PROCEDURE — 83036 HEMOGLOBIN GLYCOSYLATED A1C: CPT

## 2019-02-26 PROCEDURE — 80048 BASIC METABOLIC PNL TOTAL CA: CPT

## 2019-02-26 PROCEDURE — 80061 LIPID PANEL: CPT

## 2019-02-26 PROCEDURE — 36415 COLL VENOUS BLD VENIPUNCTURE: CPT | Mod: PO

## 2019-03-01 ENCOUNTER — TELEPHONE (OUTPATIENT)
Dept: FAMILY MEDICINE | Facility: CLINIC | Age: 79
End: 2019-03-01

## 2019-03-01 ENCOUNTER — OFFICE VISIT (OUTPATIENT)
Dept: FAMILY MEDICINE | Facility: CLINIC | Age: 79
End: 2019-03-01
Payer: MEDICARE

## 2019-03-01 VITALS
HEART RATE: 96 BPM | HEIGHT: 64 IN | BODY MASS INDEX: 28.88 KG/M2 | DIASTOLIC BLOOD PRESSURE: 75 MMHG | TEMPERATURE: 98 F | WEIGHT: 169.19 LBS | SYSTOLIC BLOOD PRESSURE: 135 MMHG | OXYGEN SATURATION: 96 %

## 2019-03-01 DIAGNOSIS — E78.5 HYPERLIPIDEMIA LDL GOAL <100: ICD-10-CM

## 2019-03-01 DIAGNOSIS — E66.3 OVERWEIGHT (BMI 25.0-29.9): ICD-10-CM

## 2019-03-01 DIAGNOSIS — Z79.4 LONG-TERM INSULIN USE: ICD-10-CM

## 2019-03-01 DIAGNOSIS — E03.9 HYPOTHYROIDISM (ACQUIRED): ICD-10-CM

## 2019-03-01 DIAGNOSIS — C50.912 MALIGNANT NEOPLASM OF LEFT BREAST IN FEMALE, ESTROGEN RECEPTOR POSITIVE, UNSPECIFIED SITE OF BREAST: ICD-10-CM

## 2019-03-01 DIAGNOSIS — Z17.0 MALIGNANT NEOPLASM OF LEFT BREAST IN FEMALE, ESTROGEN RECEPTOR POSITIVE, UNSPECIFIED SITE OF BREAST: ICD-10-CM

## 2019-03-01 DIAGNOSIS — M85.80 OSTEOPENIA, UNSPECIFIED LOCATION: ICD-10-CM

## 2019-03-01 DIAGNOSIS — I10 ESSENTIAL HYPERTENSION: ICD-10-CM

## 2019-03-01 DIAGNOSIS — M54.2 NECK PAIN: ICD-10-CM

## 2019-03-01 PROCEDURE — 3075F SYST BP GE 130 - 139MM HG: CPT | Mod: CPTII,S$GLB,, | Performed by: INTERNAL MEDICINE

## 2019-03-01 PROCEDURE — 3075F PR MOST RECENT SYSTOLIC BLOOD PRESS GE 130-139MM HG: ICD-10-PCS | Mod: CPTII,S$GLB,, | Performed by: INTERNAL MEDICINE

## 2019-03-01 PROCEDURE — 3078F PR MOST RECENT DIASTOLIC BLOOD PRESSURE < 80 MM HG: ICD-10-PCS | Mod: CPTII,S$GLB,, | Performed by: INTERNAL MEDICINE

## 2019-03-01 PROCEDURE — 99999 PR PBB SHADOW E&M-EST. PATIENT-LVL III: ICD-10-PCS | Mod: PBBFAC,,, | Performed by: INTERNAL MEDICINE

## 2019-03-01 PROCEDURE — 99214 OFFICE O/P EST MOD 30 MIN: CPT | Mod: S$GLB,,, | Performed by: INTERNAL MEDICINE

## 2019-03-01 PROCEDURE — 1101F PR PT FALLS ASSESS DOC 0-1 FALLS W/OUT INJ PAST YR: ICD-10-PCS | Mod: CPTII,S$GLB,, | Performed by: INTERNAL MEDICINE

## 2019-03-01 PROCEDURE — 99499 RISK ADDL DX/OHS AUDIT: ICD-10-PCS | Mod: S$GLB,,, | Performed by: INTERNAL MEDICINE

## 2019-03-01 PROCEDURE — 99499 UNLISTED E&M SERVICE: CPT | Mod: S$GLB,,, | Performed by: INTERNAL MEDICINE

## 2019-03-01 PROCEDURE — 99214 PR OFFICE/OUTPT VISIT, EST, LEVL IV, 30-39 MIN: ICD-10-PCS | Mod: S$GLB,,, | Performed by: INTERNAL MEDICINE

## 2019-03-01 PROCEDURE — 99999 PR PBB SHADOW E&M-EST. PATIENT-LVL III: CPT | Mod: PBBFAC,,, | Performed by: INTERNAL MEDICINE

## 2019-03-01 PROCEDURE — 3078F DIAST BP <80 MM HG: CPT | Mod: CPTII,S$GLB,, | Performed by: INTERNAL MEDICINE

## 2019-03-01 PROCEDURE — 1101F PT FALLS ASSESS-DOCD LE1/YR: CPT | Mod: CPTII,S$GLB,, | Performed by: INTERNAL MEDICINE

## 2019-03-01 RX ORDER — TIZANIDINE 2 MG/1
TABLET ORAL
Qty: 385 TABLET | Refills: 0 | OUTPATIENT
Start: 2019-03-01

## 2019-03-01 RX ORDER — LEVOTHYROXINE SODIUM 50 UG/1
50 TABLET ORAL
Qty: 90 TABLET | Refills: 1 | Status: SHIPPED | OUTPATIENT
Start: 2019-03-01 | End: 2019-08-26 | Stop reason: SDUPTHER

## 2019-03-01 RX ORDER — TIZANIDINE 2 MG/1
4 TABLET ORAL EVERY 12 HOURS PRN
Qty: 30 TABLET | Refills: 0 | Status: SHIPPED | OUTPATIENT
Start: 2019-03-01 | End: 2019-03-11

## 2019-03-01 RX ORDER — TIZANIDINE HYDROCHLORIDE 2 MG/1
2 CAPSULE, GELATIN COATED ORAL EVERY 12 HOURS PRN
Qty: 30 CAPSULE | Refills: 0 | Status: SHIPPED | OUTPATIENT
Start: 2019-03-01 | End: 2019-03-01

## 2019-03-01 NOTE — PROGRESS NOTES
HISTORY OF PRESENT ILLNESS:  Joel Condon is a 78 y.o. female who presents to the clinic today for Diabetes; Medication Refill; and Shoulder Pain (left)  .   The patient presents to clinic today for follow-up of her type 2 diabetes mellitus complicated by proteinuria, neuropathy, and retinopathy, hypertension, hyperlipidemia, and hypothyroidism.  She continues to see endocrinology for her diabetes.  Diabetes has been under fair control.  She reports improved dietary habits.  She reports compliance with her current medication regimen.  She has low blood sugars only when she does not eat.  Blood pressures at home are well controlled.  She did not take her blood pressure medication prior to her office visit this morning.  She is followed by Oncology for breast cancer.  She has had some problems with dizziness.  She thinks this is from the Femara that she is now taking.  She also reports today pain in her neck.  She describes it as a stiffness.  Worse on the left.  She has not yet done any home treatment.  She rates her pain an 8/10.      PAST MEDICAL HISTORY:  Past Medical History:   Diagnosis Date    Arthritis     Breast cancer     Cataract     Diabetes mellitus     Diabetic retinopathy     Hyperlipidemia LDL goal < 100     Hypertension     Hypothyroidism     Osteoporosis, post-menopausal     Overweight(278.02)     Proteinuria     Type II or unspecified type diabetes mellitus with renal manifestations, uncontrolled(250.42)        PAST SURGICAL HISTORY:  Past Surgical History:   Procedure Laterality Date    APPENDECTOMY      BIOPSY-LYMPH NODE-SENTINELleft Left 12/13/2017    [facility]    BREAST BIOPSY      BREAST LUMPECTOMY      BREAST SURGERY Left 12/13/2017    tumor removal    CATARACT EXTRACTION W/  INTRAOCULAR LENS IMPLANT Left 4/24/14    OS (Dr. Arce)    CATARACT EXTRACTION W/  INTRAOCULAR LENS IMPLANT Right 06/12/2014    OD (Dr. Arce)    CHOLECYSTECTOMY       COLONOSCOPY N/A 6/29/2018    [facility]formed by [facility]oles [facility] at [facility] ENDO    COLONOSCOPY N/A 4/21/2017    [facility]an [facility] at [facility] ENDO    EYE SURGERY  04/24/2014 & 06/12/2014    HYSTERECTOMY      total    INJECTION-NODE-SENTINEL - MD INJECTS IN OR Left 12/13/2017    [facility] at [facility] OR    INSERTION, IOL PROSTHESIS Left 4/24/2014    [facility] at [facility] OR    INSERTION-INTRAOCULAR LENS (IOL) Right 6/12/2014    [facility] at [facility] OR    left eye cataract surgery  4/24/14    MASTECTOMY-PARTIAL left with wire localization same day in mammography (CONSENT AM OF) 1.5 hr case Left 12/13/2017    [facility] at [facility] OR    OOPHORECTOMY      PHACOEMULSIFICATION, CATARACT Left 4/24/2014    [facility] at [facility] OR    PHACOEMULSIFICATION-ASPIRATION-CATARACT Right 6/12/2014    [facility] by [facility] at [facility] OR       SOCIAL HISTORY:  Social History     Socioeconomic History    Marital status:      Spouse name: Not on file    Number of children: 3    Years of education: Not on file    Highest education level: Not on file   Social Needs    Financial resource strain: Not on file    Food insecurity - worry: Not on file    Food insecurity - inability: Not on file    Transportation needs - medical: Not on file    Transportation needs - non-medical: Not on file   Occupational History    Occupation: retired   Tobacco Use    Smoking status: Never Smoker    Smokeless tobacco: Never Used   Substance and Sexual Activity    Alcohol use: No    Drug use: No    Sexual activity: Not Currently     Partners: Male   Other Topics Concern    Not on file   Social History Narrative    Not on file       FAMILY HISTORY:  Family History   Problem Relation Age of Onset    Diabetes Mother     Heart disease Mother     Hypertension Mother     Diabetes Sister      "Hypertension Sister     Diabetes Sister     Hypertension Sister     Diabetes Sister     Hypertension Sister     Thyroid disease Sister     Stroke Sister     Hypertension Sister     Diabetes Sister     Heart disease Sister     Diabetes Brother     Diabetes Brother     Amblyopia Neg Hx     Blindness Neg Hx     Cataracts Neg Hx     Glaucoma Neg Hx     Macular degeneration Neg Hx     Retinal detachment Neg Hx     Strabismus Neg Hx        ALLERGIES AND MEDICATIONS: updated and reviewed.  Review of patient's allergies indicates:   Allergen Reactions    Lisinopril Other (See Comments)     Cough      Hydrochlorothiazide (bulk) Other (See Comments)     Elevated pt's blood pressure making her feel bad    Pravastatin Other (See Comments)     headaches     Medication List with Changes/Refills   New Medications    TIZANIDINE (ZANAFLEX) 2 MG TABLET    Take 2 tablets (4 mg total) by mouth every 12 (twelve) hours as needed.   Current Medications    ASPIRIN (ECOTRIN) 81 MG EC TABLET    Take 1 tablet (81 mg total) by mouth once daily.    ATORVASTATIN (LIPITOR) 10 MG TABLET    TAKE 1 TABLET(10 MG) BY MOUTH EVERY DAY    BD INSULIN PEN NEEDLE UF SHORT 31 GAUGE X 5/16" NDLE    USE THREE TIMES DAILY AS DIRECTED    BLOOD SUGAR DIAGNOSTIC STRP    True Results; Test tid    INSULIN DETEMIR U-100 (LEVEMIR U-100 INSULIN) 100 UNIT/ML INJECTION    INJECT 50 UNITS UNDER THE SKIN EVERY EVENING    INSULIN SYRINGE-NEEDLE U-100 1 ML 31 GAUGE X 5/16 SYRG    USE THREE TIMES DAILY AS DIRECTED    LANCETS 26 GAUGE MISC    1 lancet by Misc.(Non-Drug; Combo Route) route 3 (three) times daily.    LETROZOLE (FEMARA) 2.5 MG TAB    Take 1 tab by mouth daily.    LOSARTAN (COZAAR) 100 MG TABLET    TAKE 1 TABLET BY MOUTH DAILY    METFORMIN (GLUCOPHAGE-XR) 500 MG 24 HR TABLET    2 tablets in the morning and 1 tablet at night    METOPROLOL SUCCINATE (TOPROL-XL) 200 MG 24 HR TABLET    TAKE 1 TABLET BY MOUTH DAILY    NIFEDIPINE (PROCARDIA-XL) 90 MG " "(OSM) 24 HR TABLET    TAKE 1 TABLET(90 MG) BY MOUTH EVERY DAY    NOVOLOG FLEXPEN U-100 INSULIN 100 UNIT/ML INPN PEN    ADMINISTER 28 UNITS UNDER THE SKIN THREE TIMES DAILY WITH MEALS    OMEPRAZOLE (PRILOSEC) 20 MG CAPSULE    TAKE 1 CAPSULE(20 MG) BY MOUTH DAILY AS NEEDED FOR HEARTBURN    PEN NEEDLE, DIABETIC (BD INSULIN PEN NEEDLE UF SHORT) 31 GAUGE X 5/16" NDLE    Use 3 times daily    SPIRONOLACTONE (ALDACTONE) 50 MG TABLET    TAKE 1 TABLET BY MOUTH ONCE DAILY    SSD 1 % CREAM    CHRISTINE TOPICALLY AA ONCE TO BID    VITAMIN E ACETATE (VITAMIN E ORAL)    Take by mouth.   Changed and/or Refilled Medications    Modified Medication Previous Medication    LEVOTHYROXINE (SYNTHROID) 50 MCG TABLET levothyroxine (SYNTHROID) 50 MCG tablet       Take 1 tablet (50 mcg total) by mouth before breakfast.    TAKE 1 TABLET(50 MCG) BY MOUTH EVERY DAY          CARE TEAM:  Patient Care Team:  Shelby Ley MD as PCP - General (Internal Medicine)  Deborah Parr MD as Consulting Physician (Hematology and Oncology)  Rosy Mcknight NP as Nurse Practitioner (Endocrinology)         REVIEW OF SYSTEMS:  Review of Systems   Constitutional: Positive for fatigue. Negative for chills, fever and unexpected weight change.   HENT: Negative for congestion and postnasal drip.    Eyes: Negative for pain and visual disturbance.   Respiratory: Negative for cough, shortness of breath and wheezing.    Cardiovascular: Negative for chest pain, palpitations and leg swelling.   Gastrointestinal: Negative for abdominal pain, constipation, diarrhea, nausea and vomiting.   Genitourinary: Negative for dysuria.   Musculoskeletal: Positive for arthralgias (- hand and shoulders/neck). Negative for back pain.   Skin: Negative for rash.   Neurological: Positive for dizziness (- mild; since starting femara). Negative for weakness and headaches.   Psychiatric/Behavioral: Negative for dysphoric mood and sleep disturbance. The patient is not nervous/anxious.  " "        PHYSICAL EXAM:   Vitals:    03/01/19 1045   BP: 135/75   Pulse:    Temp:      Weight: 76.8 kg (169 lb 3.3 oz)   Height: 5' 4" (162.6 cm)   Body mass index is 29.04 kg/m².     General appearance - alert, well appearing, and in no distress and overweight  Mental status - alert, oriented to person, place, and time, normal mood, behavior, speech, dress, motor activity, and thought processes  Eyes - pupils equal and reactive, extraocular eye movements intact, sclera anicteric  Mouth - mucous membranes moist, pharynx normal without lesions  Chest - clear to auscultation, no wheezes, rales or rhonchi, symmetric air entry  Heart - normal rate and regular rhythm, no gallops noted  Neurological - alert, oriented, normal speech, no focal findings or movement disorder noted, cranial nerves II through XII intact  Musculoskeletal - no joint tenderness, deformity or swelling, osteoarthritic changes noted in both hands, she had some tenderness to palpation of the upper trapezius muscles bilaterally (left> right) and bilateral cervical paraspinal muscles (left> right)  Extremities - peripheral pulses normal, no pedal edema, no clubbing or cyanosis  Skin - normal coloration and turgor, no rashes, no suspicious skin lesions noted      Lab Results   Component Value Date    HGBA1C 7.7 (H) 02/26/2019    HGBA1C 7.6 (H) 11/26/2018    HGBA1C 6.8 (H) 08/01/2018      Lab Results   Component Value Date    CHOL 122 02/26/2019    CHOL 113 (L) 04/25/2018    CHOL 144 03/14/2017     Lab Results   Component Value Date    LDLCALC 45.8 (L) 02/26/2019    LDLCALC 49.4 (L) 04/25/2018    LDLCALC 73.6 03/14/2017          ASSESSMENT AND PLAN:  1. 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 Type 2 diabetes, uncontrolled, with neuropathy/3. Type 2 diabetes, uncontrolled, with retinopathy/4. Long-term insulin use  Diabetes currently is under fair control for age and comorbid conditions. We discussed diabetic diet and " regular exercise. We discussed home blood sugar monitoring, if appropriate. Continue current medication regimen. and Followed by endocrinology.  Diabetic complications addressed: Neuropathy pain controlled.  Patient was counseled on the need for yearly diabetic retinopathy exam and yearly diabetic foot exam.     5. Essential hypertension  Discussed sodium restriction, maintaining ideal body weight and regular exercise program as physiologic means to achieve blood pressure control. The patient will strive towards this. The current medical regimen is effective;  continue present plan and medications. Recommended patient to check home readings to monitor and see me for followup as scheduled or sooner as needed. Patient was educated that both decongestant and anti-inflammatory medication may raise blood pressure.     6. Hyperlipidemia LDL goal <100  We discussed low fat diet and regular exercise.The current medical regimen is effective;  continue present plan and medications.      7. Hypothyroidism (acquired)  Patient is clinically euthyroid. Continue current regimen.   - levothyroxine (SYNTHROID) 50 MCG tablet; Take 1 tablet (50 mcg total) by mouth before breakfast.  Dispense: 90 tablet; Refill: 1    8. Osteopenia, unspecified location  We discussed adequate calcium and vitamin D supplementation. We discussed fall precautions. She is up to date on her BMD. Continue current treatment regimen (on Prolia shots)     9. Malignant neoplasm of left breast in female, estrogen receptor positive, unspecified site of breast  The current medical regimen is effective;  continue present plan and medications. Followed by oncology.  We discussed that femara is likely causing her mild dizziness.  She will discuss further with Oncology if symptoms worsen or persist.    10. Overweight (BMI 25.0-29.9)  The patient is asked to make an attempt to improve diet and exercise patterns to aid in medical management of this problem.     11. Neck  pain  I suspect a musculoskeletal etiology.  I recommended heat and over-the-counter Tylenol.  I prescribed a few muscle relaxers.  I gave a neck booklet with home exercises.  Consider physical therapy if symptoms worsen or persist.  - tiZANidine (ZANAFLEX) 2 MG tablet; Take 2 tablets (4 mg total) by mouth every 12 (twelve) hours as needed.  Dispense: 30 tablet; Refill: 0           Follow-up in about 6 months (around 9/1/2019), or if symptoms worsen or fail to improve, for annual exam. or sooner as needed.

## 2019-03-01 NOTE — TELEPHONE ENCOUNTER
Spoke with pharmacy and asked her to disregard the Rx for the Zanaflex capsule.  She deleted the Rx.

## 2019-03-06 ENCOUNTER — TELEPHONE (OUTPATIENT)
Dept: FAMILY MEDICINE | Facility: CLINIC | Age: 79
End: 2019-03-06

## 2019-03-06 NOTE — TELEPHONE ENCOUNTER
----- Message from Letitia Moore sent at 3/6/2019  2:51 PM CST -----  Contact: Ana Perez calling requesting for a new order for pt diabetic supplies. Ana also requesting for medical necessity form and clinical notes for diabetic supplies. Fax # [phone] Phone # [phone].

## 2019-03-25 ENCOUNTER — LAB VISIT (OUTPATIENT)
Dept: LAB | Facility: HOSPITAL | Age: 79
End: 2019-03-25
Attending: INTERNAL MEDICINE
Payer: MEDICARE

## 2019-03-25 DIAGNOSIS — C50.912 MALIGNANT NEOPLASM OF LEFT BREAST IN FEMALE, ESTROGEN RECEPTOR POSITIVE, UNSPECIFIED SITE OF BREAST: ICD-10-CM

## 2019-03-25 DIAGNOSIS — Z17.0 MALIGNANT NEOPLASM OF LEFT BREAST IN FEMALE, ESTROGEN RECEPTOR POSITIVE, UNSPECIFIED SITE OF BREAST: ICD-10-CM

## 2019-03-25 LAB
ALBUMIN SERPL BCP-MCNC: 3.7 G/DL (ref 3.5–5.2)
ALP SERPL-CCNC: 67 U/L (ref 55–135)
ALT SERPL W/O P-5'-P-CCNC: 13 U/L (ref 10–44)
ANION GAP SERPL CALC-SCNC: 10 MMOL/L (ref 8–16)
AST SERPL-CCNC: 16 U/L (ref 10–40)
BASOPHILS # BLD AUTO: 0.09 K/UL (ref 0–0.2)
BASOPHILS NFR BLD: 0.9 % (ref 0–1.9)
BILIRUB SERPL-MCNC: 0.4 MG/DL (ref 0.1–1)
BUN SERPL-MCNC: 18 MG/DL (ref 8–23)
CALCIUM SERPL-MCNC: 10.5 MG/DL (ref 8.7–10.5)
CHLORIDE SERPL-SCNC: 105 MMOL/L (ref 95–110)
CO2 SERPL-SCNC: 22 MMOL/L (ref 23–29)
CREAT SERPL-MCNC: 1.1 MG/DL (ref 0.5–1.4)
DIFFERENTIAL METHOD: ABNORMAL
EOSINOPHIL # BLD AUTO: 0.4 K/UL (ref 0–0.5)
EOSINOPHIL NFR BLD: 4.6 % (ref 0–8)
ERYTHROCYTE [DISTWIDTH] IN BLOOD BY AUTOMATED COUNT: 14.5 % (ref 11.5–14.5)
EST. GFR  (AFRICAN AMERICAN): 56 ML/MIN/1.73 M^2
EST. GFR  (NON AFRICAN AMERICAN): 48.5 ML/MIN/1.73 M^2
GLUCOSE SERPL-MCNC: 244 MG/DL (ref 70–110)
HCT VFR BLD AUTO: 39.5 % (ref 37–48.5)
HGB BLD-MCNC: 12.5 G/DL (ref 12–16)
IMM GRANULOCYTES # BLD AUTO: 0.02 K/UL (ref 0–0.04)
IMM GRANULOCYTES NFR BLD AUTO: 0.2 % (ref 0–0.5)
LYMPHOCYTES # BLD AUTO: 3.2 K/UL (ref 1–4.8)
LYMPHOCYTES NFR BLD: 33.2 % (ref 18–48)
MCH RBC QN AUTO: 29.9 PG (ref 27–31)
MCHC RBC AUTO-ENTMCNC: 31.6 G/DL (ref 32–36)
MCV RBC AUTO: 95 FL (ref 82–98)
MONOCYTES # BLD AUTO: 0.7 K/UL (ref 0.3–1)
MONOCYTES NFR BLD: 7.1 % (ref 4–15)
NEUTROPHILS # BLD AUTO: 5.2 K/UL (ref 1.8–7.7)
NEUTROPHILS NFR BLD: 54 % (ref 38–73)
NRBC BLD-RTO: 0 /100 WBC
PLATELET # BLD AUTO: 348 K/UL (ref 150–350)
PMV BLD AUTO: 11.3 FL (ref 9.2–12.9)
POTASSIUM SERPL-SCNC: 4.3 MMOL/L (ref 3.5–5.1)
PROT SERPL-MCNC: 7.5 G/DL (ref 6–8.4)
RBC # BLD AUTO: 4.18 M/UL (ref 4–5.4)
SODIUM SERPL-SCNC: 137 MMOL/L (ref 136–145)
WBC # BLD AUTO: 9.62 K/UL (ref 3.9–12.7)

## 2019-03-25 PROCEDURE — 36415 COLL VENOUS BLD VENIPUNCTURE: CPT | Mod: PO

## 2019-03-25 PROCEDURE — 80053 COMPREHEN METABOLIC PANEL: CPT

## 2019-03-25 PROCEDURE — 85025 COMPLETE CBC W/AUTO DIFF WBC: CPT

## 2019-03-27 ENCOUNTER — OFFICE VISIT (OUTPATIENT)
Dept: HEMATOLOGY/ONCOLOGY | Facility: CLINIC | Age: 79
End: 2019-03-27
Payer: MEDICARE

## 2019-03-27 VITALS
DIASTOLIC BLOOD PRESSURE: 42 MMHG | SYSTOLIC BLOOD PRESSURE: 140 MMHG | WEIGHT: 169.06 LBS | HEART RATE: 63 BPM | TEMPERATURE: 98 F | OXYGEN SATURATION: 96 % | BODY MASS INDEX: 28.17 KG/M2 | HEIGHT: 65 IN

## 2019-03-27 DIAGNOSIS — C50.912 MALIGNANT NEOPLASM OF LEFT BREAST IN FEMALE, ESTROGEN RECEPTOR POSITIVE, UNSPECIFIED SITE OF BREAST: Primary | ICD-10-CM

## 2019-03-27 DIAGNOSIS — R07.81 PAIN IN RIB: ICD-10-CM

## 2019-03-27 DIAGNOSIS — K21.9 GASTROESOPHAGEAL REFLUX DISEASE WITHOUT ESOPHAGITIS: Chronic | ICD-10-CM

## 2019-03-27 DIAGNOSIS — M54.2 NECK PAIN: ICD-10-CM

## 2019-03-27 DIAGNOSIS — Z17.0 MALIGNANT NEOPLASM OF LEFT BREAST IN FEMALE, ESTROGEN RECEPTOR POSITIVE, UNSPECIFIED SITE OF BREAST: Primary | ICD-10-CM

## 2019-03-27 DIAGNOSIS — Z79.811 ENCOUNTER FOR MONITORING AROMATASE INHIBITOR THERAPY: ICD-10-CM

## 2019-03-27 DIAGNOSIS — Z51.81 ENCOUNTER FOR MONITORING AROMATASE INHIBITOR THERAPY: ICD-10-CM

## 2019-03-27 PROCEDURE — 1101F PT FALLS ASSESS-DOCD LE1/YR: CPT | Mod: CPTII,S$GLB,, | Performed by: INTERNAL MEDICINE

## 2019-03-27 PROCEDURE — 99499 UNLISTED E&M SERVICE: CPT | Mod: S$GLB,,, | Performed by: INTERNAL MEDICINE

## 2019-03-27 PROCEDURE — 99499 RISK ADDL DX/OHS AUDIT: ICD-10-PCS | Mod: S$GLB,,, | Performed by: INTERNAL MEDICINE

## 2019-03-27 PROCEDURE — 99999 PR PBB SHADOW E&M-EST. PATIENT-LVL V: ICD-10-PCS | Mod: PBBFAC,,, | Performed by: INTERNAL MEDICINE

## 2019-03-27 PROCEDURE — 3078F DIAST BP <80 MM HG: CPT | Mod: CPTII,S$GLB,, | Performed by: INTERNAL MEDICINE

## 2019-03-27 PROCEDURE — 99999 PR PBB SHADOW E&M-EST. PATIENT-LVL V: CPT | Mod: PBBFAC,,, | Performed by: INTERNAL MEDICINE

## 2019-03-27 PROCEDURE — 99214 OFFICE O/P EST MOD 30 MIN: CPT | Mod: S$GLB,,, | Performed by: INTERNAL MEDICINE

## 2019-03-27 PROCEDURE — 3078F PR MOST RECENT DIASTOLIC BLOOD PRESSURE < 80 MM HG: ICD-10-PCS | Mod: CPTII,S$GLB,, | Performed by: INTERNAL MEDICINE

## 2019-03-27 PROCEDURE — 3077F SYST BP >= 140 MM HG: CPT | Mod: CPTII,S$GLB,, | Performed by: INTERNAL MEDICINE

## 2019-03-27 PROCEDURE — 99214 PR OFFICE/OUTPT VISIT, EST, LEVL IV, 30-39 MIN: ICD-10-PCS | Mod: S$GLB,,, | Performed by: INTERNAL MEDICINE

## 2019-03-27 PROCEDURE — 3077F PR MOST RECENT SYSTOLIC BLOOD PRESSURE >= 140 MM HG: ICD-10-PCS | Mod: CPTII,S$GLB,, | Performed by: INTERNAL MEDICINE

## 2019-03-27 PROCEDURE — 1101F PR PT FALLS ASSESS DOC 0-1 FALLS W/OUT INJ PAST YR: ICD-10-PCS | Mod: CPTII,S$GLB,, | Performed by: INTERNAL MEDICINE

## 2019-03-27 RX ORDER — OMEPRAZOLE 20 MG/1
CAPSULE, DELAYED RELEASE ORAL
Qty: 90 CAPSULE | Refills: 0 | Status: SHIPPED | OUTPATIENT
Start: 2019-03-27 | End: 2019-06-27 | Stop reason: SDUPTHER

## 2019-03-27 RX ORDER — SYRINGE,SAFETY WITH NEEDLE,1ML 25GX1"
SYRINGE (EA) MISCELLANEOUS
Qty: 100 EACH | Refills: 1 | Status: SHIPPED | OUTPATIENT
Start: 2019-03-27 | End: 2023-04-27

## 2019-03-27 RX ORDER — INSULIN ASPART 100 [IU]/ML
INJECTION, SOLUTION INTRAVENOUS; SUBCUTANEOUS
Qty: 30 ML | Refills: 1 | Status: SHIPPED | OUTPATIENT
Start: 2019-03-27 | End: 2019-06-27 | Stop reason: SDUPTHER

## 2019-03-27 NOTE — PROGRESS NOTES
"Subjective:       Patient ID: Joel Condon is a 78 y.o. female.    Chief Complaint: Follow-up    HPI   Diagnosis : Left Breast CA, IDC  S/p  left partial mastectomy and SLNB 12/13/2017. [id number] Stage 1B,grade 1, ER/VT pos Bas4kre negative  , closest margin anteriorly at 1mm  S/p  postoperative RT completed 3/13/2018  Letrozole 3/2018-present    HPI: Patient  is a 78 y.o. postmenopausal female seen today for recently diagnosed carcinoma of the left breast. She had an abnormal mammogram first noted 10/27/2017. Follow-up mammogram and ultrasound (11/14/17) showed 6mm mass in the 2OC left breast with enlarged axillary LN measuring 17mm boderline.She underwent an  ultrasound guided biopsy  on 11/21/2017 with pathology revealing infiltrating ductal carcinoma of the breast, Grade 1, ER positive 100% ,VT positive 100% Her-2neu negative.  The patient is status post left partial mastectomy and sentinel node biopsy on 12/13/2017.  Final pathology showed 6mm IDC, 1 of 2  LN with 1mm micromet. 1mm anterior margin. She does not routinely do self breast exams. She  has not noted any skin  changes on breast exam. No  nipple discharge. No history of  previous breast biopsies. No  personal history of breast cancer or ovarian cancer.     She completed  postoperative RT under the direction of Dr. Wray on 3/13/2018    She is taking adjuvant Letrozole daily    Today, she reports neck pain for past few months  She reports episodes of discomfort in LUE  No numbness/tingling  She thinks she was dx'd with " pinched nerve in neck" in past  She continues with mild arthralgias hands and hip   She also reports pain in rt rib  No trauma    She declines bone scan      Appetite and weight stable  No SOB/CP   No cough  No hot flashes        She is followed by her PCP for Type 2 DM and HTN  She reports blood glucose levels range from   She takes insulin      GYN History: Age of menarche was 14. Age of menopause was 46.  Patient reports " hormonal therapy for less than 5 years. Patient is . Age of first live birth was 17. Patient did breast feed for 2 years 8 months.         Past Medical History:   Diagnosis Date    Arthritis     Breast cancer     Cataract     Diabetes mellitus     Diabetic retinopathy     Hyperlipidemia LDL goal < 100     Hypertension     Hypothyroidism     Osteoporosis, post-menopausal     Overweight(278.02)     Proteinuria     Type II or unspecified type diabetes mellitus with renal manifestations, uncontrolled(250.42)        Past Surgical History:   Procedure Laterality Date    APPENDECTOMY      BIOPSY-LYMPH NODE-SENTINELleft Left 2017    [facility]    BREAST BIOPSY      BREAST LUMPECTOMY      BREAST SURGERY Left 2017    tumor removal    CATARACT EXTRACTION W/  INTRAOCULAR LENS IMPLANT Left 14    OS (Dr. Arce)    CATARACT EXTRACTION W/  INTRAOCULAR LENS IMPLANT Right 2014    OD (Dr. Arce)    CHOLECYSTECTOMY      COLONOSCOPY N/A 2018    [facility]kel [facility]oles [facility] at [facility] ENDO    COLONOSCOPY N/A 2017    [facility] at [facility] ENDO    EYE SURGERY  2014 & 2014    HYSTERECTOMY      total    INJECTION-NODE-SENTINEL - MD INJECTS IN OR Left 2017    [facility]    INSERTION, IOL PROSTHESIS Left 2014    [facility] at [facility] OR    INSERTION-INTRAOCULAR LENS (IOL) Right 2014    [facility] OR    left eye cataract surgery  14    MASTECTOMY-PARTIAL left with wire localization same day in mammography (CONSENT AM OF) 1.5 hr case Left 2017    [facility] OR    OOPHORECTOMY      PHACOEMULSIFICATION, CATARACT Left 2014    [facility] at [facility] OR    PHACOEMULSIFICATION-ASPIRATION-CATARACT Right 2014    Performed by Adam CHOUDHARY  "[facility] OR     Current MEDS; Reviewed and as per MEDCHART    Review of Systems   Constitutional: Negative for appetite change, fatigue, fever and unexpected weight change.   HENT: Negative for mouth sores.    Eyes: Negative for visual disturbance.   Respiratory: Negative for cough and shortness of breath.    Cardiovascular: Negative for chest pain.   Gastrointestinal: Negative for abdominal pain and diarrhea.   Genitourinary: Negative for frequency.   Musculoskeletal: Positive for arthralgias and neck pain. Negative for back pain.   Skin: Negative for rash.   Neurological: Negative for headaches.   Hematological: Negative for adenopathy.   Psychiatric/Behavioral: The patient is not nervous/anxious.        Objective:         Vitals:    03/27/19 0917 03/27/19 0922   BP: (!) 185/74 (!) 140/42   BP Location: Right arm Right arm   Patient Position: Sitting Sitting   BP Method: Large (Automatic)    Pulse: 63    Temp: 98.3 °F (36.8 °C)    TempSrc: Oral    SpO2: 96%    Weight: 76.7 kg (169 lb 1.5 oz)    Height: 5' 4.5" (1.638 m)          Physical Exam   Constitutional: She is oriented to person, place, and time. She appears well-developed and well-nourished.   HENT:   Head: Normocephalic.   Mouth/Throat: Oropharynx is clear and moist. No oropharyngeal exudate.   Eyes: Pupils are equal, round, and reactive to light. Conjunctivae and lids are normal. No scleral icterus.   Neck: Normal range of motion. Neck supple. No thyromegaly present.   Cardiovascular: Normal rate, regular rhythm and normal heart sounds.   No murmur heard.  Pulmonary/Chest: Breath sounds normal. She has no wheezes. She has no rales. Right breast exhibits no inverted nipple, no mass, no nipple discharge, no skin change and no tenderness. Left breast exhibits tenderness. Left breast exhibits no inverted nipple, no mass, no nipple discharge and no skin change.   Abdominal: Soft. Bowel sounds are normal. She exhibits no distension and no mass. " There is no hepatosplenomegaly. There is no tenderness. There is no rebound and no guarding.   Musculoskeletal: Normal range of motion. She exhibits no edema or tenderness.   Lymphadenopathy:     She has no cervical adenopathy.     She has no axillary adenopathy.        Right: No supraclavicular adenopathy present.        Left: No supraclavicular adenopathy present.   Neurological: She is alert and oriented to person, place, and time. No cranial nerve deficit. Coordination normal.   Skin: Skin is warm and dry. No ecchymosis, no petechiae and no rash noted. No erythema.   Psychiatric: She has a normal mood and affect.         FINAL PATHOLOGIC DIAGNOSIS 11/21/2017  1. Left breast mass 2:00:  Invasive mammary carcinoma, grade 1 (Sherburn score: Tubule formation 2, nuclear pleomorphism 2, mitotic  activity 1, total score 5), occupying at least 5 mm in one core.  2. Left breast axilla (lymph node):  Benign lymphoid tissue with no evidence of metastatic disease.  Note: Receptor studies and immunohistochemical studies will be performed with supplemental report to follow.  Intradepartmental QC/review obtained. Dr. Neil Irvin concurs with the above diagnostic impressions.    Supplemental Diagnosis  HORMONE RECEPTOR STUDIES:  Virtually 100% of the cells of the carcinoma are strongly nuclear estrogen receptor positive. 60 percent of the cells  are strongly nuclear progesterone receptor positive. There is an abrupt transition from the zone of nuclear  progesterone receptor positive cells to the zone where this receptor is negative. It is possible that there has been  some technical artifact which has led a region of the tissue to not stain, and that actually the vast majority of the  tumor are nuclear progesterone receptor positive. The tumor is HER-2 negative. The positive and negative controls  stained appropriately.        FINAL PATHOLOGIC DIAGNOSIS 12/13/2017   Part 1  Lymph node (1, submitted as left sentinel lymph node  count equals 60):  -No evidence of malignancy  Part 2  Lymph node (1, submitted as left sentinel lymph node count equals 20):  -No evidence of malignancy  Part 3  Breast excision (submitted as left partial mastectomy):  -Invasive, well differentiated (grade I of III) lobular carcinoma  -Carcinoma is a single focus measuring 0.6 x 0.65 cm (6 x 6.5 mm) located 1 mm from the nearest, anterior  resection margin. All resection margins are free of malignancy.  -No tumor necrosis, hemosiderin vascular or lymphatic permeation, or perineural involvement is identified.  -A microscopic focus of in situ carcinoma is identified immediately adjacent to the invasive tumor focus  -Carcinoma is graded I of III according to the Melissa modification of the Arriaga-Benson grading scheme  with 2 points each assigned for moderate tubule formation and moderate nuclear pleomorphism and 1.4  minimal mitotic count, a total of 5 of 9 possible points.  -AJCC TN: pT pN0(sn)  -Complete AJCC Staging Form follows:  INVASIVE CARCINOMA OF THE BREAST  Procedure: Partial mastectomy  Node sampling: [place] lymph nodes  Specimen laterality: Left  Tumor site: Not specified  Tumor size: 6.5 x 6.0 mm  Histologic type: Invasive lobular carcinoma  Histologic grade  -Glandular differentiation: Score 2  -Nuclear pleomorphism: Score 2  -Mitotic rate: Score 1  -Overall grade: Score 1  Tumor focality: Single focus of invasive carcinoma  Ductal carcinoma in situ (DCIS): No DCIS present  Margins-invasive carcinoma: Margins uninvolved by invasive carcinoma  -Distance from closest margin: 1 mm, anterior  Lymph nodes  -Total number of lymph nodes examined (sentinel and non-sentinel): 2  -Number sentinel lymph node nodes examined: 2  Pathologic staging  -Primary tumor: pT1b pN0(sn)  -Regional lymph nodes  Modifier: (SN)  Category: pN0  Ancillary studies: E-cadherin negative in tumor cells        Bone Density 2018   Compared to the young normal reference, this study  indicates osteopenia of the Lumbar spine and left hip with osteoporosis of the right hip as detailed above.  Compared to prior the bone mineral density of the lumbar spine and left hip has slightly improved with reduced bone mineral density of the right hip as detailed above.    Note: Degenerative changes can falsely elevate measured bone mineral density, particularly in the lumbar spine, and should be considered as part of the final clinical recommendation    Results for JONG VALENTIN (MRN [id number]) as of 6/27/2018 10:08   Ref. Range 6/22/2018 07:51   Vit D, 25-Hydroxy Latest Ref Range: 30 - 96 ng/mL 33     Mammo 11/19/2018  Impression:  Bilateral  There is no mammographic evidence of malignancy.     BI-RADS Category:   Overall: 2 - Benign    Assessment:       1. Malignant neoplasm of left breast in female, estrogen receptor positive, unspecified site of breast    2. Encounter for monitoring aromatase inhibitor therapy    3. Neck pain    4. Pain in rib    5. Type 2 diabetes, uncontrolled, with neuropathy        Plan:     1-5   ECOG 0   79 y/o with multiple medical diagnoses with Stage 1B pT1b (6mm) N1mi M0 grade 1 ER + NM + [id number] IDC carcinoma  of the left breast status post left partial mastectomy and SLNB 12/13/2017  ER + NM + [id number]  12/13/2017.  1 of 2 lymph nodes positive for micromet. Closest margin anteriorly 1mm.  She is Stage IA per AJCC 8th ed. pathologic prognostic staging system.   S/p  postoperative RT completed 3/13/2018  Cont Vit D supp  Bone Density 2018- osteopenia/osteoporosis  Cont letrozole 2.5 mg daily  Last PROLIA 4/2019  No Dental Clearance indicated ( dentures)      Pt declines Bone scan    Plan MRI NECK ( pt declines gadolinium)    Plan CXR    Follow-up with PCP for med mgmt/BP check     Cont with diabetic meds and home exercise regimen    All questions posed answered to patient's satisfaction    Follow-up  2 mo      Cc Ashli Caldera M.D.         Kenney Wray M.D.

## 2019-04-03 ENCOUNTER — HOSPITAL ENCOUNTER (OUTPATIENT)
Dept: RADIOLOGY | Facility: HOSPITAL | Age: 79
Discharge: HOME | End: 2019-04-03
Attending: INTERNAL MEDICINE
Payer: MEDICARE

## 2019-04-03 DIAGNOSIS — R07.81 PAIN IN RIB: ICD-10-CM

## 2019-04-03 DIAGNOSIS — M54.2 NECK PAIN: ICD-10-CM

## 2019-04-03 PROCEDURE — 71046 XR CHEST PA AND LATERAL: ICD-10-PCS | Mod: 26,,, | Performed by: RADIOLOGY

## 2019-04-03 PROCEDURE — 71046 X-RAY EXAM CHEST 2 VIEWS: CPT | Mod: 26,,, | Performed by: RADIOLOGY

## 2019-04-03 PROCEDURE — 72141 MRI CERVICAL SPINE WITHOUT CONTRAST: ICD-10-PCS | Mod: 26,,, | Performed by: RADIOLOGY

## 2019-04-03 PROCEDURE — 72141 MRI NECK SPINE W/O DYE: CPT | Mod: TC

## 2019-04-03 PROCEDURE — 71046 X-RAY EXAM CHEST 2 VIEWS: CPT | Mod: TC,FY

## 2019-04-03 PROCEDURE — 72141 MRI NECK SPINE W/O DYE: CPT | Mod: 26,,, | Performed by: RADIOLOGY

## 2019-04-07 DIAGNOSIS — I10 ESSENTIAL HYPERTENSION: ICD-10-CM

## 2019-04-08 RX ORDER — NIFEDIPINE 90 MG/1
TABLET, EXTENDED RELEASE ORAL
Qty: 90 TABLET | Refills: 1 | Status: SHIPPED | OUTPATIENT
Start: 2019-04-08 | End: 2019-10-10 | Stop reason: SDUPTHER

## 2019-05-08 ENCOUNTER — INFUSION (OUTPATIENT)
Dept: INFUSION THERAPY | Facility: HOSPITAL | Age: 79
End: 2019-05-08
Attending: INTERNAL MEDICINE
Payer: MEDICARE

## 2019-05-08 VITALS
RESPIRATION RATE: 18 BRPM | OXYGEN SATURATION: 98 % | DIASTOLIC BLOOD PRESSURE: 65 MMHG | TEMPERATURE: 98 F | SYSTOLIC BLOOD PRESSURE: 156 MMHG | HEART RATE: 67 BPM

## 2019-05-08 DIAGNOSIS — M85.80 OSTEOPENIA, UNSPECIFIED LOCATION: Primary | ICD-10-CM

## 2019-05-08 PROCEDURE — 63600175 PHARM REV CODE 636 W HCPCS: Mod: JG | Performed by: INTERNAL MEDICINE

## 2019-05-08 PROCEDURE — 96372 THER/PROPH/DIAG INJ SC/IM: CPT

## 2019-05-08 RX ADMIN — DENOSUMAB 60 MG: 60 INJECTION SUBCUTANEOUS at 08:05

## 2019-05-08 NOTE — PLAN OF CARE
Problem: Adult Inpatient Plan of Care  Goal: Plan of Care Review  Outcome: Ongoing (interventions implemented as appropriate)  Pt presented for Prolia injection. VSS; slightly hypertensive, but pt reported that she had not taken her BP meds yet this morning. Pt tolerated injection well to R arm. Pt reported nocturnal numbness/tingling, fatigue, occasional nausea, occasional constipation, and occasional dizziness. Pt reported taking calcium and vitamin d and denied any recent invasive dental work. Ambulatory into and out of unit. Distress screening tool completed. AVS provided.

## 2019-05-24 ENCOUNTER — LAB VISIT (OUTPATIENT)
Dept: LAB | Facility: HOSPITAL | Age: 79
End: 2019-05-24
Attending: INTERNAL MEDICINE
Payer: MEDICARE

## 2019-05-24 DIAGNOSIS — Z17.0 MALIGNANT NEOPLASM OF LEFT BREAST IN FEMALE, ESTROGEN RECEPTOR POSITIVE, UNSPECIFIED SITE OF BREAST: ICD-10-CM

## 2019-05-24 DIAGNOSIS — C50.912 MALIGNANT NEOPLASM OF LEFT BREAST IN FEMALE, ESTROGEN RECEPTOR POSITIVE, UNSPECIFIED SITE OF BREAST: ICD-10-CM

## 2019-05-24 LAB
ALBUMIN SERPL BCP-MCNC: 3.6 G/DL (ref 3.5–5.2)
ALP SERPL-CCNC: 73 U/L (ref 55–135)
ALT SERPL W/O P-5'-P-CCNC: 24 U/L (ref 10–44)
ANION GAP SERPL CALC-SCNC: 10 MMOL/L (ref 8–16)
AST SERPL-CCNC: 23 U/L (ref 10–40)
BASOPHILS # BLD AUTO: 0.08 K/UL (ref 0–0.2)
BASOPHILS NFR BLD: 0.8 % (ref 0–1.9)
BILIRUB SERPL-MCNC: 0.4 MG/DL (ref 0.1–1)
BUN SERPL-MCNC: 17 MG/DL (ref 8–23)
CALCIUM SERPL-MCNC: 9.8 MG/DL (ref 8.7–10.5)
CHLORIDE SERPL-SCNC: 108 MMOL/L (ref 95–110)
CO2 SERPL-SCNC: 22 MMOL/L (ref 23–29)
CREAT SERPL-MCNC: 1 MG/DL (ref 0.5–1.4)
DIFFERENTIAL METHOD: ABNORMAL
EOSINOPHIL # BLD AUTO: 0.4 K/UL (ref 0–0.5)
EOSINOPHIL NFR BLD: 3.9 % (ref 0–8)
ERYTHROCYTE [DISTWIDTH] IN BLOOD BY AUTOMATED COUNT: 14.6 % (ref 11.5–14.5)
EST. GFR  (AFRICAN AMERICAN): >60 ML/MIN/1.73 M^2
EST. GFR  (NON AFRICAN AMERICAN): 54.5 ML/MIN/1.73 M^2
GLUCOSE SERPL-MCNC: 173 MG/DL (ref 70–110)
HCT VFR BLD AUTO: 39.6 % (ref 37–48.5)
HGB BLD-MCNC: 12.8 G/DL (ref 12–16)
IMM GRANULOCYTES # BLD AUTO: 0.02 K/UL (ref 0–0.04)
IMM GRANULOCYTES NFR BLD AUTO: 0.2 % (ref 0–0.5)
LYMPHOCYTES # BLD AUTO: 3.8 K/UL (ref 1–4.8)
LYMPHOCYTES NFR BLD: 39.1 % (ref 18–48)
MCH RBC QN AUTO: 29.9 PG (ref 27–31)
MCHC RBC AUTO-ENTMCNC: 32.3 G/DL (ref 32–36)
MCV RBC AUTO: 93 FL (ref 82–98)
MONOCYTES # BLD AUTO: 0.7 K/UL (ref 0.3–1)
MONOCYTES NFR BLD: 6.8 % (ref 4–15)
NEUTROPHILS # BLD AUTO: 4.8 K/UL (ref 1.8–7.7)
NEUTROPHILS NFR BLD: 49.2 % (ref 38–73)
NRBC BLD-RTO: 0 /100 WBC
PLATELET # BLD AUTO: 322 K/UL (ref 150–350)
PMV BLD AUTO: 11.3 FL (ref 9.2–12.9)
POTASSIUM SERPL-SCNC: 4.1 MMOL/L (ref 3.5–5.1)
PROT SERPL-MCNC: 7.3 G/DL (ref 6–8.4)
RBC # BLD AUTO: 4.28 M/UL (ref 4–5.4)
SODIUM SERPL-SCNC: 140 MMOL/L (ref 136–145)
WBC # BLD AUTO: 9.75 K/UL (ref 3.9–12.7)

## 2019-05-24 PROCEDURE — 80053 COMPREHEN METABOLIC PANEL: CPT

## 2019-05-24 PROCEDURE — 36415 COLL VENOUS BLD VENIPUNCTURE: CPT | Mod: PO

## 2019-05-24 PROCEDURE — 85025 COMPLETE CBC W/AUTO DIFF WBC: CPT

## 2019-05-29 ENCOUNTER — OFFICE VISIT (OUTPATIENT)
Dept: HEMATOLOGY/ONCOLOGY | Facility: CLINIC | Age: 79
End: 2019-05-29
Payer: MEDICARE

## 2019-05-29 VITALS
HEART RATE: 70 BPM | BODY MASS INDEX: 27.44 KG/M2 | SYSTOLIC BLOOD PRESSURE: 131 MMHG | HEIGHT: 65 IN | DIASTOLIC BLOOD PRESSURE: 75 MMHG | TEMPERATURE: 98 F | WEIGHT: 164.69 LBS | OXYGEN SATURATION: 97 %

## 2019-05-29 DIAGNOSIS — Z17.0 MALIGNANT NEOPLASM OF LEFT BREAST IN FEMALE, ESTROGEN RECEPTOR POSITIVE, UNSPECIFIED SITE OF BREAST: Primary | ICD-10-CM

## 2019-05-29 DIAGNOSIS — C50.912 MALIGNANT NEOPLASM OF LEFT BREAST IN FEMALE, ESTROGEN RECEPTOR POSITIVE, UNSPECIFIED SITE OF BREAST: Primary | ICD-10-CM

## 2019-05-29 DIAGNOSIS — Z79.811 ENCOUNTER FOR MONITORING AROMATASE INHIBITOR THERAPY: ICD-10-CM

## 2019-05-29 DIAGNOSIS — R11.0 NAUSEA: ICD-10-CM

## 2019-05-29 DIAGNOSIS — Z86.010 HISTORY OF COLONIC POLYPS: ICD-10-CM

## 2019-05-29 DIAGNOSIS — Z51.81 ENCOUNTER FOR MONITORING AROMATASE INHIBITOR THERAPY: ICD-10-CM

## 2019-05-29 PROCEDURE — 99214 OFFICE O/P EST MOD 30 MIN: CPT | Mod: S$GLB,,, | Performed by: INTERNAL MEDICINE

## 2019-05-29 PROCEDURE — 1101F PT FALLS ASSESS-DOCD LE1/YR: CPT | Mod: CPTII,S$GLB,, | Performed by: INTERNAL MEDICINE

## 2019-05-29 PROCEDURE — 99999 PR PBB SHADOW E&M-EST. PATIENT-LVL IV: ICD-10-PCS | Mod: PBBFAC,,, | Performed by: INTERNAL MEDICINE

## 2019-05-29 PROCEDURE — 99999 PR PBB SHADOW E&M-EST. PATIENT-LVL IV: CPT | Mod: PBBFAC,,, | Performed by: INTERNAL MEDICINE

## 2019-05-29 PROCEDURE — 3075F PR MOST RECENT SYSTOLIC BLOOD PRESS GE 130-139MM HG: ICD-10-PCS | Mod: CPTII,S$GLB,, | Performed by: INTERNAL MEDICINE

## 2019-05-29 PROCEDURE — 3078F PR MOST RECENT DIASTOLIC BLOOD PRESSURE < 80 MM HG: ICD-10-PCS | Mod: CPTII,S$GLB,, | Performed by: INTERNAL MEDICINE

## 2019-05-29 PROCEDURE — 3078F DIAST BP <80 MM HG: CPT | Mod: CPTII,S$GLB,, | Performed by: INTERNAL MEDICINE

## 2019-05-29 PROCEDURE — 99499 UNLISTED E&M SERVICE: CPT | Mod: S$GLB,,, | Performed by: INTERNAL MEDICINE

## 2019-05-29 PROCEDURE — 99214 PR OFFICE/OUTPT VISIT, EST, LEVL IV, 30-39 MIN: ICD-10-PCS | Mod: S$GLB,,, | Performed by: INTERNAL MEDICINE

## 2019-05-29 PROCEDURE — 99499 RISK ADDL DX/OHS AUDIT: ICD-10-PCS | Mod: S$GLB,,, | Performed by: INTERNAL MEDICINE

## 2019-05-29 PROCEDURE — 1101F PR PT FALLS ASSESS DOC 0-1 FALLS W/OUT INJ PAST YR: ICD-10-PCS | Mod: CPTII,S$GLB,, | Performed by: INTERNAL MEDICINE

## 2019-05-29 PROCEDURE — 3075F SYST BP GE 130 - 139MM HG: CPT | Mod: CPTII,S$GLB,, | Performed by: INTERNAL MEDICINE

## 2019-05-29 NOTE — PROGRESS NOTES
Subjective:       Patient ID: Joel Condon is a 78 y.o. female.    Chief Complaint: Follow-up; Dizziness; and Nausea    Dizziness:    Associated symptoms: nausea.no fever, no headaches and no chest pain.  Nausea   Associated symptoms include arthralgias, nausea and neck pain. Pertinent negatives include no abdominal pain, chest pain, coughing, fatigue, fever, headaches or rash.      Diagnosis : Left Breast CA, IDC  S/p  left partial mastectomy and SLNB 12/13/2017. [id number] Stage 1B,grade 1, ER/IA pos [id number] negative  , closest margin anteriorly at 1mm  S/p  postoperative RT completed 3/13/2018  Letrozole 3/2018-present    HPI: Patient  is a 78 y.o. postmenopausal female seen today for recently diagnosed carcinoma of the left breast. She had an abnormal mammogram first noted 10/27/2017. Follow-up mammogram and ultrasound (11/14/17) showed 6mm mass in the 2OC left breast with enlarged axillary LN measuring 17mm boderline.She underwent an  ultrasound guided biopsy  on 11/21/2017 with pathology revealing infiltrating ductal carcinoma of the breast, Grade 1, ER positive 100% ,IA positive 100% Her-2neu negative.  The patient is status post left partial mastectomy and sentinel node biopsy on 12/13/2017.  Final pathology showed 6mm IDC, 1 of 2  LN with 1mm micromet. 1mm anterior margin. She does not routinely do self breast exams. She  has not noted any skin  changes on breast exam. No  nipple discharge. No history of  previous breast biopsies. No  personal history of breast cancer or ovarian cancer.     She completed  postoperative RT under the direction of Dr. Wray on 3/13/2018    She is taking adjuvant Letrozole daily    She is hearing impaired   She no longer has neck pain   She reports intermittent nausea x 2 mos  Nausea Unrelated to meals, no vomiting  She reports mildly diminished appetite wt loss 5 lbs past few mos  She reports early satiety   No SOB/cough    She continues with mild arthralgias hands and hip        She declined bone scan      She is followed by her PCP for Type 2 DM and HTN  She reports blood glucose levels range from   She takes insulin      GYN History: Age of menarche was 14. Age of menopause was 46.  Patient reports hormonal therapy for less than 5 years. Patient is . Age of first live birth was 17. Patient did breast feed for 2 years 8 months.         Past Medical History:   Diagnosis Date    Arthritis     Breast cancer     Cataract     Diabetes mellitus     Diabetic retinopathy     Hyperlipidemia LDL goal < 100     Hypertension     Hypothyroidism     Osteoporosis, post-menopausal     Overweight(278.02)     Proteinuria     Type II or unspecified type diabetes mellitus with renal manifestations, uncontrolled(250.42)        Past Surgical History:   Procedure Laterality Date    APPENDECTOMY      BIOPSY-LYMPH NODE-SENTINELleft Left 2017    [facility]    BREAST BIOPSY      BREAST LUMPECTOMY      BREAST SURGERY Left 2017    tumor removal    CATARACT EXTRACTION W/  INTRAOCULAR LENS IMPLANT Left 14    OS (Dr. Arce)    CATARACT EXTRACTION W/  INTRAOCULAR LENS IMPLANT Right 2014    OD (Dr. Arce)    CHOLECYSTECTOMY      COLONOSCOPY N/A 2018    [facility]formed by [facility]kel [facility]oles [facility] at [facility] ENDO    COLONOSCOPY N/A 2017    [facility] by [facility]an [facility] at [facility] ENDO    EYE SURGERY  2014 & 2014    HYSTERECTOMY      total    INJECTION-NODE-SENTINEL - MD INJECTS IN OR Left 2017    [facility] at [facility] OR    INSERTION, IOL PROSTHESIS Left 2014    [facility] by [facility] at [facility] OR    INSERTION-INTRAOCULAR LENS (IOL) Right 2014    Performed by [facility] at [facility] OR    left eye cataract surgery  14    MASTECTOMY-PARTIAL left with wire localization same day in mammography (CONSENT AM OF) 1.5 hr case Left 2017    Performed by  "[facility] OR    OOPHORECTOMY      PHACOEMULSIFICATION, CATARACT Left 4/24/2014    Performed by [facility] at [facility] OR    PHACOEMULSIFICATION-ASPIRATION-CATARACT Right 6/12/2014    Performed by [facility] at [facility] OR     Current MEDS; Reviewed and as per MEDCHART    Review of Systems   Constitutional: Negative for appetite change, fatigue, fever and unexpected weight change.   HENT: Negative for mouth sores.    Eyes: Negative for visual disturbance.   Respiratory: Negative for cough and shortness of breath.    Cardiovascular: Negative for chest pain.   Gastrointestinal: Positive for nausea. Negative for abdominal pain and diarrhea.   Genitourinary: Negative for frequency.   Musculoskeletal: Positive for arthralgias and neck pain. Negative for back pain.   Skin: Negative for rash.   Neurological: Positive for dizziness. Negative for headaches.   Hematological: Negative for adenopathy.   Psychiatric/Behavioral: The patient is not nervous/anxious.        Objective:         Vitals:    05/29/19 0849   BP: 131/75   BP Location: Right arm   Patient Position: Sitting   BP Method: Medium (Automatic)   Pulse: 70   Temp: 98.4 °F (36.9 °C)   TempSrc: Oral   SpO2: 97%   Weight: 74.7 kg (164 lb 10.9 oz)   Height: 5' 4.5" (1.638 m)         Physical Exam   Constitutional: She is oriented to person, place, and time. She appears well-developed and well-nourished.   HENT:   Head: Normocephalic.   Mouth/Throat: Oropharynx is clear and moist. No oropharyngeal exudate.   Eyes: Pupils are equal, round, and reactive to light. Conjunctivae and lids are normal. No scleral icterus.   Neck: Normal range of motion. Neck supple. No thyromegaly present.   Cardiovascular: Normal rate, regular rhythm and normal heart sounds.   No murmur heard.  Pulmonary/Chest: Breath sounds normal. She has no wheezes. She has no rales. Right breast exhibits no inverted nipple, no mass, no nipple discharge, no skin " change and no tenderness. Left breast exhibits tenderness. Left breast exhibits no inverted nipple, no mass, no nipple discharge and no skin change.   Abdominal: Soft. Bowel sounds are normal. She exhibits no distension and no mass. There is no hepatosplenomegaly. There is no tenderness. There is no rebound and no guarding.   Musculoskeletal: Normal range of motion. She exhibits no edema or tenderness.   Lymphadenopathy:     She has no cervical adenopathy.     She has no axillary adenopathy.        Right: No supraclavicular adenopathy present.        Left: No supraclavicular adenopathy present.   Neurological: She is alert and oriented to person, place, and time. No cranial nerve deficit. Coordination normal.   Skin: Skin is warm and dry. No ecchymosis, no petechiae and no rash noted. No erythema.   Psychiatric: She has a normal mood and affect.         FINAL PATHOLOGIC DIAGNOSIS 11/21/2017  1. Left breast mass 2:00:  Invasive mammary carcinoma, grade 1 (Barksdale score: Tubule formation 2, nuclear pleomorphism 2, mitotic  activity 1, total score 5), occupying at least 5 mm in one core.  2. Left breast axilla (lymph node):  Benign lymphoid tissue with no evidence of metastatic disease.  Note: Receptor studies and immunohistochemical studies will be performed with supplemental report to follow.  Intradepartmental QC/review obtained. Dr. Neil Irvin concurs with the above diagnostic impressions.    Supplemental Diagnosis  HORMONE RECEPTOR STUDIES:  Virtually 100% of the cells of the carcinoma are strongly nuclear estrogen receptor positive. 60 percent of the cells  are strongly nuclear progesterone receptor positive. There is an abrupt transition from the zone of nuclear  progesterone receptor positive cells to the zone where this receptor is negative. It is possible that there has been  some technical artifact which has led a region of the tissue to not stain, and that actually the vast majority of the  tumor are  nuclear progesterone receptor positive. The tumor is HER-2 negative. The positive and negative controls  stained appropriately.        FINAL PATHOLOGIC DIAGNOSIS 12/13/2017   Part 1  Lymph node (1, submitted as left sentinel lymph node count equals 60):  -No evidence of malignancy  Part 2  Lymph node (1, submitted as left sentinel lymph node count equals 20):  -No evidence of malignancy  Part 3  Breast excision (submitted as left partial mastectomy):  -Invasive, well differentiated (grade I of III) lobular carcinoma  -Carcinoma is a single focus measuring 0.6 x 0.65 cm (6 x 6.5 mm) located 1 mm from the nearest, anterior  resection margin. All resection margins are free of malignancy.  -No tumor necrosis, hemosiderin vascular or lymphatic permeation, or perineural involvement is identified.  -A microscopic focus of in situ carcinoma is identified immediately adjacent to the invasive tumor focus  -Carcinoma is graded I of III according to the Melissa modification of the Arriaga-Benson grading scheme  with 2 points each assigned for moderate tubule formation and moderate nuclear pleomorphism and 1.4  minimal mitotic count, a total of 5 of 9 possible points.  -AJCC TN: pT pN0(sn)  -Complete AJCC Staging Form follows:  INVASIVE CARCINOMA OF THE BREAST  Procedure: Partial mastectomy  Node sampling: [place] lymph nodes  Specimen laterality: Left  Tumor site: Not specified  Tumor size: 6.5 x 6.0 mm  Histologic type: Invasive lobular carcinoma  Histologic grade  -Glandular differentiation: Score 2  -Nuclear pleomorphism: Score 2  -Mitotic rate: Score 1  -Overall grade: Score 1  Tumor focality: Single focus of invasive carcinoma  Ductal carcinoma in situ (DCIS): No DCIS present  Margins-invasive carcinoma: Margins uninvolved by invasive carcinoma  -Distance from closest margin: 1 mm, anterior  Lymph nodes  -Total number of lymph nodes examined (sentinel and non-sentinel): 2  -Number sentinel lymph node nodes examined:  2  Pathologic staging  -Primary tumor: pT1b pN0(sn)  -Regional lymph nodes  Modifier: (SN)  Category: pN0  Ancillary studies: E-cadherin negative in tumor cells        Bone Density 2018   Compared to the young normal reference, this study indicates osteopenia of the Lumbar spine and left hip with osteoporosis of the right hip as detailed above.  Compared to prior the bone mineral density of the lumbar spine and left hip has slightly improved with reduced bone mineral density of the right hip as detailed above.    Note: Degenerative changes can falsely elevate measured bone mineral density, particularly in the lumbar spine, and should be considered as part of the final clinical recommendation    Results for JONG VALENTIN (MRN [id number]) as of 6/27/2018 10:08   Ref. Range 6/22/2018 07:51   Vit D, 25-Hydroxy Latest Ref Range: 30 - 96 ng/mL 33     Mammo 11/19/2018  Impression:  Bilateral  There is no mammographic evidence of malignancy.     BI-RADS Category:   Overall: 2 - Benign      MRI C spine w/out contrast  Cervical spondylosis with posterior marginal osteophytic disc spur complex predominantly C3-C4 through C6-C7.  No evidence for osseous metastatic disease.    CXR - negative   Assessment:       1. Malignant neoplasm of left breast in female, estrogen receptor positive, unspecified site of breast    2. Encounter for monitoring aromatase inhibitor therapy    3. Nausea    4. History of colonic polyps        Plan:     1-4  ECOG 0   79 y/o with multiple medical diagnoses with Stage 1B pT1b (6mm) N1mi M0 grade 1 ER + TX + [id number] IDC carcinoma  of the left breast status post left partial mastectomy and SLNB 12/13/2017  ER + TX + [id number]  12/13/2017.  1 of 2 lymph nodes positive for micromet. Closest margin anteriorly 1mm.  She is Stage IA per AJCC 8th ed. pathologic prognostic staging system.   S/p  postoperative RT completed 3/13/2018  Cont Vit D supp  Bone Density 2018- osteopenia/osteoporosis    Last PROLIA 4/2019  No  Dental Clearance indicated ( dentures)      Plan trial hold of Letrozole x 1 month ( nausea low incidence of SE related to AI)       MRI NECK ( pt declined gadolinium)- reviewed , no osseous mets  CXR negative       Follow-up with PCP for med mgmt/BP check     Cont with diabetic meds and home exercise regimen    All questions posed answered to patient's satisfaction    Follow-up  1 mo      Cc Ashli Caldera M.D.         Kenney Wray M.D.

## 2019-05-30 RX ORDER — SYRINGE,SAFETY WITH NEEDLE,1ML 25GX1"
SYRINGE (EA) MISCELLANEOUS
Qty: 100 EACH | Refills: 5 | Status: SHIPPED | OUTPATIENT
Start: 2019-05-30 | End: 2020-08-06

## 2019-06-27 DIAGNOSIS — K21.9 GASTROESOPHAGEAL REFLUX DISEASE WITHOUT ESOPHAGITIS: Chronic | ICD-10-CM

## 2019-06-27 RX ORDER — INSULIN ASPART 100 [IU]/ML
INJECTION, SOLUTION INTRAVENOUS; SUBCUTANEOUS
Qty: 30 ML | Refills: 0 | Status: SHIPPED | OUTPATIENT
Start: 2019-06-27 | End: 2019-08-07 | Stop reason: SDUPTHER

## 2019-06-27 RX ORDER — OMEPRAZOLE 20 MG/1
CAPSULE, DELAYED RELEASE ORAL
Qty: 90 CAPSULE | Refills: 0 | Status: SHIPPED | OUTPATIENT
Start: 2019-06-27 | End: 2019-10-10 | Stop reason: SDUPTHER

## 2019-06-28 ENCOUNTER — OFFICE VISIT (OUTPATIENT)
Dept: HEMATOLOGY/ONCOLOGY | Facility: CLINIC | Age: 79
End: 2019-06-28
Payer: MEDICARE

## 2019-06-28 VITALS
TEMPERATURE: 98 F | SYSTOLIC BLOOD PRESSURE: 152 MMHG | HEART RATE: 65 BPM | HEIGHT: 65 IN | WEIGHT: 169.75 LBS | BODY MASS INDEX: 28.28 KG/M2 | DIASTOLIC BLOOD PRESSURE: 67 MMHG | OXYGEN SATURATION: 97 %

## 2019-06-28 DIAGNOSIS — Z17.0 MALIGNANT NEOPLASM OF LEFT BREAST IN FEMALE, ESTROGEN RECEPTOR POSITIVE, UNSPECIFIED SITE OF BREAST: Primary | ICD-10-CM

## 2019-06-28 DIAGNOSIS — M54.2 CHRONIC NECK PAIN: ICD-10-CM

## 2019-06-28 DIAGNOSIS — G89.29 CHRONIC NECK PAIN: ICD-10-CM

## 2019-06-28 DIAGNOSIS — C50.912 MALIGNANT NEOPLASM OF LEFT BREAST IN FEMALE, ESTROGEN RECEPTOR POSITIVE, UNSPECIFIED SITE OF BREAST: Primary | ICD-10-CM

## 2019-06-28 DIAGNOSIS — R11.0 NAUSEA: ICD-10-CM

## 2019-06-28 DIAGNOSIS — R42 DIZZINESS: ICD-10-CM

## 2019-06-28 PROCEDURE — 99213 PR OFFICE/OUTPT VISIT, EST, LEVL III, 20-29 MIN: ICD-10-PCS | Mod: S$GLB,,, | Performed by: INTERNAL MEDICINE

## 2019-06-28 PROCEDURE — 99213 OFFICE O/P EST LOW 20 MIN: CPT | Mod: S$GLB,,, | Performed by: INTERNAL MEDICINE

## 2019-06-28 PROCEDURE — 3077F PR MOST RECENT SYSTOLIC BLOOD PRESSURE >= 140 MM HG: ICD-10-PCS | Mod: CPTII,S$GLB,, | Performed by: INTERNAL MEDICINE

## 2019-06-28 PROCEDURE — 99999 PR PBB SHADOW E&M-EST. PATIENT-LVL IV: CPT | Mod: PBBFAC,,, | Performed by: INTERNAL MEDICINE

## 2019-06-28 PROCEDURE — 1101F PT FALLS ASSESS-DOCD LE1/YR: CPT | Mod: CPTII,S$GLB,, | Performed by: INTERNAL MEDICINE

## 2019-06-28 PROCEDURE — 3078F DIAST BP <80 MM HG: CPT | Mod: CPTII,S$GLB,, | Performed by: INTERNAL MEDICINE

## 2019-06-28 PROCEDURE — 3077F SYST BP >= 140 MM HG: CPT | Mod: CPTII,S$GLB,, | Performed by: INTERNAL MEDICINE

## 2019-06-28 PROCEDURE — 99999 PR PBB SHADOW E&M-EST. PATIENT-LVL IV: ICD-10-PCS | Mod: PBBFAC,,, | Performed by: INTERNAL MEDICINE

## 2019-06-28 PROCEDURE — 3078F PR MOST RECENT DIASTOLIC BLOOD PRESSURE < 80 MM HG: ICD-10-PCS | Mod: CPTII,S$GLB,, | Performed by: INTERNAL MEDICINE

## 2019-06-28 PROCEDURE — 1101F PR PT FALLS ASSESS DOC 0-1 FALLS W/OUT INJ PAST YR: ICD-10-PCS | Mod: CPTII,S$GLB,, | Performed by: INTERNAL MEDICINE

## 2019-06-28 RX ORDER — PROCHLORPERAZINE MALEATE 10 MG
10 TABLET ORAL 3 TIMES DAILY PRN
Qty: 30 TABLET | Refills: 1 | Status: SHIPPED | OUTPATIENT
Start: 2019-06-28 | End: 2019-07-16 | Stop reason: SDUPTHER

## 2019-06-28 NOTE — PROGRESS NOTES
Subjective:       Patient ID: Joel Condon is a 78 y.o. female.    Chief Complaint: Follow-up; Pain (neck ); and Dizziness (still having just not as bad)    Dizziness:    Associated symptoms: nausea.no fever, no headaches and no chest pain.  Nausea   Associated symptoms include arthralgias, nausea and neck pain. Pertinent negatives include no abdominal pain, chest pain, coughing, fatigue, fever, headaches or rash.      Diagnosis : Left Breast CA, IDC  S/p  left partial mastectomy and SLNB 12/13/2017. [id number] Stage 1B,grade 1, ER/AZ pos [id number] negative  , closest margin anteriorly at 1mm  S/p  postoperative RT completed 3/13/2018  Letrozole 3/2018-present    HPI: Patient  is a 78 y.o. postmenopausal female seen today for recently diagnosed carcinoma of the left breast. She had an abnormal mammogram first noted 10/27/2017. Follow-up mammogram and ultrasound (11/14/17) showed 6mm mass in the 2OC left breast with enlarged axillary LN measuring 17mm boderline.She underwent an  ultrasound guided biopsy  on 11/21/2017 with pathology revealing infiltrating ductal carcinoma of the breast, Grade 1, ER positive 100% ,AZ positive 100% Her-2neu negative.  The patient is status post left partial mastectomy and sentinel node biopsy on 12/13/2017.  Final pathology showed 6mm IDC, 1 of 2  LN with 1mm micromet. 1mm anterior margin. She does not routinely do self breast exams. She  has not noted any skin  changes on breast exam. No  nipple discharge. No history of  previous breast biopsies. No  personal history of breast cancer or ovarian cancer.     She completed  postoperative RT under the direction of Dr. Wray on 3/13/2018    She is taking adjuvant Letrozole daily  Pt with trial hold of therapy ( nausea, dizziness)       She is hearing impaired   She reports chronic  neck pain   She declined referral to pain mgmt  She reports intermittent nausea continues  Nausea Unrelated to meals, no vomiting    Appetite and weight stable    No SOB/cough  She reports dizziness only slight improvement with hold of endocrine therapy     She continues with mild arthralgias hands and hip       She declined bone scan      She is followed by her PCP for Type 2 DM and HTN  She reports blood glucose levels range from   She takes insulin      GYN History: Age of menarche was 14. Age of menopause was 46.  Patient reports hormonal therapy for less than 5 years. Patient is . Age of first live birth was 17. Patient did breast feed for 2 years 8 months.         Past Medical History:   Diagnosis Date    Arthritis     Breast cancer     Cataract     Diabetes mellitus     Diabetic retinopathy     Hyperlipidemia LDL goal < 100     Hypertension     Hypothyroidism     Osteoporosis, post-menopausal     Overweight(278.02)     Proteinuria     Type II or unspecified type diabetes mellitus with renal manifestations, uncontrolled(250.42)        Past Surgical History:   Procedure Laterality Date    APPENDECTOMY      BIOPSY-LYMPH NODE-SENTINELleft Left 2017    [facility]    BREAST BIOPSY      BREAST LUMPECTOMY      BREAST SURGERY Left 2017    tumor removal    CATARACT EXTRACTION W/  INTRAOCULAR LENS IMPLANT Left 14    OS (Dr. Arce)    CATARACT EXTRACTION W/  INTRAOCULAR LENS IMPLANT Right 2014    OD (Dr. Arce)    CHOLECYSTECTOMY      COLONOSCOPY N/A 2018    [facility]formed [facility]kel [facility]oles [facility] at [facility] ENDO    COLONOSCOPY N/A 2017    [facility]an [facility] at [facility] ENDO    EYE SURGERY  2014 & 2014    HYSTERECTOMY      total    INJECTION-NODE-SENTINEL - MD INJECTS IN OR Left 2017    [facility] at [facility] OR    INSERTION, IOL PROSTHESIS Left 2014    [facility] at [facility] OR    INSERTION-INTRAOCULAR LENS (IOL) Right 2014    [facility] at [facility] OR    left eye cataract surgery   "4/24/14    MASTECTOMY-PARTIAL left with wire localization same day in mammography (CONSENT AM OF) 1.5 hr case Left 12/13/2017    [facility]    OOPHORECTOMY      PHACOEMULSIFICATION, CATARACT Left 4/24/2014    [facility] at [facility] OR    PHACOEMULSIFICATION-ASPIRATION-CATARACT Right 6/12/2014    Performed by [facility] at [facility] OR     Current MEDS; Reviewed and as per MEDCHART    Review of Systems   Constitutional: Negative for appetite change, fatigue, fever and unexpected weight change.   HENT: Negative for mouth sores.    Eyes: Negative for visual disturbance.   Respiratory: Negative for cough and shortness of breath.    Cardiovascular: Negative for chest pain.   Gastrointestinal: Positive for nausea. Negative for abdominal pain and diarrhea.   Genitourinary: Negative for frequency.   Musculoskeletal: Positive for arthralgias and neck pain. Negative for back pain.   Skin: Negative for rash.   Neurological: Positive for dizziness. Negative for headaches.   Hematological: Negative for adenopathy.   Psychiatric/Behavioral: The patient is not nervous/anxious.        Objective:         Vitals:    06/28/19 0926   BP: (!) 152/67   BP Location: Left arm   Patient Position: Sitting   BP Method: Medium (Automatic)   Pulse: 65   Temp: 98.3 °F (36.8 °C)   TempSrc: Oral   SpO2: 97%   Weight: 77 kg (169 lb 12.1 oz)   Height: 5' 4.5" (1.638 m)         Physical Exam   Constitutional: She is oriented to person, place, and time. She appears well-developed and well-nourished.   HENT:   Head: Normocephalic.   Mouth/Throat: Oropharynx is clear and moist. No oropharyngeal exudate.   Eyes: Pupils are equal, round, and reactive to light. Conjunctivae and lids are normal. No scleral icterus.   Neck: Normal range of motion. Neck supple. No thyromegaly present.   Cardiovascular: Normal rate, regular rhythm and normal heart sounds.   No murmur heard.  Pulmonary/Chest: " Breath sounds normal. She has no wheezes. She has no rales. Right breast exhibits no inverted nipple, no mass, no nipple discharge, no skin change and no tenderness. Left breast exhibits tenderness. Left breast exhibits no inverted nipple, no mass, no nipple discharge and no skin change.   Abdominal: Soft. Bowel sounds are normal. She exhibits no distension and no mass. There is no hepatosplenomegaly. There is no tenderness. There is no rebound and no guarding.   Musculoskeletal: Normal range of motion. She exhibits no edema or tenderness.   Lymphadenopathy:     She has no cervical adenopathy.     She has no axillary adenopathy.        Right: No supraclavicular adenopathy present.        Left: No supraclavicular adenopathy present.   Neurological: She is alert and oriented to person, place, and time. No cranial nerve deficit. Coordination normal.   Skin: Skin is warm and dry. No ecchymosis, no petechiae and no rash noted. No erythema.   Psychiatric: She has a normal mood and affect.         FINAL PATHOLOGIC DIAGNOSIS 11/21/2017  1. Left breast mass 2:00:  Invasive mammary carcinoma, grade 1 (Sylvester score: Tubule formation 2, nuclear pleomorphism 2, mitotic  activity 1, total score 5), occupying at least 5 mm in one core.  2. Left breast axilla (lymph node):  Benign lymphoid tissue with no evidence of metastatic disease.  Note: Receptor studies and immunohistochemical studies will be performed with supplemental report to follow.  Intradepartmental QC/review obtained. Dr. Neil Irvin concurs with the above diagnostic impressions.    Supplemental Diagnosis  HORMONE RECEPTOR STUDIES:  Virtually 100% of the cells of the carcinoma are strongly nuclear estrogen receptor positive. 60 percent of the cells  are strongly nuclear progesterone receptor positive. There is an abrupt transition from the zone of nuclear  progesterone receptor positive cells to the zone where this receptor is negative. It is possible that there  has been  some technical artifact which has led a region of the tissue to not stain, and that actually the vast majority of the  tumor are nuclear progesterone receptor positive. The tumor is HER-2 negative. The positive and negative controls  stained appropriately.        FINAL PATHOLOGIC DIAGNOSIS 12/13/2017   Part 1  Lymph node (1, submitted as left sentinel lymph node count equals 60):  -No evidence of malignancy  Part 2  Lymph node (1, submitted as left sentinel lymph node count equals 20):  -No evidence of malignancy  Part 3  Breast excision (submitted as left partial mastectomy):  -Invasive, well differentiated (grade I of III) lobular carcinoma  -Carcinoma is a single focus measuring 0.6 x 0.65 cm (6 x 6.5 mm) located 1 mm from the nearest, anterior  resection margin. All resection margins are free of malignancy.  -No tumor necrosis, hemosiderin vascular or lymphatic permeation, or perineural involvement is identified.  -A microscopic focus of in situ carcinoma is identified immediately adjacent to the invasive tumor focus  -Carcinoma is graded I of III according to the Melissa modification of the Arriaga-Benson grading scheme  with 2 points each assigned for moderate tubule formation and moderate nuclear pleomorphism and 1.4  minimal mitotic count, a total of 5 of 9 possible points.  -AJCC TN: pT pN0(sn)  -Complete AJCC Staging Form follows:  INVASIVE CARCINOMA OF THE BREAST  Procedure: Partial mastectomy  Node sampling: [place] lymph nodes  Specimen laterality: Left  Tumor site: Not specified  Tumor size: 6.5 x 6.0 mm  Histologic type: Invasive lobular carcinoma  Histologic grade  -Glandular differentiation: Score 2  -Nuclear pleomorphism: Score 2  -Mitotic rate: Score 1  -Overall grade: Score 1  Tumor focality: Single focus of invasive carcinoma  Ductal carcinoma in situ (DCIS): No DCIS present  Margins-invasive carcinoma: Margins uninvolved by invasive carcinoma  -Distance from closest margin: 1 mm,  anterior  Lymph nodes  -Total number of lymph nodes examined (sentinel and non-sentinel): 2  -Number sentinel lymph node nodes examined: 2  Pathologic staging  -Primary tumor: pT1b pN0(sn)  -Regional lymph nodes  Modifier: (SN)  Category: pN0  Ancillary studies: E-cadherin negative in tumor cells        Bone Density 2018   Compared to the young normal reference, this study indicates osteopenia of the Lumbar spine and left hip with osteoporosis of the right hip as detailed above.  Compared to prior the bone mineral density of the lumbar spine and left hip has slightly improved with reduced bone mineral density of the right hip as detailed above.    Note: Degenerative changes can falsely elevate measured bone mineral density, particularly in the lumbar spine, and should be considered as part of the final clinical recommendation      Mammo 11/19/2018  Impression:  Bilateral  There is no mammographic evidence of malignancy.     BI-RADS Category:   Overall: 2 - Benign      MRI C spine w/out contrast 4/3/2019   Cervical spondylosis with posterior marginal osteophytic disc spur complex predominantly C3-C4 through C6-C7.  No evidence for osseous metastatic disease.    CXR - negative   Assessment:       1. Malignant neoplasm of left breast in female, estrogen receptor positive, unspecified site of breast    2. Chronic neck pain    3. Dizziness    4. Nausea        Plan:     1-4  ECOG 0   77 y/o with multiple medical diagnoses with Stage 1B pT1b (6mm) N1mi M0 grade 1 ER + CT + [id number] IDC carcinoma  of the left breast status post left partial mastectomy and SLNB 12/13/2017  ER + CT + [id number]  12/13/2017.  1 of 2 lymph nodes positive for micromet. Closest margin anteriorly 1mm.  She is Stage IA per AJCC 8th ed. pathologic prognostic staging system.   S/p  postoperative RT completed 3/13/2018  Cont Vit D supp  Bone Density 2018- osteopenia/osteoporosis    Last PROLIA 4/2019  No Dental Clearance indicated ( dentures)    trial  hold of Letrozole x 1 month ( nausea, dizziness ) w/out sig improvement   Still have dizziness but not as bad   Still has nausea    Pt will restart endocrine therapy    Pt declines referral to Neurology for dizzinesss  Pt declines referral to Pain mgmt for chronic neck pain       MRI NECK ( pt declined gadolinium)- reviewed , no osseous mets  CXR negative       Follow-up with PCP for med mgmt/BP check       All questions posed answered to patient's satisfaction    Follow-up  2 mo      Cc Ashli Caldera M.D.         Kenney Wray M.D.

## 2019-07-05 DIAGNOSIS — E78.5 HYPERLIPIDEMIA LDL GOAL <100: ICD-10-CM

## 2019-07-05 RX ORDER — ATORVASTATIN CALCIUM 10 MG/1
TABLET, FILM COATED ORAL
Qty: 90 TABLET | Refills: 0 | Status: SHIPPED | OUTPATIENT
Start: 2019-07-05 | End: 2019-07-12 | Stop reason: SDUPTHER

## 2019-07-12 DIAGNOSIS — E78.5 HYPERLIPIDEMIA LDL GOAL <100: ICD-10-CM

## 2019-07-12 DIAGNOSIS — C50.912 MALIGNANT NEOPLASM OF LEFT FEMALE BREAST, UNSPECIFIED ESTROGEN RECEPTOR STATUS, UNSPECIFIED SITE OF BREAST: ICD-10-CM

## 2019-07-12 RX ORDER — ATORVASTATIN CALCIUM 10 MG/1
TABLET, FILM COATED ORAL
Qty: 90 TABLET | Refills: 0 | Status: SHIPPED | OUTPATIENT
Start: 2019-07-12 | End: 2019-08-30 | Stop reason: SDUPTHER

## 2019-07-12 RX ORDER — LETROZOLE 2.5 MG/1
TABLET, FILM COATED ORAL
Qty: 90 TABLET | Refills: 0 | Status: SHIPPED | OUTPATIENT
Start: 2019-07-12 | End: 2019-08-30 | Stop reason: SDUPTHER

## 2019-07-13 RX ORDER — SPIRONOLACTONE 50 MG/1
TABLET, FILM COATED ORAL
Qty: 90 TABLET | Refills: 0 | Status: SHIPPED | OUTPATIENT
Start: 2019-07-13 | End: 2019-10-10 | Stop reason: SDUPTHER

## 2019-07-13 RX ORDER — METOPROLOL SUCCINATE 200 MG/1
TABLET, EXTENDED RELEASE ORAL
Qty: 90 TABLET | Refills: 0 | Status: SHIPPED | OUTPATIENT
Start: 2019-07-13 | End: 2019-10-10 | Stop reason: SDUPTHER

## 2019-07-15 DIAGNOSIS — E11.40 TYPE 2 DIABETES, CONTROLLED, WITH NEUROPATHY: Primary | ICD-10-CM

## 2019-07-15 RX ORDER — METFORMIN HYDROCHLORIDE 500 MG/1
TABLET, EXTENDED RELEASE ORAL
Qty: 270 TABLET | Refills: 1 | Status: SHIPPED | OUTPATIENT
Start: 2019-07-15 | End: 2020-01-15

## 2019-07-16 DIAGNOSIS — R11.0 NAUSEA: ICD-10-CM

## 2019-07-16 RX ORDER — PROCHLORPERAZINE MALEATE 10 MG
TABLET ORAL
Qty: 270 TABLET | Refills: 1 | Status: SHIPPED | OUTPATIENT
Start: 2019-07-16 | End: 2019-11-08

## 2019-08-07 RX ORDER — INSULIN ASPART 100 [IU]/ML
INJECTION, SOLUTION INTRAVENOUS; SUBCUTANEOUS
Qty: 30 ML | Refills: 0 | Status: SHIPPED | OUTPATIENT
Start: 2019-08-07 | End: 2019-09-19 | Stop reason: SDUPTHER

## 2019-08-15 RX ORDER — PEN NEEDLE, DIABETIC 30 GX3/16"
NEEDLE, DISPOSABLE MISCELLANEOUS
Qty: 100 EACH | Refills: 0 | Status: SHIPPED | OUTPATIENT
Start: 2019-08-15 | End: 2019-10-10 | Stop reason: SDUPTHER

## 2019-08-26 DIAGNOSIS — E03.9 HYPOTHYROIDISM (ACQUIRED): ICD-10-CM

## 2019-08-26 DIAGNOSIS — I10 ESSENTIAL HYPERTENSION: ICD-10-CM

## 2019-08-26 RX ORDER — LOSARTAN POTASSIUM 100 MG/1
TABLET ORAL
Qty: 90 TABLET | Refills: 0 | OUTPATIENT
Start: 2019-08-26

## 2019-08-26 RX ORDER — LEVOTHYROXINE SODIUM 50 UG/1
TABLET ORAL
Qty: 90 TABLET | Refills: 0 | Status: SHIPPED | OUTPATIENT
Start: 2019-08-26 | End: 2019-11-25 | Stop reason: SDUPTHER

## 2019-08-27 DIAGNOSIS — I10 ESSENTIAL HYPERTENSION: ICD-10-CM

## 2019-08-27 RX ORDER — LOSARTAN POTASSIUM 100 MG/1
100 TABLET ORAL DAILY
Qty: 30 TABLET | Refills: 1 | Status: SHIPPED | OUTPATIENT
Start: 2019-08-27 | End: 2019-08-27 | Stop reason: SDUPTHER

## 2019-08-27 NOTE — TELEPHONE ENCOUNTER
Please confirm follow-up office visit with patient.  No further refills will be authorized without being seen in the office.

## 2019-08-28 ENCOUNTER — LAB VISIT (OUTPATIENT)
Dept: LAB | Facility: HOSPITAL | Age: 79
End: 2019-08-28
Attending: INTERNAL MEDICINE
Payer: MEDICARE

## 2019-08-28 DIAGNOSIS — Z17.0 MALIGNANT NEOPLASM OF LEFT BREAST IN FEMALE, ESTROGEN RECEPTOR POSITIVE, UNSPECIFIED SITE OF BREAST: ICD-10-CM

## 2019-08-28 DIAGNOSIS — C50.912 MALIGNANT NEOPLASM OF LEFT BREAST IN FEMALE, ESTROGEN RECEPTOR POSITIVE, UNSPECIFIED SITE OF BREAST: ICD-10-CM

## 2019-08-28 LAB
ALBUMIN SERPL BCP-MCNC: 3.8 G/DL (ref 3.5–5.2)
ALP SERPL-CCNC: 54 U/L (ref 55–135)
ALT SERPL W/O P-5'-P-CCNC: 17 U/L (ref 10–44)
ANION GAP SERPL CALC-SCNC: 14 MMOL/L (ref 8–16)
AST SERPL-CCNC: 21 U/L (ref 10–40)
BASOPHILS # BLD AUTO: 0.05 K/UL (ref 0–0.2)
BASOPHILS NFR BLD: 0.6 % (ref 0–1.9)
BILIRUB SERPL-MCNC: 0.6 MG/DL (ref 0.1–1)
BUN SERPL-MCNC: 18 MG/DL (ref 8–23)
CALCIUM SERPL-MCNC: 10.2 MG/DL (ref 8.7–10.5)
CHLORIDE SERPL-SCNC: 107 MMOL/L (ref 95–110)
CO2 SERPL-SCNC: 22 MMOL/L (ref 23–29)
CREAT SERPL-MCNC: 1.1 MG/DL (ref 0.5–1.4)
DIFFERENTIAL METHOD: NORMAL
EOSINOPHIL # BLD AUTO: 0.5 K/UL (ref 0–0.5)
EOSINOPHIL NFR BLD: 5.9 % (ref 0–8)
ERYTHROCYTE [DISTWIDTH] IN BLOOD BY AUTOMATED COUNT: 14.4 % (ref 11.5–14.5)
EST. GFR  (AFRICAN AMERICAN): 55.6 ML/MIN/1.73 M^2
EST. GFR  (NON AFRICAN AMERICAN): 48.2 ML/MIN/1.73 M^2
GLUCOSE SERPL-MCNC: 98 MG/DL (ref 70–110)
HCT VFR BLD AUTO: 37.5 % (ref 37–48.5)
HGB BLD-MCNC: 12.2 G/DL (ref 12–16)
LYMPHOCYTES # BLD AUTO: 2.9 K/UL (ref 1–4.8)
LYMPHOCYTES NFR BLD: 33.4 % (ref 18–48)
MCH RBC QN AUTO: 30.3 PG (ref 27–31)
MCHC RBC AUTO-ENTMCNC: 32.5 G/DL (ref 32–36)
MCV RBC AUTO: 93 FL (ref 82–98)
MONOCYTES # BLD AUTO: 0.7 K/UL (ref 0.3–1)
MONOCYTES NFR BLD: 8.3 % (ref 4–15)
NEUTROPHILS # BLD AUTO: 4.5 K/UL (ref 1.8–7.7)
NEUTROPHILS NFR BLD: 51.8 % (ref 38–73)
PLATELET # BLD AUTO: 313 K/UL (ref 150–350)
PMV BLD AUTO: 10.8 FL (ref 9.2–12.9)
POTASSIUM SERPL-SCNC: 4.2 MMOL/L (ref 3.5–5.1)
PROT SERPL-MCNC: 7.2 G/DL (ref 6–8.4)
RBC # BLD AUTO: 4.03 M/UL (ref 4–5.4)
SODIUM SERPL-SCNC: 143 MMOL/L (ref 136–145)
WBC # BLD AUTO: 8.6 K/UL (ref 3.9–12.7)

## 2019-08-28 PROCEDURE — 36415 COLL VENOUS BLD VENIPUNCTURE: CPT | Mod: PO

## 2019-08-28 PROCEDURE — 85025 COMPLETE CBC W/AUTO DIFF WBC: CPT | Mod: PO

## 2019-08-28 PROCEDURE — 80053 COMPREHEN METABOLIC PANEL: CPT

## 2019-08-28 RX ORDER — LOSARTAN POTASSIUM 100 MG/1
TABLET ORAL
Qty: 90 TABLET | Refills: 0 | Status: SHIPPED | OUTPATIENT
Start: 2019-08-28 | End: 2019-11-25 | Stop reason: SDUPTHER

## 2019-08-28 NOTE — TELEPHONE ENCOUNTER
Please confirm follow up Office Visit.  No further refills will be authorized without being seen in the office.

## 2019-08-30 ENCOUNTER — OFFICE VISIT (OUTPATIENT)
Dept: HEMATOLOGY/ONCOLOGY | Facility: CLINIC | Age: 79
End: 2019-08-30
Payer: MEDICARE

## 2019-08-30 VITALS
TEMPERATURE: 98 F | HEART RATE: 65 BPM | DIASTOLIC BLOOD PRESSURE: 79 MMHG | SYSTOLIC BLOOD PRESSURE: 183 MMHG | OXYGEN SATURATION: 94 % | BODY MASS INDEX: 26.96 KG/M2 | HEIGHT: 65 IN | WEIGHT: 161.81 LBS

## 2019-08-30 DIAGNOSIS — G89.29 CHRONIC NECK PAIN: ICD-10-CM

## 2019-08-30 DIAGNOSIS — Z17.0 MALIGNANT NEOPLASM OF LEFT BREAST IN FEMALE, ESTROGEN RECEPTOR POSITIVE, UNSPECIFIED SITE OF BREAST: Primary | ICD-10-CM

## 2019-08-30 DIAGNOSIS — Z51.81 ENCOUNTER FOR MONITORING AROMATASE INHIBITOR THERAPY: ICD-10-CM

## 2019-08-30 DIAGNOSIS — C50.912 MALIGNANT NEOPLASM OF LEFT BREAST IN FEMALE, ESTROGEN RECEPTOR POSITIVE, UNSPECIFIED SITE OF BREAST: Primary | ICD-10-CM

## 2019-08-30 DIAGNOSIS — M54.2 CHRONIC NECK PAIN: ICD-10-CM

## 2019-08-30 DIAGNOSIS — Z79.811 ENCOUNTER FOR MONITORING AROMATASE INHIBITOR THERAPY: ICD-10-CM

## 2019-08-30 DIAGNOSIS — M85.80 OSTEOPENIA, UNSPECIFIED LOCATION: ICD-10-CM

## 2019-08-30 DIAGNOSIS — C50.912 MALIGNANT NEOPLASM OF LEFT FEMALE BREAST, UNSPECIFIED ESTROGEN RECEPTOR STATUS, UNSPECIFIED SITE OF BREAST: ICD-10-CM

## 2019-08-30 DIAGNOSIS — R11.0 NAUSEA WITHOUT VOMITING: ICD-10-CM

## 2019-08-30 PROCEDURE — 3077F PR MOST RECENT SYSTOLIC BLOOD PRESSURE >= 140 MM HG: ICD-10-PCS | Mod: CPTII,S$GLB,, | Performed by: INTERNAL MEDICINE

## 2019-08-30 PROCEDURE — 3078F PR MOST RECENT DIASTOLIC BLOOD PRESSURE < 80 MM HG: ICD-10-PCS | Mod: CPTII,S$GLB,, | Performed by: INTERNAL MEDICINE

## 2019-08-30 PROCEDURE — 3078F DIAST BP <80 MM HG: CPT | Mod: CPTII,S$GLB,, | Performed by: INTERNAL MEDICINE

## 2019-08-30 PROCEDURE — 99214 OFFICE O/P EST MOD 30 MIN: CPT | Mod: S$GLB,,, | Performed by: INTERNAL MEDICINE

## 2019-08-30 PROCEDURE — 99214 PR OFFICE/OUTPT VISIT, EST, LEVL IV, 30-39 MIN: ICD-10-PCS | Mod: S$GLB,,, | Performed by: INTERNAL MEDICINE

## 2019-08-30 PROCEDURE — 3077F SYST BP >= 140 MM HG: CPT | Mod: CPTII,S$GLB,, | Performed by: INTERNAL MEDICINE

## 2019-08-30 PROCEDURE — 99999 PR PBB SHADOW E&M-EST. PATIENT-LVL V: CPT | Mod: PBBFAC,,, | Performed by: INTERNAL MEDICINE

## 2019-08-30 PROCEDURE — 1101F PR PT FALLS ASSESS DOC 0-1 FALLS W/OUT INJ PAST YR: ICD-10-PCS | Mod: CPTII,S$GLB,, | Performed by: INTERNAL MEDICINE

## 2019-08-30 PROCEDURE — 99999 PR PBB SHADOW E&M-EST. PATIENT-LVL V: ICD-10-PCS | Mod: PBBFAC,,, | Performed by: INTERNAL MEDICINE

## 2019-08-30 PROCEDURE — 1101F PT FALLS ASSESS-DOCD LE1/YR: CPT | Mod: CPTII,S$GLB,, | Performed by: INTERNAL MEDICINE

## 2019-08-30 RX ORDER — LETROZOLE 2.5 MG/1
TABLET, FILM COATED ORAL
Qty: 90 TABLET | Refills: 6 | Status: SHIPPED | OUTPATIENT
Start: 2019-08-30 | End: 2019-11-08 | Stop reason: SDUPTHER

## 2019-08-30 NOTE — PROGRESS NOTES
Subjective:       Patient ID: Joel Condon is a 79 y.o. female.    Chief Complaint: Follow-up       Diagnosis : Left Breast CA, IDC  S/p  left partial mastectomy and SLNB 12/13/2017. [id number] Stage 1B,grade 1, ER/HI pos [id number] negative  , closest margin anteriorly at 1mm  S/p  postoperative RT completed 3/13/2018  Letrozole 3/2018-present    Pt arrived > 40 min late for visit    HPI: Patient  is a 78 y.o. postmenopausal female seen today for recently diagnosed carcinoma of the left breast. She had an abnormal mammogram first noted 10/27/2017. Follow-up mammogram and ultrasound (11/14/17) showed 6mm mass in the 2OC left breast with enlarged axillary LN measuring 17mm boderline.She underwent an  ultrasound guided biopsy  on 11/21/2017 with pathology revealing infiltrating ductal carcinoma of the breast, Grade 1, ER positive 100% ,HI positive 100% Her-2neu negative.  The patient is status post left partial mastectomy and sentinel node biopsy on 12/13/2017.  Final pathology showed 6mm IDC, 1 of 2  LN with 1mm micromet. 1mm anterior margin. She does not routinely do self breast exams. She  has not noted any skin  changes on breast exam. No  nipple discharge. No history of  previous breast biopsies. No  personal history of breast cancer or ovarian cancer.     She completed  postoperative RT under the direction of Dr. Wray on 3/13/2018    She is taking adjuvant Letrozole daily  Pt with trial hold of therapy ( nausea, dizziness)       She is hearing impaired   No new issues  She reports chronic  neck pain   She declined referral to pain mgmt  She continues with intermittent nausea   Nausea Unrelated to meals, no vomiting  Appetite and weight stable   No SOB/cough  She reports dizziness only slight improvement with hold of endocrine therapy   She continues with chronic mild arthralgias hands and hip   No HA/vision changes       She declined bone scan      She is followed by her PCP for Type 2 DM and HTN  She reports blood  glucose levels range from   She takes insulin      GYN History: Age of menarche was 14. Age of menopause was 46.  Patient reports hormonal therapy for less than 5 years. Patient is . Age of first live birth was 17. Patient did breast feed for 2 years 8 months.         Past Medical History:   Diagnosis Date    Arthritis     Breast cancer     Cataract     Diabetes mellitus     Diabetic retinopathy     Hyperlipidemia LDL goal < 100     Hypertension     Hypothyroidism     Osteoporosis, post-menopausal     Overweight(278.02)     Proteinuria     Type II or unspecified type diabetes mellitus with renal manifestations, uncontrolled(250.42)        Past Surgical History:   Procedure Laterality Date    APPENDECTOMY      BIOPSY-LYMPH NODE-SENTINELleft Left 2017    Per[facility] at [facility] OR    BREAST BIOPSY      BREAST LUMPECTOMY      BREAST SURGERY Left 2017    tumor removal    CATARACT EXTRACTION W/  INTRAOCULAR LENS IMPLANT Left 14    OS (Dr. Arce)    CATARACT EXTRACTION W/  INTRAOCULAR LENS IMPLANT Right 2014    OD (Dr. Arce)    CHOLECYSTECTOMY      COLONOSCOPY N/A 2018    Performed by Mykel Boles MD at [facility] ENDO    COLONOSCOPY N/A 2017    Performed by Homar Payan MD at [facility] ENDO    EYE SURGERY  2014 & 2014    HYSTERECTOMY      total    INJECTION-NODE-SENTINEL - MD INJECTS IN OR Left 2017    Performed [facility] at [facility] OR    INSERTION, IOL PROSTHESIS Left 2014    Performed by Adam [facility]ce [facility] at [facility] OR    INSERTION-INTRAOCULAR LENS (IOL) Right 2014    Performed by Adam [facility]ce [facility] at [facility] OR    left eye cataract surgery  14    MASTECTOMY-PARTIAL left with wire localization same day in mammography (CONSENT AM OF) 1.5 hr case Left 2017    Performed by [facility] at [facility] OR    OOPHORECTOMY      PHACOEMULSIFICATION, CATARACT Left 2014     "[facility]formed [facility] at [facility] OR    PHACOEMULSIFICATION-ASPIRATION-CATARACT Right 6/12/2014    Performed by Adam [facility]ce MD at [facility] OR     Current MEDS; Reviewed and as per MEDCHART    Review of Systems   Constitutional: Negative for appetite change, fatigue, fever and unexpected weight change.   HENT: Negative for mouth sores.    Eyes: Negative for visual disturbance.   Respiratory: Negative for cough and shortness of breath.    Cardiovascular: Negative for chest pain.   Gastrointestinal: Positive for nausea. Negative for abdominal pain and diarrhea.   Genitourinary: Negative for frequency.   Musculoskeletal: Positive for arthralgias and neck pain. Negative for back pain.   Skin: Negative for rash.   Neurological: Positive for dizziness. Negative for headaches.   Hematological: Negative for adenopathy.   Psychiatric/Behavioral: The patient is not nervous/anxious.        Objective:         Vitals:    08/30/19 0920   BP: (!) 183/79   BP Location: Right arm   Patient Position: Sitting   BP Method: Medium (Automatic)   Pulse: 65   Temp: 98.3 °F (36.8 °C)   TempSrc: Oral   SpO2: (!) 94%   Weight: 73.4 kg (161 lb 13.1 oz)   Height: 5' 4.5" (1.638 m)             Physical Exam   Constitutional: She is oriented to person, place, and time. She appears well-developed and well-nourished.   HENT:   Head: Normocephalic.   Mouth/Throat: Oropharynx is clear and moist. No oropharyngeal exudate.   Eyes: Pupils are equal, round, and reactive to light. Conjunctivae and lids are normal. No scleral icterus.   Neck: Normal range of motion. Neck supple. No thyromegaly present.   Cardiovascular: Normal rate, regular rhythm and normal heart sounds.   No murmur heard.  Pulmonary/Chest: Breath sounds normal. She has no wheezes. She has no rales. Right breast exhibits no inverted nipple, no mass, no nipple discharge, no skin change and no tenderness. Left breast exhibits tenderness. Left breast exhibits no inverted " nipple, no mass, no nipple discharge and no skin change.   Abdominal: Soft. Bowel sounds are normal. She exhibits no distension and no mass. There is no hepatosplenomegaly. There is no tenderness. There is no rebound and no guarding.   Musculoskeletal: Normal range of motion. She exhibits no edema or tenderness.   Lymphadenopathy:     She has no cervical adenopathy.     She has no axillary adenopathy.        Right: No supraclavicular adenopathy present.        Left: No supraclavicular adenopathy present.   Neurological: She is alert and oriented to person, place, and time. No cranial nerve deficit. Coordination normal.   Skin: Skin is warm and dry. No ecchymosis, no petechiae and no rash noted. No erythema.   Psychiatric: She has a normal mood and affect.         FINAL PATHOLOGIC DIAGNOSIS 11/21/2017  1. Left breast mass 2:00:  Invasive mammary carcinoma, grade 1 ([place] score: Tubule formation 2, nuclear pleomorphism 2, mitotic  activity 1, total score 5), occupying at least 5 mm in one core.  2. Left breast axilla (lymph node):  Benign lymphoid tissue with no evidence of metastatic disease.  Note: Receptor studies and immunohistochemical studies will be performed with supplemental report to follow.  Intradepartmental QC/review obtained. Dr. Neil Irvin concurs with the above diagnostic impressions.    Supplemental Diagnosis  HORMONE RECEPTOR STUDIES:  Virtually 100% of the cells of the carcinoma are strongly nuclear estrogen receptor positive. 60 percent of the cells  are strongly nuclear progesterone receptor positive. There is an abrupt transition from the zone of nuclear  progesterone receptor positive cells to the zone where this receptor is negative. It is possible that there has been  some technical artifact which has led a region of the tissue to not stain, and that actually the vast majority of the  tumor are nuclear progesterone receptor positive. The tumor is HER-2 negative. The positive and negative  controls  stained appropriately.        FINAL PATHOLOGIC DIAGNOSIS 12/13/2017   Part 1  Lymph node (1, submitted as left sentinel lymph node count equals 60):  -No evidence of malignancy  Part 2  Lymph node (1, submitted as left sentinel lymph node count equals 20):  -No evidence of malignancy  Part 3  Breast excision (submitted as left partial mastectomy):  -Invasive, well differentiated (grade I of III) lobular carcinoma  -Carcinoma is a single focus measuring 0.6 x 0.65 cm (6 x 6.5 mm) located 1 mm from the nearest, anterior  resection margin. All resection margins are free of malignancy.  -No tumor necrosis, hemosiderin vascular or lymphatic permeation, or perineural involvement is identified.  -A microscopic focus of in situ carcinoma is identified immediately adjacent to the invasive tumor focus  -Carcinoma is graded I of III according to the Melissa modification of the Arriaga-Benson grading scheme  with 2 points each assigned for moderate tubule formation and moderate nuclear pleomorphism and 1.4  minimal mitotic count, a total of 5 of 9 possible points.  -AJCC TN: pT pN0(sn)  -Complete AJCC Staging Form follows:  INVASIVE CARCINOMA OF THE BREAST  Procedure: Partial mastectomy  Node sampling: Martin lymph nodes  Specimen laterality: Left  Tumor site: Not specified  Tumor size: 6.5 x 6.0 mm  Histologic type: Invasive lobular carcinoma  Histologic grade  -Glandular differentiation: Score 2  -Nuclear pleomorphism: Score 2  -Mitotic rate: Score 1  -Overall grade: Score 1  Tumor focality: Single focus of invasive carcinoma  Ductal carcinoma in situ (DCIS): No DCIS present  Margins-invasive carcinoma: Margins uninvolved by invasive carcinoma  -Distance from closest margin: 1 mm, anterior  Lymph nodes  -Total number of lymph nodes examined (sentinel and non-sentinel): 2  -Number sentinel lymph node nodes examined: 2  Pathologic staging  -Primary tumor: pT1b pN0(sn)  -Regional lymph nodes  Modifier:  (SN)  Category: pN0  Ancillary studies: E-cadherin negative in tumor cells        Bone Density 2018   Compared to the young normal reference, this study indicates osteopenia of the Lumbar spine and left hip with osteoporosis of the right hip as detailed above.  Compared to prior the bone mineral density of the lumbar spine and left hip has slightly improved with reduced bone mineral density of the right hip as detailed above.    Note: Degenerative changes can falsely elevate measured bone mineral density, particularly in the lumbar spine, and should be considered as part of the final clinical recommendation      Mammo 11/19/2018  Impression:  Bilateral  There is no mammographic evidence of malignancy.     BI-RADS Category:   Overall: 2 - Benign      MRI C spine w/out contrast 4/3/2019   Cervical spondylosis with posterior marginal osteophytic disc spur complex predominantly C3-C4 through C6-C7.  No evidence for osseous metastatic disease.      CXR - negative   Assessment:       1. Malignant neoplasm of left breast in female, estrogen receptor positive, unspecified site of breast    2. Encounter for monitoring aromatase inhibitor therapy    3. Chronic neck pain    4. Osteopenia, unspecified location        Plan:     1-4  ECOG 0   77 y/o with multiple medical diagnoses with Stage 1B pT1b (6mm) N1mi M0 grade 1 ER + LA + QRI4oat IDC carcinoma  of the left breast status post left partial mastectomy and SLNB 12/13/2017  ER + LA + [id number]  12/13/2017.  1 of 2 lymph nodes positive for micromet. Closest margin anteriorly 1mm.  She is Stage IA per AJCC 8th ed. pathologic prognostic staging system.   S/p  postoperative RT completed 3/13/2018  Cont Vit D supp  Bone Density 2018- osteopenia/osteoporosis  Plan follow-up bone density prior to f/u   Plan follow-up Mammo prior to f/u     Last PROLIA 4/2019  No Dental Clearance indicated ( dentures)  Next Prolia dose in near future    trial hold of Letrozole x 1 month ( nausea,  dizziness ) w/out sig improvement   Still have dizziness but not as bad   Still has nausea  Consider MRI brain     Cont endocrine therapy    Pt declines referral to Neurology for dizzinesss  Pt declines referral to Pain mgmt for chronic neck pain       MRI NECK ( pt declined gadolinium)- reviewed , no osseous mets  CXR negative       Follow-up with PCP for med mgmt/BP check     All questions posed answered to patient's satisfaction    Follow-up  2 mo with labs      Advance Care Planning     Power of   I initiated the process of advance care planning today and explained the importance of this process to the patient.  I introduced the concept of advance directives to the patient, as well. Then the patient received detailed information about the importance of designating a Health Care Power of  (HCPOA). She was also instructed to communicate with this person about their wishes for future healthcare, should she become sick and lose decision-making capacity. The patient has not previously appointed a HCPOA. After our discussion, the patient has decided to complete a HCPOA and has appointed her daughter and NAME Isis Roberts   I spent a total time of 16  minutes discussing this issue with the patient.           Cc Ashli Caldera M.D.         Kenney Wray M.D.

## 2019-09-19 RX ORDER — INSULIN ASPART 100 [IU]/ML
INJECTION, SOLUTION INTRAVENOUS; SUBCUTANEOUS
Qty: 30 ML | Refills: 0 | Status: SHIPPED | OUTPATIENT
Start: 2019-09-19 | End: 2019-11-06 | Stop reason: SDUPTHER

## 2019-09-24 ENCOUNTER — PATIENT OUTREACH (OUTPATIENT)
Dept: ADMINISTRATIVE | Facility: OTHER | Age: 79
End: 2019-09-24

## 2019-09-26 ENCOUNTER — TELEPHONE (OUTPATIENT)
Dept: CARDIOLOGY | Facility: CLINIC | Age: 79
End: 2019-09-26

## 2019-09-26 ENCOUNTER — OFFICE VISIT (OUTPATIENT)
Dept: CARDIOLOGY | Facility: CLINIC | Age: 79
End: 2019-09-26
Payer: MEDICARE

## 2019-09-26 VITALS
WEIGHT: 165 LBS | RESPIRATION RATE: 15 BRPM | HEIGHT: 65 IN | BODY MASS INDEX: 27.49 KG/M2 | HEART RATE: 59 BPM | DIASTOLIC BLOOD PRESSURE: 84 MMHG | SYSTOLIC BLOOD PRESSURE: 162 MMHG | OXYGEN SATURATION: 98 %

## 2019-09-26 DIAGNOSIS — R07.9 CHEST PAIN, UNSPECIFIED TYPE: Primary | ICD-10-CM

## 2019-09-26 DIAGNOSIS — I10 ESSENTIAL HYPERTENSION: ICD-10-CM

## 2019-09-26 DIAGNOSIS — R06.09 DOE (DYSPNEA ON EXERTION): ICD-10-CM

## 2019-09-26 DIAGNOSIS — E78.5 HYPERLIPIDEMIA LDL GOAL <100: ICD-10-CM

## 2019-09-26 DIAGNOSIS — R94.31 ABNORMAL EKG: ICD-10-CM

## 2019-09-26 DIAGNOSIS — R00.2 HEART PALPITATIONS: ICD-10-CM

## 2019-09-26 DIAGNOSIS — R42 DIZZINESS: ICD-10-CM

## 2019-09-26 PROCEDURE — 3077F SYST BP >= 140 MM HG: CPT | Mod: CPTII,S$GLB,, | Performed by: INTERNAL MEDICINE

## 2019-09-26 PROCEDURE — 99499 RISK ADDL DX/OHS AUDIT: ICD-10-PCS | Mod: S$GLB,,, | Performed by: INTERNAL MEDICINE

## 2019-09-26 PROCEDURE — 1101F PT FALLS ASSESS-DOCD LE1/YR: CPT | Mod: CPTII,S$GLB,, | Performed by: INTERNAL MEDICINE

## 2019-09-26 PROCEDURE — 99499 UNLISTED E&M SERVICE: CPT | Mod: S$GLB,,, | Performed by: INTERNAL MEDICINE

## 2019-09-26 PROCEDURE — 3079F DIAST BP 80-89 MM HG: CPT | Mod: CPTII,S$GLB,, | Performed by: INTERNAL MEDICINE

## 2019-09-26 PROCEDURE — 3079F PR MOST RECENT DIASTOLIC BLOOD PRESSURE 80-89 MM HG: ICD-10-PCS | Mod: CPTII,S$GLB,, | Performed by: INTERNAL MEDICINE

## 2019-09-26 PROCEDURE — 1101F PR PT FALLS ASSESS DOC 0-1 FALLS W/OUT INJ PAST YR: ICD-10-PCS | Mod: CPTII,S$GLB,, | Performed by: INTERNAL MEDICINE

## 2019-09-26 PROCEDURE — 3077F PR MOST RECENT SYSTOLIC BLOOD PRESSURE >= 140 MM HG: ICD-10-PCS | Mod: CPTII,S$GLB,, | Performed by: INTERNAL MEDICINE

## 2019-09-26 PROCEDURE — 99214 OFFICE O/P EST MOD 30 MIN: CPT | Mod: S$GLB,,, | Performed by: INTERNAL MEDICINE

## 2019-09-26 PROCEDURE — 93000 EKG 12-LEAD: ICD-10-PCS | Mod: 59,S$GLB,, | Performed by: INTERNAL MEDICINE

## 2019-09-26 PROCEDURE — 99999 PR PBB SHADOW E&M-EST. PATIENT-LVL III: CPT | Mod: PBBFAC,,, | Performed by: INTERNAL MEDICINE

## 2019-09-26 PROCEDURE — 99214 PR OFFICE/OUTPT VISIT, EST, LEVL IV, 30-39 MIN: ICD-10-PCS | Mod: S$GLB,,, | Performed by: INTERNAL MEDICINE

## 2019-09-26 PROCEDURE — 99999 PR PBB SHADOW E&M-EST. PATIENT-LVL III: ICD-10-PCS | Mod: PBBFAC,,, | Performed by: INTERNAL MEDICINE

## 2019-09-26 PROCEDURE — 93000 ELECTROCARDIOGRAM COMPLETE: CPT | Mod: 59,S$GLB,, | Performed by: INTERNAL MEDICINE

## 2019-09-26 RX ORDER — HYDRALAZINE HYDROCHLORIDE 50 MG/1
50 TABLET, FILM COATED ORAL 2 TIMES DAILY
Qty: 180 TABLET | Refills: 3 | Status: SHIPPED | OUTPATIENT
Start: 2019-09-26 | End: 2020-02-13 | Stop reason: SDUPTHER

## 2019-09-26 NOTE — PROGRESS NOTES
CARDIOVASCULAR PROGRESS NOTE    REASON FOR CONSULT:   Joel Condon is a 79 y.o. female who presents for follow up of HTN.    PCP: Av  HISTORY OF PRESENT ILLNESS:   Last seen 6/2018.    The patient comes in for follow-up after a greater than 1 year hiatus.  She complains of left arm discomfort which radiates all the way down to her hand.  There was some associated chest discomfort.  This apparently occurred yesterday evening and lasted all night.  She does not describe any further similar symptoms, but does describe dyspnea on exertion.  She also describes occasional dizziness and heart palpitations without juan syncope.  There has been no PND, orthopnea, or lower extremity edema.  She denies melena, hematuria, or claudicant symptoms.    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 Arce)    CATARACT EXTRACTION W/  INTRAOCULAR LENS IMPLANT Right 06/12/2014    OD (Dr. Arce)    CHOLECYSTECTOMY      COLONOSCOPY N/A 4/21/2017    Procedure: COLONOSCOPY;  Surgeon: Homar Payan MD;  Location: [facility] ENDO;  Service: Endoscopy;  Laterality: N/A;    COLONOSCOPY N/A 6/29/2018    Procedure: COLONOSCOPY;  Surgeon: Mykel Boles MD;  Location: [facility] ENDO;  Service: Endoscopy;  Laterality: N/A;    EYE SURGERY  04/24/2014 &  "06/12/2014    HYSTERECTOMY      total    left eye cataract surgery  4/24/14    OOPHORECTOMY         ALLERGIES AND MEDICATION:     Review of patient's allergies indicates:   Allergen Reactions    Lisinopril Other (See Comments)     Cough      Pravastatin Other (See Comments)     headaches     Previous Medications    ASPIRIN (ECOTRIN) 81 MG EC TABLET    Take 1 tablet (81 mg total) by mouth once daily.    ATORVASTATIN (LIPITOR) 10 MG TABLET    TAKE 1 TABLET(10 MG) BY MOUTH EVERY DAY    BD INSULIN PEN NEEDLE UF SHORT 31 GAUGE X 5/16" NDLE    USE THREE TIMES DAILY AS DIRECTED    BLOOD SUGAR DIAGNOSTIC STRP    True Results; Test four times a day.    INSULIN DETEMIR U-100 (LEVEMIR U-100 INSULIN) 100 UNIT/ML INJECTION    INJECT 50 UNITS UNDER THE SKIN EVERY EVENING    INSULIN SYRINGE-NEEDLE U-100 1 ML 31 GAUGE X 5/16 SYRG    USE THREE TIMES DAILY AS DIRECTED    INSULIN SYRINGE-NEEDLE U-100 1 ML 31 GAUGE X 5/16 SYRG    USE THREE TIMES DAILY AS DIRECTED    LANCETS 26 GAUGE MISC    1 lancet by Misc.(Non-Drug; Combo Route) route 3 (three) times daily.    LETROZOLE (FEMARA) 2.5 MG TAB    Take 1 tab by mouth daily.    LETROZOLE (FEMARA) 2.5 MG TAB    TAKE 1 TABLET BY MOUTH DAILY.    LEVOTHYROXINE (SYNTHROID) 50 MCG TABLET    TAKE 1 TABLET(50 MCG) BY MOUTH BEFORE BREAKFAST    LOSARTAN (COZAAR) 100 MG TABLET    TAKE 1 TABLET BY MOUTH DAILY    METFORMIN (GLUCOPHAGE-XR) 500 MG 24 HR TABLET    2 tablets in the morning and 1 tablet at night    METOPROLOL SUCCINATE (TOPROL-XL) 200 MG 24 HR TABLET    TAKE 1 TABLET BY MOUTH DAILY    NIFEDIPINE (PROCARDIA-XL) 90 MG (OSM) 24 HR TABLET    TAKE 1 TABLET(90 MG) BY MOUTH EVERY DAY    NOVOLOG FLEXPEN U-100 INSULIN 100 UNIT/ML (3 ML) INPN PEN    ADMINISTER 28 UNITS UNDER THE SKIN THREE TIMES DAILY WITH MEALS    OMEPRAZOLE (PRILOSEC) 20 MG CAPSULE    TAKE 1 CAPSULE(20 MG) BY MOUTH DAILY AS NEEDED FOR HEARTBURN    PEN NEEDLE, DIABETIC (BD ULTRA-FINE SHORT PEN NEEDLE) 31 GAUGE X 5/16" NDLE    " USE THREE TIMES DAILY    PROCHLORPERAZINE (COMPAZINE) 10 MG TABLET    TAKE 1 TABLET BY MOUTH THREE TIMES DAILY AS NEEDED FOR NAUSEA    SPIRONOLACTONE (ALDACTONE) 50 MG TABLET    TAKE 1 TABLET BY MOUTH ONCE DAILY    VITAMIN E ACETATE (VITAMIN E ORAL)    Take by mouth.       SOCIAL HISTORY:     Social History     Socioeconomic History    Marital status:      Spouse name: Not on file    Number of children: 3    Years of education: Not on file    Highest education level: Not on file   Occupational History    Occupation: retired   Social Needs    Financial resource strain: Not on file    Food insecurity:     Worry: Not on file     Inability: Not on file    Transportation needs:     Medical: Not on file     Non-medical: Not on file   Tobacco Use    Smoking status: Never Smoker    Smokeless tobacco: Never Used   Substance and Sexual Activity    Alcohol use: No    Drug use: No    Sexual activity: Not Currently     Partners: Male   Lifestyle    Physical activity:     Days per week: Not on file     Minutes per session: Not on file    Stress: Not on file   Relationships    Social connections:     Talks on phone: Not on file     Gets together: Not on file     Attends Restorationism service: Not on file     Active member of club or organization: Not on file     Attends meetings of clubs or organizations: Not on file     Relationship status: Not on file   Other Topics Concern    Not on file   Social History Narrative    Not on file       FAMILY HISTORY:     Family History   Problem Relation Age of Onset    Diabetes Mother     Heart disease Mother     Hypertension Mother     Diabetes Sister     Hypertension Sister     Diabetes Sister     Hypertension Sister     Diabetes Sister     Hypertension Sister     Thyroid disease Sister     Stroke Sister     Hypertension Sister     Diabetes Sister     Heart disease Sister     Diabetes Brother     Diabetes Brother     Amblyopia Neg Hx     Blindness Neg  "Hx     Cataracts Neg Hx     Glaucoma Neg Hx     Macular degeneration Neg Hx     Retinal detachment Neg Hx     Strabismus Neg Hx        REVIEW OF SYSTEMS:   Review of Systems   Constitutional: Negative for chills, diaphoresis and fever.   HENT: Negative for nosebleeds.    Eyes: Negative for blurred vision, double vision and photophobia.   Respiratory: Negative for hemoptysis, shortness of breath and wheezing.    Cardiovascular: Positive for chest pain and palpitations. Negative for orthopnea, claudication, leg swelling and PND.   Gastrointestinal: Negative for abdominal pain, blood in stool, heartburn, melena, nausea and vomiting.   Genitourinary: Negative for flank pain and hematuria.   Musculoskeletal: Negative for falls, joint pain, myalgias and neck pain.   Skin: Negative for rash.   Neurological: Positive for dizziness. Negative for seizures, loss of consciousness, weakness and headaches.   Endo/Heme/Allergies: Negative for polydipsia. Does not bruise/bleed easily.   Psychiatric/Behavioral: Negative for depression and memory loss. The patient is not nervous/anxious.        PHYSICAL EXAM:     Vitals:    09/26/19 0951   BP: (!) 162/84   Pulse: (!) 59   Resp: 15    Body mass index is 27.88 kg/m².  Weight: 74.8 kg (165 lb)   Height: 5' 4.5" (163.8 cm)     Physical Exam   Constitutional: She is oriented to person, place, and time. She appears well-developed and well-nourished. She is cooperative.  Non-toxic appearance. No distress.   HENT:   Head: Normocephalic and atraumatic.   Eyes: Pupils are equal, round, and reactive to light. Conjunctivae and EOM are normal. No scleral icterus.   Neck: Trachea normal. Neck supple. Normal carotid pulses and no JVD present. Carotid bruit is not present. No neck rigidity. No tracheal deviation and no edema present. No thyromegaly present.   Cardiovascular: Normal rate, regular rhythm, S1 normal and S2 normal. PMI is not displaced. Exam reveals no gallop and no friction rub. "   No murmur heard.  Pulses:       Carotid pulses are 2+ on the right side, and 2+ on the left side.  Pulmonary/Chest: Effort normal and breath sounds normal. No stridor. No respiratory distress. She has no wheezes. She has no rales. She exhibits no tenderness.   Abdominal: Soft. She exhibits no distension. There is no hepatosplenomegaly.   Musculoskeletal: Normal range of motion. She exhibits no edema or tenderness.   Feet:   Right Foot:   Skin Integrity: Negative for ulcer.   Left Foot:   Skin Integrity: Negative for ulcer.   Neurological: She is alert and oriented to person, place, and time. No cranial nerve deficit.   Skin: Skin is warm and dry. No rash noted. No erythema.   Psychiatric: She has a normal mood and affect. Her speech is normal and behavior is normal.   Vitals reviewed.      DATA:   EKG: (personally reviewed tracing(s))  9/26/19 SR 59, ALMI-age indet, IMI-age indet.  ALMI findings new vs 6/15/18.    Laboratory:  CBC:  Recent Labs   Lab 03/25/19  0806 05/24/19  0728 08/28/19  0915   WBC 9.62 9.75 8.60   Hemoglobin 12.5 12.8 12.2   Hematocrit 39.5 39.6 37.5   Platelets 348 322 313       CHEMISTRIES:  Recent Labs   Lab 03/25/19  0806 05/24/19  0728 08/28/19  0915   Glucose 244 H 173 H 98   Sodium 137 140 143   Potassium 4.3 4.1 4.2   BUN, Bld 18 17 18   Creatinine 1.1 1.0 1.1   eGFR if African American 56.0 A >60.0 55.6 A   eGFR if non  48.5 A 54.5 A 48.2 A   Calcium 10.5 9.8 10.2       CARDIAC BIOMARKERS:        COAGS:        LIPIDS/LFTS:  Recent Labs   Lab 03/14/17  0722  04/25/18  0719  02/26/19  0816 03/25/19  0806 05/24/19  0728 08/28/19  0915   Cholesterol 144  --  113 L  --  122  --   --   --    Triglycerides 112  --  78  --  146  --   --   --    HDL 48  --  48  --  47  --   --   --    LDL Cholesterol 73.6  --  49.4 L  --  45.8 L  --   --   --    Non-HDL Cholesterol 96  --  65  --  75  --   --   --    AST 19   < > 22   < >  --  16 23 21   ALT 20   < > 16   < >  --  13 24 17    <  > = values in this interval not displayed.     Lab Results   Component Value Date    TSH 3.315 03/14/2017         Cardiovascular Testing:  Renal art US 10/25/17  Elevated peak systolic velocity ratio of 147 cm/sec in the proximal right renal artery with no secondary findings to suggest renal artery stenosis. Short-term followup suggested.  Borderline elevation of the renal resistive indices (0.79-0.80).  No evidence of obstructive uropathy.    Holter 3/28/16:  PREDOMINANT RHYTHM  1. Sinus rhythm with heart rates varying between 65 and 111 bpm with an average of 80 bpm.   VENTRICULAR ARRHYTHMIAS  1. There were occasional PVCs totalling 697 and averaging 29 per hour. There was 1 couplet.  2. There were no episodes of ventricular tachycardia.  SUPRA VENTRICULAR ARRHYTHMIAS  1. There were very rare PACs recorded totalling 1 and averaging less than 1 per hour.   2. There were no episodes of sustained supraventricular tachycardia.  SINUS NODE FUNCTION  1. There was no evidence of high grade SA cristian block.   AV CONDUCTION  1. There was no evidence of high grade AV block.   DIARY  1. The diary was not returned  MISCELLANEOUS  1. There were occasional hookup related artifacts. Overall, the study was of good quality.   2. This was a tape of adequate length (24 hrs).    Echo 3/28/16:  1 - Normal left ventricular systolic function (EF 60-65%). Normal wall motion.   2 - Concentric remodeling.   3 - Trivial tricuspid regurgitation.   4 - The estimated PA systolic pressure is 27 mmHg.     Ex-MPI 3/28/16   Jignesh 2min, 86% MPHR, -CP/EKG  Nuclear Quantitative Functional Analysis:   Quantitative measurements of LV function are over estimated secondary to small ventricular volumes.   Visually estimated LV function is hyperdynamic.   Impression: NORMAL MYOCARDIAL PERFUSION  1. The perfusion scan is free of evidence for myocardial ischemia or injury.   2. Resting wall motion is physiologic.   3. Visually estimated LV function is  hyperdynamic.   4. The ventricular volumes are normal at rest and stress.   5. The extracardiac distribution of radioactivity is normal.    ASSESSMENT:   # L arm and assoc CP, EKG changes noted concerning for ?AWMI  # palps/dizziness  # HTN, uncontrolled.  Pt prev intol of HCTZ.  # DM  # HLP, on atorva 10mg  # BMI 28, down 1 unit vs last OV    PLAN:   Cont med rx  Echo  Elen MPI (pt only able to do 2min last MPI)  Renal art US  Holter  Add hydrala 50mg bid  Diet/exercise/weight loss  Consider reordering PASCUAL evaluation  RTC 1 month for review of above      Juan Pablo Ordoñez MD, FACC

## 2019-10-10 DIAGNOSIS — I10 ESSENTIAL HYPERTENSION: ICD-10-CM

## 2019-10-10 DIAGNOSIS — K21.9 GASTROESOPHAGEAL REFLUX DISEASE WITHOUT ESOPHAGITIS: Chronic | ICD-10-CM

## 2019-10-10 RX ORDER — SPIRONOLACTONE 50 MG/1
TABLET, FILM COATED ORAL
Qty: 90 TABLET | Refills: 3 | Status: SHIPPED | OUTPATIENT
Start: 2019-10-10 | End: 2020-12-15 | Stop reason: SDUPTHER

## 2019-10-10 RX ORDER — NIFEDIPINE 90 MG/1
TABLET, EXTENDED RELEASE ORAL
Qty: 90 TABLET | Refills: 0 | Status: SHIPPED | OUTPATIENT
Start: 2019-10-10 | End: 2020-01-27

## 2019-10-10 RX ORDER — PEN NEEDLE, DIABETIC 30 GX3/16"
NEEDLE, DISPOSABLE MISCELLANEOUS
Qty: 100 EACH | Refills: 0 | Status: SHIPPED | OUTPATIENT
Start: 2019-10-10 | End: 2019-12-10 | Stop reason: SDUPTHER

## 2019-10-10 RX ORDER — METOPROLOL SUCCINATE 200 MG/1
TABLET, EXTENDED RELEASE ORAL
Qty: 90 TABLET | Refills: 3 | Status: SHIPPED | OUTPATIENT
Start: 2019-10-10 | End: 2020-07-27

## 2019-10-10 RX ORDER — OMEPRAZOLE 20 MG/1
CAPSULE, DELAYED RELEASE ORAL
Qty: 90 CAPSULE | Refills: 0 | Status: SHIPPED | OUTPATIENT
Start: 2019-10-10 | End: 2020-02-27

## 2019-10-18 ENCOUNTER — PATIENT OUTREACH (OUTPATIENT)
Dept: ADMINISTRATIVE | Facility: OTHER | Age: 79
End: 2019-10-18

## 2019-10-21 ENCOUNTER — TELEPHONE (OUTPATIENT)
Dept: ENDOCRINOLOGY | Facility: CLINIC | Age: 79
End: 2019-10-21

## 2019-10-21 NOTE — TELEPHONE ENCOUNTER
Called pt to let her know that her 10/23/19 f/u appt has to be rescheduled and that we have an available appt slot on 10/22/19 @1:00pm. Was unable to reach pt/ LVM for her to call the office back. Slot blocked off for pt.

## 2019-10-22 ENCOUNTER — TELEPHONE (OUTPATIENT)
Dept: ENDOCRINOLOGY | Facility: CLINIC | Age: 79
End: 2019-10-22

## 2019-10-22 NOTE — TELEPHONE ENCOUNTER
2nd attempt to call pt to let her know that her 10/23/19 f/u appt has to be rescheduled and that we have an available appt slot today 10/22/19 @1:00pm. Was unable to reach pt/ LVM for her to call the office back. Slot blocked off for pt.

## 2019-10-23 ENCOUNTER — TELEPHONE (OUTPATIENT)
Dept: CARDIOLOGY | Facility: CLINIC | Age: 79
End: 2019-10-23

## 2019-10-23 ENCOUNTER — TELEPHONE (OUTPATIENT)
Dept: ENDOCRINOLOGY | Facility: CLINIC | Age: 79
End: 2019-10-23

## 2019-10-23 NOTE — TELEPHONE ENCOUNTER
3rd attempt to reach pt to notify her that her appt for today 10/23/19 has to be rescheduled due to a staff meeting. LVM to call the clinic to reschedule appt. Apologized for the inconvenience.

## 2019-10-24 ENCOUNTER — PATIENT OUTREACH (OUTPATIENT)
Dept: ADMINISTRATIVE | Facility: OTHER | Age: 79
End: 2019-10-24

## 2019-10-31 DIAGNOSIS — E78.5 HYPERLIPIDEMIA LDL GOAL <100: ICD-10-CM

## 2019-10-31 RX ORDER — ATORVASTATIN CALCIUM 10 MG/1
TABLET, FILM COATED ORAL
Qty: 90 TABLET | Refills: 0 | Status: SHIPPED | OUTPATIENT
Start: 2019-10-31 | End: 2019-11-08 | Stop reason: SDUPTHER

## 2019-11-06 ENCOUNTER — HOSPITAL ENCOUNTER (OUTPATIENT)
Dept: RADIOLOGY | Facility: HOSPITAL | Age: 79
Discharge: HOME | End: 2019-11-06
Attending: INTERNAL MEDICINE
Payer: MEDICARE

## 2019-11-06 VITALS — BODY MASS INDEX: 28.17 KG/M2 | WEIGHT: 165 LBS | HEIGHT: 64 IN

## 2019-11-06 DIAGNOSIS — Z17.0 MALIGNANT NEOPLASM OF LEFT BREAST IN FEMALE, ESTROGEN RECEPTOR POSITIVE, UNSPECIFIED SITE OF BREAST: ICD-10-CM

## 2019-11-06 DIAGNOSIS — Z51.81 ENCOUNTER FOR MONITORING AROMATASE INHIBITOR THERAPY: ICD-10-CM

## 2019-11-06 DIAGNOSIS — Z79.811 ENCOUNTER FOR MONITORING AROMATASE INHIBITOR THERAPY: ICD-10-CM

## 2019-11-06 DIAGNOSIS — C50.912 MALIGNANT NEOPLASM OF LEFT BREAST IN FEMALE, ESTROGEN RECEPTOR POSITIVE, UNSPECIFIED SITE OF BREAST: ICD-10-CM

## 2019-11-06 DIAGNOSIS — M85.80 OSTEOPENIA, UNSPECIFIED LOCATION: ICD-10-CM

## 2019-11-06 PROCEDURE — 77062 BREAST TOMOSYNTHESIS BI: CPT | Mod: 26,,, | Performed by: RADIOLOGY

## 2019-11-06 PROCEDURE — 77066 DX MAMMO INCL CAD BI: CPT | Mod: TC

## 2019-11-06 PROCEDURE — 77062 MAMMO DIGITAL DIAGNOSTIC BILAT WITH TOMOSYNTHESIS_CAD: ICD-10-PCS | Mod: 26,,, | Performed by: RADIOLOGY

## 2019-11-06 PROCEDURE — 77066 DX MAMMO INCL CAD BI: CPT | Mod: 26,,, | Performed by: RADIOLOGY

## 2019-11-06 PROCEDURE — 77066 MAMMO DIGITAL DIAGNOSTIC BILAT WITH TOMOSYNTHESIS_CAD: ICD-10-PCS | Mod: 26,,, | Performed by: RADIOLOGY

## 2019-11-06 RX ORDER — INSULIN ASPART 100 [IU]/ML
INJECTION, SOLUTION INTRAVENOUS; SUBCUTANEOUS
Qty: 30 ML | Refills: 0 | Status: SHIPPED | OUTPATIENT
Start: 2019-11-06 | End: 2019-12-10 | Stop reason: SDUPTHER

## 2019-11-07 ENCOUNTER — OFFICE VISIT (OUTPATIENT)
Dept: HEMATOLOGY/ONCOLOGY | Facility: CLINIC | Age: 79
End: 2019-11-07
Payer: MEDICARE

## 2019-11-07 VITALS
BODY MASS INDEX: 28.14 KG/M2 | OXYGEN SATURATION: 97 % | DIASTOLIC BLOOD PRESSURE: 64 MMHG | HEIGHT: 65 IN | WEIGHT: 168.88 LBS | HEART RATE: 73 BPM | SYSTOLIC BLOOD PRESSURE: 136 MMHG | TEMPERATURE: 98 F

## 2019-11-07 DIAGNOSIS — M25.50 ARTHRALGIA, UNSPECIFIED JOINT: ICD-10-CM

## 2019-11-07 DIAGNOSIS — Z79.811 ENCOUNTER FOR MONITORING AROMATASE INHIBITOR THERAPY: ICD-10-CM

## 2019-11-07 DIAGNOSIS — Z17.0 MALIGNANT NEOPLASM OF LEFT BREAST IN FEMALE, ESTROGEN RECEPTOR POSITIVE, UNSPECIFIED SITE OF BREAST: Primary | ICD-10-CM

## 2019-11-07 DIAGNOSIS — M85.80 OSTEOPENIA, UNSPECIFIED LOCATION: ICD-10-CM

## 2019-11-07 DIAGNOSIS — Z51.81 ENCOUNTER FOR MONITORING AROMATASE INHIBITOR THERAPY: ICD-10-CM

## 2019-11-07 DIAGNOSIS — C50.912 MALIGNANT NEOPLASM OF LEFT BREAST IN FEMALE, ESTROGEN RECEPTOR POSITIVE, UNSPECIFIED SITE OF BREAST: Primary | ICD-10-CM

## 2019-11-07 PROCEDURE — 3078F DIAST BP <80 MM HG: CPT | Mod: CPTII,S$GLB,, | Performed by: INTERNAL MEDICINE

## 2019-11-07 PROCEDURE — 3075F SYST BP GE 130 - 139MM HG: CPT | Mod: CPTII,S$GLB,, | Performed by: INTERNAL MEDICINE

## 2019-11-07 PROCEDURE — 3075F PR MOST RECENT SYSTOLIC BLOOD PRESS GE 130-139MM HG: ICD-10-PCS | Mod: CPTII,S$GLB,, | Performed by: INTERNAL MEDICINE

## 2019-11-07 PROCEDURE — 3078F PR MOST RECENT DIASTOLIC BLOOD PRESSURE < 80 MM HG: ICD-10-PCS | Mod: CPTII,S$GLB,, | Performed by: INTERNAL MEDICINE

## 2019-11-07 PROCEDURE — 1101F PR PT FALLS ASSESS DOC 0-1 FALLS W/OUT INJ PAST YR: ICD-10-PCS | Mod: CPTII,S$GLB,, | Performed by: INTERNAL MEDICINE

## 2019-11-07 PROCEDURE — 99999 PR PBB SHADOW E&M-EST. PATIENT-LVL V: ICD-10-PCS | Mod: PBBFAC,,, | Performed by: INTERNAL MEDICINE

## 2019-11-07 PROCEDURE — 99999 PR PBB SHADOW E&M-EST. PATIENT-LVL V: CPT | Mod: PBBFAC,,, | Performed by: INTERNAL MEDICINE

## 2019-11-07 PROCEDURE — 99214 PR OFFICE/OUTPT VISIT, EST, LEVL IV, 30-39 MIN: ICD-10-PCS | Mod: S$GLB,,, | Performed by: INTERNAL MEDICINE

## 2019-11-07 PROCEDURE — 99214 OFFICE O/P EST MOD 30 MIN: CPT | Mod: S$GLB,,, | Performed by: INTERNAL MEDICINE

## 2019-11-07 PROCEDURE — 1101F PT FALLS ASSESS-DOCD LE1/YR: CPT | Mod: CPTII,S$GLB,, | Performed by: INTERNAL MEDICINE

## 2019-11-07 NOTE — PROGRESS NOTES
Subjective:       Patient ID: Joel Condon is a 79 y.o. female.    Chief Complaint: Follow-up       Diagnosis : Left Breast CA, IDC  S/p  left partial mastectomy and SLNB 12/13/2017. [id number] Stage 1B,grade 1, ER/WY pos Eby2swn negative  , closest margin anteriorly at 1mm  S/p  postoperative RT completed 3/13/2018  Letrozole 3/2018-present      HPI: Patient  is a 79 y.o. postmenopausal female seen today for recently diagnosed carcinoma of the left breast. She had an abnormal mammogram first noted 10/27/2017. Follow-up mammogram and ultrasound (11/14/17) showed 6mm mass in the 2OC left breast with enlarged axillary LN measuring 17mm boderline.She underwent an  ultrasound guided biopsy  on 11/21/2017 with pathology revealing infiltrating ductal carcinoma of the breast, Grade 1, ER positive 100% ,WY positive 100% Her-2neu negative.  The patient is status post left partial mastectomy and sentinel node biopsy on 12/13/2017.  Final pathology showed 6mm IDC, 1 of 2  LN with 1mm micromet. 1mm anterior margin. She does not routinely do self breast exams. She  has not noted any skin  changes on breast exam. No  nipple discharge. No history of  previous breast biopsies. No  personal history of breast cancer or ovarian cancer.     She completed  postoperative RT under the direction of Dr. Wray on 3/13/2018    She is taking adjuvant Letrozole daily  Pt with trial hold of therapy ( nausea, dizziness)       She is hearing impaired   She reports increasing diffuse arthralgias  She reports chronic  neck pain   She declined referral to pain mgmt  Appetite and weight stable   No SOB/cough  She reports dizziness only slight improvement with hold of endocrine therapy   She continues with chronic mild arthralgias hands and hip   No HA/vision changes       She declined bone scan      She is followed by her PCP for Type 2 DM and HTN  She reports blood glucose levels range from   She takes insulin     Mammo 12/6/2019    Impression:  Bilateral  There is no mammographic evidence of malignancy.     BI-RADS Category:   Overall: 2 - Benign       GYN History: Age of menarche was 14. Age of menopause was 46.  Patient reports hormonal therapy for less than 5 years. Patient is . Age of first live birth was 17. Patient did breast feed for 2 years 8 months.         Past Medical History:   Diagnosis Date    Arthritis     Breast cancer     Cataract     Colon polyp     Diabetes mellitus     Diabetic retinopathy     Hyperlipidemia LDL goal < 100     Hypertension     Hypothyroidism     Osteoporosis, post-menopausal     Overweight(278.02)     Proteinuria     Type II or unspecified type diabetes mellitus with renal manifestations, uncontrolled(250.42)        Past Surgical History:   Procedure Laterality Date    APPENDECTOMY      BREAST BIOPSY      BREAST LUMPECTOMY      BREAST SURGERY Left 2017    tumor removal    CATARACT EXTRACTION W/  INTRAOCULAR LENS IMPLANT Left 14    OS (Dr. Arce)    CATARACT EXTRACTION W/  INTRAOCULAR LENS IMPLANT Right 2014    OD (Dr. Arce)    CHOLECYSTECTOMY      COLONOSCOPY N/A 2017    Procedure: COLONOSCOPY;  Surgeon: Homar Payan MD;  Location: [facility] ENDO;  Service: Endoscopy;  Laterality: N/A;    COLONOSCOPY N/A 2018    Procedure: COLONOSCOPY;  Surgeon: Mykel Boles MD;  Location: [facility] ENDO;  Service: Endoscopy;  Laterality: N/A;    EYE SURGERY  2014 & 2014    HYSTERECTOMY      total    left eye cataract surgery  14    OOPHORECTOMY       Current MEDS; Reviewed and as per MEDCHART    Review of Systems   Constitutional: Negative for appetite change, fatigue, fever and unexpected weight change.   HENT: Negative for mouth sores.    Eyes: Negative for visual disturbance.   Respiratory: Negative for cough and shortness of breath.    Cardiovascular: Negative for chest pain.   Gastrointestinal: Negative for abdominal pain, diarrhea and  "nausea.   Genitourinary: Negative for frequency.   Musculoskeletal: Positive for arthralgias and neck pain. Negative for back pain.   Skin: Negative for rash.   Neurological: Positive for dizziness. Negative for headaches.   Hematological: Negative for adenopathy.   Psychiatric/Behavioral: The patient is not nervous/anxious.        Objective:         Vitals:    11/07/19 0814   BP: 136/64   BP Location: Left arm   Patient Position: Sitting   BP Method: Medium (Automatic)   Pulse: 73   Temp: 98.1 °F (36.7 °C)   TempSrc: Oral   SpO2: 97%   Weight: 76.6 kg (168 lb 14 oz)   Height: 5' 4.5" (1.638 m)             Physical Exam   Constitutional: She is oriented to person, place, and time. She appears well-developed and well-nourished.   HENT:   Head: Normocephalic.   Mouth/Throat: Oropharynx is clear and moist. No oropharyngeal exudate.   Eyes: Pupils are equal, round, and reactive to light. Conjunctivae and lids are normal. No scleral icterus.   Neck: Normal range of motion. Neck supple. No thyromegaly present.   Cardiovascular: Normal rate, regular rhythm and normal heart sounds.   No murmur heard.  Pulmonary/Chest: Breath sounds normal. She has no wheezes. She has no rales. Right breast exhibits no inverted nipple, no mass, no nipple discharge, no skin change and no tenderness. Left breast exhibits tenderness. Left breast exhibits no inverted nipple, no mass, no nipple discharge and no skin change.   Abdominal: Soft. Bowel sounds are normal. She exhibits no distension and no mass. There is no hepatosplenomegaly. There is no tenderness. There is no rebound and no guarding.   Musculoskeletal: Normal range of motion. She exhibits no edema or tenderness.   Lymphadenopathy:     She has no cervical adenopathy.     She has no axillary adenopathy.        Right: No supraclavicular adenopathy present.        Left: No supraclavicular adenopathy present.   Neurological: She is alert and oriented to person, place, and time. No " cranial nerve deficit. Coordination normal.   Skin: Skin is warm and dry. No ecchymosis, no petechiae and no rash noted. No erythema.   Psychiatric: She has a normal mood and affect.         FINAL PATHOLOGIC DIAGNOSIS 11/21/2017  1. Left breast mass 2:00:  Invasive mammary carcinoma, grade 1 (Geronimo score: Tubule formation 2, nuclear pleomorphism 2, mitotic  activity 1, total score 5), occupying at least 5 mm in one core.  2. Left breast axilla (lymph node):  Benign lymphoid tissue with no evidence of metastatic disease.  Note: Receptor studies and immunohistochemical studies will be performed with supplemental report to follow.  Intradepartmental QC/review obtained. Dr. Neil Irvin concurs with the above diagnostic impressions.    Supplemental Diagnosis  HORMONE RECEPTOR STUDIES:  Virtually 100% of the cells of the carcinoma are strongly nuclear estrogen receptor positive. 60 percent of the cells  are strongly nuclear progesterone receptor positive. There is an abrupt transition from the zone of nuclear  progesterone receptor positive cells to the zone where this receptor is negative. It is possible that there has been  some technical artifact which has led a region of the tissue to not stain, and that actually the vast majority of the  tumor are nuclear progesterone receptor positive. The tumor is HER-2 negative. The positive and negative controls  stained appropriately.        FINAL PATHOLOGIC DIAGNOSIS 12/13/2017   Part 1  Lymph node (1, submitted as left sentinel lymph node count equals 60):  -No evidence of malignancy  Part 2  Lymph node (1, submitted as left sentinel lymph node count equals 20):  -No evidence of malignancy  Part 3  Breast excision (submitted as left partial mastectomy):  -Invasive, well differentiated (grade I of III) lobular carcinoma  -Carcinoma is a single focus measuring 0.6 x 0.65 cm (6 x 6.5 mm) located 1 mm from the nearest, anterior  resection margin. All resection margins are free  of malignancy.  -No tumor necrosis, hemosiderin vascular or lymphatic permeation, or perineural involvement is identified.  -A microscopic focus of in situ carcinoma is identified immediately adjacent to the invasive tumor focus  -Carcinoma is graded I of III according to the Melissa modification of the Arriaga-Benson grading scheme  with 2 points each assigned for moderate tubule formation and moderate nuclear pleomorphism and 1.4  minimal mitotic count, a total of 5 of 9 possible points.  -AJCC TN: pT pN0(sn)  -Complete AJCC Staging Form follows:  INVASIVE CARCINOMA OF THE BREAST  Procedure: Partial mastectomy  Node sampling: [place] lymph nodes  Specimen laterality: Left  Tumor site: Not specified  Tumor size: 6.5 x 6.0 mm  Histologic type: Invasive lobular carcinoma  Histologic grade  -Glandular differentiation: Score 2  -Nuclear pleomorphism: Score 2  -Mitotic rate: Score 1  -Overall grade: Score 1  Tumor focality: Single focus of invasive carcinoma  Ductal carcinoma in situ (DCIS): No DCIS present  Margins-invasive carcinoma: Margins uninvolved by invasive carcinoma  -Distance from closest margin: 1 mm, anterior  Lymph nodes  -Total number of lymph nodes examined (sentinel and non-sentinel): 2  -Number sentinel lymph node nodes examined: 2  Pathologic staging  -Primary tumor: pT1b pN0(sn)  -Regional lymph nodes  Modifier: (SN)  Category: pN0  Ancillary studies: E-cadherin negative in tumor cells        Bone Density 2018   Compared to the young normal reference, this study indicates osteopenia of the Lumbar spine and left hip with osteoporosis of the right hip as detailed above.  Compared to prior the bone mineral density of the lumbar spine and left hip has slightly improved with reduced bone mineral density of the right hip as detailed above.    Note: Degenerative changes can falsely elevate measured bone mineral density, particularly in the lumbar spine, and should be considered as part of the final  clinical recommendation      Mammo 11/19/2018  Impression:  Bilateral  There is no mammographic evidence of malignancy.     BI-RADS Category:   Overall: 2 - Benign      MRI C spine w/out contrast 4/3/2019   Cervical spondylosis with posterior marginal osteophytic disc spur complex predominantly C3-C4 through C6-C7.  No evidence for osseous metastatic disease.      CXR - negative   Assessment:       1. Malignant neoplasm of left breast in female, estrogen receptor positive, unspecified site of breast    2. Encounter for monitoring aromatase inhibitor therapy    3. Osteopenia, unspecified location    4. Arthralgia, unspecified joint        Plan:     1-4  ECOG 0   80 y/o with multiple medical diagnoses with Stage 1B pT1b (6mm) N1mi M0 grade 1 ER + RI + [id number] IDC carcinoma  of the left breast status post left partial mastectomy and SLNB 12/13/2017  ER + RI + [id number]  12/13/2017.  1 of 2 lymph nodes positive for micromet. Closest margin anteriorly 1mm.  She is Stage IA per AJCC 8th ed. pathologic prognostic staging system.   S/p  postoperative RT completed 3/13/2018  Cont Vit D supp  Bone Density 2018- osteopenia/osteoporosis  Plan follow-up bone density prior to f/u   Plan follow-up Mammo prior to f/u     Last PROLIA 4/2019  No Dental Clearance indicated ( dentures)  Next Prolia dose in near future    trial hold of Letrozole ( increasing arthralgias)  Cont endocrine therapy  Pt declined MRI brain    Pt declines referral to Neurology for dizzinesss  Pt declines referral to Pain mgmt for chronic neck pain       MRI NECK ( pt declined gadolinium)- reviewed , no osseous mets  CXR negative     Follow-up with PCP for med mgmt/BP check     Plan Follow-up Mammo 12/2019  PT instructed to HOLD LETROZOLE and then f/u in 1mo    All questions posed answered to patient's satisfaction    Follow-up  2 mo with labs      Advance Care Planning     Power of   I initiated the process of advance care planning today and explained  the importance of this process to the patient.  I introduced the concept of advance directives to the patient, as well. Then the patient received detailed information about the importance of designating a Health Care Power of  (HCPOA). She was also instructed to communicate with this person about their wishes for future healthcare, should she become sick and lose decision-making capacity. The patient has not previously appointed a HCPOA. After our discussion, the patient has decided to complete a HCPOA and has appointed her daughter and NAME Isis Roberts   I spent a total time of 16  minutes discussing this issue with the patient.           Marlin Caldera M.D.         Kenney Wray M.D.

## 2019-11-08 ENCOUNTER — OFFICE VISIT (OUTPATIENT)
Dept: FAMILY MEDICINE | Facility: CLINIC | Age: 79
End: 2019-11-08
Payer: MEDICARE

## 2019-11-08 ENCOUNTER — LAB VISIT (OUTPATIENT)
Dept: LAB | Facility: HOSPITAL | Age: 79
End: 2019-11-08
Attending: INTERNAL MEDICINE
Payer: MEDICARE

## 2019-11-08 VITALS
DIASTOLIC BLOOD PRESSURE: 64 MMHG | HEART RATE: 62 BPM | WEIGHT: 168.56 LBS | OXYGEN SATURATION: 97 % | HEIGHT: 64 IN | BODY MASS INDEX: 28.78 KG/M2 | TEMPERATURE: 98 F | SYSTOLIC BLOOD PRESSURE: 136 MMHG

## 2019-11-08 DIAGNOSIS — M85.80 OSTEOPENIA, UNSPECIFIED LOCATION: ICD-10-CM

## 2019-11-08 DIAGNOSIS — E03.9 HYPOTHYROIDISM (ACQUIRED): ICD-10-CM

## 2019-11-08 DIAGNOSIS — C50.912 MALIGNANT NEOPLASM OF LEFT BREAST IN FEMALE, ESTROGEN RECEPTOR POSITIVE, UNSPECIFIED SITE OF BREAST: ICD-10-CM

## 2019-11-08 DIAGNOSIS — Z23 NEEDS FLU SHOT: ICD-10-CM

## 2019-11-08 DIAGNOSIS — Z00.00 ROUTINE MEDICAL EXAM: Primary | ICD-10-CM

## 2019-11-08 DIAGNOSIS — Z12.11 COLON CANCER SCREENING: ICD-10-CM

## 2019-11-08 DIAGNOSIS — K21.9 GASTROESOPHAGEAL REFLUX DISEASE WITHOUT ESOPHAGITIS: Chronic | ICD-10-CM

## 2019-11-08 DIAGNOSIS — E78.5 HYPERLIPIDEMIA LDL GOAL <100: ICD-10-CM

## 2019-11-08 DIAGNOSIS — Z71.89 ADVANCED DIRECTIVES, COUNSELING/DISCUSSION: ICD-10-CM

## 2019-11-08 DIAGNOSIS — Z23 NEED FOR SHINGLES VACCINE: ICD-10-CM

## 2019-11-08 DIAGNOSIS — I10 ESSENTIAL HYPERTENSION: ICD-10-CM

## 2019-11-08 DIAGNOSIS — Z17.0 MALIGNANT NEOPLASM OF LEFT BREAST IN FEMALE, ESTROGEN RECEPTOR POSITIVE, UNSPECIFIED SITE OF BREAST: ICD-10-CM

## 2019-11-08 DIAGNOSIS — M19.90 OSTEOARTHRITIS, UNSPECIFIED OSTEOARTHRITIS TYPE, UNSPECIFIED SITE: ICD-10-CM

## 2019-11-08 DIAGNOSIS — Z79.4 LONG-TERM INSULIN USE: ICD-10-CM

## 2019-11-08 LAB
ALBUMIN/CREAT UR: 55 UG/MG (ref 0–30)
CREAT UR-MCNC: 109 MG/DL (ref 15–325)
ESTIMATED AVG GLUCOSE: 197 MG/DL (ref 68–131)
HBA1C MFR BLD HPLC: 8.5 % (ref 4–5.6)
MICROALBUMIN UR DL<=1MG/L-MCNC: 60 UG/ML

## 2019-11-08 PROCEDURE — G0008 ADMIN INFLUENZA VIRUS VAC: HCPCS | Mod: S$GLB,,, | Performed by: INTERNAL MEDICINE

## 2019-11-08 PROCEDURE — 82043 UR ALBUMIN QUANTITATIVE: CPT

## 2019-11-08 PROCEDURE — 90662 FLU VACCINE - HIGH DOSE (65+) PRESERVATIVE FREE IM: ICD-10-PCS | Mod: S$GLB,,, | Performed by: INTERNAL MEDICINE

## 2019-11-08 PROCEDURE — G0008 FLU VACCINE - HIGH DOSE (65+) PRESERVATIVE FREE IM: ICD-10-PCS | Mod: S$GLB,,, | Performed by: INTERNAL MEDICINE

## 2019-11-08 PROCEDURE — 99999 PR PBB SHADOW E&M-EST. PATIENT-LVL IV: ICD-10-PCS | Mod: PBBFAC,,, | Performed by: INTERNAL MEDICINE

## 2019-11-08 PROCEDURE — 99999 PR PBB SHADOW E&M-EST. PATIENT-LVL IV: CPT | Mod: PBBFAC,,, | Performed by: INTERNAL MEDICINE

## 2019-11-08 PROCEDURE — 99214 PR OFFICE/OUTPT VISIT, EST, LEVL IV, 30-39 MIN: ICD-10-PCS | Mod: 25,S$GLB,, | Performed by: INTERNAL MEDICINE

## 2019-11-08 PROCEDURE — 3075F SYST BP GE 130 - 139MM HG: CPT | Mod: CPTII,S$GLB,, | Performed by: INTERNAL MEDICINE

## 2019-11-08 PROCEDURE — 99214 OFFICE O/P EST MOD 30 MIN: CPT | Mod: 25,S$GLB,, | Performed by: INTERNAL MEDICINE

## 2019-11-08 PROCEDURE — 99499 UNLISTED E&M SERVICE: CPT | Mod: S$GLB,,, | Performed by: INTERNAL MEDICINE

## 2019-11-08 PROCEDURE — 3078F PR MOST RECENT DIASTOLIC BLOOD PRESSURE < 80 MM HG: ICD-10-PCS | Mod: CPTII,S$GLB,, | Performed by: INTERNAL MEDICINE

## 2019-11-08 PROCEDURE — 3075F PR MOST RECENT SYSTOLIC BLOOD PRESS GE 130-139MM HG: ICD-10-PCS | Mod: CPTII,S$GLB,, | Performed by: INTERNAL MEDICINE

## 2019-11-08 PROCEDURE — 99499 RISK ADDL DX/OHS AUDIT: ICD-10-PCS | Mod: S$GLB,,, | Performed by: INTERNAL MEDICINE

## 2019-11-08 PROCEDURE — 90662 IIV NO PRSV INCREASED AG IM: CPT | Mod: S$GLB,,, | Performed by: INTERNAL MEDICINE

## 2019-11-08 PROCEDURE — 83036 HEMOGLOBIN GLYCOSYLATED A1C: CPT

## 2019-11-08 PROCEDURE — 3078F DIAST BP <80 MM HG: CPT | Mod: CPTII,S$GLB,, | Performed by: INTERNAL MEDICINE

## 2019-11-08 PROCEDURE — 36415 COLL VENOUS BLD VENIPUNCTURE: CPT | Mod: PO

## 2019-11-08 NOTE — PROGRESS NOTES
HISTORY OF PRESENT ILLNESS:  Joel Condon is a 79 y.o. female who presents to the clinic today for a routine medicalphysical exam. Her last physical exam was approximately 1 years(s) ago.        PAST MEDICAL HISTORY:  Past Medical History:   Diagnosis Date    Arthritis     Breast cancer     Cataract     Colon polyp     Diabetes mellitus     Diabetic retinopathy     Hyperlipidemia LDL goal < 100     Hypertension     Hypothyroidism     Osteoporosis, post-menopausal     Overweight(278.02)     Proteinuria     Type II or unspecified type diabetes mellitus with renal manifestations, uncontrolled(250.42)        PAST SURGICAL HISTORY:  Past Surgical History:   Procedure Laterality Date    APPENDECTOMY      BREAST BIOPSY      BREAST LUMPECTOMY      BREAST SURGERY Left 12/13/2017    tumor removal    CATARACT EXTRACTION W/  INTRAOCULAR LENS IMPLANT Left 4/24/14    OS (Dr. Arce)    CATARACT EXTRACTION W/  INTRAOCULAR LENS IMPLANT Right 06/12/2014    OD (Dr. Arce)    CHOLECYSTECTOMY      COLONOSCOPY N/A 4/21/2017    Procedure: COLONOSCOPY;  Surgeon: Homar Payan MD;  Location: [facility] ENDO;  Service: Endoscopy;  Laterality: N/A;    COLONOSCOPY N/A 6/29/2018    Procedure: COLONOSCOPY;  Surgeon: Mykel Boles MD;  Location: [facility] ENDO;  Service: Endoscopy;  Laterality: N/A;    EYE SURGERY  04/24/2014 & 06/12/2014    HYSTERECTOMY      total    left eye cataract surgery  4/24/14    OOPHORECTOMY         SOCIAL HISTORY:  Social History     Socioeconomic History    Marital status:      Spouse name: Not on file    Number of children: 3    Years of education: Not on file    Highest education level: Not on file   Occupational History    Occupation: retired   Social Needs    Financial resource strain: Not on file    Food insecurity:     Worry: Not on file     Inability: Not on file    Transportation needs:     Medical: Not on file     Non-medical: Not on file   Tobacco Use    Smoking  "status: Never Smoker    Smokeless tobacco: Never Used   Substance and Sexual Activity    Alcohol use: No    Drug use: No    Sexual activity: Not Currently     Partners: Male   Lifestyle    Physical activity:     Days per week: Not on file     Minutes per session: Not on file    Stress: Not on file   Relationships    Social connections:     Talks on phone: Not on file     Gets together: Not on file     Attends Zoroastrian service: Not on file     Active member of club or organization: Not on file     Attends meetings of clubs or organizations: Not on file     Relationship status: Not on file   Other Topics Concern    Not on file   Social History Narrative    Not on file       FAMILY HISTORY:  Family History   Problem Relation Age of Onset    Diabetes Mother     Heart disease Mother     Hypertension Mother     Stroke Mother     Diabetes Sister     Hypertension Sister     Diabetes Sister     Hypertension Sister     Diabetes Sister     Hypertension Sister     Hypertension Sister     Diabetes Sister     Diabetes Brother     Diabetes Brother     Diabetes Brother     Prostate cancer Brother     Amblyopia Neg Hx     Blindness Neg Hx     Cataracts Neg Hx     Glaucoma Neg Hx     Macular degeneration Neg Hx     Retinal detachment Neg Hx     Strabismus Neg Hx        ALLERGIES AND MEDICATIONS: updated and reviewed.  Review of patient's allergies indicates:   Allergen Reactions    Lisinopril Other (See Comments)     Cough      Hydrochlorothiazide (bulk) Other (See Comments)     Elevated pt's blood pressure making her feel bad    Pravastatin Other (See Comments)     headaches     Medication List with Changes/Refills   Current Medications    ASPIRIN (ECOTRIN) 81 MG EC TABLET    Take 1 tablet (81 mg total) by mouth once daily.    ATORVASTATIN (LIPITOR) 10 MG TABLET    TAKE 1 TABLET(10 MG) BY MOUTH EVERY DAY    BD INSULIN PEN NEEDLE UF SHORT 31 GAUGE X 5/16" NDLE    USE THREE TIMES DAILY AS DIRECTED " "   BLOOD SUGAR DIAGNOSTIC STRP    True Results; Test four times a day.    CYANOCOBALAMIN, VITAMIN B-12, MISC    by Misc.(Non-Drug; Combo Route) route.    HYDRALAZINE (APRESOLINE) 50 MG TABLET    Take 1 tablet (50 mg total) by mouth 2 (two) times daily.    INSULIN DETEMIR U-100 (LEVEMIR U-100 INSULIN) 100 UNIT/ML INJECTION    INJECT 50 UNITS UNDER THE SKIN EVERY EVENING    INSULIN SYRINGE-NEEDLE U-100 1 ML 31 GAUGE X 5/16 SYRG    USE THREE TIMES DAILY AS DIRECTED    INSULIN SYRINGE-NEEDLE U-100 1 ML 31 GAUGE X 5/16 SYRG    USE THREE TIMES DAILY AS DIRECTED    LANCETS 26 GAUGE MISC    1 lancet by Misc.(Non-Drug; Combo Route) route 3 (three) times daily.    LETROZOLE (FEMARA) 2.5 MG TAB    Take 1 tab by mouth daily.    LEVOTHYROXINE (SYNTHROID) 50 MCG TABLET    TAKE 1 TABLET(50 MCG) BY MOUTH BEFORE BREAKFAST    LOSARTAN (COZAAR) 100 MG TABLET    TAKE 1 TABLET BY MOUTH DAILY    METFORMIN (GLUCOPHAGE-XR) 500 MG 24 HR TABLET    2 tablets in the morning and 1 tablet at night    METOPROLOL SUCCINATE (TOPROL-XL) 200 MG 24 HR TABLET    TAKE 1 TABLET BY MOUTH DAILY    NIFEDIPINE (PROCARDIA-XL) 90 MG (OSM) 24 HR TABLET    TAKE 1 TABLET(90 MG) BY MOUTH EVERY DAY    NOVOLOG FLEXPEN U-100 INSULIN 100 UNIT/ML (3 ML) INPN PEN    ADMINISTER 28 UNITS UNDER THE SKIN THREE TIMES DAILY WITH MEALS    OMEPRAZOLE (PRILOSEC) 20 MG CAPSULE    TAKE 1 CAPSULE(20 MG) BY MOUTH DAILY AS NEEDED FOR HEARTBURN    PEN NEEDLE, DIABETIC (BD ULTRA-FINE SHORT PEN NEEDLE) 31 GAUGE X 5/16" NDLE    USE THREE TIMES DAILY AS DIRECTED    SPIRONOLACTONE (ALDACTONE) 50 MG TABLET    TAKE 1 TABLET BY MOUTH ONCE DAILY    VITAMIN E ACETATE (VITAMIN E ORAL)    Take by mouth.   Discontinued Medications    ATORVASTATIN (LIPITOR) 10 MG TABLET    TAKE 1 TABLET(10 MG) BY MOUTH EVERY DAY    LETROZOLE (FEMARA) 2.5 MG TAB    TAKE 1 TABLET BY MOUTH DAILY.    PROCHLORPERAZINE (COMPAZINE) 10 MG TABLET    TAKE 1 TABLET BY MOUTH THREE TIMES DAILY AS NEEDED FOR NAUSEA         CARE " TEAM:  Patient Care Team:  Shelby Ley MD as PCP - General (Internal Medicine)  Deborah Parr MD as Consulting Physician (Hematology and Oncology)  Rosy Mcknight NP as Nurse Practitioner (Endocrinology)           SCREENING HISTORY:  Health Maintenance       Date Due Completion Date    Shingles Vaccine (2 of 3) 11/04/2015 9/9/2015    Eye Exam 05/08/2019 5/8/2018    Override on 5/2/2017: Done    Override on 11/4/2012: Done    Urine Microalbumin 08/01/2019 8/1/2018    Foot Exam 08/15/2019 8/15/2018    Override on 6/28/2017: Done    Override on 3/1/2017: Done    Override on 5/20/2015: Done    Hemoglobin A1c 08/26/2019 2/26/2019    Lipid Panel 02/26/2020 2/26/2019    DEXA SCAN 03/20/2020 3/20/2018    Mammogram 11/06/2020 11/6/2019    Colonoscopy 06/29/2021 6/29/2018    Override on 4/1/2007: Done    TETANUS VACCINE 06/15/2026 6/15/2016            REVIEW OF SYSTEMS:   The patient reports: fair dietary habits.  The patient reports : that they do not exercise, but try to stay active.  Review of Systems   Constitutional: Negative for chills, fatigue, fever and unexpected weight change.   HENT: Negative for congestion and postnasal drip.    Eyes: Negative for pain and visual disturbance.   Respiratory: Negative for cough, shortness of breath and wheezing.    Cardiovascular: Negative for chest pain, palpitations and leg swelling.   Gastrointestinal: Negative for abdominal pain, constipation, diarrhea, nausea and vomiting.   Genitourinary: Negative for dysuria.   Musculoskeletal: Positive for arthralgias, back pain and gait problem.   Skin: Negative for rash.   Neurological: Negative for weakness and headaches.   Psychiatric/Behavioral: Negative for dysphoric mood and sleep disturbance. The patient is not nervous/anxious.       ROS (Optional)-: no pelvic pain  Breast ROS (Optional)-: negative for breast lumps/discharge            Physical Examination:   Vitals:    11/08/19 1146   BP: 136/64   Pulse:    Temp:   "    Weight: 76.5 kg (168 lb 8.7 oz)   Height: 5' 4" (162.6 cm)   Body mass index is 28.93 kg/m².      Patient did not require to have a chaperone present during the exam today.    General appearance - alert, well appearing, and in no distress, overweight  Psychiatric - alert, oriented to person, place, and time, normal behavior, speech, dress, motor activity, mildly depressed mood  Eyes - pupils equal and reactive, extraocular eye movements intact, sclera anicteric  Mouth - mucous membranes moist, pharynx normal without lesions  Neck - supple, no significant adenopathy, carotids upstroke normal bilaterally, no bruits, thyroid exam: thyroid is normal in size without nodules or tenderness  Lymphatics - no palpable cervical lymphadenopathy  Chest - clear to auscultation, no wheezes, rales or rhonchi, symmetric air entry  Heart - normal rate and regular rhythm, no gallops noted  Abdomen - soft, nontender, nondistended, no masses or organomegaly  Breasts - not examined  Back exam - moderate limit in range of motion noted on exam, mild pain with motion noted during exam, in depth exam deferred  Neurological - alert, normal speech, no focal findings or movement disorder noted, cranial nerves II through XII intact  Musculoskeletal - patient noted to have Moderate-Severe osteoarthritic changes to both knee joints. No joint effusions noted., no muscular tenderness noted, mild osteoarthritic changes noted in both hands, she was slightly unsteady on her feet and very stiff upon 1st arising from a seated position  Extremities - no pedal edema noted  Skin - normal coloration and turgor, no rashes, no suspicious skin lesions noted  Diabetic Foot Exam - \Protective Sensation (w/ 10 gram monofilament):  Right: Decreased  Left: Decreased    Visual Inspection:  Normal -  Bilateral except Dry Skin/onychomycosis -  Bilateral    Pedal Pulses:   Right: Present  Left: Present    Posterior tibialis:   Right:Present  Left: " Present        Labs:  Lab Results   Component Value Date    HGBA1C 7.7 (H) 02/26/2019    HGBA1C 7.6 (H) 11/26/2018    HGBA1C 6.8 (H) 08/01/2018      Lab Results   Component Value Date    CHOL 122 02/26/2019    CHOL 113 (L) 04/25/2018    CHOL 144 03/14/2017     Lab Results   Component Value Date    LDLCALC 45.8 (L) 02/26/2019    LDLCALC 49.4 (L) 04/25/2018    LDLCALC 73.6 03/14/2017         ASSESSMENT AND PLAN:  1. Routine medical exam  Counseled on age appropriate medical preventative services including age appropriate cancer screenings, age appropriate eye and dental exams, over all nutritional health, need for a consistent exercise regimen, and an over all push towards maintaining a vigorous and active lifestyle.  Counseled on age appropriate vaccines and discussed upcoming health care needs based on age/gender. Discussed good sleep hygiene and stress management.     2. 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 Type 2 diabetes, uncontrolled, with neuropathy/4. Type 2 diabetes, uncontrolled, with retinopathy/5. Long-term insulin use  Diabetes is under fair control at this time for age and comorbid conditions. We discussed diabetic diet and regular exercise. We discussed home blood sugar monitoring, if appropriate - the patient should test three times per day with meals and as needed. Continue current medication regimen. and Followed by endocrinology.  Diabetic complications addressed: Neuropathy pain controlled.  Patient was counseled on the need for yearly eye exam to screen for/monitor diabetic retinopathy and yearly diabetic foot exam.   - MICROALBUMIN / CREATININE RATIO URINE  - Ambulatory consult to Optometry    6. Essential hypertension  Discussed sodium restriction, maintaining ideal body weight and regular exercise program as physiologic means to achieve blood pressure control. The patient will strive towards this. The current medical regimen is effective;  continue  present plan and medications. Recommended patient to check home readings to monitor and see me for followup as scheduled or sooner as needed. Patient was educated that both decongestant and anti-inflammatory medication may raise blood pressure.     7. Hyperlipidemia LDL goal <100  We discussed low fat diet and regular exercise.The current medical regimen is effective;  continue present plan and medications.      8. Hypothyroidism (acquired)  Patient is clinically euthyroid. Continue current regimen.     9. Osteopenia, unspecified location  We discussed adequate calcium and vitamin D supplementation. We discussed fall precautions. She is up to date on her BMD. Continue current treatment regimen (on Prolia shots q6 months)     10. Malignant neoplasm of left breast in female, estrogen receptor positive, unspecified site of breast  Stable. Continue current medication. Followed by oncology.    11. Gastroesophageal reflux disease without esophagitis  Symptoms controlled: yes. Reflux precautions discussed (eliminate tobacco if a smoker; minimize caffeine, chocolate and red/white peppermint intake; avoid heavy and spicy meals; don't lay down within 2-3 hours after eating; minimize the intake of NSAIDs). Medication as needed. Patient asked to take medication breaks, if possible - discussed chronic use can limit calcium absorption (which can lead to osteopenia/osteoporosis), increases the risk for intestinal infections, and can cause kidney damage. There are also some newer studies that show possible increased risk of mortality.     12. Need for shingles vaccine  Patient was advised to get immunization at the pharmacy.     13. Colon cancer screening  She is up-to-date on colon cancer screening.  She will be due again in 2021.    14. Advanced directives, counseling/discussion  We assisted the patient in completing living will and healthcare power of  today.    15. Needs flu shot    - Influenza - High Dose (65+) (PF)  (IM)    16. Osteoarthritis, unspecified osteoarthritis type, unspecified site  The patient is requesting a Rollator with seat.  She is also requesting a handicap tag.  Tylenol as needed for pain.  She has seen Orthopedics.  She does not wish injections because she has been told it will increase her blood sugars.  Also, she is not interested in any surgical intervention.  - WALKER FOR HOME USE          Follow up in about 6 months (around 5/8/2020), or if symptoms worsen or fail to improve, for follow up chronic medical conditions.. or sooner as needed.

## 2019-11-11 ENCOUNTER — TELEPHONE (OUTPATIENT)
Dept: FAMILY MEDICINE | Facility: CLINIC | Age: 79
End: 2019-11-11

## 2019-11-11 NOTE — TELEPHONE ENCOUNTER
Patient notified. Patient said that she is still seeing the NP Rosy and they had cancelled her appointment but never called to reschedule her. She said that she will call diabetic clinic to reschedule for December.   89 y/o M with hx of Parkinson disease, HTN, BPH, ETOH abuse, afib on Coumadin, found down at home for unknown period of time (up to 2 days) admitted for toxic metabolic encephalopathy and acute renal failure and found to have UTI s/p 7 day tx w/ ceftriaxone now with NORMA PNA.    Problem/Plan - 1:  ·  Problem: Sepsis.  Plan: 8/27 pt found to have rigors and was ill appearing with tachycardia to 143, temp to 101.4, RR of 30 and oxygen saturation of 80% on RA. CXR showed new NORMA opacity consistent with aspiration PNA. Lactate 2.7 (trended down to 1.8), and leukocytosis to 11.3 with left shift. Pt was fluid resuscitated with 1L bolus NS and fluids continued. Zosyn was started for empiric coverage. Afebrile since 9/2 when had fever 100.4.  - lactate improved  - sputum cx showed MRSA, 1st dose of vanc (8/31)  - leukocytosis 7.1 today, stabilizing, continue to trend  - completed 7 day course of zosyn finished 9/4 AM  - c/w vanco; monitor levels. 5 day course with goal 15-20  - f/u blood cx; NGTD  - pt also received 7 day course Ceftriaxone 1g q24h last dose on 27th for sepsis 2/2 UTI.     Problem/Plan - 2:  ·  Problem: Gram-negative pneumonia.  Plan: Pt has sputum culture positive for MRSA.  - c/w vanco; f/u vanc levels and redose accordingly (needs 5 day course of therapeutic levels 15-20)  - completed 7 day course of zosyn 9/4 AM  - f/u blood cx, NGTD  - continue to trend CBC    # Hypoxic hypercapnic respiratory failure likely 2/2 PNA  RESOLVED  - Pt found to have PNA w/ increased respiratory needs. ABG showed met acidosis with resp alkalosis  - pt on RA  - c/w pulmonary toilet  - monitor for signs of mucus plugging.     Problem/Plan - 3:  ·  Problem: Toxic metabolic encephalopathy.  Plan: - pt originally presented with UTI in AMS  - currently A&Ox1 to person only  - AMS likely 2/2 sepsis vs. delirium, will cont to monitor and treat underlying disease    #delirium- pt continues to be oriented only to self with agitation. Likely 2/2 delirium in the setting of sepsis/hospitalization and possible underlying dementia from Parkinson's/long term alcoholism  - as per psychiatry, seroquel 12.5 qhs and q8hrs PRN for agitation  - continue to orient patient  - continue to monitor.     Problem/Plan - 4:  ·  Problem: Chronic atrial fibrillation.  Plan: Pt has h/o of a-fib on home Coumadin and Toprol XL 200mg. Was difficult to rate control earlier this admission likely 2/2 missed doses of Toprol XL  - on lopressor for rate control. Pt has been tachycardic 90s-120s  - c/w lopressor 75 BID  - INR <2 9/1. c/w Eliquis for further anticoagulation  - if monitor says pt is tachycardic, palpate HR for 1 minute for more accurate rate    #Hyponatremia  - pt continues to have fluctuations in sodium. LR @75cc/hr started today AM. Sodium today 139. May be 2/2 SIADH vs. diarrhea vs. NILS  - continue to monitor.     Problem/Plan - 5:  ·  Problem: Acute renal failure.  Plan: In the setting of dehydration and obstruction from BPH, underlying CKD 3  - Cr increased to 1.21 today  - trend BMP's  - giving some IVF today, will consider stopping pending good PO intake  - f/u vanc level and dose according to level to avoid further kidney injury    #diarrhea- pt w diarrhea. cdiff ruled out. GI PCR neg.  w/ improvements in quantity.   - c/w probiotics  - received 1 dose loperamide 9/1  - continue to monitor  - To remove rectal tube today.     Problem/Plan - 6:  Problem: Parkinson's disease. Plan: - c/w home Sinemet

## 2019-11-11 NOTE — TELEPHONE ENCOUNTER
Please call patient: her diabetes control has worsened again. She needs to go back and see Rosy Mcknight NP.

## 2019-11-12 ENCOUNTER — INFUSION (OUTPATIENT)
Dept: INFUSION THERAPY | Facility: HOSPITAL | Age: 79
End: 2019-11-12
Attending: INTERNAL MEDICINE
Payer: MEDICARE

## 2019-11-12 VITALS
HEART RATE: 74 BPM | DIASTOLIC BLOOD PRESSURE: 70 MMHG | OXYGEN SATURATION: 95 % | SYSTOLIC BLOOD PRESSURE: 165 MMHG | RESPIRATION RATE: 17 BRPM | TEMPERATURE: 96 F

## 2019-11-12 DIAGNOSIS — M85.80 OSTEOPENIA, UNSPECIFIED LOCATION: Primary | ICD-10-CM

## 2019-11-12 PROCEDURE — 96372 THER/PROPH/DIAG INJ SC/IM: CPT

## 2019-11-12 PROCEDURE — 63600175 PHARM REV CODE 636 W HCPCS: Mod: JG | Performed by: INTERNAL MEDICINE

## 2019-11-12 RX ADMIN — DENOSUMAB 60 MG: 60 INJECTION SUBCUTANEOUS at 09:11

## 2019-11-12 NOTE — PLAN OF CARE
Patient received Prolia injection. Tolerated well. VSS. Patient reports taking Vitamin D and Calcium supplements as directed for bone health. Serum Calcium levels remain WNL at this time. She denies any recent falls, jaw pain or teeth extractions within the last 3 months. Patient received discharge instructions and follow up appointments. Verbalized understanding and ambulated unassisted off unit by self. Patient in NAD at time of discharge.

## 2019-11-25 DIAGNOSIS — I10 ESSENTIAL HYPERTENSION: ICD-10-CM

## 2019-11-25 DIAGNOSIS — E03.9 HYPOTHYROIDISM (ACQUIRED): ICD-10-CM

## 2019-11-25 RX ORDER — LOSARTAN POTASSIUM 100 MG/1
TABLET ORAL
Qty: 90 TABLET | Refills: 2 | Status: SHIPPED | OUTPATIENT
Start: 2019-11-25 | End: 2020-07-27

## 2019-11-25 RX ORDER — LEVOTHYROXINE SODIUM 50 UG/1
TABLET ORAL
Qty: 90 TABLET | Refills: 1 | Status: SHIPPED | OUTPATIENT
Start: 2019-11-25 | End: 2020-06-01

## 2019-12-06 ENCOUNTER — PATIENT OUTREACH (OUTPATIENT)
Dept: ADMINISTRATIVE | Facility: OTHER | Age: 79
End: 2019-12-06

## 2019-12-10 RX ORDER — INSULIN ASPART 100 [IU]/ML
INJECTION, SOLUTION INTRAVENOUS; SUBCUTANEOUS
Qty: 30 ML | Refills: 0 | Status: SHIPPED | OUTPATIENT
Start: 2019-12-10 | End: 2020-02-20

## 2019-12-10 RX ORDER — PEN NEEDLE, DIABETIC 30 GX3/16"
NEEDLE, DISPOSABLE MISCELLANEOUS
Qty: 100 EACH | Refills: 11 | Status: SHIPPED | OUTPATIENT
Start: 2019-12-10 | End: 2020-08-06

## 2019-12-12 ENCOUNTER — TELEPHONE (OUTPATIENT)
Dept: HEMATOLOGY/ONCOLOGY | Facility: CLINIC | Age: 79
End: 2019-12-12

## 2020-01-10 ENCOUNTER — PATIENT OUTREACH (OUTPATIENT)
Dept: ADMINISTRATIVE | Facility: OTHER | Age: 80
End: 2020-01-10

## 2020-01-13 ENCOUNTER — TELEPHONE (OUTPATIENT)
Dept: HEMATOLOGY/ONCOLOGY | Facility: CLINIC | Age: 80
End: 2020-01-13

## 2020-01-13 NOTE — TELEPHONE ENCOUNTER
I called the patient about her appointment scheduled on 1/17/2020. The patient will need to reschedule the appointment as the provider is out of the office.

## 2020-01-14 ENCOUNTER — OFFICE VISIT (OUTPATIENT)
Dept: OPTOMETRY | Facility: CLINIC | Age: 80
End: 2020-01-14
Payer: MEDICARE

## 2020-01-14 DIAGNOSIS — E11.3293 MILD NONPROLIFERATIVE DIABETIC RETINOPATHY OF BOTH EYES WITHOUT MACULAR EDEMA ASSOCIATED WITH TYPE 2 DIABETES MELLITUS: Primary | ICD-10-CM

## 2020-01-14 DIAGNOSIS — H52.13 MYOPIA WITH ASTIGMATISM, BILATERAL: ICD-10-CM

## 2020-01-14 DIAGNOSIS — H52.203 MYOPIA WITH ASTIGMATISM, BILATERAL: ICD-10-CM

## 2020-01-14 DIAGNOSIS — Z13.5 SCREENING FOR GLAUCOMA: ICD-10-CM

## 2020-01-14 DIAGNOSIS — Z96.1 PSEUDOPHAKIA OF BOTH EYES: ICD-10-CM

## 2020-01-14 LAB
LEFT EYE DM RETINOPATHY: POSITIVE
RIGHT EYE DM RETINOPATHY: POSITIVE

## 2020-01-14 PROCEDURE — 99499 UNLISTED E&M SERVICE: CPT | Mod: S$GLB,,, | Performed by: OPTOMETRIST

## 2020-01-14 PROCEDURE — 99499 RISK ADDL DX/OHS AUDIT: ICD-10-PCS | Mod: S$GLB,,, | Performed by: OPTOMETRIST

## 2020-01-14 PROCEDURE — 92014 PR EYE EXAM, EST PATIENT,COMPREHESV: ICD-10-PCS | Mod: S$GLB,,, | Performed by: OPTOMETRIST

## 2020-01-14 PROCEDURE — 99999 PR PBB SHADOW E&M-EST. PATIENT-LVL II: CPT | Mod: PBBFAC,,, | Performed by: OPTOMETRIST

## 2020-01-14 PROCEDURE — 92015 DETERMINE REFRACTIVE STATE: CPT | Mod: S$GLB,,, | Performed by: OPTOMETRIST

## 2020-01-14 PROCEDURE — 92014 COMPRE OPH EXAM EST PT 1/>: CPT | Mod: S$GLB,,, | Performed by: OPTOMETRIST

## 2020-01-14 PROCEDURE — 99999 PR PBB SHADOW E&M-EST. PATIENT-LVL II: ICD-10-PCS | Mod: PBBFAC,,, | Performed by: OPTOMETRIST

## 2020-01-14 PROCEDURE — 92015 PR REFRACTION: ICD-10-PCS | Mod: S$GLB,,, | Performed by: OPTOMETRIST

## 2020-01-14 NOTE — PROGRESS NOTES
HPI     Annual diabetic eye exam  Mild NPDR 1 yr ago  Doesn't like prescription sRx, prefers NVO lenses  Hemoglobin A1C       Date                     Value               Ref Range             Status                11/08/2019               8.5 (H)             4.0 - 5.6 %           Final                  02/26/2019               7.7 (H)             4.0 - 5.6 %           Final                  11/26/2018               7.6 (H)             4.0 - 5.6 %           Final                Last edited by Jose R Salazar, OD on 1/14/2020  9:04 AM. (History)            Assessment /Plan     For exam results, see Encounter Report.    Mild nonproliferative diabetic retinopathy of both eyes without macular edema associated with type 2 diabetes mellitus  -Retinopathy noted today, no ocular treatment necessary.  Continued blood sugar control with primary care physician and monitor at 12 mo dilated fundus exam.    Pseudophakia of both eyes  -Clear, centered    Screening for glaucoma  -Monitor with annual eye exam and IOP check    Myopia with astigmatism, bilateral  Eyeglass Final Rx     Eyeglass Final Rx       Sphere Cylinder Axis Dist VA Add    Right -0.50 +1.00 170 20/40 +2.50    Left [place] +0.75 155 20/40 +2.50    Type:  Bifocal    Expiration Date:  1/14/2021                  RTC 1 yr

## 2020-01-14 NOTE — LETTER
January 14, 2020      Shelby Ley MD  [address]  [place] [address]           [facility]  [address]  [place] [address]  Phone: [phone]  Fax: [phone]          Patient: Joel Condon   MR Number: [id number]   YOB: 1940   Date of Visit: 1/14/2020       Dear Dr. Shelby Ley:    Thank you for referring Joel Condon to me for evaluation. Attached you will find relevant portions of my assessment and plan of care.    If you have questions, please do not hesitate to call me. I look forward to following Joel Condon along with you.    Sincerely,    Jose R Salazar, OD    Enclosure  CC:  No Recipients    If you would like to receive this communication electronically, please contact [email or web address] or [phone] to request more information on [employer] access.    For providers and/or their staff who would like to refer a patient to [facility], please contact us through our one-stop-shop provider referral line, [facility], at [phone].    If you feel you have received this communication in error or would no longer like to receive these types of communications, please e-mail [email or web address]

## 2020-01-15 ENCOUNTER — TELEPHONE (OUTPATIENT)
Dept: ENDOCRINOLOGY | Facility: CLINIC | Age: 80
End: 2020-01-15

## 2020-01-15 DIAGNOSIS — E11.40 TYPE 2 DIABETES, CONTROLLED, WITH NEUROPATHY: Primary | ICD-10-CM

## 2020-01-15 RX ORDER — METFORMIN HYDROCHLORIDE 500 MG/1
TABLET, EXTENDED RELEASE ORAL
Qty: 270 TABLET | Refills: 0 | Status: SHIPPED | OUTPATIENT
Start: 2020-01-15 | End: 2020-04-15

## 2020-01-15 NOTE — TELEPHONE ENCOUNTER
Tried contacting pt to inform her Rosy wants her to make a F/U appointment with labs the week prior in order to get more refills on medications. The pt did not answer.

## 2020-01-16 ENCOUNTER — TELEPHONE (OUTPATIENT)
Dept: HEMATOLOGY/ONCOLOGY | Facility: CLINIC | Age: 80
End: 2020-01-16

## 2020-01-16 NOTE — TELEPHONE ENCOUNTER
I called the patient about her appointment that will need to be rescheduled as the provider is out of the office. L/M that I called

## 2020-01-27 DIAGNOSIS — I10 ESSENTIAL HYPERTENSION: ICD-10-CM

## 2020-01-27 RX ORDER — NIFEDIPINE 90 MG/1
TABLET, EXTENDED RELEASE ORAL
Qty: 90 TABLET | Refills: 0 | Status: SHIPPED | OUTPATIENT
Start: 2020-01-27 | End: 2020-05-04

## 2020-01-28 ENCOUNTER — OFFICE VISIT (OUTPATIENT)
Dept: HEMATOLOGY/ONCOLOGY | Facility: CLINIC | Age: 80
End: 2020-01-28
Payer: MEDICARE

## 2020-01-28 VITALS — OXYGEN SATURATION: 96 %

## 2020-01-28 DIAGNOSIS — Z17.0 MALIGNANT NEOPLASM OF LEFT BREAST IN FEMALE, ESTROGEN RECEPTOR POSITIVE, UNSPECIFIED SITE OF BREAST: Primary | ICD-10-CM

## 2020-01-28 DIAGNOSIS — Z79.811 ENCOUNTER FOR MONITORING AROMATASE INHIBITOR THERAPY: ICD-10-CM

## 2020-01-28 DIAGNOSIS — M85.80 OSTEOPENIA, UNSPECIFIED LOCATION: ICD-10-CM

## 2020-01-28 DIAGNOSIS — Z51.81 ENCOUNTER FOR MONITORING AROMATASE INHIBITOR THERAPY: ICD-10-CM

## 2020-01-28 DIAGNOSIS — C50.912 MALIGNANT NEOPLASM OF LEFT BREAST IN FEMALE, ESTROGEN RECEPTOR POSITIVE, UNSPECIFIED SITE OF BREAST: Primary | ICD-10-CM

## 2020-01-28 PROCEDURE — 1159F MED LIST DOCD IN RCRD: CPT | Mod: S$GLB,,, | Performed by: INTERNAL MEDICINE

## 2020-01-28 PROCEDURE — 99214 PR OFFICE/OUTPT VISIT, EST, LEVL IV, 30-39 MIN: ICD-10-PCS | Mod: S$GLB,,, | Performed by: INTERNAL MEDICINE

## 2020-01-28 PROCEDURE — 3078F DIAST BP <80 MM HG: CPT | Mod: CPTII,S$GLB,, | Performed by: INTERNAL MEDICINE

## 2020-01-28 PROCEDURE — 99499 RISK ADDL DX/OHS AUDIT: ICD-10-PCS | Mod: S$GLB,,, | Performed by: INTERNAL MEDICINE

## 2020-01-28 PROCEDURE — 99214 OFFICE O/P EST MOD 30 MIN: CPT | Mod: S$GLB,,, | Performed by: INTERNAL MEDICINE

## 2020-01-28 PROCEDURE — 3078F PR MOST RECENT DIASTOLIC BLOOD PRESSURE < 80 MM HG: ICD-10-PCS | Mod: CPTII,S$GLB,, | Performed by: INTERNAL MEDICINE

## 2020-01-28 PROCEDURE — 99999 PR PBB SHADOW E&M-EST. PATIENT-LVL V: ICD-10-PCS | Mod: PBBFAC,,, | Performed by: INTERNAL MEDICINE

## 2020-01-28 PROCEDURE — 1159F PR MEDICATION LIST DOCUMENTED IN MEDICAL RECORD: ICD-10-PCS | Mod: S$GLB,,, | Performed by: INTERNAL MEDICINE

## 2020-01-28 PROCEDURE — 1101F PR PT FALLS ASSESS DOC 0-1 FALLS W/OUT INJ PAST YR: ICD-10-PCS | Mod: CPTII,S$GLB,, | Performed by: INTERNAL MEDICINE

## 2020-01-28 PROCEDURE — 99499 UNLISTED E&M SERVICE: CPT | Mod: S$GLB,,, | Performed by: INTERNAL MEDICINE

## 2020-01-28 PROCEDURE — 99999 PR PBB SHADOW E&M-EST. PATIENT-LVL V: CPT | Mod: PBBFAC,,, | Performed by: INTERNAL MEDICINE

## 2020-01-28 PROCEDURE — 3074F SYST BP LT 130 MM HG: CPT | Mod: CPTII,S$GLB,, | Performed by: INTERNAL MEDICINE

## 2020-01-28 PROCEDURE — 3074F PR MOST RECENT SYSTOLIC BLOOD PRESSURE < 130 MM HG: ICD-10-PCS | Mod: CPTII,S$GLB,, | Performed by: INTERNAL MEDICINE

## 2020-01-28 PROCEDURE — 1101F PT FALLS ASSESS-DOCD LE1/YR: CPT | Mod: CPTII,S$GLB,, | Performed by: INTERNAL MEDICINE

## 2020-01-28 RX ORDER — LETROZOLE 2.5 MG/1
TABLET, FILM COATED ORAL
Qty: 90 TABLET | Refills: 6 | Status: SHIPPED | OUTPATIENT
Start: 2020-01-28 | End: 2020-07-22 | Stop reason: SDUPTHER

## 2020-01-28 NOTE — PROGRESS NOTES
Subjective:       Patient ID: Joel Condon is a 79 y.o. female.    Chief Complaint: Follow-up       Diagnosis : Left Breast CA, IDC  S/p  left partial mastectomy and SLNB 12/13/2017. [id number] Stage 1B,grade 1, ER/TN pos [id number] negative  , closest margin anteriorly at 1mm  S/p  postoperative RT completed 3/13/2018  Letrozole 3/2018-present      HPI: Patient  is a 79 y.o. postmenopausal female seen today for recently diagnosed carcinoma of the left breast. She had an abnormal mammogram first noted 10/27/2017. Follow-up mammogram and ultrasound (11/14/17) showed 6mm mass in the 2OC left breast with enlarged axillary LN measuring 17mm boderline.She underwent an  ultrasound guided biopsy  on 11/21/2017 with pathology revealing infiltrating ductal carcinoma of the breast, Grade 1, ER positive 100% ,TN positive 100% Her-2neu negative.  The patient is status post left partial mastectomy and sentinel node biopsy on 12/13/2017.  Final pathology showed 6mm IDC, 1 of 2  LN with 1mm micromet. 1mm anterior margin. She does not routinely do self breast exams. She  has not noted any skin  changes on breast exam. No  nipple discharge. No history of  previous breast biopsies. No  personal history of breast cancer or ovarian cancer.     She completed  postoperative RT under the direction of Dr. Wray on 3/13/2018    She is taking adjuvant Letrozole daily  Trial hold of therapy ( nausea, dizziness) w/minor improvement      She is hearing impaired   She continues with  diffuse arthralgias  She reports chronic  neck pain and lt shoulder pain  She continues to decline referral to pain mgmt  Appetite and weight stable   No SOB/cough  She reports dizziness only slight improvement with hold of endocrine therapy         She declined bone scan      She is followed by her PCP for Type 2 DM and HTN  She reports blood glucose levels range from   She takes insulin     Mammo 12/6/2019   Impression:  Bilateral  There is no mammographic  evidence of malignancy.     BI-RADS Category:   Overall: 2 - Benign       GYN History: Age of menarche was 14. Age of menopause was 46.  Patient reports hormonal therapy for less than 5 years. Patient is . Age of first live birth was 17. Patient did breast feed for 2 years 8 months.         Past Medical History:   Diagnosis Date    Arthritis     Breast cancer     Cataract     Colon polyp     Diabetes mellitus     Diabetic retinopathy     Hyperlipidemia LDL goal < 100     Hypertension     Hypothyroidism     Osteoporosis, post-menopausal     Overweight(278.02)     Proteinuria     Type II or unspecified type diabetes mellitus with renal manifestations, uncontrolled(250.42)        Past Surgical History:   Procedure Laterality Date    APPENDECTOMY      BREAST BIOPSY      BREAST LUMPECTOMY      BREAST SURGERY Left 2017    tumor removal    CATARACT EXTRACTION W/  INTRAOCULAR LENS IMPLANT Left 14    OS (Dr. Arce)    CATARACT EXTRACTION W/  INTRAOCULAR LENS IMPLANT Right 2014    OD (Dr. Arce)    CHOLECYSTECTOMY      COLONOSCOPY N/A 2017    Procedure: COLONOSCOPY;  Surgeon: Homar Payan MD;  Location: [facility];  Service: Endoscopy;  Laterality: N/A;    COLONOSCOPY N/A 2018    Procedure: COLONOSCOPY;  Surgeon: Mykel Boles MD;  Location: [facility] ENDO;  Service: Endoscopy;  Laterality: N/A;    EYE SURGERY  2014 & 2014    HYSTERECTOMY      total    left eye cataract surgery  14    OOPHORECTOMY       Current MEDS; Reviewed and as per MEDCHART    Review of Systems   Constitutional: Negative for appetite change, fatigue, fever and unexpected weight change.   HENT: Negative for mouth sores.    Eyes: Negative for visual disturbance.   Respiratory: Negative for cough and shortness of breath.    Cardiovascular: Negative for chest pain.   Gastrointestinal: Negative for abdominal pain, diarrhea and nausea.   Genitourinary: Negative for frequency.  "  Musculoskeletal: Positive for arthralgias and neck pain. Negative for back pain.   Skin: Negative for rash.   Neurological: Positive for dizziness. Negative for headaches.   Hematological: Negative for adenopathy.   Psychiatric/Behavioral: The patient is not nervous/anxious.        Objective:         Vitals:    01/28/20 1045   BP: (P) 131/69   Pulse: (P) 77   Temp: (P) 97.4 °F (36.3 °C)   SpO2: 96%   Weight: (P) 76.8 kg (169 lb 5 oz)   Height: (P) 5' 4.5" (1.638 m)             Physical Exam   Constitutional: She is oriented to person, place, and time. She appears well-developed and well-nourished.   HENT:   Head: Normocephalic.   Mouth/Throat: Oropharynx is clear and moist. No oropharyngeal exudate.   Eyes: Pupils are equal, round, and reactive to light. Conjunctivae and lids are normal. No scleral icterus.   Neck: Normal range of motion. Neck supple. No thyromegaly present.   Cardiovascular: Normal rate, regular rhythm and normal heart sounds.   No murmur heard.  Pulmonary/Chest: Breath sounds normal. She has no wheezes. She has no rales. Right breast exhibits no inverted nipple, no mass, no nipple discharge, no skin change and no tenderness. Left breast exhibits tenderness. Left breast exhibits no inverted nipple, no mass, no nipple discharge and no skin change.   Abdominal: Soft. Bowel sounds are normal. She exhibits no distension and no mass. There is no hepatosplenomegaly. There is no tenderness. There is no rebound and no guarding.   Musculoskeletal: Normal range of motion. She exhibits no edema or tenderness.   Lymphadenopathy:     She has no cervical adenopathy.     She has no axillary adenopathy.        Right: No supraclavicular adenopathy present.        Left: No supraclavicular adenopathy present.   Neurological: She is alert and oriented to person, place, and time. No cranial nerve deficit. Coordination normal.   Skin: Skin is warm and dry. No ecchymosis, no petechiae and no rash noted. No erythema. "   Psychiatric: She has a normal mood and affect.         FINAL PATHOLOGIC DIAGNOSIS 11/21/2017  1. Left breast mass 2:00:  Invasive mammary carcinoma, grade 1 (Frederick score: Tubule formation 2, nuclear pleomorphism 2, mitotic  activity 1, total score 5), occupying at least 5 mm in one core.  2. Left breast axilla (lymph node):  Benign lymphoid tissue with no evidence of metastatic disease.  Note: Receptor studies and immunohistochemical studies will be performed with supplemental report to follow.  Intradepartmental QC/review obtained. Dr. Neil Irvin concurs with the above diagnostic impressions.    Supplemental Diagnosis  HORMONE RECEPTOR STUDIES:  Virtually 100% of the cells of the carcinoma are strongly nuclear estrogen receptor positive. 60 percent of the cells  are strongly nuclear progesterone receptor positive. There is an abrupt transition from the zone of nuclear  progesterone receptor positive cells to the zone where this receptor is negative. It is possible that there has been  some technical artifact which has led a region of the tissue to not stain, and that actually the vast majority of the  tumor are nuclear progesterone receptor positive. The tumor is HER-2 negative. The positive and negative controls  stained appropriately.        FINAL PATHOLOGIC DIAGNOSIS 12/13/2017   Part 1  Lymph node (1, submitted as left sentinel lymph node count equals 60):  -No evidence of malignancy  Part 2  Lymph node (1, submitted as left sentinel lymph node count equals 20):  -No evidence of malignancy  Part 3  Breast excision (submitted as left partial mastectomy):  -Invasive, well differentiated (grade I of III) lobular carcinoma  -Carcinoma is a single focus measuring 0.6 x 0.65 cm (6 x 6.5 mm) located 1 mm from the nearest, anterior  resection margin. All resection margins are free of malignancy.  -No tumor necrosis, hemosiderin vascular or lymphatic permeation, or perineural involvement is identified.  -A  microscopic focus of in situ carcinoma is identified immediately adjacent to the invasive tumor focus  -Carcinoma is graded I of III according to the Melissa modification of the Arriaga-Benson grading scheme  with 2 points each assigned for moderate tubule formation and moderate nuclear pleomorphism and 1.4  minimal mitotic count, a total of 5 of 9 possible points.  -AJCC TN: pT pN0(sn)  -Complete AJCC Staging Form follows:  INVASIVE CARCINOMA OF THE BREAST  Procedure: Partial mastectomy  Node sampling: [place] lymph nodes  Specimen laterality: Left  Tumor site: Not specified  Tumor size: 6.5 x 6.0 mm  Histologic type: Invasive lobular carcinoma  Histologic grade  -Glandular differentiation: Score 2  -Nuclear pleomorphism: Score 2  -Mitotic rate: Score 1  -Overall grade: Score 1  Tumor focality: Single focus of invasive carcinoma  Ductal carcinoma in situ (DCIS): No DCIS present  Margins-invasive carcinoma: Margins uninvolved by invasive carcinoma  -Distance from closest margin: 1 mm, anterior  Lymph nodes  -Total number of lymph nodes examined (sentinel and non-sentinel): 2  -Number sentinel lymph node nodes examined: 2  Pathologic staging  -Primary tumor: pT1b pN0(sn)  -Regional lymph nodes  Modifier: (SN)  Category: pN0  Ancillary studies: E-cadherin negative in tumor cells        Bone Density 2018   Compared to the young normal reference, this study indicates osteopenia of the Lumbar spine and left hip with osteoporosis of the right hip as detailed above.  Compared to prior the bone mineral density of the lumbar spine and left hip has slightly improved with reduced bone mineral density of the right hip as detailed above.    Note: Degenerative changes can falsely elevate measured bone mineral density, particularly in the lumbar spine, and should be considered as part of the final clinical recommendation    Mammo 11/6/2019   Impression:  Bilateral  There is no mammographic evidence of malignancy.     BI-RADS  Category:   Overall: 2 - Benign           MRI C spine w/out contrast 4/3/2019   Cervical spondylosis with posterior marginal osteophytic disc spur complex predominantly C3-C4 through C6-C7.  No evidence for osseous metastatic disease.      CXR - negative   Assessment:       1. Malignant neoplasm of left breast in female, estrogen receptor positive, unspecified site of breast    2. Encounter for monitoring aromatase inhibitor therapy    3. Osteopenia, unspecified location        Plan:     1-3  ECOG 0   78 y/o with multiple medical diagnoses with Stage 1B pT1b (6mm) N1mi M0 grade 1 ER + PA + XHI5ops IDC carcinoma  of the left breast status post left partial mastectomy and SLNB 12/13/2017  ER + PA + [id number]  12/13/2017.  1 of 2 lymph nodes positive for micromet. Closest margin anteriorly 1mm.  She is Stage IA per AJCC 8th ed. pathologic prognostic staging system.   S/p  postoperative RT completed 3/13/2018  Cont Vit D supp  Bone Density 2018- osteopenia/osteoporosis ( f/u bone denstiy declined per insurance)   Plan follow-up bone density prior to f/u   Follow-up Mammo 11/6/2019 BI-RADS Category: Overall: 2 - Benign      Last PROLIA 11/2019  No Dental Clearance indicated ( dentures)      trial hold of Letrozole ( increasing arthralgias) w/out improvement  Cont endocrine therapy  Pt declined MRI brain    Pt declines referral to Neurology for dizzinesss  Pt declines referral to Pain mgmt for chronic neck pain       MRI NECK ( pt declined gadolinium)- reviewed , no osseous mets      Follow-up with PCP for med mgmt/BP check     All questions posed answered to patient's satisfaction    Follow-up  2 mo with labs      Advance Care Planning     Power of   I initiated the process of advance care planning today and explained the importance of this process to the patient.  I introduced the concept of advance directives to the patient, as well. Then the patient received detailed information about the importance of  designating a Health Care Power of  (HCPOA). She was also instructed to communicate with this person about their wishes for future healthcare, should she become sick and lose decision-making capacity. The patient has not previously appointed a HCPOA. After our discussion, the patient has decided to complete a HCPOA and has appointed her daughter and NAME Isis Roberts   I spent a total time of 16  minutes discussing this issue with the patient.           Marlin Caldera M.D.         Kenney Wray M.D.

## 2020-02-05 ENCOUNTER — TELEPHONE (OUTPATIENT)
Dept: CARDIOLOGY | Facility: CLINIC | Age: 80
End: 2020-02-05

## 2020-02-13 ENCOUNTER — TELEPHONE (OUTPATIENT)
Dept: FAMILY MEDICINE | Facility: CLINIC | Age: 80
End: 2020-02-13

## 2020-02-13 ENCOUNTER — OFFICE VISIT (OUTPATIENT)
Dept: CARDIOLOGY | Facility: CLINIC | Age: 80
End: 2020-02-13
Payer: MEDICARE

## 2020-02-13 VITALS
SYSTOLIC BLOOD PRESSURE: 140 MMHG | HEIGHT: 64 IN | OXYGEN SATURATION: 98 % | HEART RATE: 64 BPM | RESPIRATION RATE: 15 BRPM | WEIGHT: 169 LBS | BODY MASS INDEX: 28.85 KG/M2 | DIASTOLIC BLOOD PRESSURE: 66 MMHG

## 2020-02-13 DIAGNOSIS — R06.02 SOB (SHORTNESS OF BREATH): ICD-10-CM

## 2020-02-13 DIAGNOSIS — G47.33 OSA (OBSTRUCTIVE SLEEP APNEA): ICD-10-CM

## 2020-02-13 DIAGNOSIS — E66.3 OVERWEIGHT (BMI 25.0-29.9): ICD-10-CM

## 2020-02-13 DIAGNOSIS — R06.09 DOE (DYSPNEA ON EXERTION): ICD-10-CM

## 2020-02-13 DIAGNOSIS — R00.2 HEART PALPITATIONS: ICD-10-CM

## 2020-02-13 DIAGNOSIS — R94.31 ABNORMAL EKG: ICD-10-CM

## 2020-02-13 DIAGNOSIS — R07.9 CHEST PAIN, UNSPECIFIED TYPE: Primary | ICD-10-CM

## 2020-02-13 DIAGNOSIS — I10 ESSENTIAL HYPERTENSION: ICD-10-CM

## 2020-02-13 DIAGNOSIS — E78.5 HYPERLIPIDEMIA LDL GOAL <100: ICD-10-CM

## 2020-02-13 PROCEDURE — 93000 ELECTROCARDIOGRAM COMPLETE: CPT | Mod: S$GLB,,, | Performed by: INTERNAL MEDICINE

## 2020-02-13 PROCEDURE — 99214 OFFICE O/P EST MOD 30 MIN: CPT | Mod: S$GLB,,, | Performed by: INTERNAL MEDICINE

## 2020-02-13 PROCEDURE — 99999 PR PBB SHADOW E&M-EST. PATIENT-LVL III: ICD-10-PCS | Mod: PBBFAC,,, | Performed by: INTERNAL MEDICINE

## 2020-02-13 PROCEDURE — 99999 PR PBB SHADOW E&M-EST. PATIENT-LVL III: CPT | Mod: PBBFAC,,, | Performed by: INTERNAL MEDICINE

## 2020-02-13 PROCEDURE — 3077F SYST BP >= 140 MM HG: CPT | Mod: CPTII,S$GLB,, | Performed by: INTERNAL MEDICINE

## 2020-02-13 PROCEDURE — 3078F PR MOST RECENT DIASTOLIC BLOOD PRESSURE < 80 MM HG: ICD-10-PCS | Mod: CPTII,S$GLB,, | Performed by: INTERNAL MEDICINE

## 2020-02-13 PROCEDURE — 1159F PR MEDICATION LIST DOCUMENTED IN MEDICAL RECORD: ICD-10-PCS | Mod: S$GLB,,, | Performed by: INTERNAL MEDICINE

## 2020-02-13 PROCEDURE — 1126F AMNT PAIN NOTED NONE PRSNT: CPT | Mod: S$GLB,,, | Performed by: INTERNAL MEDICINE

## 2020-02-13 PROCEDURE — 93000 EKG 12-LEAD: ICD-10-PCS | Mod: S$GLB,,, | Performed by: INTERNAL MEDICINE

## 2020-02-13 PROCEDURE — 3052F PR MOST RECENT HEMOGLOBIN A1C LEVEL 8.0 - < 9.0%: ICD-10-PCS | Mod: CPTII,S$GLB,, | Performed by: INTERNAL MEDICINE

## 2020-02-13 PROCEDURE — 1159F MED LIST DOCD IN RCRD: CPT | Mod: S$GLB,,, | Performed by: INTERNAL MEDICINE

## 2020-02-13 PROCEDURE — 1101F PT FALLS ASSESS-DOCD LE1/YR: CPT | Mod: CPTII,S$GLB,, | Performed by: INTERNAL MEDICINE

## 2020-02-13 PROCEDURE — 99214 PR OFFICE/OUTPT VISIT, EST, LEVL IV, 30-39 MIN: ICD-10-PCS | Mod: S$GLB,,, | Performed by: INTERNAL MEDICINE

## 2020-02-13 PROCEDURE — 1101F PR PT FALLS ASSESS DOC 0-1 FALLS W/OUT INJ PAST YR: ICD-10-PCS | Mod: CPTII,S$GLB,, | Performed by: INTERNAL MEDICINE

## 2020-02-13 PROCEDURE — 3052F HG A1C>EQUAL 8.0%<EQUAL 9.0%: CPT | Mod: CPTII,S$GLB,, | Performed by: INTERNAL MEDICINE

## 2020-02-13 PROCEDURE — 3078F DIAST BP <80 MM HG: CPT | Mod: CPTII,S$GLB,, | Performed by: INTERNAL MEDICINE

## 2020-02-13 PROCEDURE — 1126F PR PAIN SEVERITY QUANTIFIED, NO PAIN PRESENT: ICD-10-PCS | Mod: S$GLB,,, | Performed by: INTERNAL MEDICINE

## 2020-02-13 PROCEDURE — 3077F PR MOST RECENT SYSTOLIC BLOOD PRESSURE >= 140 MM HG: ICD-10-PCS | Mod: CPTII,S$GLB,, | Performed by: INTERNAL MEDICINE

## 2020-02-13 RX ORDER — HYDRALAZINE HYDROCHLORIDE 100 MG/1
100 TABLET, FILM COATED ORAL 2 TIMES DAILY
Qty: 180 TABLET | Refills: 3 | Status: SHIPPED | OUTPATIENT
Start: 2020-02-13 | End: 2020-08-05 | Stop reason: ALTCHOICE

## 2020-02-13 NOTE — PROGRESS NOTES
CARDIOVASCULAR PROGRESS NOTE    REASON FOR CONSULT:   Joel Condon is a 79 y.o. female who presents for follow up of CP, palps, HTN.    PCP: Av  HISTORY OF PRESENT ILLNESS:   Patient returns for follow-up.  Unfortunately, she did not get any of her testing as previously ordered.  Apparently, her insurance company denied this, but did not contact us for preauthorization.  The patient tells me that she was told that they would not pay for her stress test because it was less than 1 year since her last stress test.  We have no record of any stress test in 2019, nor 2018, nor does the patient recall having a stress test performed elsewhere.  She does continue to describe episodic chest pain, palpitations, and arm discomfort as well as lightheadedness.  There has been no PND, orthopnea, or lower extremity edema.  She denies melena, hematuria, or claudicant symptoms.  Her blood pressure remains uncontrolled.    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 Arce)    CATARACT EXTRACTION W/  INTRAOCULAR LENS IMPLANT Right 06/12/2014    OD (Dr. Arce)    CHOLECYSTECTOMY      COLONOSCOPY N/A 4/21/2017    Procedure: COLONOSCOPY;  Surgeon: Homra Payan MD;  Location: [facility];  Service: Endoscopy;  Laterality: N/A;     "COLONOSCOPY N/A 6/29/2018    Procedure: COLONOSCOPY;  Surgeon: Mykel Boles MD;  Location: [facility];  Service: Endoscopy;  Laterality: N/A;    EYE SURGERY  04/24/2014 & 06/12/2014    HYSTERECTOMY      total    left eye cataract surgery  4/24/14    OOPHORECTOMY         ALLERGIES AND MEDICATION:     Review of patient's allergies indicates:   Allergen Reactions    Lisinopril Other (See Comments)     Cough      Pravastatin Other (See Comments)     headaches     Previous Medications    ASPIRIN (ECOTRIN) 81 MG EC TABLET    Take 1 tablet (81 mg total) by mouth once daily.    ATORVASTATIN (LIPITOR) 10 MG TABLET    TAKE 1 TABLET(10 MG) BY MOUTH EVERY DAY    BD INSULIN PEN NEEDLE UF SHORT 31 GAUGE X 5/16" NDLE    USE THREE TIMES DAILY AS DIRECTED    BLOOD SUGAR DIAGNOSTIC STRP    True Results; Test four times a day.    CYANOCOBALAMIN, VITAMIN B-12, MISC    by Misc.(Non-Drug; Combo Route) route.    HYDRALAZINE (APRESOLINE) 50 MG TABLET    Take 1 tablet (50 mg total) by mouth 2 (two) times daily.    INSULIN DETEMIR U-100 (LEVEMIR U-100 INSULIN) 100 UNIT/ML INJECTION    INJECT 50 UNITS UNDER THE SKIN EVERY EVENING    INSULIN SYRINGE-NEEDLE U-100 1 ML 31 GAUGE X 5/16 SYRG    USE THREE TIMES DAILY AS DIRECTED    INSULIN SYRINGE-NEEDLE U-100 1 ML 31 GAUGE X 5/16 SYRG    USE THREE TIMES DAILY AS DIRECTED    LANCETS 26 GAUGE MISC    1 lancet by Misc.(Non-Drug; Combo Route) route 3 (three) times daily.    LETROZOLE (FEMARA) 2.5 MG TAB    Take 1 tab by mouth daily.    LEVOTHYROXINE (SYNTHROID) 50 MCG TABLET    TAKE 1 TABLET(50 MCG) BY MOUTH BEFORE BREAKFAST    LOSARTAN (COZAAR) 100 MG TABLET    TAKE 1 TABLET BY MOUTH DAILY    METFORMIN (GLUCOPHAGE-XR) 500 MG 24 HR TABLET    TAKE 2 TABLETS BY MOUTH IN THE MORNING AND 1 TABLET AT NIGHT    METOPROLOL SUCCINATE (TOPROL-XL) 200 MG 24 HR TABLET    TAKE 1 TABLET BY MOUTH DAILY    NIFEDIPINE (PROCARDIA-XL) 90 MG (OSM) 24 HR TABLET    TAKE 1 TABLET(90 MG) BY MOUTH EVERY DAY    " "NOVOLOG FLEXPEN U-100 INSULIN 100 UNIT/ML (3 ML) INPN PEN    ADMINISTER 28 UNITS UNDER THE SKIN THREE TIMES DAILY WITH MEALS    OMEPRAZOLE (PRILOSEC) 20 MG CAPSULE    TAKE 1 CAPSULE(20 MG) BY MOUTH DAILY AS NEEDED FOR HEARTBURN    PEN NEEDLE, DIABETIC (BD ULTRA-FINE SHORT PEN NEEDLE) 31 GAUGE X 5/16" NDLE    USE THREE TIMES DAILY AS DIRECTED    SPIRONOLACTONE (ALDACTONE) 50 MG TABLET    TAKE 1 TABLET BY MOUTH ONCE DAILY    VITAMIN E ACETATE (VITAMIN E ORAL)    Take by mouth.       SOCIAL HISTORY:     Social History     Socioeconomic History    Marital status:      Spouse name: Not on file    Number of children: 3    Years of education: Not on file    Highest education level: Not on file   Occupational History    Occupation: retired   Social Needs    Financial resource strain: Not on file    Food insecurity:     Worry: Not on file     Inability: Not on file    Transportation needs:     Medical: Not on file     Non-medical: Not on file   Tobacco Use    Smoking status: Never Smoker    Smokeless tobacco: Never Used   Substance and Sexual Activity    Alcohol use: No    Drug use: No    Sexual activity: Not Currently     Partners: Male   Lifestyle    Physical activity:     Days per week: Not on file     Minutes per session: Not on file    Stress: Not on file   Relationships    Social connections:     Talks on phone: Not on file     Gets together: Not on file     Attends Orthodoxy service: Not on file     Active member of club or organization: Not on file     Attends meetings of clubs or organizations: Not on file     Relationship status: Not on file   Other Topics Concern    Not on file   Social History Narrative    Not on file       FAMILY HISTORY:     Family History   Problem Relation Age of Onset    Diabetes Mother     Heart disease Mother     Hypertension Mother     Stroke Mother     Diabetes Sister     Hypertension Sister     Diabetes Sister     Hypertension Sister     Diabetes Sister " "    Hypertension Sister     Hypertension Sister     Diabetes Sister     Diabetes Brother     Diabetes Brother     Diabetes Brother     Prostate cancer Brother     Amblyopia Neg Hx     Blindness Neg Hx     Cataracts Neg Hx     Glaucoma Neg Hx     Macular degeneration Neg Hx     Retinal detachment Neg Hx     Strabismus Neg Hx        REVIEW OF SYSTEMS:   Review of Systems   Constitutional: Negative for chills, diaphoresis and fever.   HENT: Negative for nosebleeds.    Eyes: Negative for blurred vision, double vision and photophobia.   Respiratory: Negative for hemoptysis, shortness of breath and wheezing.    Cardiovascular: Positive for chest pain and palpitations. Negative for orthopnea, claudication, leg swelling and PND.   Gastrointestinal: Negative for abdominal pain, blood in stool, heartburn, melena, nausea and vomiting.   Genitourinary: Negative for flank pain and hematuria.   Musculoskeletal: Negative for falls, joint pain, myalgias and neck pain.   Skin: Negative for rash.   Neurological: Positive for dizziness. Negative for seizures, loss of consciousness, weakness and headaches.   Endo/Heme/Allergies: Negative for polydipsia. Does not bruise/bleed easily.   Psychiatric/Behavioral: Negative for depression and memory loss. The patient is not nervous/anxious.        PHYSICAL EXAM:     Vitals:    02/13/20 0807   BP: (!) 140/66   Pulse: 64   Resp: 15    Body mass index is 29.01 kg/m².  Weight: 76.7 kg (169 lb)   Height: 5' 4" (162.6 cm)     Physical Exam   Constitutional: She is oriented to person, place, and time. She appears well-developed and well-nourished. She is cooperative.  Non-toxic appearance. No distress.   HENT:   Head: Normocephalic and atraumatic.   Eyes: Pupils are equal, round, and reactive to light. Conjunctivae and EOM are normal. No scleral icterus.   Neck: Trachea normal. Neck supple. Normal carotid pulses and no JVD present. Carotid bruit is not present. No neck rigidity. No " tracheal deviation and no edema present. No thyromegaly present.   Cardiovascular: Normal rate, regular rhythm, S1 normal and S2 normal. PMI is not displaced. Exam reveals no gallop and no friction rub.   No murmur heard.  Pulses:       Carotid pulses are 2+ on the right side, and 2+ on the left side.  Pulmonary/Chest: Effort normal and breath sounds normal. No stridor. No respiratory distress. She has no wheezes. She has no rales. She exhibits no tenderness.   Abdominal: Soft. She exhibits no distension. There is no hepatosplenomegaly.   Musculoskeletal: Normal range of motion. She exhibits no edema or tenderness.   Feet:   Right Foot:   Skin Integrity: Negative for ulcer.   Left Foot:   Skin Integrity: Negative for ulcer.   Neurological: She is alert and oriented to person, place, and time. No cranial nerve deficit.   Skin: Skin is warm and dry. No rash noted. No erythema.   Psychiatric: She has a normal mood and affect. Her speech is normal and behavior is normal.   Vitals reviewed.      DATA:   EKG: (personally reviewed tracing(s))  2/13/20 SR 64, PRWP, IMI-age indet.  PRWP not as prom vs 9/26/19    Laboratory:  CBC:  Recent Labs   Lab 05/24/19  0728 08/28/19  0915 11/06/19  0854   WBC 9.75 8.60 8.99   Hemoglobin 12.8 12.2 12.0   Hematocrit 39.6 37.5 37.3   Platelets 322 313 312       CHEMISTRIES:  Recent Labs   Lab 05/24/19  0728 08/28/19  0915 11/06/19  0854   Glucose 173 H 98 270 H   Sodium 140 143 140   Potassium 4.1 4.2 4.7   BUN, Bld 17 18 20   Creatinine 1.0 1.1 1.2   eGFR if African American >60.0 55.6 A 50 A   eGFR if non  54.5 A 48.2 A 43 A   Calcium 9.8 10.2 9.7       CARDIAC BIOMARKERS:        COAGS:        LIPIDS/LFTS:  Recent Labs   Lab 03/14/17  0722  04/25/18  0719  02/26/19  0816  05/24/19  0728 08/28/19  0915 11/06/19  0854   Cholesterol 144  --  113 L  --  122  --   --   --   --    Triglycerides 112  --  78  --  146  --   --   --   --    HDL 48  --  48  --  47  --   --   --    --    LDL Cholesterol 73.6  --  49.4 L  --  45.8 L  --   --   --   --    Non-HDL Cholesterol 96  --  65  --  75  --   --   --   --    AST 19   < > 22   < >  --    < > 23 21 15   ALT 20   < > 16   < >  --    < > 24 17 15    < > = values in this interval not displayed.     Lab Results   Component Value Date    TSH 3.315 03/14/2017         Cardiovascular Testing:  Renal art US 10/25/17  Elevated peak systolic velocity ratio of 147 cm/sec in the proximal right renal artery with no secondary findings to suggest renal artery stenosis. Short-term followup suggested.  Borderline elevation of the renal resistive indices (0.79-0.80).  No evidence of obstructive uropathy.    Holter 3/28/16:  PREDOMINANT RHYTHM  1. Sinus rhythm with heart rates varying between 65 and 111 bpm with an average of 80 bpm.   VENTRICULAR ARRHYTHMIAS  1. There were occasional PVCs totalling 697 and averaging 29 per hour. There was 1 couplet.  2. There were no episodes of ventricular tachycardia.  SUPRA VENTRICULAR ARRHYTHMIAS  1. There were very rare PACs recorded totalling 1 and averaging less than 1 per hour.   2. There were no episodes of sustained supraventricular tachycardia.  SINUS NODE FUNCTION  1. There was no evidence of high grade SA cristian block.   AV CONDUCTION  1. There was no evidence of high grade AV block.   DIARY  1. The diary was not returned  MISCELLANEOUS  1. There were occasional hookup related artifacts. Overall, the study was of good quality.   2. This was a tape of adequate length (24 hrs).    Echo 3/28/16:  1 - Normal left ventricular systolic function (EF 60-65%). Normal wall motion.   2 - Concentric remodeling.   3 - Trivial tricuspid regurgitation.   4 - The estimated PA systolic pressure is 27 mmHg.     Ex-MPI 3/28/16   Jignesh 2min, 86% MPHR, -CP/EKG  Nuclear Quantitative Functional Analysis:   Quantitative measurements of LV function are over estimated secondary to small ventricular volumes.   Visually estimated LV function  is hyperdynamic.   Impression: NORMAL MYOCARDIAL PERFUSION  1. The perfusion scan is free of evidence for myocardial ischemia or injury.   2. Resting wall motion is physiologic.   3. Visually estimated LV function is hyperdynamic.   4. The ventricular volumes are normal at rest and stress.   5. The extracardiac distribution of radioactivity is normal.    ASSESSMENT:   # L arm and assoc CP, EKG changes noted concerning for ?AWMI  # palps/dizziness  # HTN, uncontrolled.  Pt prev intol of HCTZ.  # DM  # HLP, on atorva 10mg  # ?PASCUAL    PLAN:   Cont med rx  Inc hydrala 100mg bid  Reorder:  -Echo  -Elen MPI (pt only able to do 2min last MPI)  -Renal art US  -Holter  Diet/exercise/weight loss  PASCUAL evaluation  RTC 1 month for review of above      Juan Pablo Ordoñez MD, FACC

## 2020-02-13 NOTE — TELEPHONE ENCOUNTER
Patient said that [employer] is no long covering Novalog. Patient said that they will cover Humalog Kwik. Patient would like for new script to be sent to [facility] on file.

## 2020-02-13 NOTE — TELEPHONE ENCOUNTER
Please schedule for her a free nurse visit to reassess this information. She should bring her insulin (both types) to the visit.

## 2020-02-18 ENCOUNTER — TELEPHONE (OUTPATIENT)
Dept: FAMILY MEDICINE | Facility: CLINIC | Age: 80
End: 2020-02-18

## 2020-02-18 NOTE — TELEPHONE ENCOUNTER
----- Message from Jessica Salinas sent at 2/17/2020 11:33 AM CST -----  Contact: Patient [phone]  Type: Patient Call Back    Who called: Patient    What is the request in detail: Need a new Rx for Humalog Pen sent in to pharmacy, due to insurance changing her medication from Novolog to Humalog. Please call pt in regards to this.  Pt states that she normally get 2 boxes of the insulin, which needs to be stated on the Rx.     [id number] - [place] - [address]  [address]  [place] [address]  Phone: [phone] Fax: [phone]    Would the patient rather a call back or a response via [facility]? Call back    Best call back number: [phone]

## 2020-02-20 RX ORDER — INSULIN ASPART 100 [IU]/ML
INJECTION, SOLUTION INTRAVENOUS; SUBCUTANEOUS
Qty: 30 ML | Refills: 0 | Status: SHIPPED | OUTPATIENT
Start: 2020-02-20 | End: 2020-03-20

## 2020-02-26 DIAGNOSIS — K21.9 GASTROESOPHAGEAL REFLUX DISEASE WITHOUT ESOPHAGITIS: Chronic | ICD-10-CM

## 2020-02-27 RX ORDER — OMEPRAZOLE 20 MG/1
CAPSULE, DELAYED RELEASE ORAL
Qty: 90 CAPSULE | Refills: 0 | Status: SHIPPED | OUTPATIENT
Start: 2020-02-27 | End: 2020-07-10

## 2020-03-10 ENCOUNTER — TELEPHONE (OUTPATIENT)
Dept: HEMATOLOGY/ONCOLOGY | Facility: CLINIC | Age: 80
End: 2020-03-10

## 2020-03-10 NOTE — TELEPHONE ENCOUNTER
Called patient in regards to follow up appointment with Dr. Parr on tomorrow. Patient needs to complete blood work before follow up. No answer. Left voice message for patient to return my call.

## 2020-03-20 ENCOUNTER — TELEPHONE (OUTPATIENT)
Dept: ENDOCRINOLOGY | Facility: CLINIC | Age: 80
End: 2020-03-20

## 2020-03-20 RX ORDER — INSULIN ASPART 100 [IU]/ML
INJECTION, SOLUTION INTRAVENOUS; SUBCUTANEOUS
Qty: 30 ML | Refills: 0
Start: 2020-03-20 | End: 2020-03-24

## 2020-03-20 NOTE — TELEPHONE ENCOUNTER
Discussed with patient on the phone regarding diabetes instead of an in-person visit d/t COVID19 risk. Reviewed importance of DM control to avoid severe COVID-19 infection and death.      Last seen more than a year ago. Pt could not keep last appt d/t lack of transportation.   Only taking Novolog 2x/day before breakfast and before dinner instead of 3x/day d/t having low BGs. Perhaps because she's not eating enough.     PRESCRIBED DIABETES MEDICATIONS: Levemir (vial) 40 units every night at 10 pm, Novolog Flexpen 17 units ac, metformin xr 1000 mg AM and 500 mg PM      Skips Novolog if  BG < 110.     Test 3x/day. BG drops to 70-90s in the afternoon and also overnight. Wakes up with diaphoresis. Doesn't keep a log. Hard to write because of painful arthritis to her hands.   BG yesterday 134, 199. Patient has a hard time recalling what her BGs are and doesn't know how to review her glucometer.     Advised pt to reduce Levemir to 32 units hs and Novolog to 15 units ac. Inject Novolog 20 units ac if eating heavy.   rtc in 3 mo with labs prior. She voiced understanding.

## 2020-03-24 RX ORDER — INSULIN ASPART 100 [IU]/ML
INJECTION, SOLUTION INTRAVENOUS; SUBCUTANEOUS
Qty: 30 ML | Refills: 0 | Status: SHIPPED | OUTPATIENT
Start: 2020-03-24 | End: 2020-05-13

## 2020-04-03 ENCOUNTER — TELEPHONE (OUTPATIENT)
Dept: FAMILY MEDICINE | Facility: CLINIC | Age: 80
End: 2020-04-03

## 2020-04-15 RX ORDER — METFORMIN HYDROCHLORIDE 500 MG/1
TABLET, EXTENDED RELEASE ORAL
Qty: 270 TABLET | Refills: 0 | Status: SHIPPED | OUTPATIENT
Start: 2020-04-15 | End: 2020-07-09

## 2020-04-28 ENCOUNTER — TELEPHONE (OUTPATIENT)
Dept: PULMONOLOGY | Facility: CLINIC | Age: 80
End: 2020-04-28

## 2020-04-30 DIAGNOSIS — E78.5 HYPERLIPIDEMIA LDL GOAL <100: ICD-10-CM

## 2020-04-30 RX ORDER — ATORVASTATIN CALCIUM 10 MG/1
TABLET, FILM COATED ORAL
Qty: 90 TABLET | Refills: 0 | Status: SHIPPED | OUTPATIENT
Start: 2020-04-30 | End: 2020-07-27

## 2020-05-04 DIAGNOSIS — I10 ESSENTIAL HYPERTENSION: ICD-10-CM

## 2020-05-04 RX ORDER — NIFEDIPINE 90 MG/1
TABLET, EXTENDED RELEASE ORAL
Qty: 90 TABLET | Refills: 0 | Status: SHIPPED | OUTPATIENT
Start: 2020-05-04 | End: 2020-07-27

## 2020-05-06 DIAGNOSIS — Z13.9 SCREENING FOR CONDITION: Primary | ICD-10-CM

## 2020-05-06 NOTE — PROGRESS NOTES
05/06/2020      In an effort to protect our immunocompromised patients from potential exposure to COVID-19, [facility] will now require all patients receiving an infusion, an injection, and/or radiation therapy to be tested for COVID-19 prior to their appointment.  All patients currently under treatment will be tested immediately, and patients initiating new treatment cycles or with one-time appointments (injections, transfusions, etc.) must be tested within 72 hours of their appointment.     Placed COVID-19 test order for patient.  A member of our team is to contact the patient in the near future to explain this process and the rationale behind it, to ask the COVID-19 screening questions, and to get the patient scheduled for their COVID-19 test.     The above was completed in accordance with instructions and guidelines set forth by [facility].     Signed,    Shoaib Love, RACHEL     Date:  05/06/2020

## 2020-05-13 RX ORDER — INSULIN LISPRO 100 [IU]/ML
INJECTION, SOLUTION INTRAVENOUS; SUBCUTANEOUS
Qty: 30 ML | Refills: 0 | Status: SHIPPED | OUTPATIENT
Start: 2020-05-13 | End: 2020-05-13

## 2020-05-13 RX ORDER — INSULIN LISPRO 100 [IU]/ML
INJECTION, SOLUTION INTRAVENOUS; SUBCUTANEOUS
Qty: 81 ML | Refills: 0 | Status: SHIPPED | OUTPATIENT
Start: 2020-05-13 | End: 2020-08-18 | Stop reason: SINTOL

## 2020-05-22 ENCOUNTER — LAB VISIT (OUTPATIENT)
Dept: FAMILY MEDICINE | Facility: CLINIC | Age: 80
End: 2020-05-22
Payer: MEDICARE

## 2020-05-22 ENCOUNTER — LAB VISIT (OUTPATIENT)
Dept: LAB | Facility: HOSPITAL | Age: 80
End: 2020-05-22
Attending: INTERNAL MEDICINE
Payer: MEDICARE

## 2020-05-22 DIAGNOSIS — Z13.9 SCREENING FOR CONDITION: ICD-10-CM

## 2020-05-22 DIAGNOSIS — Z17.0 MALIGNANT NEOPLASM OF LEFT BREAST IN FEMALE, ESTROGEN RECEPTOR POSITIVE, UNSPECIFIED SITE OF BREAST: ICD-10-CM

## 2020-05-22 DIAGNOSIS — C50.912 MALIGNANT NEOPLASM OF LEFT BREAST IN FEMALE, ESTROGEN RECEPTOR POSITIVE, UNSPECIFIED SITE OF BREAST: ICD-10-CM

## 2020-05-22 LAB
ALBUMIN SERPL BCP-MCNC: 3.9 G/DL (ref 3.5–5.2)
ALP SERPL-CCNC: 62 U/L (ref 55–135)
ALT SERPL W/O P-5'-P-CCNC: 16 U/L (ref 10–44)
ANION GAP SERPL CALC-SCNC: 9 MMOL/L (ref 8–16)
AST SERPL-CCNC: 17 U/L (ref 10–40)
BASOPHILS # BLD AUTO: 0.07 K/UL (ref 0–0.2)
BASOPHILS NFR BLD: 0.7 % (ref 0–1.9)
BILIRUB SERPL-MCNC: 0.4 MG/DL (ref 0.1–1)
BUN SERPL-MCNC: 20 MG/DL (ref 8–23)
CALCIUM SERPL-MCNC: 9.6 MG/DL (ref 8.7–10.5)
CHLORIDE SERPL-SCNC: 109 MMOL/L (ref 95–110)
CO2 SERPL-SCNC: 23 MMOL/L (ref 23–29)
CREAT SERPL-MCNC: 1.2 MG/DL (ref 0.5–1.4)
DIFFERENTIAL METHOD: ABNORMAL
EOSINOPHIL # BLD AUTO: 0.5 K/UL (ref 0–0.5)
EOSINOPHIL NFR BLD: 4.2 % (ref 0–8)
ERYTHROCYTE [DISTWIDTH] IN BLOOD BY AUTOMATED COUNT: 14.6 % (ref 11.5–14.5)
EST. GFR  (AFRICAN AMERICAN): 50 ML/MIN/1.73 M^2
EST. GFR  (NON AFRICAN AMERICAN): 43.4 ML/MIN/1.73 M^2
GLUCOSE SERPL-MCNC: 210 MG/DL (ref 70–110)
HCT VFR BLD AUTO: 40.9 % (ref 37–48.5)
HGB BLD-MCNC: 12.6 G/DL (ref 12–16)
IMM GRANULOCYTES # BLD AUTO: 0.03 K/UL (ref 0–0.04)
IMM GRANULOCYTES NFR BLD AUTO: 0.3 % (ref 0–0.5)
LYMPHOCYTES # BLD AUTO: 4.5 K/UL (ref 1–4.8)
LYMPHOCYTES NFR BLD: 41.8 % (ref 18–48)
MCH RBC QN AUTO: 29.9 PG (ref 27–31)
MCHC RBC AUTO-ENTMCNC: 30.8 G/DL (ref 32–36)
MCV RBC AUTO: 97 FL (ref 82–98)
MONOCYTES # BLD AUTO: 0.7 K/UL (ref 0.3–1)
MONOCYTES NFR BLD: 6.4 % (ref 4–15)
NEUTROPHILS # BLD AUTO: 5 K/UL (ref 1.8–7.7)
NEUTROPHILS NFR BLD: 46.6 % (ref 38–73)
NRBC BLD-RTO: 0 /100 WBC
PLATELET # BLD AUTO: 312 K/UL (ref 150–350)
PMV BLD AUTO: 11 FL (ref 9.2–12.9)
POTASSIUM SERPL-SCNC: 4.2 MMOL/L (ref 3.5–5.1)
PROT SERPL-MCNC: 7.4 G/DL (ref 6–8.4)
RBC # BLD AUTO: 4.21 M/UL (ref 4–5.4)
SODIUM SERPL-SCNC: 141 MMOL/L (ref 136–145)
WBC # BLD AUTO: 10.75 K/UL (ref 3.9–12.7)

## 2020-05-22 PROCEDURE — 85025 COMPLETE CBC W/AUTO DIFF WBC: CPT

## 2020-05-22 PROCEDURE — 80053 COMPREHEN METABOLIC PANEL: CPT

## 2020-05-22 PROCEDURE — 36415 COLL VENOUS BLD VENIPUNCTURE: CPT | Mod: PO

## 2020-05-22 PROCEDURE — U0003 INFECTIOUS AGENT DETECTION BY NUCLEIC ACID (DNA OR RNA); SEVERE ACUTE RESPIRATORY SYNDROME CORONAVIRUS 2 (SARS-COV-2) (CORONAVIRUS DISEASE [COVID-19]), AMPLIFIED PROBE TECHNIQUE, MAKING USE OF HIGH THROUGHPUT TECHNOLOGIES AS DESCRIBED BY CMS-2020-01-R: HCPCS

## 2020-05-23 LAB — SARS-COV-2 RNA RESP QL NAA+PROBE: NOT DETECTED

## 2020-05-27 ENCOUNTER — INFUSION (OUTPATIENT)
Dept: INFUSION THERAPY | Facility: HOSPITAL | Age: 80
End: 2020-05-27
Attending: INTERNAL MEDICINE
Payer: MEDICARE

## 2020-05-27 VITALS
RESPIRATION RATE: 17 BRPM | TEMPERATURE: 99 F | HEART RATE: 71 BPM | DIASTOLIC BLOOD PRESSURE: 60 MMHG | OXYGEN SATURATION: 97 % | SYSTOLIC BLOOD PRESSURE: 123 MMHG

## 2020-05-27 DIAGNOSIS — M85.80 OSTEOPENIA, UNSPECIFIED LOCATION: Primary | ICD-10-CM

## 2020-05-27 PROCEDURE — 63600175 PHARM REV CODE 636 W HCPCS: Mod: JG | Performed by: INTERNAL MEDICINE

## 2020-05-27 PROCEDURE — 96372 THER/PROPH/DIAG INJ SC/IM: CPT

## 2020-05-27 RX ADMIN — DENOSUMAB 60 MG: 60 INJECTION SUBCUTANEOUS at 08:05

## 2020-05-27 NOTE — PLAN OF CARE
Pt arrived to unit. VSS. Pt afebrile. Covid test negative. Pt taking calcium and vitamin d. Denies recent falls or dental work. Tolerated Prolia injection. Pt given next appt for Prolia. Pt ambulated off unit, no distress noted.

## 2020-05-31 DIAGNOSIS — E03.9 HYPOTHYROIDISM (ACQUIRED): ICD-10-CM

## 2020-06-01 RX ORDER — LEVOTHYROXINE SODIUM 50 UG/1
TABLET ORAL
Qty: 90 TABLET | Refills: 1 | Status: SHIPPED | OUTPATIENT
Start: 2020-06-01 | End: 2020-10-23

## 2020-06-24 ENCOUNTER — PATIENT OUTREACH (OUTPATIENT)
Dept: ADMINISTRATIVE | Facility: OTHER | Age: 80
End: 2020-06-24

## 2020-07-09 RX ORDER — METFORMIN HYDROCHLORIDE 500 MG/1
TABLET, EXTENDED RELEASE ORAL
Qty: 270 TABLET | Refills: 0 | Status: SHIPPED | OUTPATIENT
Start: 2020-07-09 | End: 2020-07-27

## 2020-07-22 DIAGNOSIS — Z17.0 MALIGNANT NEOPLASM OF LEFT BREAST IN FEMALE, ESTROGEN RECEPTOR POSITIVE, UNSPECIFIED SITE OF BREAST: ICD-10-CM

## 2020-07-22 DIAGNOSIS — C50.912 MALIGNANT NEOPLASM OF LEFT BREAST IN FEMALE, ESTROGEN RECEPTOR POSITIVE, UNSPECIFIED SITE OF BREAST: ICD-10-CM

## 2020-07-22 RX ORDER — LETROZOLE 2.5 MG/1
TABLET, FILM COATED ORAL
Qty: 90 TABLET | Refills: 6 | Status: SHIPPED | OUTPATIENT
Start: 2020-07-22 | End: 2022-02-15 | Stop reason: SDUPTHER

## 2020-07-22 NOTE — TELEPHONE ENCOUNTER
Patient requesting refill, she states that she has no transportation for appointment tomorrow so she needed to reschedule.

## 2020-07-25 DIAGNOSIS — K21.9 GASTROESOPHAGEAL REFLUX DISEASE WITHOUT ESOPHAGITIS: Chronic | ICD-10-CM

## 2020-07-27 RX ORDER — OMEPRAZOLE 20 MG/1
CAPSULE, DELAYED RELEASE ORAL
Qty: 90 CAPSULE | Refills: 0 | Status: SHIPPED | OUTPATIENT
Start: 2020-07-27 | End: 2023-03-08 | Stop reason: SDUPTHER

## 2020-07-27 RX ORDER — METFORMIN HYDROCHLORIDE 500 MG/1
TABLET, EXTENDED RELEASE ORAL
Qty: 270 TABLET | Refills: 0 | Status: SHIPPED | OUTPATIENT
Start: 2020-07-27 | End: 2021-01-06 | Stop reason: SDUPTHER

## 2020-07-27 NOTE — TELEPHONE ENCOUNTER
Spoke with patient scheduled follow up appointment with PCP 8/5/20 at 10:20 AM. Patient verbalized understanding.

## 2020-08-04 ENCOUNTER — PATIENT OUTREACH (OUTPATIENT)
Dept: ADMINISTRATIVE | Facility: OTHER | Age: 80
End: 2020-08-04

## 2020-08-05 ENCOUNTER — OFFICE VISIT (OUTPATIENT)
Dept: FAMILY MEDICINE | Facility: CLINIC | Age: 80
End: 2020-08-05
Payer: MEDICARE

## 2020-08-05 VITALS
HEIGHT: 64 IN | OXYGEN SATURATION: 96 % | DIASTOLIC BLOOD PRESSURE: 40 MMHG | WEIGHT: 169 LBS | SYSTOLIC BLOOD PRESSURE: 128 MMHG | HEART RATE: 72 BPM | BODY MASS INDEX: 28.85 KG/M2 | TEMPERATURE: 97 F

## 2020-08-05 DIAGNOSIS — R35.0 URINARY FREQUENCY: ICD-10-CM

## 2020-08-05 DIAGNOSIS — Z23 NEED FOR SHINGLES VACCINE: ICD-10-CM

## 2020-08-05 DIAGNOSIS — E66.3 OVERWEIGHT (BMI 25.0-29.9): ICD-10-CM

## 2020-08-05 DIAGNOSIS — Z79.4 LONG-TERM INSULIN USE: ICD-10-CM

## 2020-08-05 DIAGNOSIS — Z11.59 NEED FOR HEPATITIS C SCREENING TEST: ICD-10-CM

## 2020-08-05 DIAGNOSIS — Z78.0 OSTEOPENIA AFTER MENOPAUSE: ICD-10-CM

## 2020-08-05 DIAGNOSIS — M54.50 MIDLINE LOW BACK PAIN, UNSPECIFIED CHRONICITY, UNSPECIFIED WHETHER SCIATICA PRESENT: ICD-10-CM

## 2020-08-05 DIAGNOSIS — I10 ESSENTIAL HYPERTENSION: ICD-10-CM

## 2020-08-05 DIAGNOSIS — C50.912 MALIGNANT NEOPLASM OF LEFT BREAST IN FEMALE, ESTROGEN RECEPTOR POSITIVE, UNSPECIFIED SITE OF BREAST: ICD-10-CM

## 2020-08-05 DIAGNOSIS — Z17.0 MALIGNANT NEOPLASM OF LEFT BREAST IN FEMALE, ESTROGEN RECEPTOR POSITIVE, UNSPECIFIED SITE OF BREAST: ICD-10-CM

## 2020-08-05 DIAGNOSIS — E78.5 HYPERLIPIDEMIA LDL GOAL <100: ICD-10-CM

## 2020-08-05 DIAGNOSIS — M85.80 OSTEOPENIA AFTER MENOPAUSE: ICD-10-CM

## 2020-08-05 PROCEDURE — 1126F PR PAIN SEVERITY QUANTIFIED, NO PAIN PRESENT: ICD-10-PCS | Mod: S$GLB,,, | Performed by: INTERNAL MEDICINE

## 2020-08-05 PROCEDURE — 99214 PR OFFICE/OUTPT VISIT, EST, LEVL IV, 30-39 MIN: ICD-10-PCS | Mod: 25,S$GLB,, | Performed by: INTERNAL MEDICINE

## 2020-08-05 PROCEDURE — 1101F PT FALLS ASSESS-DOCD LE1/YR: CPT | Mod: CPTII,S$GLB,, | Performed by: INTERNAL MEDICINE

## 2020-08-05 PROCEDURE — 1101F PR PT FALLS ASSESS DOC 0-1 FALLS W/OUT INJ PAST YR: ICD-10-PCS | Mod: CPTII,S$GLB,, | Performed by: INTERNAL MEDICINE

## 2020-08-05 PROCEDURE — 99214 OFFICE O/P EST MOD 30 MIN: CPT | Mod: 25,S$GLB,, | Performed by: INTERNAL MEDICINE

## 2020-08-05 PROCEDURE — 90750 ZOSTER RECOMBINANT VACCINE: ICD-10-PCS | Mod: S$GLB,,, | Performed by: INTERNAL MEDICINE

## 2020-08-05 PROCEDURE — 90471 IMMUNIZATION ADMIN: CPT | Mod: S$GLB,,, | Performed by: INTERNAL MEDICINE

## 2020-08-05 PROCEDURE — 90471 ZOSTER RECOMBINANT VACCINE: ICD-10-PCS | Mod: S$GLB,,, | Performed by: INTERNAL MEDICINE

## 2020-08-05 PROCEDURE — 3078F PR MOST RECENT DIASTOLIC BLOOD PRESSURE < 80 MM HG: ICD-10-PCS | Mod: CPTII,S$GLB,, | Performed by: INTERNAL MEDICINE

## 2020-08-05 PROCEDURE — 90750 HZV VACC RECOMBINANT IM: CPT | Mod: S$GLB,,, | Performed by: INTERNAL MEDICINE

## 2020-08-05 PROCEDURE — 1159F PR MEDICATION LIST DOCUMENTED IN MEDICAL RECORD: ICD-10-PCS | Mod: S$GLB,,, | Performed by: INTERNAL MEDICINE

## 2020-08-05 PROCEDURE — 99999 PR PBB SHADOW E&M-EST. PATIENT-LVL V: CPT | Mod: PBBFAC,,, | Performed by: INTERNAL MEDICINE

## 2020-08-05 PROCEDURE — 1159F MED LIST DOCD IN RCRD: CPT | Mod: S$GLB,,, | Performed by: INTERNAL MEDICINE

## 2020-08-05 PROCEDURE — 3052F PR MOST RECENT HEMOGLOBIN A1C LEVEL 8.0 - < 9.0%: ICD-10-PCS | Mod: CPTII,S$GLB,, | Performed by: INTERNAL MEDICINE

## 2020-08-05 PROCEDURE — 99999 PR PBB SHADOW E&M-EST. PATIENT-LVL V: ICD-10-PCS | Mod: PBBFAC,,, | Performed by: INTERNAL MEDICINE

## 2020-08-05 PROCEDURE — 1126F AMNT PAIN NOTED NONE PRSNT: CPT | Mod: S$GLB,,, | Performed by: INTERNAL MEDICINE

## 2020-08-05 PROCEDURE — 3074F PR MOST RECENT SYSTOLIC BLOOD PRESSURE < 130 MM HG: ICD-10-PCS | Mod: CPTII,S$GLB,, | Performed by: INTERNAL MEDICINE

## 2020-08-05 PROCEDURE — 3052F HG A1C>EQUAL 8.0%<EQUAL 9.0%: CPT | Mod: CPTII,S$GLB,, | Performed by: INTERNAL MEDICINE

## 2020-08-05 PROCEDURE — 3078F DIAST BP <80 MM HG: CPT | Mod: CPTII,S$GLB,, | Performed by: INTERNAL MEDICINE

## 2020-08-05 PROCEDURE — 3074F SYST BP LT 130 MM HG: CPT | Mod: CPTII,S$GLB,, | Performed by: INTERNAL MEDICINE

## 2020-08-05 RX ORDER — HYDRALAZINE HYDROCHLORIDE 50 MG/1
TABLET, FILM COATED ORAL
COMMUNITY
Start: 2020-07-25 | End: 2021-03-05

## 2020-08-05 NOTE — PROGRESS NOTES
HISTORY OF PRESENT ILLNESS:  Joel Condon is a 79 y.o. female who presents to the clinic today for Diabetes, Hypertension, and Hyperlipidemia  .   The patient presents to clinic today for follow-up of her type 2 diabetes mellitus complicated by retinopathy/neuropathy/nephropathy, hypertension, and hyperlipidemia.  She states her blood sugar control depends on what she eats.  She admits that some days she does not eat correctly in and her sugars may be above 200.  She continues to follow with endocrinology for treatment.  She denies cardiac chest pain or shortness of breath.  She is mostly sedentary and staying isolated during this COVID-19 pandemic.  She continues to follow with oncology regarding her breast cancer.  No recurrence at this time.  She is concerned about some nonspecific low back pain.  She has chronic intermittent urinary frequency due to the fact that her sugars are sometimes high and she likes to drink a lot of water.  She is concerned about having a urinary tract infection.      PAST MEDICAL HISTORY:  Past Medical History:   Diagnosis Date    Arthritis     Breast cancer     Cataract     Colon polyp     Diabetes mellitus     Diabetic retinopathy     Hyperlipidemia LDL goal < 100     Hypertension     Hypothyroidism     Osteoporosis, post-menopausal     Overweight(278.02)     Proteinuria     Type II or unspecified type diabetes mellitus with renal manifestations, uncontrolled(250.42)        PAST SURGICAL HISTORY:  Past Surgical History:   Procedure Laterality Date    APPENDECTOMY      BREAST BIOPSY      BREAST LUMPECTOMY      BREAST SURGERY Left 12/13/2017    tumor removal    CATARACT EXTRACTION W/  INTRAOCULAR LENS IMPLANT Left 4/24/14    OS (Dr. Arce)    CATARACT EXTRACTION W/  INTRAOCULAR LENS IMPLANT Right 06/12/2014    OD (Dr. Arce)    CHOLECYSTECTOMY      COLONOSCOPY N/A 4/21/2017    Procedure: COLONOSCOPY;  Surgeon: Homar Payan MD;  Location: [facility];   Service: Endoscopy;  Laterality: N/A;    COLONOSCOPY N/A 6/29/2018    Procedure: COLONOSCOPY;  Surgeon: Mykel Boles MD;  Location: [facility];  Service: Endoscopy;  Laterality: N/A;    EYE SURGERY  04/24/2014 & 06/12/2014    HYSTERECTOMY      total    left eye cataract surgery  4/24/14    OOPHORECTOMY         SOCIAL HISTORY:  Social History     Socioeconomic History    Marital status:      Spouse name: Not on file    Number of children: 3    Years of education: Not on file    Highest education level: Not on file   Occupational History    Occupation: retired   Social Needs    Financial resource strain: Not on file    Food insecurity     Worry: Not on file     Inability: Not on file    Transportation needs     Medical: Not on file     Non-medical: Not on file   Tobacco Use    Smoking status: Never Smoker    Smokeless tobacco: Never Used   Substance and Sexual Activity    Alcohol use: No    Drug use: No    Sexual activity: Not Currently     Partners: Male   Lifestyle    Physical activity     Days per week: Not on file     Minutes per session: Not on file    Stress: Not on file   Relationships    Social connections     Talks on phone: Not on file     Gets together: Not on file     Attends [employer]ars[employer] service: Not on file     Active member of club or organization: Not on file     Attends meetings of clubs or organizations: Not on file     Relationship status: Not on file   Other Topics Concern    Not on file   Social History Narrative    Not on file       FAMILY HISTORY:  Family History   Problem Relation Age of Onset    Diabetes Mother     Heart disease Mother     Hypertension Mother     Stroke Mother     Diabetes Sister     Hypertension Sister     Diabetes Sister     Hypertension Sister     Diabetes Sister     Hypertension Sister     Hypertension Sister     Diabetes Sister     Diabetes Brother     Diabetes Brother     Diabetes Brother     Prostate cancer Brother      "Amblyopia Neg Hx     Blindness Neg Hx     Cataracts Neg Hx     Glaucoma Neg Hx     Macular degeneration Neg Hx     Retinal detachment Neg Hx     Strabismus Neg Hx        ALLERGIES AND MEDICATIONS: updated and reviewed.  Review of patient's allergies indicates:   Allergen Reactions    Lisinopril Other (See Comments)     Cough      Hydrochlorothiazide (bulk) Other (See Comments)     Elevated pt's blood pressure making her feel bad    Pravastatin Other (See Comments)     headaches     Medication List with Changes/Refills   Current Medications    ASPIRIN (ECOTRIN) 81 MG EC TABLET    Take 1 tablet (81 mg total) by mouth once daily.    ATORVASTATIN (LIPITOR) 10 MG TABLET    TAKE 1 TABLET(10 MG) BY MOUTH EVERY DAY    BD INSULIN PEN NEEDLE UF SHORT 31 GAUGE X 5/16" NDLE    USE THREE TIMES DAILY AS DIRECTED    BLOOD SUGAR DIAGNOSTIC STRP    True Results; Test four times a day.    CYANOCOBALAMIN, VITAMIN B-12, MISC    by Misc.(Non-Drug; Combo Route) route.    HYDRALAZINE (APRESOLINE) 50 MG TABLET        INSULIN DETEMIR U-100 (LEVEMIR U-100 INSULIN) 100 UNIT/ML INJECTION    INJECT 32 UNITS UNDER THE SKIN EVERY EVENING    INSULIN LISPRO (HUMALOG KWIKPEN INSULIN) 100 UNIT/ML PEN    ADMINISTER 28 UNITS UNDER THE SKIN THREE TIMES DAILY WITH MEALS    INSULIN SYRINGE-NEEDLE U-100 1 ML 31 GAUGE X 5/16 SYRG    USE THREE TIMES DAILY AS DIRECTED    INSULIN SYRINGE-NEEDLE U-100 1 ML 31 GAUGE X 5/16 SYRG    USE THREE TIMES DAILY AS DIRECTED    LANCETS 26 GAUGE MISC    1 lancet by Misc.(Non-Drug; Combo Route) route 3 (three) times daily.    LETROZOLE (FEMARA) 2.5 MG TAB    Take 1 tab by mouth daily.    LEVOTHYROXINE (SYNTHROID) 50 MCG TABLET    TAKE 1 TABLET(50 MCG) BY MOUTH BEFORE BREAKFAST    LOSARTAN (COZAAR) 100 MG TABLET    TAKE 1 TABLET BY MOUTH DAILY    METFORMIN (GLUCOPHAGE-XR) 500 MG XR 24HR TABLET    TAKE 2 TABLETS BY MOUTH EVERY MORNING, AND 1 TABLET AT NIGHT    METOPROLOL SUCCINATE (TOPROL-XL) 200 MG 24 HR TABLET    " "TAKE 1 TABLET BY MOUTH DAILY    NIFEDIPINE (PROCARDIA-XL) 90 MG (OSM) 24 HR TABLET    TAKE 1 TABLET(90 MG) BY MOUTH EVERY DAY    OMEPRAZOLE (PRILOSEC) 20 MG CAPSULE    TAKE 1 CAPSULE(20 MG) BY MOUTH DAILY AS NEEDED FOR HEARTBURN    PEN NEEDLE, DIABETIC (BD ULTRA-FINE SHORT PEN NEEDLE) 31 GAUGE X 5/16" NDLE    USE THREE TIMES DAILY AS DIRECTED    SPIRONOLACTONE (ALDACTONE) 50 MG TABLET    TAKE 1 TABLET BY MOUTH ONCE DAILY    VITAMIN E ACETATE (VITAMIN E ORAL)    Take by mouth.   Discontinued Medications    HYDRALAZINE (APRESOLINE) 100 MG TABLET    Take 1 tablet (100 mg total) by mouth 2 (two) times daily.         CARE TEAM:  Patient Care Team:  Shelby Ley MD as PCP - General (Internal Medicine)  Deborah Parr MD as Consulting Physician (Hematology and Oncology)  Rosy Mcknight NP as Nurse Practitioner (Endocrinology)  Juan Pablo Ordoñez MD as Consulting Physician (Cardiology)         REVIEW OF SYSTEMS:  Review of Systems   Constitutional: Negative for chills, fatigue, fever and unexpected weight change.   HENT: Negative for congestion and postnasal drip.    Eyes: Negative for pain and visual disturbance.   Respiratory: Negative for cough, shortness of breath and wheezing.    Cardiovascular: Negative for chest pain, palpitations and leg swelling.   Gastrointestinal: Negative for abdominal pain, constipation, diarrhea, nausea and vomiting.   Genitourinary: Positive for frequency. Negative for dysuria and hematuria.   Musculoskeletal: Positive for arthralgias and back pain.   Skin: Negative for rash.   Neurological: Negative for weakness and headaches.   Psychiatric/Behavioral: Negative for dysphoric mood and sleep disturbance. The patient is not nervous/anxious.          PHYSICAL EXAM:   Vitals:    08/05/20 0954   BP: (!) 128/40   Pulse: 72   Temp: 97.3 °F (36.3 °C)     Weight: 76.6 kg (168 lb 15.7 oz)   Height: 5' 4" (162.6 cm)   Body mass index is 29.01 kg/m².      General appearance - alert, well " appearing, and in no distress, overweight  Psychiatric - alert, oriented to person, place, and time, normal mood, behavior, speech, dress, motor activity, and thought processes  Eyes - pupils equal and reactive, extraocular eye movements intact, sclera anicteric  Mouth - not examined; patient wearing mask due to Covid 19 pandemic  Neck - supple, no significant adenopathy, carotids upstroke normal bilaterally, no bruits  Lymphatics - no palpable cervical lymphadenopathy  Chest - clear to auscultation, no wheezes, rales or rhonchi, symmetric air entry  Heart - normal rate and regular rhythm, no gallops noted  Back exam - mild limit in range of motion noted on exam due to age, mild pain with motion noted during exam, in depth exam deferred  Neurological - alert, normal speech, no focal findings or movement disorder noted, cranial nerves II through XII intact  Musculoskeletal - patient noted to have Moderate osteoarthritic changes to both knee joints. No joint effusions noted., no muscular tenderness noted  Extremities - peripheral pulses normal, no pedal edema, no clubbing or cyanosis  Skin - normal coloration and turgor, no rashes, no suspicious skin lesions noted      Labs:  Lab Results   Component Value Date    HGBA1C 8.5 (H) 11/08/2019    HGBA1C 7.7 (H) 02/26/2019    HGBA1C 7.6 (H) 11/26/2018      Lab Results   Component Value Date    CHOL 122 02/26/2019    CHOL 113 (L) 04/25/2018    CHOL 144 03/14/2017     Lab Results   Component Value Date    LDLCALC 45.8 (L) 02/26/2019    LDLCALC 49.4 (L) 04/25/2018    LDLCALC 73.6 03/14/2017         ASSESSMENT AND PLAN:  1. Type 2 diabetes, uncontrolled, with retinopathy/2. Type 2 diabetes, uncontrolled, with neuropathy/3. 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 Long-term insulin use  Diabetes is not controlled at this time for age and comorbid conditions. We discussed diabetic diet and regular exercise. We discussed home blood sugar  monitoring, if appropriate - the patient should test three times per day with meals and as needed. Continue current medication regimen. Patient is followed by endocrinology.  Diabetic complications addressed: Neuropathy pain controlled.  Patient was counseled on the need for yearly eye exam to screen for/monitor diabetic retinopathy and yearly diabetic foot exam.    5. Essential hypertension  Discussed sodium restriction, maintaining ideal body weight and regular exercise program as physiologic means to achieve blood pressure control. The patient will strive towards this.   The current medical regimen is effective;  continue present plan and medications. Recommended patient to check home readings to monitor and see me for followup as scheduled or sooner as needed.   Patient was educated that both decongestant and anti-inflammatory medication may raise blood pressure.  The patient is not eligible for the digital hypertension program.    6. Hyperlipidemia LDL goal <100  We discussed low fat diet and regular exercise.The current medical regimen is effective;  continue present plan and medications.     7. Malignant neoplasm of left breast in female, estrogen receptor positive, unspecified site of breast  The current medical regimen is effective;  continue present plan and medications.   Followed by: Hematology/Oncology.     8. Osteopenia after menopause  We discussed adequate calcium and vitamin D supplementation. We discussed fall precautions. She is up to date on her BMD. Continue current treatment regimen as per endocrinology recommendation/treatment plan (Prolia injections)    9. Overweight (BMI 25.0-29.9)  The patient is asked to make an attempt to improve diet and exercise patterns to aid in medical management of this problem.    10. Need for hepatitis C screening test  Routine screening with next blood draw.  - Hepatitis C Antibody; Future    11. Need for shingles vaccine  First dose given today.    12. Midline  low back pain, unspecified chronicity, unspecified whether sciatica present/13.  Urinary frequency  Suspect her discomfort is due to some arthritis in her back.  We discussed Tylenol as needed for pain and regular stretching/strengthening exercises well as ice or heat.  I will check a urine culture to rule out infection.  - Urinalysis; Future  - Urine culture; Future         Orders Placed This Encounter   Procedures    Urine culture    Hepatitis C Antibody    Urinalysis      Follow up in about 6 months (around 2/5/2021), or if symptoms worsen or fail to improve, for annual exam. or sooner as needed.

## 2020-08-06 ENCOUNTER — OFFICE VISIT (OUTPATIENT)
Dept: ENDOCRINOLOGY | Facility: CLINIC | Age: 80
End: 2020-08-06
Payer: MEDICARE

## 2020-08-06 VITALS
BODY MASS INDEX: 28.84 KG/M2 | SYSTOLIC BLOOD PRESSURE: 175 MMHG | HEART RATE: 65 BPM | TEMPERATURE: 98 F | WEIGHT: 168 LBS | DIASTOLIC BLOOD PRESSURE: 74 MMHG

## 2020-08-06 DIAGNOSIS — I10 ESSENTIAL HYPERTENSION: ICD-10-CM

## 2020-08-06 DIAGNOSIS — N18.30 CKD (CHRONIC KIDNEY DISEASE) STAGE 3, GFR 30-59 ML/MIN: ICD-10-CM

## 2020-08-06 PROCEDURE — 1101F PR PT FALLS ASSESS DOC 0-1 FALLS W/OUT INJ PAST YR: ICD-10-PCS | Mod: CPTII,S$GLB,, | Performed by: NURSE PRACTITIONER

## 2020-08-06 PROCEDURE — 3052F HG A1C>EQUAL 8.0%<EQUAL 9.0%: CPT | Mod: CPTII,S$GLB,, | Performed by: NURSE PRACTITIONER

## 2020-08-06 PROCEDURE — 1101F PT FALLS ASSESS-DOCD LE1/YR: CPT | Mod: CPTII,S$GLB,, | Performed by: NURSE PRACTITIONER

## 2020-08-06 PROCEDURE — 3077F PR MOST RECENT SYSTOLIC BLOOD PRESSURE >= 140 MM HG: ICD-10-PCS | Mod: CPTII,S$GLB,, | Performed by: NURSE PRACTITIONER

## 2020-08-06 PROCEDURE — 3052F PR MOST RECENT HEMOGLOBIN A1C LEVEL 8.0 - < 9.0%: ICD-10-PCS | Mod: CPTII,S$GLB,, | Performed by: NURSE PRACTITIONER

## 2020-08-06 PROCEDURE — 3077F SYST BP >= 140 MM HG: CPT | Mod: CPTII,S$GLB,, | Performed by: NURSE PRACTITIONER

## 2020-08-06 PROCEDURE — 1126F AMNT PAIN NOTED NONE PRSNT: CPT | Mod: S$GLB,,, | Performed by: NURSE PRACTITIONER

## 2020-08-06 PROCEDURE — 99499 UNLISTED E&M SERVICE: CPT | Mod: S$GLB,,, | Performed by: NURSE PRACTITIONER

## 2020-08-06 PROCEDURE — 99999 PR PBB SHADOW E&M-EST. PATIENT-LVL IV: CPT | Mod: PBBFAC,,, | Performed by: NURSE PRACTITIONER

## 2020-08-06 PROCEDURE — 1159F PR MEDICATION LIST DOCUMENTED IN MEDICAL RECORD: ICD-10-PCS | Mod: S$GLB,,, | Performed by: NURSE PRACTITIONER

## 2020-08-06 PROCEDURE — 1126F PR PAIN SEVERITY QUANTIFIED, NO PAIN PRESENT: ICD-10-PCS | Mod: S$GLB,,, | Performed by: NURSE PRACTITIONER

## 2020-08-06 PROCEDURE — 99499 RISK ADDL DX/OHS AUDIT: ICD-10-PCS | Mod: S$GLB,,, | Performed by: NURSE PRACTITIONER

## 2020-08-06 PROCEDURE — 99214 OFFICE O/P EST MOD 30 MIN: CPT | Mod: S$GLB,,, | Performed by: NURSE PRACTITIONER

## 2020-08-06 PROCEDURE — 3078F PR MOST RECENT DIASTOLIC BLOOD PRESSURE < 80 MM HG: ICD-10-PCS | Mod: CPTII,S$GLB,, | Performed by: NURSE PRACTITIONER

## 2020-08-06 PROCEDURE — 3078F DIAST BP <80 MM HG: CPT | Mod: CPTII,S$GLB,, | Performed by: NURSE PRACTITIONER

## 2020-08-06 PROCEDURE — 99214 PR OFFICE/OUTPT VISIT, EST, LEVL IV, 30-39 MIN: ICD-10-PCS | Mod: S$GLB,,, | Performed by: NURSE PRACTITIONER

## 2020-08-06 PROCEDURE — 99999 PR PBB SHADOW E&M-EST. PATIENT-LVL IV: ICD-10-PCS | Mod: PBBFAC,,, | Performed by: NURSE PRACTITIONER

## 2020-08-06 PROCEDURE — 1159F MED LIST DOCD IN RCRD: CPT | Mod: S$GLB,,, | Performed by: NURSE PRACTITIONER

## 2020-08-06 RX ORDER — INSULIN DETEMIR 100 [IU]/ML
40 INJECTION, SOLUTION SUBCUTANEOUS NIGHTLY
Qty: 36 ML | Refills: 1 | Status: SHIPPED | OUTPATIENT
Start: 2020-08-06 | End: 2021-03-05 | Stop reason: SDUPTHER

## 2020-08-06 RX ORDER — PEN NEEDLE, DIABETIC 30 GX3/16"
NEEDLE, DISPOSABLE MISCELLANEOUS
Qty: 400 EACH | Refills: 3 | Status: SHIPPED | OUTPATIENT
Start: 2020-08-06 | End: 2021-01-14 | Stop reason: SDUPTHER

## 2020-08-06 NOTE — PROGRESS NOTES
CC: This 79 y.o. Black or  female  is here for evaluation of  T2DM along with comorbidities indicated in the Visit Diagnosis section of this encounter.    HPI: Joel Condon was diagnosed with T2DM in 1994.  Oral agents started at diagnosis.         Prior visit 12/2018  a1c up from 6.8 to 7.6%.   She has been taking novolog 17 instead of 15 units ac. Still doesn't want to try a weekly glp1-RA to replace novolog injections. Doesn't like changes to her meds bc of possible s/e.   She took her novolog injection this morning about 1-2 hours earlier without eating. POCT glucose was < 49 but denies any symptoms of dizziness, fatigue, etc. She typically waits a couple of hours in the morning between novolog injection and breakfast.   Plan Make sure to eat within 15 minutes of Novolog injections to avoid hypoglycemia.   Continue current insulin doses and metformin.   Test bg 4x/day. rtc in 3 mo with labs prior.     Interval history  Pt has been lost to care for almost 2 years. Her last A1c was 8.5% in Nov 2019.   Pt is now taking Humalog 28 units ac; previously was taking 20 units ac.   She saw Dr. Ley yesterday and got labs drawn but unfortunately A1c was not included.       LAST DIABETES EDUCATION: years ago at [facility]. And also a class done by [employer] early 2017    HOSPITALIZED FOR DIABETES  -  No.    PRESCRIBED DIABETES MEDICATIONS: Levemir (vial) 40 units every night at 10 pm, Humalog Kwikpen 28 units ac, metformin xr 1000 mg AM and 500 mg PM      Misses medication doses - No      DM COMPLICATIONS: peripheral neuropathy    SIGNIFICANT DIABETES MED HISTORY:   Metformin started at initial ov 8/17/17  Diarrhea on metformin even w/ psyllium fiber supplement     SELF MONITORING BLOOD GLUCOSE: Checks blood glucose at home 4x/day. Declines Freestyle Romeo. Does not mind fingersticks.   No logs.   Mostly in the 100s with occasional 200s depending on diet. Lowest BG was 84.        HYPOGLYCEMIC  EPISODES: symptoms start in the 80s. Denies overnight symptoms.    No BG < 80 recently.     CURRENT DIET: drinks diet cranberry juice, diet soda, tea w/ splenda. 3 meals/day.     CURRENT EXERCISE:  None d/t hip pain       BP (!) 175/74 (BP Location: Right arm, Patient Position: Sitting, BP Method: Large (Automatic)) Comment: Pt has not taken blood pressure medication today.  Pulse 65   Temp 98 °F (36.7 °C) (Oral)   Wt 76.2 kg (168 lb)   BMI 28.84 kg/m²       ROS:   CONSTITUTIONAL: Appetite low, denies fatigue  GI: no diarrhea   : NO DYSURIA       PHYSICAL EXAM:  GENERAL: Well developed, well nourished. No acute distress.   PSYCH: AAOx3, appropriate mood and affect, conversant, well-groomed. Judgement and insight good.   NEURO: Cranial nerves grossly intact. Speech clear, no tremor.       Hemoglobin A1C   Date Value Ref Range Status   11/08/2019 8.5 (H) 4.0 - 5.6 % Final     Comment:     ADA Screening Guidelines:  5.7-6.4%  Consistent with prediabetes  >or=6.5%  Consistent with diabetes  High levels of fetal hemoglobin interfere with the HbA1C  assay. Heterozygous hemoglobin variants (HbS, HgC, etc)do  not significantly interfere with this assay.   However, presence of multiple variants may affect accuracy.     02/26/2019 7.7 (H) 4.0 - 5.6 % Final     Comment:     ADA Screening Guidelines:  5.7-6.4%  Consistent with prediabetes  >or=6.5%  Consistent with diabetes  High levels of fetal hemoglobin interfere with the HbA1C  assay. Heterozygous hemoglobin variants (HbS, HgC, etc)do  not significantly interfere with this assay.   However, presence of multiple variants may affect accuracy.     11/26/2018 7.6 (H) 4.0 - 5.6 % Final     Comment:     ADA Screening Guidelines:  5.7-6.4%  Consistent with prediabetes  >or=6.5%  Consistent with diabetes  High levels of fetal hemoglobin interfere with the HbA1C  assay. Heterozygous hemoglobin variants (HbS, HgC, etc)do  not significantly interfere with this assay.   However,  presence of multiple variants may affect accuracy.             Chemistry        Component Value Date/Time     05/22/2020 0805    K 4.2 05/22/2020 0805     05/22/2020 0805    CO2 23 05/22/2020 0805    BUN 20 05/22/2020 0805    CREATININE 1.2 05/22/2020 0805     (H) 05/22/2020 0805        Component Value Date/Time    CALCIUM 9.6 05/22/2020 0805    ALKPHOS 62 05/22/2020 0805    AST 17 05/22/2020 0805    ALT 16 05/22/2020 0805    BILITOT 0.4 05/22/2020 0805    ESTGFRAFRICA 50.0 (A) 05/22/2020 0805    EGFRNONAA 43.4 (A) 05/22/2020 0805          Lab Results   Component Value Date    LDLCALC 45.8 (L) 02/26/2019         Lab Results   Component Value Date    MICALBCREAT 55.0 (H) 11/08/2019           STANDARDS of CARE:        ASA:       yes        Last eye exam:       ASSESSMENT and PLAN:    A1C GOAL: < 8%      1. 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 However, reported blood sugars from fingersticks seems to be controlled for age.   A1c and BMP sometime next week.   Test glucose 4x/day. Bring logs to every visit.   Follow up again in 3 months with labs prior.       Hemoglobin A1C   2. CKD (chronic kidney disease) stage 3, GFR 30-59 ml/min  Basic metabolic panel   3. Essential hypertension  BP is high. Has not yet taken her meds today.            Orders Placed This Encounter   Procedures    Basic metabolic panel     Standing Status:   Future     Standing Expiration Date:   10/5/2021    Hemoglobin A1C     Standing Status:   Future     Standing Expiration Date:   10/5/2021        Follow up in about 3 months (around 11/6/2020).

## 2020-08-06 NOTE — PATIENT INSTRUCTIONS
Unable to evaluate fully diabetes control since no blood sugar logs are available. However, reported blood sugars from fingersticks seems to be controlled for age.   A1c and BMP sometime next week.   Test glucose 4x/day. Bring logs to every visit.   Follow up again in 3 months with labs prior.

## 2020-08-07 ENCOUNTER — TELEPHONE (OUTPATIENT)
Dept: FAMILY MEDICINE | Facility: CLINIC | Age: 80
End: 2020-08-07

## 2020-08-07 NOTE — TELEPHONE ENCOUNTER
Please call patient: her urinalysis was unremarkable and her urine culture was negative for infection.

## 2020-08-11 ENCOUNTER — OFFICE VISIT (OUTPATIENT)
Dept: HEMATOLOGY/ONCOLOGY | Facility: CLINIC | Age: 80
End: 2020-08-11
Payer: MEDICARE

## 2020-08-11 ENCOUNTER — LAB VISIT (OUTPATIENT)
Dept: LAB | Facility: HOSPITAL | Age: 80
End: 2020-08-11
Attending: INTERNAL MEDICINE
Payer: MEDICARE

## 2020-08-11 VITALS
HEIGHT: 65 IN | DIASTOLIC BLOOD PRESSURE: 72 MMHG | WEIGHT: 168.88 LBS | OXYGEN SATURATION: 97 % | TEMPERATURE: 97 F | SYSTOLIC BLOOD PRESSURE: 179 MMHG | BODY MASS INDEX: 28.14 KG/M2 | HEART RATE: 68 BPM

## 2020-08-11 DIAGNOSIS — Z79.811 ENCOUNTER FOR MONITORING AROMATASE INHIBITOR THERAPY: ICD-10-CM

## 2020-08-11 DIAGNOSIS — Z17.0 MALIGNANT NEOPLASM OF LEFT BREAST IN FEMALE, ESTROGEN RECEPTOR POSITIVE, UNSPECIFIED SITE OF BREAST: Primary | ICD-10-CM

## 2020-08-11 DIAGNOSIS — I10 ESSENTIAL HYPERTENSION: ICD-10-CM

## 2020-08-11 DIAGNOSIS — M85.80 OSTEOPENIA, UNSPECIFIED LOCATION: ICD-10-CM

## 2020-08-11 DIAGNOSIS — C50.912 MALIGNANT NEOPLASM OF LEFT BREAST IN FEMALE, ESTROGEN RECEPTOR POSITIVE, UNSPECIFIED SITE OF BREAST: Primary | ICD-10-CM

## 2020-08-11 DIAGNOSIS — R42 DIZZINESS: ICD-10-CM

## 2020-08-11 DIAGNOSIS — Z17.0 MALIGNANT NEOPLASM OF LEFT BREAST IN FEMALE, ESTROGEN RECEPTOR POSITIVE, UNSPECIFIED SITE OF BREAST: ICD-10-CM

## 2020-08-11 DIAGNOSIS — C50.912 MALIGNANT NEOPLASM OF LEFT BREAST IN FEMALE, ESTROGEN RECEPTOR POSITIVE, UNSPECIFIED SITE OF BREAST: ICD-10-CM

## 2020-08-11 DIAGNOSIS — Z12.31 ENCOUNTER FOR SCREENING MAMMOGRAM FOR MALIGNANT NEOPLASM OF BREAST: ICD-10-CM

## 2020-08-11 DIAGNOSIS — Z51.81 ENCOUNTER FOR MONITORING AROMATASE INHIBITOR THERAPY: ICD-10-CM

## 2020-08-11 LAB
ALBUMIN SERPL BCP-MCNC: 3.7 G/DL (ref 3.5–5.2)
ALP SERPL-CCNC: 59 U/L (ref 55–135)
ALT SERPL W/O P-5'-P-CCNC: 10 U/L (ref 10–44)
ANION GAP SERPL CALC-SCNC: 10 MMOL/L (ref 8–16)
AST SERPL-CCNC: 14 U/L (ref 10–40)
BASOPHILS # BLD AUTO: 0.08 K/UL (ref 0–0.2)
BASOPHILS NFR BLD: 1 % (ref 0–1.9)
BILIRUB SERPL-MCNC: 0.4 MG/DL (ref 0.1–1)
BUN SERPL-MCNC: 26 MG/DL (ref 8–23)
CALCIUM SERPL-MCNC: 9.1 MG/DL (ref 8.7–10.5)
CHLORIDE SERPL-SCNC: 110 MMOL/L (ref 95–110)
CO2 SERPL-SCNC: 23 MMOL/L (ref 23–29)
CREAT SERPL-MCNC: 1.4 MG/DL (ref 0.5–1.4)
DIFFERENTIAL METHOD: ABNORMAL
EOSINOPHIL # BLD AUTO: 0.4 K/UL (ref 0–0.5)
EOSINOPHIL NFR BLD: 4.4 % (ref 0–8)
ERYTHROCYTE [DISTWIDTH] IN BLOOD BY AUTOMATED COUNT: 13.7 % (ref 11.5–14.5)
EST. GFR  (AFRICAN AMERICAN): 42 ML/MIN/1.73 M^2
EST. GFR  (NON AFRICAN AMERICAN): 36 ML/MIN/1.73 M^2
GLUCOSE SERPL-MCNC: 217 MG/DL (ref 70–110)
HCT VFR BLD AUTO: 38 % (ref 37–48.5)
HGB BLD-MCNC: 12.2 G/DL (ref 12–16)
IMM GRANULOCYTES # BLD AUTO: 0.02 K/UL (ref 0–0.04)
IMM GRANULOCYTES NFR BLD AUTO: 0.2 % (ref 0–0.5)
LYMPHOCYTES # BLD AUTO: 2.5 K/UL (ref 1–4.8)
LYMPHOCYTES NFR BLD: 29.5 % (ref 18–48)
MCH RBC QN AUTO: 30.6 PG (ref 27–31)
MCHC RBC AUTO-ENTMCNC: 32.1 G/DL (ref 32–36)
MCV RBC AUTO: 95 FL (ref 82–98)
MONOCYTES # BLD AUTO: 0.5 K/UL (ref 0.3–1)
MONOCYTES NFR BLD: 6 % (ref 4–15)
NEUTROPHILS # BLD AUTO: 4.9 K/UL (ref 1.8–7.7)
NEUTROPHILS NFR BLD: 58.9 % (ref 38–73)
NRBC BLD-RTO: 0 /100 WBC
PLATELET # BLD AUTO: 325 K/UL (ref 150–350)
PMV BLD AUTO: 10.1 FL (ref 9.2–12.9)
POTASSIUM SERPL-SCNC: 4.6 MMOL/L (ref 3.5–5.1)
PROT SERPL-MCNC: 7.5 G/DL (ref 6–8.4)
RBC # BLD AUTO: 3.99 M/UL (ref 4–5.4)
SODIUM SERPL-SCNC: 143 MMOL/L (ref 136–145)
WBC # BLD AUTO: 8.38 K/UL (ref 3.9–12.7)

## 2020-08-11 PROCEDURE — 3077F PR MOST RECENT SYSTOLIC BLOOD PRESSURE >= 140 MM HG: ICD-10-PCS | Mod: CPTII,S$GLB,, | Performed by: INTERNAL MEDICINE

## 2020-08-11 PROCEDURE — 3078F DIAST BP <80 MM HG: CPT | Mod: CPTII,S$GLB,, | Performed by: INTERNAL MEDICINE

## 2020-08-11 PROCEDURE — 1159F MED LIST DOCD IN RCRD: CPT | Mod: S$GLB,,, | Performed by: INTERNAL MEDICINE

## 2020-08-11 PROCEDURE — 1101F PR PT FALLS ASSESS DOC 0-1 FALLS W/OUT INJ PAST YR: ICD-10-PCS | Mod: CPTII,S$GLB,, | Performed by: INTERNAL MEDICINE

## 2020-08-11 PROCEDURE — 85025 COMPLETE CBC W/AUTO DIFF WBC: CPT

## 2020-08-11 PROCEDURE — 1101F PT FALLS ASSESS-DOCD LE1/YR: CPT | Mod: CPTII,S$GLB,, | Performed by: INTERNAL MEDICINE

## 2020-08-11 PROCEDURE — 80053 COMPREHEN METABOLIC PANEL: CPT

## 2020-08-11 PROCEDURE — 99214 PR OFFICE/OUTPT VISIT, EST, LEVL IV, 30-39 MIN: ICD-10-PCS | Mod: S$GLB,,, | Performed by: INTERNAL MEDICINE

## 2020-08-11 PROCEDURE — 3077F SYST BP >= 140 MM HG: CPT | Mod: CPTII,S$GLB,, | Performed by: INTERNAL MEDICINE

## 2020-08-11 PROCEDURE — 3078F PR MOST RECENT DIASTOLIC BLOOD PRESSURE < 80 MM HG: ICD-10-PCS | Mod: CPTII,S$GLB,, | Performed by: INTERNAL MEDICINE

## 2020-08-11 PROCEDURE — 99999 PR PBB SHADOW E&M-EST. PATIENT-LVL V: ICD-10-PCS | Mod: PBBFAC,,, | Performed by: INTERNAL MEDICINE

## 2020-08-11 PROCEDURE — 1159F PR MEDICATION LIST DOCUMENTED IN MEDICAL RECORD: ICD-10-PCS | Mod: S$GLB,,, | Performed by: INTERNAL MEDICINE

## 2020-08-11 PROCEDURE — 1126F AMNT PAIN NOTED NONE PRSNT: CPT | Mod: S$GLB,,, | Performed by: INTERNAL MEDICINE

## 2020-08-11 PROCEDURE — 1126F PR PAIN SEVERITY QUANTIFIED, NO PAIN PRESENT: ICD-10-PCS | Mod: S$GLB,,, | Performed by: INTERNAL MEDICINE

## 2020-08-11 PROCEDURE — 99999 PR PBB SHADOW E&M-EST. PATIENT-LVL V: CPT | Mod: PBBFAC,,, | Performed by: INTERNAL MEDICINE

## 2020-08-11 PROCEDURE — 99214 OFFICE O/P EST MOD 30 MIN: CPT | Mod: S$GLB,,, | Performed by: INTERNAL MEDICINE

## 2020-08-11 PROCEDURE — 36415 COLL VENOUS BLD VENIPUNCTURE: CPT

## 2020-08-11 NOTE — PROGRESS NOTES
Subjective:       Patient ID: Joel Condon is a 79 y.o. female.    Chief Complaint: Follow-up (BREAST CANCER)       Diagnosis : Left Breast CA, IDC  S/p  left partial mastectomy and SLNB 12/13/2017. [id number] Stage 1B,grade 1, ER/NJ pos Qtk1cln negative  , closest margin anteriorly at 1mm  S/p  postoperative RT completed 3/13/2018  Letrozole 3/2018-present      HPI: Patient  is a 79 y.o. postmenopausal female seen today for recently diagnosed carcinoma of the left breast. She had an abnormal mammogram first noted 10/27/2017. Follow-up mammogram and ultrasound (11/14/17) showed 6mm mass in the 2OC left breast with enlarged axillary LN measuring 17mm boderline.She underwent an  ultrasound guided biopsy  on 11/21/2017 with pathology revealing infiltrating ductal carcinoma of the breast, Grade 1, ER positive 100% ,NJ positive 100% Her-2neu negative.  The patient is status post left partial mastectomy and sentinel node biopsy on 12/13/2017.  Final pathology showed 6mm IDC, 1 of 2  LN with 1mm micromet. 1mm anterior margin. She does not routinely do self breast exams. She  has not noted any skin  changes on breast exam. No  nipple discharge. No history of  previous breast biopsies. No  personal history of breast cancer or ovarian cancer.     She completed  postoperative RT under the direction of Dr. Wray on 3/13/2018    She is taking adjuvant Letrozole daily  Trial hold of therapy ( nausea, dizziness) w/minor improvement      She is hearing impaired   She reports episodes of dizziness, lasts seconds  Reports episodes improved  She reports she checked BP when dizzy and one occasion was elevated   She continues with  diffuse arthralgias  She reports chronic  neck pain and lt shoulder pain  She declines referral to pain mgmt  Appetite and weight stable   No SOB/cough    Trial hold of Anastrozole in past   She reports dizziness only slight improvement with hold of endocrine therapy         She declined bone scan      She is  followed by her PCP for Type 2 DM and HTN  She reports blood glucose levels range from   She takes insulin     Mammo 2019   Impression:  Bilateral  There is no mammographic evidence of malignancy.     BI-RADS Category:   Overall: 2 - Benign       GYN History: Age of menarche was 14. Age of menopause was 46.  Patient reports hormonal therapy for less than 5 years. Patient is . Age of first live birth was 17. Patient did breast feed for 2 years 8 months.         Past Medical History:   Diagnosis Date    Arthritis     Breast cancer     Cataract     Colon polyp     Diabetes mellitus     Diabetic retinopathy     Hyperlipidemia LDL goal < 100     Hypertension     Hypothyroidism     Osteoporosis, post-menopausal     Overweight(278.02)     Proteinuria     Type II or unspecified type diabetes mellitus with renal manifestations, uncontrolled(250.42)        Past Surgical History:   Procedure Laterality Date    APPENDECTOMY      BREAST BIOPSY      BREAST LUMPECTOMY      BREAST SURGERY Left 2017    tumor removal    CATARACT EXTRACTION W/  INTRAOCULAR LENS IMPLANT Left 14    OS (Dr. Arce)    CATARACT EXTRACTION W/  INTRAOCULAR LENS IMPLANT Right 2014    OD (Dr. Arce)    CHOLECYSTECTOMY      COLONOSCOPY N/A 2017    Procedure: COLONOSCOPY;  Surgeon: Homar Payan MD;  Location: [facility] ENDO;  Service: Endoscopy;  Laterality: N/A;    COLONOSCOPY N/A 2018    Procedure: COLONOSCOPY;  Surgeon: Mykel Boles MD;  Location: [facility] ENDO;  Service: Endoscopy;  Laterality: N/A;    EYE SURGERY  2014 & 2014    HYSTERECTOMY      total    left eye cataract surgery  14    OOPHORECTOMY       Current MEDS; Reviewed and as per MEDCHART    Review of Systems   Constitutional: Negative for appetite change, fatigue, fever and unexpected weight change.   HENT: Negative for mouth sores.    Eyes: Negative for visual disturbance.   Respiratory: Negative for cough  "and shortness of breath.    Cardiovascular: Negative for chest pain.   Gastrointestinal: Negative for abdominal pain, diarrhea and nausea.   Genitourinary: Negative for frequency.   Musculoskeletal: Positive for arthralgias and neck pain. Negative for back pain.   Skin: Negative for rash.   Neurological: Positive for dizziness. Negative for headaches.   Hematological: Negative for adenopathy.   Psychiatric/Behavioral: The patient is not nervous/anxious.        Objective:         Vitals:    08/11/20 0942   BP: (!) 179/72   BP Location: Right arm   Patient Position: Sitting   BP Method: Medium (Automatic)   Pulse: 68   Temp: 97.1 °F (36.2 °C)   TempSrc: Oral   SpO2: 97%   Weight: 76.6 kg (168 lb 14 oz)   Height: 5' 4.5" (1.638 m)             Physical Exam  Constitutional:       Appearance: She is well-developed.   HENT:      Head: Normocephalic.      Mouth/Throat:      Pharynx: No oropharyngeal exudate.   Eyes:      General: Lids are normal. No scleral icterus.     Conjunctiva/sclera: Conjunctivae normal.      Pupils: Pupils are equal, round, and reactive to light.   Neck:      Musculoskeletal: Normal range of motion and neck supple.      Thyroid: No thyromegaly.   Cardiovascular:      Rate and Rhythm: Normal rate and regular rhythm.      Heart sounds: Normal heart sounds. No murmur.   Pulmonary:      Breath sounds: Normal breath sounds. No wheezing or rales.   Chest:      Breasts:         Right: No inverted nipple, mass, nipple discharge, skin change or tenderness.         Left: Tenderness present. No inverted nipple, mass, nipple discharge or skin change.   Abdominal:      General: Bowel sounds are normal. There is no distension.      Palpations: Abdomen is soft. There is no mass.      Tenderness: There is no abdominal tenderness. There is no guarding or rebound.   Musculoskeletal: Normal range of motion.         General: No tenderness.   Lymphadenopathy:      Cervical: No cervical adenopathy.      Upper Body:      " Right upper body: No supraclavicular adenopathy.      Left upper body: No supraclavicular adenopathy.   Skin:     General: Skin is warm and dry.      Findings: No ecchymosis, erythema, petechiae or rash.   Neurological:      Mental Status: She is alert and oriented to person, place, and time.      Cranial Nerves: No cranial nerve deficit.      Coordination: Coordination normal.           FINAL PATHOLOGIC DIAGNOSIS 11/21/2017  1. Left breast mass 2:00:  Invasive mammary carcinoma, grade 1 (Cromona score: Tubule formation 2, nuclear pleomorphism 2, mitotic  activity 1, total score 5), occupying at least 5 mm in one core.  2. Left breast axilla (lymph node):  Benign lymphoid tissue with no evidence of metastatic disease.  Note: Receptor studies and immunohistochemical studies will be performed with supplemental report to follow.  Intradepartmental QC/review obtained. Dr. Neil Irvin concurs with the above diagnostic impressions.    Supplemental Diagnosis  HORMONE RECEPTOR STUDIES:  Virtually 100% of the cells of the carcinoma are strongly nuclear estrogen receptor positive. 60 percent of the cells  are strongly nuclear progesterone receptor positive. There is an abrupt transition from the zone of nuclear  progesterone receptor positive cells to the zone where this receptor is negative. It is possible that there has been  some technical artifact which has led a region of the tissue to not stain, and that actually the vast majority of the  tumor are nuclear progesterone receptor positive. The tumor is HER-2 negative. The positive and negative controls  stained appropriately.        FINAL PATHOLOGIC DIAGNOSIS 12/13/2017   Part 1  Lymph node (1, submitted as left sentinel lymph node count equals 60):  -No evidence of malignancy  Part 2  Lymph node (1, submitted as left sentinel lymph node count equals 20):  -No evidence of malignancy  Part 3  Breast excision (submitted as left partial mastectomy):  -Invasive, well  differentiated (grade I of III) lobular carcinoma  -Carcinoma is a single focus measuring 0.6 x 0.65 cm (6 x 6.5 mm) located 1 mm from the nearest, anterior  resection margin. All resection margins are free of malignancy.  -No tumor necrosis, hemosiderin vascular or lymphatic permeation, or perineural involvement is identified.  -A microscopic focus of in situ carcinoma is identified immediately adjacent to the invasive tumor focus  -Carcinoma is graded I of III according to the Melissa modification of the Arriaga-Benson grading scheme  with 2 points each assigned for moderate tubule formation and moderate nuclear pleomorphism and 1.4  minimal mitotic count, a total of 5 of 9 possible points.  -AJCC TN: pT pN0(sn)  -Complete AJCC Staging Form follows:  INVASIVE CARCINOMA OF THE BREAST  Procedure: Partial mastectomy  Node sampling: [place] lymph nodes  Specimen laterality: Left  Tumor site: Not specified  Tumor size: 6.5 x 6.0 mm  Histologic type: Invasive lobular carcinoma  Histologic grade  -Glandular differentiation: Score 2  -Nuclear pleomorphism: Score 2  -Mitotic rate: Score 1  -Overall grade: Score 1  Tumor focality: Single focus of invasive carcinoma  Ductal carcinoma in situ (DCIS): No DCIS present  Margins-invasive carcinoma: Margins uninvolved by invasive carcinoma  -Distance from closest margin: 1 mm, anterior  Lymph nodes  -Total number of lymph nodes examined (sentinel and non-sentinel): 2  -Number sentinel lymph node nodes examined: 2  Pathologic staging  -Primary tumor: pT1b pN0(sn)  -Regional lymph nodes  Modifier: (SN)  Category: pN0  Ancillary studies: E-cadherin negative in tumor cells        Bone Density 2018   Compared to the young normal reference, this study indicates osteopenia of the Lumbar spine and left hip with osteoporosis of the right hip as detailed above.  Compared to prior the bone mineral density of the lumbar spine and left hip has slightly improved with reduced bone mineral  density of the right hip as detailed above.    Note: Degenerative changes can falsely elevate measured bone mineral density, particularly in the lumbar spine, and should be considered as part of the final clinical recommendation    Mammo 11/6/2019   Impression:  Bilateral  There is no mammographic evidence of malignancy.     BI-RADS Category:   Overall: 2 - Benign           MRI C spine w/out contrast 4/3/2019   Cervical spondylosis with posterior marginal osteophytic disc spur complex predominantly C3-C4 through C6-C7.  No evidence for osseous metastatic disease.      CXR - negative   Assessment:       1. Malignant neoplasm of left breast in female, estrogen receptor positive, unspecified site of breast    2. Encounter for monitoring aromatase inhibitor therapy    3. Encounter for screening mammogram for malignant neoplasm of breast     4. Osteopenia, unspecified location    5. Dizziness    6. Essential hypertension        Plan:     1-5  ECOG 0   80 y/o with multiple medical diagnoses with Stage 1B pT1b (6mm) N1mi M0 grade 1 ER + IA + [id number] IDC carcinoma  of the left breast status post left partial mastectomy and SLNB 12/13/2017  ER + IA + [id number]  12/13/2017.  1 of 2 lymph nodes positive for micromet. Closest margin anteriorly 1mm.  She is Stage IA per AJCC 8th ed. pathologic prognostic staging system.   S/p  postoperative RT completed 3/13/2018  Cont Vit D supp  Bone Density 2018- osteopenia/osteoporosis ( f/u bone denstiy declined per insurance)   Plan follow-up bone density prior to f/u   Follow-up Mammo 11/6/2019 BI-RADS Category: Overall: 2 - Benign  Plan follow-up Mammo 11/2020      Cont prolia  Last PROLIA 5/2020  No Dental Clearance indicated ( dentures)      trial hold of Letrozole ( increasing arthralgias) w/out improvement  Cont endocrine therapy  Pt declined MRI brain    Pt declines referral to Neurology for dizzinesss      6. Pt did not take BP meds this am  Asymptomatic  Follow-up with PCP for med  mgmt  Pt advised to continue BP meds     All questions posed answered to patient's satisfaction    Follow-up  3 mo with labs      Advance Care Planning     Power of   I previously initiated the process of advance care planning today and explained the importance of this process to the patient.  I introduced the concept of advance directives to the patient, as well. Then the patient received detailed information about the importance of designating a Health Care Power of  (HCPOA). She was also instructed to communicate with this person about their wishes for future healthcare, should she become sick and lose decision-making capacity. The patient has not previously appointed a HCPOA. After our discussion, the patient has decided to complete a HCPOA and has appointed her daughter and NAME Isis Roberts   I spent a total time of 16  minutes discussing this issue with the patient.           Marlin Caldera M.D.         Kenney Wray M.D.

## 2020-08-17 ENCOUNTER — TELEPHONE (OUTPATIENT)
Dept: ENDOCRINOLOGY | Facility: CLINIC | Age: 80
End: 2020-08-17

## 2020-08-17 NOTE — TELEPHONE ENCOUNTER
The pt states she has been sick for 4 days due to the Humalog. She states she feels anxious, dizzy and weak. She states she stopped taking the Humalog and she has been feeling better.

## 2020-08-17 NOTE — TELEPHONE ENCOUNTER
----- Message from Barrett Law sent at 8/17/2020  3:30 PM CDT -----  Regarding: Adverse Effects  Contact: Patient  Type: Patient Call Back    Who called:PATIENT    What is the request in detail: She states she is experiencing side effects of insulin. Please advise.    Can the clinic reply by [facility]? No    Would the patient rather a call back or a response via [facility]? Call    Best call back number: [phone] (home)     Additional Information:n/a

## 2020-08-18 RX ORDER — INSULIN ASPART 100 [IU]/ML
INJECTION, SOLUTION INTRAVENOUS; SUBCUTANEOUS
Qty: 30 ML | Refills: 5 | Status: SHIPPED | OUTPATIENT
Start: 2020-08-18 | End: 2020-12-08

## 2020-08-18 NOTE — TELEPHONE ENCOUNTER
Pt c/o weakness and nausea when she takes Humalog, which she did not have on Novolog. She has been taking Levemir before meals in addition to night dose. Yesterday, she took Levemir 40 units twice.       Advised her to take Levemir 40 units once daily at night. Do not take more frequently.   Novolog rx sent to [facility] to help with PA. She voiced understanding.

## 2020-08-19 ENCOUNTER — TELEPHONE (OUTPATIENT)
Dept: PHARMACY | Facility: CLINIC | Age: 80
End: 2020-08-19

## 2020-10-05 ENCOUNTER — CLINICAL SUPPORT (OUTPATIENT)
Dept: FAMILY MEDICINE | Facility: CLINIC | Age: 80
End: 2020-10-05
Payer: MEDICARE

## 2020-10-05 DIAGNOSIS — Z23 NEED FOR INFLUENZA VACCINATION: Primary | ICD-10-CM

## 2020-10-05 PROCEDURE — 90471 IMMUNIZATION ADMIN: CPT | Mod: S$GLB,,, | Performed by: INTERNAL MEDICINE

## 2020-10-05 PROCEDURE — 99499 UNLISTED E&M SERVICE: CPT | Mod: S$GLB,,, | Performed by: INTERNAL MEDICINE

## 2020-10-05 PROCEDURE — 90750 HZV VACC RECOMBINANT IM: CPT | Mod: S$GLB,,, | Performed by: INTERNAL MEDICINE

## 2020-10-05 PROCEDURE — 99499 NO LOS: ICD-10-PCS | Mod: S$GLB,,, | Performed by: INTERNAL MEDICINE

## 2020-10-05 PROCEDURE — 90471 ZOSTER RECOMBINANT VACCINE: ICD-10-PCS | Mod: S$GLB,,, | Performed by: INTERNAL MEDICINE

## 2020-10-05 PROCEDURE — 90750 ZOSTER RECOMBINANT VACCINE: ICD-10-PCS | Mod: S$GLB,,, | Performed by: INTERNAL MEDICINE

## 2020-10-05 NOTE — PROGRESS NOTES
Shingrix vaccine given, patient advised to wait 15 mins.for  Monitoring. . Patient verbalized an understanding.

## 2020-10-20 ENCOUNTER — CLINICAL SUPPORT (OUTPATIENT)
Dept: FAMILY MEDICINE | Facility: CLINIC | Age: 80
End: 2020-10-20
Payer: MEDICARE

## 2020-10-20 DIAGNOSIS — Z23 NEED FOR INFLUENZA VACCINATION: Primary | ICD-10-CM

## 2020-10-20 PROCEDURE — 90694 VACC AIIV4 NO PRSRV 0.5ML IM: CPT | Mod: S$GLB,,, | Performed by: INTERNAL MEDICINE

## 2020-10-20 PROCEDURE — G0008 FLU VACCINE - QUADRIVALENT - ADJUVANTED: ICD-10-PCS | Mod: S$GLB,,, | Performed by: INTERNAL MEDICINE

## 2020-10-20 PROCEDURE — 99499 UNLISTED E&M SERVICE: CPT | Mod: S$GLB,,, | Performed by: INTERNAL MEDICINE

## 2020-10-20 PROCEDURE — 90694 FLU VACCINE - QUADRIVALENT - ADJUVANTED: ICD-10-PCS | Mod: S$GLB,,, | Performed by: INTERNAL MEDICINE

## 2020-10-20 PROCEDURE — 99499 NO LOS: ICD-10-PCS | Mod: S$GLB,,, | Performed by: INTERNAL MEDICINE

## 2020-10-20 PROCEDURE — G0008 ADMIN INFLUENZA VIRUS VAC: HCPCS | Mod: S$GLB,,, | Performed by: INTERNAL MEDICINE

## 2020-10-23 DIAGNOSIS — E78.5 HYPERLIPIDEMIA LDL GOAL <100: ICD-10-CM

## 2020-10-23 DIAGNOSIS — I10 ESSENTIAL HYPERTENSION: ICD-10-CM

## 2020-10-23 DIAGNOSIS — E03.9 HYPOTHYROIDISM (ACQUIRED): ICD-10-CM

## 2020-10-23 RX ORDER — LEVOTHYROXINE SODIUM 50 UG/1
TABLET ORAL
Qty: 90 TABLET | Refills: 0 | Status: SHIPPED | OUTPATIENT
Start: 2020-10-23 | End: 2021-03-05 | Stop reason: SDUPTHER

## 2020-10-23 RX ORDER — NIFEDIPINE 90 MG/1
TABLET, EXTENDED RELEASE ORAL
Qty: 90 TABLET | Refills: 0 | Status: SHIPPED | OUTPATIENT
Start: 2020-10-23 | End: 2021-01-27 | Stop reason: SDUPTHER

## 2020-10-23 RX ORDER — ATORVASTATIN CALCIUM 10 MG/1
TABLET, FILM COATED ORAL
Qty: 90 TABLET | Refills: 0 | Status: SHIPPED | OUTPATIENT
Start: 2020-10-23 | End: 2021-02-03 | Stop reason: SDUPTHER

## 2020-11-04 RX ORDER — METOPROLOL SUCCINATE 200 MG/1
200 TABLET, EXTENDED RELEASE ORAL DAILY
Qty: 90 TABLET | Refills: 1 | Status: SHIPPED | OUTPATIENT
Start: 2020-11-04 | End: 2021-03-05 | Stop reason: SDUPTHER

## 2020-11-05 ENCOUNTER — PATIENT OUTREACH (OUTPATIENT)
Dept: ADMINISTRATIVE | Facility: OTHER | Age: 80
End: 2020-11-05

## 2020-11-23 ENCOUNTER — HOSPITAL ENCOUNTER (OUTPATIENT)
Dept: RADIOLOGY | Facility: HOSPITAL | Age: 80
Discharge: HOME | End: 2020-11-23
Attending: INTERNAL MEDICINE
Payer: MEDICARE

## 2020-11-23 VITALS — WEIGHT: 168 LBS | HEIGHT: 65 IN | BODY MASS INDEX: 27.99 KG/M2

## 2020-11-23 DIAGNOSIS — C50.912 MALIGNANT NEOPLASM OF LEFT BREAST IN FEMALE, ESTROGEN RECEPTOR POSITIVE, UNSPECIFIED SITE OF BREAST: ICD-10-CM

## 2020-11-23 DIAGNOSIS — Z12.31 ENCOUNTER FOR SCREENING MAMMOGRAM FOR MALIGNANT NEOPLASM OF BREAST: ICD-10-CM

## 2020-11-23 DIAGNOSIS — Z17.0 MALIGNANT NEOPLASM OF LEFT BREAST IN FEMALE, ESTROGEN RECEPTOR POSITIVE, UNSPECIFIED SITE OF BREAST: ICD-10-CM

## 2020-11-23 PROCEDURE — 77067 MAMMO DIGITAL SCREENING BILAT WITH TOMO: ICD-10-PCS | Mod: 26,,, | Performed by: RADIOLOGY

## 2020-11-23 PROCEDURE — 77067 SCR MAMMO BI INCL CAD: CPT | Mod: TC,PO

## 2020-11-23 PROCEDURE — 77063 BREAST TOMOSYNTHESIS BI: CPT | Mod: 26,,, | Performed by: RADIOLOGY

## 2020-11-23 PROCEDURE — 77067 SCR MAMMO BI INCL CAD: CPT | Mod: 26,,, | Performed by: RADIOLOGY

## 2020-11-23 PROCEDURE — 77063 MAMMO DIGITAL SCREENING BILAT WITH TOMO: ICD-10-PCS | Mod: 26,,, | Performed by: RADIOLOGY

## 2020-11-30 ENCOUNTER — INFUSION (OUTPATIENT)
Dept: INFUSION THERAPY | Facility: HOSPITAL | Age: 80
End: 2020-11-30
Attending: INTERNAL MEDICINE
Payer: MEDICARE

## 2020-11-30 VITALS
RESPIRATION RATE: 17 BRPM | DIASTOLIC BLOOD PRESSURE: 64 MMHG | SYSTOLIC BLOOD PRESSURE: 139 MMHG | OXYGEN SATURATION: 96 % | HEART RATE: 71 BPM | TEMPERATURE: 98 F

## 2020-11-30 DIAGNOSIS — M81.0 OSTEOPOROSIS: Primary | ICD-10-CM

## 2020-11-30 DIAGNOSIS — M85.80 OSTEOPENIA, UNSPECIFIED LOCATION: ICD-10-CM

## 2020-11-30 LAB
ALBUMIN SERPL BCP-MCNC: 3.8 G/DL (ref 3.5–5.2)
ALBUMIN SERPL BCP-MCNC: 3.8 G/DL (ref 3.5–5.2)
ALP SERPL-CCNC: 56 U/L (ref 55–135)
ALP SERPL-CCNC: 56 U/L (ref 55–135)
ALT SERPL W/O P-5'-P-CCNC: 17 U/L (ref 10–44)
ALT SERPL W/O P-5'-P-CCNC: 17 U/L (ref 10–44)
ANION GAP SERPL CALC-SCNC: 7 MMOL/L (ref 8–16)
ANION GAP SERPL CALC-SCNC: 7 MMOL/L (ref 8–16)
AST SERPL-CCNC: 17 U/L (ref 10–40)
AST SERPL-CCNC: 17 U/L (ref 10–40)
BILIRUB SERPL-MCNC: 0.6 MG/DL (ref 0.1–1)
BILIRUB SERPL-MCNC: 0.6 MG/DL (ref 0.1–1)
BUN SERPL-MCNC: 17 MG/DL (ref 8–23)
BUN SERPL-MCNC: 17 MG/DL (ref 8–23)
CALCIUM SERPL-MCNC: 9.5 MG/DL (ref 8.7–10.5)
CALCIUM SERPL-MCNC: 9.5 MG/DL (ref 8.7–10.5)
CHLORIDE SERPL-SCNC: 107 MMOL/L (ref 95–110)
CHLORIDE SERPL-SCNC: 107 MMOL/L (ref 95–110)
CO2 SERPL-SCNC: 24 MMOL/L (ref 23–29)
CO2 SERPL-SCNC: 24 MMOL/L (ref 23–29)
CREAT SERPL-MCNC: 1.1 MG/DL (ref 0.5–1.4)
CREAT SERPL-MCNC: 1.1 MG/DL (ref 0.5–1.4)
EST. GFR  (AFRICAN AMERICAN): 55 ML/MIN/1.73 M^2
EST. GFR  (AFRICAN AMERICAN): 55 ML/MIN/1.73 M^2
EST. GFR  (NON AFRICAN AMERICAN): 48 ML/MIN/1.73 M^2
EST. GFR  (NON AFRICAN AMERICAN): 48 ML/MIN/1.73 M^2
GLUCOSE SERPL-MCNC: 240 MG/DL (ref 70–110)
GLUCOSE SERPL-MCNC: 240 MG/DL (ref 70–110)
POTASSIUM SERPL-SCNC: 4.7 MMOL/L (ref 3.5–5.1)
POTASSIUM SERPL-SCNC: 4.7 MMOL/L (ref 3.5–5.1)
PROT SERPL-MCNC: 7.6 G/DL (ref 6–8.4)
PROT SERPL-MCNC: 7.6 G/DL (ref 6–8.4)
SODIUM SERPL-SCNC: 138 MMOL/L (ref 136–145)
SODIUM SERPL-SCNC: 138 MMOL/L (ref 136–145)

## 2020-11-30 PROCEDURE — 96372 THER/PROPH/DIAG INJ SC/IM: CPT

## 2020-11-30 PROCEDURE — 80053 COMPREHEN METABOLIC PANEL: CPT

## 2020-11-30 PROCEDURE — 63600175 PHARM REV CODE 636 W HCPCS: Mod: JG | Performed by: INTERNAL MEDICINE

## 2020-11-30 RX ADMIN — DENOSUMAB 60 MG: 60 INJECTION SUBCUTANEOUS at 10:11

## 2020-11-30 NOTE — PLAN OF CARE
Pt arrived to unit. Blood collected for cmp. Pt taking calcium and vitamin d. Denies recent dental work or falls. Tolerated Prolia. Pt given next appt. Pt ambulated off unit. No distress noted.

## 2020-12-01 ENCOUNTER — LAB VISIT (OUTPATIENT)
Dept: LAB | Facility: HOSPITAL | Age: 80
End: 2020-12-01
Attending: INTERNAL MEDICINE
Payer: MEDICARE

## 2020-12-01 DIAGNOSIS — Z17.0 MALIGNANT NEOPLASM OF LEFT BREAST IN FEMALE, ESTROGEN RECEPTOR POSITIVE, UNSPECIFIED SITE OF BREAST: ICD-10-CM

## 2020-12-01 DIAGNOSIS — C50.912 MALIGNANT NEOPLASM OF LEFT BREAST IN FEMALE, ESTROGEN RECEPTOR POSITIVE, UNSPECIFIED SITE OF BREAST: ICD-10-CM

## 2020-12-01 LAB
ALBUMIN SERPL BCP-MCNC: 3.8 G/DL (ref 3.5–5.2)
ALP SERPL-CCNC: 58 U/L (ref 55–135)
ALT SERPL W/O P-5'-P-CCNC: 15 U/L (ref 10–44)
ANION GAP SERPL CALC-SCNC: 11 MMOL/L (ref 8–16)
AST SERPL-CCNC: 16 U/L (ref 10–40)
BASOPHILS # BLD AUTO: 0.07 K/UL (ref 0–0.2)
BASOPHILS NFR BLD: 0.7 % (ref 0–1.9)
BILIRUB SERPL-MCNC: 0.4 MG/DL (ref 0.1–1)
BUN SERPL-MCNC: 18 MG/DL (ref 8–23)
CALCIUM SERPL-MCNC: 9.7 MG/DL (ref 8.7–10.5)
CHLORIDE SERPL-SCNC: 109 MMOL/L (ref 95–110)
CO2 SERPL-SCNC: 23 MMOL/L (ref 23–29)
CREAT SERPL-MCNC: 1.1 MG/DL (ref 0.5–1.4)
DIFFERENTIAL METHOD: ABNORMAL
EOSINOPHIL # BLD AUTO: 0.5 K/UL (ref 0–0.5)
EOSINOPHIL NFR BLD: 5.2 % (ref 0–8)
ERYTHROCYTE [DISTWIDTH] IN BLOOD BY AUTOMATED COUNT: 14 % (ref 11.5–14.5)
EST. GFR  (AFRICAN AMERICAN): 55.2 ML/MIN/1.73 M^2
EST. GFR  (NON AFRICAN AMERICAN): 47.9 ML/MIN/1.73 M^2
GLUCOSE SERPL-MCNC: 143 MG/DL (ref 70–110)
HCT VFR BLD AUTO: 39.4 % (ref 37–48.5)
HGB BLD-MCNC: 12.4 G/DL (ref 12–16)
IMM GRANULOCYTES # BLD AUTO: 0.03 K/UL (ref 0–0.04)
IMM GRANULOCYTES NFR BLD AUTO: 0.3 % (ref 0–0.5)
LYMPHOCYTES # BLD AUTO: 3.3 K/UL (ref 1–4.8)
LYMPHOCYTES NFR BLD: 34.7 % (ref 18–48)
MCH RBC QN AUTO: 30.5 PG (ref 27–31)
MCHC RBC AUTO-ENTMCNC: 31.5 G/DL (ref 32–36)
MCV RBC AUTO: 97 FL (ref 82–98)
MONOCYTES # BLD AUTO: 0.7 K/UL (ref 0.3–1)
MONOCYTES NFR BLD: 7 % (ref 4–15)
NEUTROPHILS # BLD AUTO: 4.9 K/UL (ref 1.8–7.7)
NEUTROPHILS NFR BLD: 52.1 % (ref 38–73)
NRBC BLD-RTO: 0 /100 WBC
PLATELET # BLD AUTO: 321 K/UL (ref 150–350)
PMV BLD AUTO: 11 FL (ref 9.2–12.9)
POTASSIUM SERPL-SCNC: 4.3 MMOL/L (ref 3.5–5.1)
PROT SERPL-MCNC: 7.5 G/DL (ref 6–8.4)
RBC # BLD AUTO: 4.06 M/UL (ref 4–5.4)
SODIUM SERPL-SCNC: 143 MMOL/L (ref 136–145)
WBC # BLD AUTO: 9.37 K/UL (ref 3.9–12.7)

## 2020-12-01 PROCEDURE — 80053 COMPREHEN METABOLIC PANEL: CPT

## 2020-12-01 PROCEDURE — 36415 COLL VENOUS BLD VENIPUNCTURE: CPT | Mod: PO

## 2020-12-01 PROCEDURE — 85025 COMPLETE CBC W/AUTO DIFF WBC: CPT

## 2020-12-03 ENCOUNTER — OFFICE VISIT (OUTPATIENT)
Dept: HEMATOLOGY/ONCOLOGY | Facility: CLINIC | Age: 80
End: 2020-12-03
Payer: MEDICARE

## 2020-12-03 VITALS
BODY MASS INDEX: 27.18 KG/M2 | WEIGHT: 163.13 LBS | OXYGEN SATURATION: 96 % | HEART RATE: 71 BPM | TEMPERATURE: 98 F | HEIGHT: 65 IN | DIASTOLIC BLOOD PRESSURE: 65 MMHG | SYSTOLIC BLOOD PRESSURE: 133 MMHG

## 2020-12-03 DIAGNOSIS — M85.80 OSTEOPENIA, UNSPECIFIED LOCATION: ICD-10-CM

## 2020-12-03 DIAGNOSIS — I10 ESSENTIAL HYPERTENSION: ICD-10-CM

## 2020-12-03 DIAGNOSIS — Z51.81 ENCOUNTER FOR MONITORING AROMATASE INHIBITOR THERAPY: ICD-10-CM

## 2020-12-03 DIAGNOSIS — Z17.0 MALIGNANT NEOPLASM OF LEFT BREAST IN FEMALE, ESTROGEN RECEPTOR POSITIVE, UNSPECIFIED SITE OF BREAST: Primary | ICD-10-CM

## 2020-12-03 DIAGNOSIS — Z79.811 ENCOUNTER FOR MONITORING AROMATASE INHIBITOR THERAPY: ICD-10-CM

## 2020-12-03 DIAGNOSIS — C50.912 MALIGNANT NEOPLASM OF LEFT BREAST IN FEMALE, ESTROGEN RECEPTOR POSITIVE, UNSPECIFIED SITE OF BREAST: Primary | ICD-10-CM

## 2020-12-03 PROCEDURE — 3078F DIAST BP <80 MM HG: CPT | Mod: CPTII,S$GLB,, | Performed by: INTERNAL MEDICINE

## 2020-12-03 PROCEDURE — 1159F MED LIST DOCD IN RCRD: CPT | Mod: S$GLB,,, | Performed by: INTERNAL MEDICINE

## 2020-12-03 PROCEDURE — 1126F AMNT PAIN NOTED NONE PRSNT: CPT | Mod: S$GLB,,, | Performed by: INTERNAL MEDICINE

## 2020-12-03 PROCEDURE — 1126F PR PAIN SEVERITY QUANTIFIED, NO PAIN PRESENT: ICD-10-PCS | Mod: S$GLB,,, | Performed by: INTERNAL MEDICINE

## 2020-12-03 PROCEDURE — 1159F PR MEDICATION LIST DOCUMENTED IN MEDICAL RECORD: ICD-10-PCS | Mod: S$GLB,,, | Performed by: INTERNAL MEDICINE

## 2020-12-03 PROCEDURE — 99999 PR PBB SHADOW E&M-EST. PATIENT-LVL V: CPT | Mod: PBBFAC,,, | Performed by: INTERNAL MEDICINE

## 2020-12-03 PROCEDURE — 99999 PR PBB SHADOW E&M-EST. PATIENT-LVL V: ICD-10-PCS | Mod: PBBFAC,,, | Performed by: INTERNAL MEDICINE

## 2020-12-03 PROCEDURE — 3075F SYST BP GE 130 - 139MM HG: CPT | Mod: CPTII,S$GLB,, | Performed by: INTERNAL MEDICINE

## 2020-12-03 PROCEDURE — 1157F ADVNC CARE PLAN IN RCRD: CPT | Mod: S$GLB,,, | Performed by: INTERNAL MEDICINE

## 2020-12-03 PROCEDURE — 3075F PR MOST RECENT SYSTOLIC BLOOD PRESS GE 130-139MM HG: ICD-10-PCS | Mod: CPTII,S$GLB,, | Performed by: INTERNAL MEDICINE

## 2020-12-03 PROCEDURE — 3078F PR MOST RECENT DIASTOLIC BLOOD PRESSURE < 80 MM HG: ICD-10-PCS | Mod: CPTII,S$GLB,, | Performed by: INTERNAL MEDICINE

## 2020-12-03 PROCEDURE — 99214 PR OFFICE/OUTPT VISIT, EST, LEVL IV, 30-39 MIN: ICD-10-PCS | Mod: S$GLB,,, | Performed by: INTERNAL MEDICINE

## 2020-12-03 PROCEDURE — 1157F PR ADVANCE CARE PLAN OR EQUIV PRESENT IN MEDICAL RECORD: ICD-10-PCS | Mod: S$GLB,,, | Performed by: INTERNAL MEDICINE

## 2020-12-03 PROCEDURE — 99214 OFFICE O/P EST MOD 30 MIN: CPT | Mod: S$GLB,,, | Performed by: INTERNAL MEDICINE

## 2020-12-03 NOTE — PROGRESS NOTES
Subjective:       Patient ID: Joel Condon is a 80 y.o. female.    Chief Complaint: No chief complaint on file.       Diagnosis : Left Breast CA, IDC  S/p  left partial mastectomy and SLNB 12/13/2017. [id number] Stage 1B,grade 1, ER/TX pos [id number] negative  , closest margin anteriorly at 1mm  S/p  postoperative RT completed 3/13/2018  Letrozole 3/2018-present      HPI: Patient  is a 80 y.o. postmenopausal female seen today for recently diagnosed carcinoma of the left breast. She had an abnormal mammogram first noted 10/27/2017. Follow-up mammogram and ultrasound (11/14/17) showed 6mm mass in the 2OC left breast with enlarged axillary LN measuring 17mm boderline.She underwent an  ultrasound guided biopsy  on 11/21/2017 with pathology revealing infiltrating ductal carcinoma of the breast, Grade 1, ER positive 100% ,TX positive 100% Her-2neu negative.  The patient is status post left partial mastectomy and sentinel node biopsy on 12/13/2017.  Final pathology showed 6mm IDC, 1 of 2  LN with 1mm micromet. 1mm anterior margin. She does not routinely do self breast exams. She  has not noted any skin  changes on breast exam. No  nipple discharge. No history of  previous breast biopsies. No  personal history of breast cancer or ovarian cancer.     She completed  postoperative RT under the direction of Dr. Wray on 3/13/2018    She is taking adjuvant Letrozole daily  Trial hold of therapy ( nausea, dizziness) w/minor improvement      She is hearing impaired      She continues with chronic mild  diffuse arthralgias  Appetite and weight stable   No SOB/cough    She remains on Letrozole    Trial hold of AI  in past   She reports dizziness only slight improvement with hold of endocrine therapy         She declined bone scan      She is followed by her PCP for Type 2 DM and HTN  She reports blood glucose levels range from   She takes insulin     Mammo 11/23/2020     BI-RADS Category:   Overall: 0 - Incomplete: Needs  Additional Imaging Evaluation     Recommendation:  Diagnostic mammogram with possible ultrasound (if indicated) is recommended     GYN History: Age of menarche was 14. Age of menopause was 46.  Patient reports hormonal therapy for less than 5 years. Patient is . Age of first live birth was 17. Patient did breast feed for 2 years 8 months.         Past Medical History:   Diagnosis Date    Arthritis     Breast cancer     Cataract     Colon polyp     Diabetes mellitus     Diabetic retinopathy     Hyperlipidemia LDL goal < 100     Hypertension     Hypothyroidism     Osteoporosis, post-menopausal     Overweight(278.02)     Proteinuria     Type II or unspecified type diabetes mellitus with renal manifestations, uncontrolled(250.42)        Past Surgical History:   Procedure Laterality Date    APPENDECTOMY      BREAST BIOPSY      BREAST LUMPECTOMY      BREAST SURGERY Left 2017    tumor removal    CATARACT EXTRACTION W/  INTRAOCULAR LENS IMPLANT Left 14    OS (Dr. Arce)    CATARACT EXTRACTION W/  INTRAOCULAR LENS IMPLANT Right 2014    OD (Dr. Arce)    CHOLECYSTECTOMY      COLONOSCOPY N/A 2017    Procedure: COLONOSCOPY;  Surgeon: Homar Payan MD;  Location: [facility] ENDO;  Service: Endoscopy;  Laterality: N/A;    COLONOSCOPY N/A 2018    Procedure: COLONOSCOPY;  Surgeon: Mykel Boles MD;  Location: [facility] ENDO;  Service: Endoscopy;  Laterality: N/A;    EYE SURGERY  2014 & 2014    HYSTERECTOMY      total    left eye cataract surgery  14    OOPHORECTOMY       Current MEDS; Reviewed and as per MEDCHART    Review of Systems   Constitutional: Negative for appetite change, fatigue, fever and unexpected weight change.   HENT: Negative for mouth sores.    Eyes: Negative for visual disturbance.   Respiratory: Negative for cough and shortness of breath.    Cardiovascular: Negative for chest pain.   Gastrointestinal: Negative for abdominal pain, diarrhea  "and nausea.   Genitourinary: Negative for frequency.   Musculoskeletal: Positive for arthralgias and neck pain. Negative for back pain.   Skin: Negative for rash.   Neurological: Positive for dizziness. Negative for headaches.   Hematological: Negative for adenopathy.   Psychiatric/Behavioral: The patient is not nervous/anxious.        Objective:         Vitals:    12/03/20 0838   BP: 133/65   BP Location: Right arm   Patient Position: Sitting   BP Method: Medium (Automatic)   Pulse: 71   Temp: 98.2 °F (36.8 °C)   SpO2: 96%   Weight: 74 kg (163 lb 2.3 oz)   Height: 5' 4.5" (1.638 m)             Physical Exam  Constitutional:       Appearance: She is well-developed.   HENT:      Head: Normocephalic.      Mouth/Throat:      Pharynx: No oropharyngeal exudate.   Eyes:      General: Lids are normal. No scleral icterus.     Conjunctiva/sclera: Conjunctivae normal.      Pupils: Pupils are equal, round, and reactive to light.   Neck:      Musculoskeletal: Normal range of motion and neck supple.      Thyroid: No thyromegaly.   Cardiovascular:      Rate and Rhythm: Normal rate and regular rhythm.      Heart sounds: Normal heart sounds. No murmur.   Pulmonary:      Breath sounds: Normal breath sounds. No wheezing or rales.   Chest:      Breasts:         Right: No inverted nipple, mass, nipple discharge, skin change or tenderness.         Left: Tenderness present. No inverted nipple, mass, nipple discharge or skin change.   Abdominal:      General: Bowel sounds are normal. There is no distension.      Palpations: Abdomen is soft. There is no mass.      Tenderness: There is no abdominal tenderness. There is no guarding or rebound.   Musculoskeletal: Normal range of motion.         General: No tenderness.   Lymphadenopathy:      Cervical: No cervical adenopathy.      Upper Body:      Right upper body: No supraclavicular adenopathy.      Left upper body: No supraclavicular adenopathy.   Skin:     General: Skin is warm and dry.     "  Findings: No ecchymosis, erythema, petechiae or rash.   Neurological:      Mental Status: She is alert and oriented to person, place, and time.      Cranial Nerves: No cranial nerve deficit.      Coordination: Coordination normal.           FINAL PATHOLOGIC DIAGNOSIS 11/21/2017  1. Left breast mass 2:00:  Invasive mammary carcinoma, grade 1 (Arp score: Tubule formation 2, nuclear pleomorphism 2, mitotic  activity 1, total score 5), occupying at least 5 mm in one core.  2. Left breast axilla (lymph node):  Benign lymphoid tissue with no evidence of metastatic disease.  Note: Receptor studies and immunohistochemical studies will be performed with supplemental report to follow.  Intradepartmental QC/review obtained. Dr. Neil Irvin concurs with the above diagnostic impressions.    Supplemental Diagnosis  HORMONE RECEPTOR STUDIES:  Virtually 100% of the cells of the carcinoma are strongly nuclear estrogen receptor positive. 60 percent of the cells  are strongly nuclear progesterone receptor positive. There is an abrupt transition from the zone of nuclear  progesterone receptor positive cells to the zone where this receptor is negative. It is possible that there has been  some technical artifact which has led a region of the tissue to not stain, and that actually the vast majority of the  tumor are nuclear progesterone receptor positive. The tumor is HER-2 negative. The positive and negative controls  stained appropriately.        FINAL PATHOLOGIC DIAGNOSIS 12/13/2017   Part 1  Lymph node (1, submitted as left sentinel lymph node count equals 60):  -No evidence of malignancy  Part 2  Lymph node (1, submitted as left sentinel lymph node count equals 20):  -No evidence of malignancy  Part 3  Breast excision (submitted as left partial mastectomy):  -Invasive, well differentiated (grade I of III) lobular carcinoma  -Carcinoma is a single focus measuring 0.6 x 0.65 cm (6 x 6.5 mm) located 1 mm from the nearest,  anterior  resection margin. All resection margins are free of malignancy.  -No tumor necrosis, hemosiderin vascular or lymphatic permeation, or perineural involvement is identified.  -A microscopic focus of in situ carcinoma is identified immediately adjacent to the invasive tumor focus  -Carcinoma is graded I of III according to the Melissa modification of the Arriaga-Benson grading scheme  with 2 points each assigned for moderate tubule formation and moderate nuclear pleomorphism and 1.4  minimal mitotic count, a total of 5 of 9 possible points.  -AJCC TN: pT pN0(sn)  -Complete AJCC Staging Form follows:  INVASIVE CARCINOMA OF THE BREAST  Procedure: Partial mastectomy  Node sampling: [place] lymph nodes  Specimen laterality: Left  Tumor site: Not specified  Tumor size: 6.5 x 6.0 mm  Histologic type: Invasive lobular carcinoma  Histologic grade  -Glandular differentiation: Score 2  -Nuclear pleomorphism: Score 2  -Mitotic rate: Score 1  -Overall grade: Score 1  Tumor focality: Single focus of invasive carcinoma  Ductal carcinoma in situ (DCIS): No DCIS present  Margins-invasive carcinoma: Margins uninvolved by invasive carcinoma  -Distance from closest margin: 1 mm, anterior  Lymph nodes  -Total number of lymph nodes examined (sentinel and non-sentinel): 2  -Number sentinel lymph node nodes examined: 2  Pathologic staging  -Primary tumor: pT1b pN0(sn)  -Regional lymph nodes  Modifier: (SN)  Category: pN0  Ancillary studies: E-cadherin negative in tumor cells        Bone Density 2018   Compared to the young normal reference, this study indicates osteopenia of the Lumbar spine and left hip with osteoporosis of the right hip as detailed above.  Compared to prior the bone mineral density of the lumbar spine and left hip has slightly improved with reduced bone mineral density of the right hip as detailed above.    Note: Degenerative changes can falsely elevate measured bone mineral density, particularly in the lumbar  spine, and should be considered as part of the final clinical recommendation    MRI C spine w/out contrast 4/3/2019   Cervical spondylosis with posterior marginal osteophytic disc spur complex predominantly C3-C4 through C6-C7.  No evidence for osseous metastatic disease.      CXR - negative   Assessment:       1. Malignant neoplasm of left breast in female, estrogen receptor positive, unspecified site of breast    2. Encounter for monitoring aromatase inhibitor therapy    3. Osteopenia, unspecified location    4. Essential hypertension        Plan:     1-2  ECOG 0   80 y/o with multiple medical diagnoses with Stage 1B pT1b (6mm) N1mi M0 grade 1 ER + IL + XWD2ade IDC carcinoma  of the left breast status post left partial mastectomy and SLNB 12/13/2017  ER + IL + [id number]  12/13/2017.  1 of 2 lymph nodes positive for micromet. Closest margin anteriorly 1mm.  She is Stage IA per AJCC 8th ed. pathologic prognostic staging system.   S/p  postoperative RT completed 3/13/2018  Cont Vit D supp  Bone Density 2018- osteopenia/osteoporosis ( f/u bone denstiy declined per insurance)   Plan follow-up bone density prior to f/u   Follow-up Mammo   Plan follow-up Mammo ( pt reports she was not notified of findings of recent mammo 11/2020)  Cont endocrine therapy    3. Cont prolia  Last PROLIA 5/2020  No Dental Clearance indicated ( dentures)      4. Cont BP meds  Well-controlled        All questions posed answered to patient's satisfaction    Follow-up  3 mo with labs      Advance Care Planning     Power of   I previously initiated the process of advance care planning today and explained the importance of this process to the patient.  I introduced the concept of advance directives to the patient, as well. Then the patient received detailed information about the importance of designating a Health Care Power of  (HCPOA). She was also instructed to communicate with this person about their wishes for future healthcare,  should she become sick and lose decision-making capacity. The patient has not previously appointed a HCPOA. After our discussion, the patient has decided to complete a HCPOA and has appointed her daughter and NAME Isis Roberts   I spent a total time of 16  minutes discussing this issue with the patient.           Marlin Caldera M.D.         Kenney Wray M.D.

## 2020-12-04 ENCOUNTER — LAB VISIT (OUTPATIENT)
Dept: LAB | Facility: HOSPITAL | Age: 80
End: 2020-12-04
Attending: NURSE PRACTITIONER
Payer: MEDICARE

## 2020-12-04 DIAGNOSIS — N18.30 CKD (CHRONIC KIDNEY DISEASE) STAGE 3, GFR 30-59 ML/MIN: ICD-10-CM

## 2020-12-04 LAB
ANION GAP SERPL CALC-SCNC: 8 MMOL/L (ref 8–16)
BUN SERPL-MCNC: 13 MG/DL (ref 8–23)
CALCIUM SERPL-MCNC: 8.9 MG/DL (ref 8.7–10.5)
CHLORIDE SERPL-SCNC: 109 MMOL/L (ref 95–110)
CO2 SERPL-SCNC: 25 MMOL/L (ref 23–29)
CREAT SERPL-MCNC: 1 MG/DL (ref 0.5–1.4)
EST. GFR  (AFRICAN AMERICAN): >60 ML/MIN/1.73 M^2
EST. GFR  (NON AFRICAN AMERICAN): 53.7 ML/MIN/1.73 M^2
ESTIMATED AVG GLUCOSE: 200 MG/DL (ref 68–131)
GLUCOSE SERPL-MCNC: 112 MG/DL (ref 70–110)
HBA1C MFR BLD HPLC: 8.6 % (ref 4–5.6)
POTASSIUM SERPL-SCNC: 4.4 MMOL/L (ref 3.5–5.1)
SODIUM SERPL-SCNC: 142 MMOL/L (ref 136–145)

## 2020-12-04 PROCEDURE — 80048 BASIC METABOLIC PNL TOTAL CA: CPT

## 2020-12-04 PROCEDURE — 36415 COLL VENOUS BLD VENIPUNCTURE: CPT | Mod: PO

## 2020-12-04 PROCEDURE — 83036 HEMOGLOBIN GLYCOSYLATED A1C: CPT

## 2020-12-08 ENCOUNTER — OFFICE VISIT (OUTPATIENT)
Dept: ENDOCRINOLOGY | Facility: CLINIC | Age: 80
End: 2020-12-08
Payer: MEDICARE

## 2020-12-08 VITALS
DIASTOLIC BLOOD PRESSURE: 64 MMHG | WEIGHT: 166.13 LBS | HEART RATE: 62 BPM | BODY MASS INDEX: 28.07 KG/M2 | SYSTOLIC BLOOD PRESSURE: 157 MMHG | TEMPERATURE: 99 F

## 2020-12-08 DIAGNOSIS — I10 ESSENTIAL HYPERTENSION: ICD-10-CM

## 2020-12-08 PROCEDURE — 3077F PR MOST RECENT SYSTOLIC BLOOD PRESSURE >= 140 MM HG: ICD-10-PCS | Mod: CPTII,S$GLB,, | Performed by: NURSE PRACTITIONER

## 2020-12-08 PROCEDURE — 1157F ADVNC CARE PLAN IN RCRD: CPT | Mod: S$GLB,,, | Performed by: NURSE PRACTITIONER

## 2020-12-08 PROCEDURE — 3077F SYST BP >= 140 MM HG: CPT | Mod: CPTII,S$GLB,, | Performed by: NURSE PRACTITIONER

## 2020-12-08 PROCEDURE — 99999 PR PBB SHADOW E&M-EST. PATIENT-LVL IV: CPT | Mod: PBBFAC,,, | Performed by: NURSE PRACTITIONER

## 2020-12-08 PROCEDURE — 1125F AMNT PAIN NOTED PAIN PRSNT: CPT | Mod: S$GLB,,, | Performed by: NURSE PRACTITIONER

## 2020-12-08 PROCEDURE — 99999 PR PBB SHADOW E&M-EST. PATIENT-LVL IV: ICD-10-PCS | Mod: PBBFAC,,, | Performed by: NURSE PRACTITIONER

## 2020-12-08 PROCEDURE — 3078F PR MOST RECENT DIASTOLIC BLOOD PRESSURE < 80 MM HG: ICD-10-PCS | Mod: CPTII,S$GLB,, | Performed by: NURSE PRACTITIONER

## 2020-12-08 PROCEDURE — 3052F PR MOST RECENT HEMOGLOBIN A1C LEVEL 8.0 - < 9.0%: ICD-10-PCS | Mod: CPTII,S$GLB,, | Performed by: NURSE PRACTITIONER

## 2020-12-08 PROCEDURE — 99214 PR OFFICE/OUTPT VISIT, EST, LEVL IV, 30-39 MIN: ICD-10-PCS | Mod: S$GLB,,, | Performed by: NURSE PRACTITIONER

## 2020-12-08 PROCEDURE — 1159F MED LIST DOCD IN RCRD: CPT | Mod: S$GLB,,, | Performed by: NURSE PRACTITIONER

## 2020-12-08 PROCEDURE — 1159F PR MEDICATION LIST DOCUMENTED IN MEDICAL RECORD: ICD-10-PCS | Mod: S$GLB,,, | Performed by: NURSE PRACTITIONER

## 2020-12-08 PROCEDURE — 3052F HG A1C>EQUAL 8.0%<EQUAL 9.0%: CPT | Mod: CPTII,S$GLB,, | Performed by: NURSE PRACTITIONER

## 2020-12-08 PROCEDURE — 99214 OFFICE O/P EST MOD 30 MIN: CPT | Mod: S$GLB,,, | Performed by: NURSE PRACTITIONER

## 2020-12-08 PROCEDURE — 1125F PR PAIN SEVERITY QUANTIFIED, PAIN PRESENT: ICD-10-PCS | Mod: S$GLB,,, | Performed by: NURSE PRACTITIONER

## 2020-12-08 PROCEDURE — 3078F DIAST BP <80 MM HG: CPT | Mod: CPTII,S$GLB,, | Performed by: NURSE PRACTITIONER

## 2020-12-08 PROCEDURE — 1157F PR ADVANCE CARE PLAN OR EQUIV PRESENT IN MEDICAL RECORD: ICD-10-PCS | Mod: S$GLB,,, | Performed by: NURSE PRACTITIONER

## 2020-12-08 RX ORDER — INSULIN LISPRO 100 [IU]/ML
INJECTION, SOLUTION INTRAVENOUS; SUBCUTANEOUS
Qty: 30 ML | Refills: 5 | Status: SHIPPED | OUTPATIENT
Start: 2020-12-08 | End: 2021-11-17

## 2020-12-08 NOTE — PROGRESS NOTES
CC: This 80 y.o. Black or  female  is here for evaluation of  T2DM along with comorbidities indicated in the Visit Diagnosis section of this encounter.    HPI: Joel Condon was diagnosed with T2DM in 1994.  Oral agents started at diagnosis.         Prior visit 12/2018  a1c up from 6.8 to 7.6%.   She has been taking novolog 17 instead of 15 units ac. Still doesn't want to try a weekly glp1-RA to replace novolog injections. Doesn't like changes to her meds bc of possible s/e.   She took her novolog injection this morning about 1-2 hours earlier without eating. POCT glucose was < 49 but denies any symptoms of dizziness, fatigue, etc. She typically waits a couple of hours in the morning between novolog injection and breakfast.   Plan Make sure to eat within 15 minutes of Novolog injections to avoid hypoglycemia.   Continue current insulin doses and metformin.   Test bg 4x/day. rtc in 3 mo with labs prior.     Prior visit 8/2020  Pt has been lost to care for almost 2 years. Her last A1c was 8.5% in Nov 2019.   Pt is now taking Humalog 28 units ac; previously was taking 20 units ac.   She saw Dr. Ley yesterday and got labs drawn but unfortunately A1c was not included.   Plan Unable to evaluate fully diabetes control since no blood sugar logs are available. However, reported blood sugars from fingersticks seems to be controlled for age.   A1c and BMP sometime next week.   Test glucose 4x/day. Bring logs to every visit.   Follow up again in 3 months with labs prior.     Interval history  Pt had reported before she could not tolerate Humalog d/t nausea but has continued to take it and is tolerating Humalog well.     a1c is 8.6%.     BP is high. Pt has not taken her medications yet. Waits to take them when she eats breakfast.       LAST DIABETES EDUCATION: years ago at [facility]. And also a class done by [facility] early 2017    HOSPITALIZED FOR DIABETES  -  No.    PRESCRIBED DIABETES MEDICATIONS:  Levemir (vial) 40 units every night at 10 pm, Humalog Kwikpen 28 units ac, metformin xr 1000 mg AM and 500 mg PM      Misses medication doses - skips medicine if BG < 100.       DM COMPLICATIONS: peripheral neuropathy    SIGNIFICANT DIABETES MED HISTORY:   Metformin started at initial ov 8/17/17  Diarrhea on metformin even w/ psyllium fiber supplement   Humalog - nausea and weakness       SELF MONITORING BLOOD GLUCOSE: Checks blood glucose at home 3x/day ac. Declines Freestyle Romeo. Does not mind fingersticks.   No logs. Has trouble writing d/t arthritis.   High 100s to low 200s.          HYPOGLYCEMIC EPISODES: symptoms start in the 80s. Denies overnight symptoms.    BGs have been low sometimes.     CURRENT DIET: drinks diet cranberry juice, diet soda, tea w/ splenda. 3 meals/day. Likes to eat beans and spaghetti.   Breakfast - egg, huertas, 1 slice of toast, 1 cup of coffee, sometimes with OJ   Lunch - sandwich, a few chips, diet cold drink       CURRENT EXERCISE:  None d/t hip pain       BP (!) 157/64 (BP Location: Right arm, Patient Position: Sitting, BP Method: Large (Automatic)) Comment: Pt has not taken BP medication today.  Pulse 62   Temp 98.6 °F (37 °C) (Oral)   Wt 75.3 kg (166 lb 1.6 oz)   BMI 28.07 kg/m²       ROS:   CONSTITUTIONAL: Appetite low, denies fatigue  MS: pain to hands and hip      PHYSICAL EXAM:  GENERAL: Well developed, well nourished. No acute distress.   PSYCH: AAOx3, appropriate mood and affect, conversant, well-groomed. Judgement and insight good.   NEURO: Cranial nerves grossly intact. Speech clear, no tremor.       Hemoglobin A1C   Date Value Ref Range Status   12/04/2020 8.6 (H) 4.0 - 5.6 % Final     Comment:     ADA Screening Guidelines:  5.7-6.4%  Consistent with prediabetes  >or=6.5%  Consistent with diabetes  High levels of fetal hemoglobin interfere with the HbA1C  assay. Heterozygous hemoglobin variants (HbS, HgC, etc)do  not significantly interfere with this assay.   However,  presence of multiple variants may affect accuracy.     11/08/2019 8.5 (H) 4.0 - 5.6 % Final     Comment:     ADA Screening Guidelines:  5.7-6.4%  Consistent with prediabetes  >or=6.5%  Consistent with diabetes  High levels of fetal hemoglobin interfere with the HbA1C  assay. Heterozygous hemoglobin variants (HbS, HgC, etc)do  not significantly interfere with this assay.   However, presence of multiple variants may affect accuracy.     02/26/2019 7.7 (H) 4.0 - 5.6 % Final     Comment:     ADA Screening Guidelines:  5.7-6.4%  Consistent with prediabetes  >or=6.5%  Consistent with diabetes  High levels of fetal hemoglobin interfere with the HbA1C  assay. Heterozygous hemoglobin variants (HbS, HgC, etc)do  not significantly interfere with this assay.   However, presence of multiple variants may affect accuracy.             Chemistry        Component Value Date/Time     12/04/2020 0940    K 4.4 12/04/2020 0940     12/04/2020 0940    CO2 25 12/04/2020 0940    BUN 13 12/04/2020 0940    CREATININE 1.0 12/04/2020 0940     (H) 12/04/2020 0940        Component Value Date/Time    CALCIUM 8.9 12/04/2020 0940    ALKPHOS 58 12/01/2020 0755    AST 16 12/01/2020 0755    ALT 15 12/01/2020 0755    BILITOT 0.4 12/01/2020 0755    ESTGFRAFRICA >60.0 12/04/2020 0940    EGFRNONAA 53.7 (A) 12/04/2020 0940          Lab Results   Component Value Date    LDLCALC 45.8 (L) 02/26/2019         Lab Results   Component Value Date    MICALBCREAT 55.0 (H) 11/08/2019           STANDARDS of CARE:        ASA:       yes        Last eye exam:       ASSESSMENT and PLAN:    A1C GOAL: < 8%    1. 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   Again, she declines dietician visit because she believes she knows what to do. Suspect BG fluctuates due to carbohydrate unawareness and inconsistencies in diet.   After much persuasion, she agrees to under   Continuous Glucose Monitoring (CGM) study.       insulin lispro (HUMALOG KWIKPEN INSULIN) 100 unit/mL pen    GLUCOSE MONITORING CONTINUOUS MIN 72 HOURS   2. Essential hypertension  Needs to take medications when she goes home. Reviewed importance of adherence and needs to take meds prior to going to office visits.          Orders Placed This Encounter   Procedures    GLUCOSE MONITORING CONTINUOUS MIN 72 HOURS     Standing Status:   Future     Standing Expiration Date:   12/9/2021        No follow-ups on file.

## 2020-12-14 ENCOUNTER — HOSPITAL ENCOUNTER (OUTPATIENT)
Dept: RADIOLOGY | Facility: HOSPITAL | Age: 80
Discharge: HOME | End: 2020-12-14
Attending: INTERNAL MEDICINE
Payer: MEDICARE

## 2020-12-14 DIAGNOSIS — R92.8 ABNORMAL MAMMOGRAM OF LEFT BREAST: ICD-10-CM

## 2020-12-15 ENCOUNTER — OFFICE VISIT (OUTPATIENT)
Dept: CARDIOLOGY | Facility: CLINIC | Age: 80
End: 2020-12-15
Payer: MEDICARE

## 2020-12-15 VITALS
OXYGEN SATURATION: 97 % | HEIGHT: 64 IN | HEART RATE: 71 BPM | SYSTOLIC BLOOD PRESSURE: 150 MMHG | WEIGHT: 166.44 LBS | DIASTOLIC BLOOD PRESSURE: 67 MMHG | BODY MASS INDEX: 28.42 KG/M2 | RESPIRATION RATE: 16 BRPM

## 2020-12-15 DIAGNOSIS — E78.5 HYPERLIPIDEMIA LDL GOAL <100: ICD-10-CM

## 2020-12-15 DIAGNOSIS — R94.31 ABNORMAL EKG: Primary | ICD-10-CM

## 2020-12-15 DIAGNOSIS — R06.02 SOB (SHORTNESS OF BREATH): ICD-10-CM

## 2020-12-15 DIAGNOSIS — I10 ESSENTIAL HYPERTENSION: ICD-10-CM

## 2020-12-15 PROCEDURE — 1159F PR MEDICATION LIST DOCUMENTED IN MEDICAL RECORD: ICD-10-PCS | Mod: S$GLB,,, | Performed by: INTERNAL MEDICINE

## 2020-12-15 PROCEDURE — 93000 EKG 12-LEAD: ICD-10-PCS | Mod: S$GLB,,, | Performed by: INTERNAL MEDICINE

## 2020-12-15 PROCEDURE — 3078F DIAST BP <80 MM HG: CPT | Mod: CPTII,S$GLB,, | Performed by: INTERNAL MEDICINE

## 2020-12-15 PROCEDURE — 3052F HG A1C>EQUAL 8.0%<EQUAL 9.0%: CPT | Mod: CPTII,S$GLB,, | Performed by: INTERNAL MEDICINE

## 2020-12-15 PROCEDURE — 99499 RISK ADDL DX/OHS AUDIT: ICD-10-PCS | Mod: S$GLB,,, | Performed by: INTERNAL MEDICINE

## 2020-12-15 PROCEDURE — 99499 UNLISTED E&M SERVICE: CPT | Mod: S$GLB,,, | Performed by: INTERNAL MEDICINE

## 2020-12-15 PROCEDURE — 1157F ADVNC CARE PLAN IN RCRD: CPT | Mod: S$GLB,,, | Performed by: INTERNAL MEDICINE

## 2020-12-15 PROCEDURE — 1101F PT FALLS ASSESS-DOCD LE1/YR: CPT | Mod: CPTII,S$GLB,, | Performed by: INTERNAL MEDICINE

## 2020-12-15 PROCEDURE — 3288F FALL RISK ASSESSMENT DOCD: CPT | Mod: CPTII,S$GLB,, | Performed by: INTERNAL MEDICINE

## 2020-12-15 PROCEDURE — 1159F MED LIST DOCD IN RCRD: CPT | Mod: S$GLB,,, | Performed by: INTERNAL MEDICINE

## 2020-12-15 PROCEDURE — 93000 ELECTROCARDIOGRAM COMPLETE: CPT | Mod: S$GLB,,, | Performed by: INTERNAL MEDICINE

## 2020-12-15 PROCEDURE — 99999 PR PBB SHADOW E&M-EST. PATIENT-LVL V: ICD-10-PCS | Mod: PBBFAC,,, | Performed by: INTERNAL MEDICINE

## 2020-12-15 PROCEDURE — 3052F PR MOST RECENT HEMOGLOBIN A1C LEVEL 8.0 - < 9.0%: ICD-10-PCS | Mod: CPTII,S$GLB,, | Performed by: INTERNAL MEDICINE

## 2020-12-15 PROCEDURE — 3077F PR MOST RECENT SYSTOLIC BLOOD PRESSURE >= 140 MM HG: ICD-10-PCS | Mod: CPTII,S$GLB,, | Performed by: INTERNAL MEDICINE

## 2020-12-15 PROCEDURE — 3078F PR MOST RECENT DIASTOLIC BLOOD PRESSURE < 80 MM HG: ICD-10-PCS | Mod: CPTII,S$GLB,, | Performed by: INTERNAL MEDICINE

## 2020-12-15 PROCEDURE — 99214 PR OFFICE/OUTPT VISIT, EST, LEVL IV, 30-39 MIN: ICD-10-PCS | Mod: S$GLB,,, | Performed by: INTERNAL MEDICINE

## 2020-12-15 PROCEDURE — 3288F PR FALLS RISK ASSESSMENT DOCUMENTED: ICD-10-PCS | Mod: CPTII,S$GLB,, | Performed by: INTERNAL MEDICINE

## 2020-12-15 PROCEDURE — 3077F SYST BP >= 140 MM HG: CPT | Mod: CPTII,S$GLB,, | Performed by: INTERNAL MEDICINE

## 2020-12-15 PROCEDURE — 1125F AMNT PAIN NOTED PAIN PRSNT: CPT | Mod: S$GLB,,, | Performed by: INTERNAL MEDICINE

## 2020-12-15 PROCEDURE — 99214 OFFICE O/P EST MOD 30 MIN: CPT | Mod: S$GLB,,, | Performed by: INTERNAL MEDICINE

## 2020-12-15 PROCEDURE — 1125F PR PAIN SEVERITY QUANTIFIED, PAIN PRESENT: ICD-10-PCS | Mod: S$GLB,,, | Performed by: INTERNAL MEDICINE

## 2020-12-15 PROCEDURE — 99999 PR PBB SHADOW E&M-EST. PATIENT-LVL V: CPT | Mod: PBBFAC,,, | Performed by: INTERNAL MEDICINE

## 2020-12-15 PROCEDURE — 1101F PR PT FALLS ASSESS DOC 0-1 FALLS W/OUT INJ PAST YR: ICD-10-PCS | Mod: CPTII,S$GLB,, | Performed by: INTERNAL MEDICINE

## 2020-12-15 PROCEDURE — 1157F PR ADVANCE CARE PLAN OR EQUIV PRESENT IN MEDICAL RECORD: ICD-10-PCS | Mod: S$GLB,,, | Performed by: INTERNAL MEDICINE

## 2020-12-15 RX ORDER — SPIRONOLACTONE 50 MG/1
50 TABLET, FILM COATED ORAL DAILY
Qty: 90 TABLET | Refills: 3 | Status: SHIPPED | OUTPATIENT
Start: 2020-12-15 | End: 2021-03-05 | Stop reason: SDUPTHER

## 2020-12-15 NOTE — PROGRESS NOTES
CARDIOVASCULAR PROGRESS NOTE    REASON FOR CONSULT:   Joel Condon is a 80 y.o. female who presents for follow up of HTN.    PCP: Av  HISTORY OF PRESENT ILLNESS:   Last seen February 2020.    Patient returns for follow-up.  Unfortunately, once again,, she did not get any of her testing as previously ordered.  This time, the patient tells me that she has a transportation issue.  Nevertheless, she denies intercurrent angina, dyspnea, palpitations, or syncope.  There has been no PND, orthopnea, or lower extremity edema.  She denies melena, hematuria, or claudicant symptoms.  She has run out of her Aldactone I refilled this today.  Her blood pressure is out of range.    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 Arce)    CATARACT EXTRACTION W/  INTRAOCULAR LENS IMPLANT Right 06/12/2014    OD (Dr. Arce)    CHOLECYSTECTOMY      COLONOSCOPY N/A 4/21/2017    Procedure: COLONOSCOPY;  Surgeon: Homar Payan MD;  Location: [facility] ENDO;  Service: Endoscopy;  Laterality: N/A;    COLONOSCOPY N/A 6/29/2018    Procedure: COLONOSCOPY;  Surgeon: Mykel Boles MD;  Location: [facility] ENDO;  Service: Endoscopy;  Laterality: N/A;    EYE SURGERY  04/24/2014 & 06/12/2014    HYSTERECTOMY      total    left eye cataract surgery   "4/24/14    OOPHORECTOMY         ALLERGIES AND MEDICATION:     Review of patient's allergies indicates:   Allergen Reactions    Lisinopril Other (See Comments)     Cough      Pravastatin Other (See Comments)     headaches     Previous Medications    ASPIRIN (ECOTRIN) 81 MG EC TABLET    Take 1 tablet (81 mg total) by mouth once daily.    ATORVASTATIN (LIPITOR) 10 MG TABLET    TAKE 1 TABLET(10 MG) BY MOUTH EVERY DAY    BLOOD SUGAR DIAGNOSTIC STRP    True Results; Test four times a day.    CHOLECALCIFEROL, VITAMIN D3, (VITAMIN D3) 100 MCG (4,000 UNIT) CAP    Take by mouth.    CYANOCOBALAMIN, VITAMIN B-12, MISC    by Misc.(Non-Drug; Combo Route) route.    HYDRALAZINE (APRESOLINE) 50 MG TABLET        INSULIN DETEMIR U-100 (LEVEMIR FLEXTOUCH U-100 INSULN) 100 UNIT/ML (3 ML) INPN PEN    Inject 40 Units into the skin every evening.    INSULIN LISPRO (HUMALOG KWIKPEN INSULIN) 100 UNIT/ML PEN    Injects 28 units three times daily before meals    INSULIN SYRINGE-NEEDLE U-100 1 ML 31 GAUGE X 5/16 SYRG    USE THREE TIMES DAILY AS DIRECTED    LANCETS 26 GAUGE MISC    1 lancet by Misc.(Non-Drug; Combo Route) route 3 (three) times daily.    LETROZOLE (FEMARA) 2.5 MG TAB    Take 1 tab by mouth daily.    LEVOTHYROXINE (SYNTHROID) 50 MCG TABLET    TAKE 1 TABLET BY MOUTH EVERY MORNING BEFORE BREAKFAST    LOSARTAN (COZAAR) 100 MG TABLET    TAKE 1 TABLET BY MOUTH DAILY    METFORMIN (GLUCOPHAGE-XR) 500 MG XR 24HR TABLET    TAKE 2 TABLETS BY MOUTH EVERY MORNING, AND 1 TABLET AT NIGHT    METOPROLOL SUCCINATE (TOPROL-XL) 200 MG 24 HR TABLET    Take 1 tablet (200 mg total) by mouth once daily.    NIFEDIPINE (PROCARDIA-XL) 90 MG (OSM) 24 HR TABLET    TAKE 1 TABLET(90 MG) BY MOUTH EVERY DAY    OMEPRAZOLE (PRILOSEC) 20 MG CAPSULE    TAKE 1 CAPSULE(20 MG) BY MOUTH DAILY AS NEEDED FOR HEARTBURN    PEN NEEDLE, DIABETIC (BD SHIV 2ND GEN PEN NEEDLE) 32 GAUGE X 5/32" NDLE    Use 4x/day    SPIRONOLACTONE (ALDACTONE) 50 MG TABLET    TAKE 1 TABLET BY " MOUTH ONCE DAILY    VITAMIN E ACETATE (VITAMIN E ORAL)    Take by mouth.       SOCIAL HISTORY:     Social History     Socioeconomic History    Marital status:      Spouse name: Not on file    Number of children: 3    Years of education: Not on file    Highest education level: Not on file   Occupational History    Occupation: retired   Social Needs    Financial resource strain: Not on file    Food insecurity     Worry: Not on file     Inability: Not on file    Transportation needs     Medical: Not on file     Non-medical: Not on file   Tobacco Use    Smoking status: Never Smoker    Smokeless tobacco: Never Used   Substance and Sexual Activity    Alcohol use: No    Drug use: No    Sexual activity: Not Currently     Partners: Male   Lifestyle    Physical activity     Days per week: Not on file     Minutes per session: Not on file    Stress: Not on file   Relationships    Social connections     Talks on phone: Not on file     Gets together: Not on file     Attends Congregational service: Not on file     Active member of club or organization: Not on file     Attends meetings of clubs or organizations: Not on file     Relationship status: Not on file   Other Topics Concern    Not on file   Social History Narrative    Not on file       FAMILY HISTORY:     Family History   Problem Relation Age of Onset    Diabetes Mother     Heart disease Mother     Hypertension Mother     Stroke Mother     Diabetes Sister     Hypertension Sister     Diabetes Sister     Hypertension Sister     Diabetes Sister     Hypertension Sister     Hypertension Sister     Diabetes Sister     Diabetes Brother     Diabetes Brother     Diabetes Brother     Prostate cancer Brother     Amblyopia Neg Hx     Blindness Neg Hx     Cataracts Neg Hx     Glaucoma Neg Hx     Macular degeneration Neg Hx     Retinal detachment Neg Hx     Strabismus Neg Hx        REVIEW OF SYSTEMS:   Review of Systems   Constitutional:  "Negative for chills, diaphoresis and fever.   HENT: Negative for nosebleeds.    Eyes: Negative for blurred vision, double vision and photophobia.   Respiratory: Negative for hemoptysis, shortness of breath and wheezing.    Cardiovascular:  Negative for chest pain, palps,orthopnea, claudication, leg swelling and PND.   Gastrointestinal: Negative for abdominal pain, blood in stool, heartburn, melena, nausea and vomiting.   Genitourinary: Negative for flank pain and hematuria.   Musculoskeletal: Negative for falls, joint pain, myalgias and neck pain.   Skin: Negative for rash.   Neurological: Negative for dizziness, seizures, loss of consciousness, weakness and headaches.   Endo/Heme/Allergies: Negative for polydipsia. Does not bruise/bleed easily.   Psychiatric/Behavioral: Negative for depression and memory loss. The patient is not nervous/anxious.        PHYSICAL EXAM:     Vitals:    12/15/20 1437   BP: (!) 150/67   Pulse: 71   Resp: 16    Body mass index is 28.57 kg/m².  Weight: 75.5 kg (166 lb 7.2 oz)   Height: 5' 4" (162.6 cm)     Physical Exam   Constitutional: She is oriented to person, place, and time. She appears well-developed and well-nourished. She is cooperative.  Non-toxic appearance. No distress.   HENT:   Head: Normocephalic and atraumatic.   Eyes: Pupils are equal, round, and reactive to light. Conjunctivae and EOM are normal. No scleral icterus.   Neck: Trachea normal. Neck supple. Normal carotid pulses and no JVD present. Carotid bruit is not present. No neck rigidity. No tracheal deviation and no edema present. No thyromegaly present.   Cardiovascular: Normal rate, regular rhythm, S1 normal and S2 normal. PMI is not displaced. Exam reveals no gallop and no friction rub.   No murmur heard.  Pulses:       Carotid pulses are 2+ on the right side, and 2+ on the left side.  Pulmonary/Chest: Effort normal and breath sounds normal. No stridor. No respiratory distress. She has no wheezes. She has no rales. " She exhibits no tenderness.   Abdominal: Soft. She exhibits no distension. There is no hepatosplenomegaly.   Musculoskeletal: Normal range of motion. She exhibits no edema or tenderness.   Feet:   Right Foot:   Skin Integrity: Negative for ulcer.   Left Foot:   Skin Integrity: Negative for ulcer.   Neurological: She is alert and oriented to person, place, and time. No cranial nerve deficit.   Skin: Skin is warm and dry. No rash noted. No erythema.   Psychiatric: She has a normal mood and affect. Her speech is normal and behavior is normal.   Vitals reviewed.      DATA:   EKG: (personally reviewed tracing(s))  12/15/20 SR 71, PRWP, IMI-age indet, NSSTTW changes, similar to 2/13/20    Laboratory:  CBC:  Recent Labs   Lab 05/22/20  0805 08/11/20  0908 12/01/20  0755   WBC 10.75 8.38 9.37   Hemoglobin 12.6 12.2 12.4   Hematocrit 40.9 38.0 39.4   Platelets 312 325 321       CHEMISTRIES:  Recent Labs   Lab 11/30/20  0916 12/01/20  0755 12/04/20  0940   Glucose 240 H  240 H 143 H 112 H   Sodium 138  138 143 142   Potassium 4.7  4.7 4.3 4.4   BUN 17  17 18 13   Creatinine 1.1  1.1 1.1 1.0   eGFR if African American 55 A  55 A 55.2 A >60.0   eGFR if non  48 A  48 A 47.9 A 53.7 A   Calcium 9.5  9.5 9.7 8.9       CARDIAC BIOMARKERS:        COAGS:        LIPIDS/LFTS:  Recent Labs   Lab 04/25/18  0719  02/26/19  0816  08/11/20  0908 11/30/20  0916 12/01/20  0755   Cholesterol 113 L  --  122  --   --   --   --    Triglycerides 78  --  146  --   --   --   --    HDL 48  --  47  --   --   --   --    LDL Cholesterol 49.4 L  --  45.8 L  --   --   --   --    Non-HDL Cholesterol 65  --  75  --   --   --   --    AST 22   < >  --    < > 14 17  17 16   ALT 16   < >  --    < > 10 17  17 15    < > = values in this interval not displayed.     Lab Results   Component Value Date    TSH 3.315 03/14/2017         Cardiovascular Testing:  Renal art US 10/25/17  Elevated peak systolic velocity ratio of 147 cm/sec in the  proximal right renal artery with no secondary findings to suggest renal artery stenosis. Short-term followup suggested.  Borderline elevation of the renal resistive indices (0.79-0.80).  No evidence of obstructive uropathy.    Holter 3/28/16:  PREDOMINANT RHYTHM  1. Sinus rhythm with heart rates varying between 65 and 111 bpm with an average of 80 bpm.   VENTRICULAR ARRHYTHMIAS  1. There were occasional PVCs totalling 697 and averaging 29 per hour. There was 1 couplet.  2. There were no episodes of ventricular tachycardia.  SUPRA VENTRICULAR ARRHYTHMIAS  1. There were very rare PACs recorded totalling 1 and averaging less than 1 per hour.   2. There were no episodes of sustained supraventricular tachycardia.  SINUS NODE FUNCTION  1. There was no evidence of high grade SA cristian block.   AV CONDUCTION  1. There was no evidence of high grade AV block.   DIARY  1. The diary was not returned  MISCELLANEOUS  1. There were occasional hookup related artifacts. Overall, the study was of good quality.   2. This was a tape of adequate length (24 hrs).    Echo 3/28/16:  1 - Normal left ventricular systolic function (EF 60-65%). Normal wall motion.   2 - Concentric remodeling.   3 - Trivial tricuspid regurgitation.   4 - The estimated PA systolic pressure is 27 mmHg.     Ex-MPI 3/28/16   Jignesh 2min, 86% MPHR, -CP/EKG  Nuclear Quantitative Functional Analysis:   Quantitative measurements of LV function are over estimated secondary to small ventricular volumes.   Visually estimated LV function is hyperdynamic.   Impression: NORMAL MYOCARDIAL PERFUSION  1. The perfusion scan is free of evidence for myocardial ischemia or injury.   2. Resting wall motion is physiologic.   3. Visually estimated LV function is hyperdynamic.   4. The ventricular volumes are normal at rest and stress.   5. The extracardiac distribution of radioactivity is normal.    ASSESSMENT:   # abnl EKG  # HTN, uncontrolled.  Ran out of aldact  # DM  # HLP, on  atorva 10mg  # ?PASCUAL, did not follow thru with sleep med appt    PLAN:   Cont med rx  Refilled aldact  RN BP check in 2 weeks (Dr. Ley)  RTC 3 months for reassessment, will consider ischemic assessment +/- TRAVIS US if pt agreeable.      Juan Pablo Ordoñez MD, FACC

## 2021-01-07 RX ORDER — METFORMIN HYDROCHLORIDE 500 MG/1
TABLET, EXTENDED RELEASE ORAL
Qty: 270 TABLET | Refills: 0 | Status: SHIPPED | OUTPATIENT
Start: 2021-01-07 | End: 2021-04-19

## 2021-01-14 ENCOUNTER — TELEPHONE (OUTPATIENT)
Dept: ENDOCRINOLOGY | Facility: CLINIC | Age: 81
End: 2021-01-14

## 2021-01-14 RX ORDER — PEN NEEDLE, DIABETIC 30 GX3/16"
NEEDLE, DISPOSABLE MISCELLANEOUS
Qty: 400 EACH | Refills: 3 | Status: SHIPPED | OUTPATIENT
Start: 2021-01-14 | End: 2021-11-17

## 2021-01-27 DIAGNOSIS — I10 ESSENTIAL HYPERTENSION: ICD-10-CM

## 2021-01-27 RX ORDER — NIFEDIPINE 90 MG/1
TABLET, EXTENDED RELEASE ORAL
Qty: 90 TABLET | Refills: 0 | Status: SHIPPED | OUTPATIENT
Start: 2021-01-27 | End: 2021-03-05 | Stop reason: SDUPTHER

## 2021-02-03 DIAGNOSIS — E78.5 HYPERLIPIDEMIA LDL GOAL <100: ICD-10-CM

## 2021-02-03 RX ORDER — ATORVASTATIN CALCIUM 10 MG/1
10 TABLET, FILM COATED ORAL DAILY
Qty: 90 TABLET | Refills: 0 | Status: SHIPPED | OUTPATIENT
Start: 2021-02-03 | End: 2021-03-05 | Stop reason: SDUPTHER

## 2021-02-19 ENCOUNTER — PATIENT OUTREACH (OUTPATIENT)
Dept: ADMINISTRATIVE | Facility: HOSPITAL | Age: 81
End: 2021-02-19

## 2021-02-19 DIAGNOSIS — E78.5 HYPERLIPIDEMIA LDL GOAL <100: Primary | ICD-10-CM

## 2021-02-19 RX ORDER — HYDRALAZINE HYDROCHLORIDE 100 MG/1
TABLET, FILM COATED ORAL
COMMUNITY
Start: 2021-01-26 | End: 2021-03-05

## 2021-02-19 RX ORDER — PEN NEEDLE, DIABETIC 31 GX5/16"
NEEDLE, DISPOSABLE MISCELLANEOUS 4 TIMES DAILY
COMMUNITY
Start: 2021-01-15 | End: 2021-11-17 | Stop reason: SDUPTHER

## 2021-03-04 ENCOUNTER — IMMUNIZATION (OUTPATIENT)
Dept: OBSTETRICS AND GYNECOLOGY | Facility: CLINIC | Age: 81
End: 2021-03-04
Payer: MEDICARE

## 2021-03-04 DIAGNOSIS — Z23 NEED FOR VACCINATION: Primary | ICD-10-CM

## 2021-03-04 PROCEDURE — 91300 COVID-19, MRNA, LNP-S, PF, 30 MCG/0.3 ML DOSE VACCINE: CPT | Mod: PBBFAC | Performed by: FAMILY MEDICINE

## 2021-03-05 ENCOUNTER — OFFICE VISIT (OUTPATIENT)
Dept: FAMILY MEDICINE | Facility: CLINIC | Age: 81
End: 2021-03-05
Payer: MEDICARE

## 2021-03-05 VITALS
HEIGHT: 64 IN | BODY MASS INDEX: 27.92 KG/M2 | SYSTOLIC BLOOD PRESSURE: 130 MMHG | WEIGHT: 163.56 LBS | TEMPERATURE: 99 F | HEART RATE: 68 BPM | OXYGEN SATURATION: 95 % | DIASTOLIC BLOOD PRESSURE: 59 MMHG

## 2021-03-05 DIAGNOSIS — M85.80 OSTEOPENIA AFTER MENOPAUSE: ICD-10-CM

## 2021-03-05 DIAGNOSIS — E03.9 HYPOTHYROIDISM (ACQUIRED): ICD-10-CM

## 2021-03-05 DIAGNOSIS — K21.9 GASTROESOPHAGEAL REFLUX DISEASE WITHOUT ESOPHAGITIS: Chronic | ICD-10-CM

## 2021-03-05 DIAGNOSIS — Z78.0 OSTEOPENIA AFTER MENOPAUSE: ICD-10-CM

## 2021-03-05 DIAGNOSIS — Z79.4 LONG-TERM INSULIN USE: ICD-10-CM

## 2021-03-05 DIAGNOSIS — Z17.0 MALIGNANT NEOPLASM OF LEFT BREAST IN FEMALE, ESTROGEN RECEPTOR POSITIVE, UNSPECIFIED SITE OF BREAST: ICD-10-CM

## 2021-03-05 DIAGNOSIS — E78.5 HYPERLIPIDEMIA LDL GOAL <100: ICD-10-CM

## 2021-03-05 DIAGNOSIS — E66.3 OVERWEIGHT (BMI 25.0-29.9): ICD-10-CM

## 2021-03-05 DIAGNOSIS — I10 ESSENTIAL HYPERTENSION: ICD-10-CM

## 2021-03-05 DIAGNOSIS — Z00.00 ROUTINE MEDICAL EXAM: Primary | ICD-10-CM

## 2021-03-05 DIAGNOSIS — C50.912 MALIGNANT NEOPLASM OF LEFT BREAST IN FEMALE, ESTROGEN RECEPTOR POSITIVE, UNSPECIFIED SITE OF BREAST: ICD-10-CM

## 2021-03-05 PROCEDURE — 1101F PT FALLS ASSESS-DOCD LE1/YR: CPT | Mod: CPTII,S$GLB,, | Performed by: INTERNAL MEDICINE

## 2021-03-05 PROCEDURE — 1126F PR PAIN SEVERITY QUANTIFIED, NO PAIN PRESENT: ICD-10-PCS | Mod: S$GLB,,, | Performed by: INTERNAL MEDICINE

## 2021-03-05 PROCEDURE — 3052F PR MOST RECENT HEMOGLOBIN A1C LEVEL 8.0 - < 9.0%: ICD-10-PCS | Mod: CPTII,S$GLB,, | Performed by: INTERNAL MEDICINE

## 2021-03-05 PROCEDURE — 3075F PR MOST RECENT SYSTOLIC BLOOD PRESS GE 130-139MM HG: ICD-10-PCS | Mod: CPTII,S$GLB,, | Performed by: INTERNAL MEDICINE

## 2021-03-05 PROCEDURE — 3052F HG A1C>EQUAL 8.0%<EQUAL 9.0%: CPT | Mod: CPTII,S$GLB,, | Performed by: INTERNAL MEDICINE

## 2021-03-05 PROCEDURE — 1101F PR PT FALLS ASSESS DOC 0-1 FALLS W/OUT INJ PAST YR: ICD-10-PCS | Mod: CPTII,S$GLB,, | Performed by: INTERNAL MEDICINE

## 2021-03-05 PROCEDURE — 3075F SYST BP GE 130 - 139MM HG: CPT | Mod: CPTII,S$GLB,, | Performed by: INTERNAL MEDICINE

## 2021-03-05 PROCEDURE — 99499 RISK ADDL DX/OHS AUDIT: ICD-10-PCS | Mod: S$GLB,,, | Performed by: INTERNAL MEDICINE

## 2021-03-05 PROCEDURE — 99499 UNLISTED E&M SERVICE: CPT | Mod: S$GLB,,, | Performed by: INTERNAL MEDICINE

## 2021-03-05 PROCEDURE — 99999 PR PBB SHADOW E&M-EST. PATIENT-LVL V: CPT | Mod: PBBFAC,,, | Performed by: INTERNAL MEDICINE

## 2021-03-05 PROCEDURE — 99214 OFFICE O/P EST MOD 30 MIN: CPT | Mod: S$GLB,,, | Performed by: INTERNAL MEDICINE

## 2021-03-05 PROCEDURE — 1126F AMNT PAIN NOTED NONE PRSNT: CPT | Mod: S$GLB,,, | Performed by: INTERNAL MEDICINE

## 2021-03-05 PROCEDURE — 1157F ADVNC CARE PLAN IN RCRD: CPT | Mod: S$GLB,,, | Performed by: INTERNAL MEDICINE

## 2021-03-05 PROCEDURE — 99214 PR OFFICE/OUTPT VISIT, EST, LEVL IV, 30-39 MIN: ICD-10-PCS | Mod: S$GLB,,, | Performed by: INTERNAL MEDICINE

## 2021-03-05 PROCEDURE — 3288F PR FALLS RISK ASSESSMENT DOCUMENTED: ICD-10-PCS | Mod: CPTII,S$GLB,, | Performed by: INTERNAL MEDICINE

## 2021-03-05 PROCEDURE — 3078F DIAST BP <80 MM HG: CPT | Mod: CPTII,S$GLB,, | Performed by: INTERNAL MEDICINE

## 2021-03-05 PROCEDURE — 99999 PR PBB SHADOW E&M-EST. PATIENT-LVL V: ICD-10-PCS | Mod: PBBFAC,,, | Performed by: INTERNAL MEDICINE

## 2021-03-05 PROCEDURE — 3288F FALL RISK ASSESSMENT DOCD: CPT | Mod: CPTII,S$GLB,, | Performed by: INTERNAL MEDICINE

## 2021-03-05 PROCEDURE — 3078F PR MOST RECENT DIASTOLIC BLOOD PRESSURE < 80 MM HG: ICD-10-PCS | Mod: CPTII,S$GLB,, | Performed by: INTERNAL MEDICINE

## 2021-03-05 PROCEDURE — 1157F PR ADVANCE CARE PLAN OR EQUIV PRESENT IN MEDICAL RECORD: ICD-10-PCS | Mod: S$GLB,,, | Performed by: INTERNAL MEDICINE

## 2021-03-05 RX ORDER — LEVOTHYROXINE SODIUM 50 UG/1
50 TABLET ORAL EVERY MORNING
Qty: 90 TABLET | Refills: 1 | Status: SHIPPED | OUTPATIENT
Start: 2021-03-05 | End: 2021-07-20

## 2021-03-05 RX ORDER — LOSARTAN POTASSIUM 100 MG/1
100 TABLET ORAL DAILY
Qty: 90 TABLET | Refills: 1 | Status: SHIPPED | OUTPATIENT
Start: 2021-03-05 | End: 2022-01-21

## 2021-03-05 RX ORDER — LETROZOLE 2.5 MG/1
TABLET, FILM COATED ORAL
Qty: 90 TABLET | Refills: 6 | Status: CANCELLED | OUTPATIENT
Start: 2021-03-05

## 2021-03-05 RX ORDER — NIFEDIPINE 90 MG/1
TABLET, EXTENDED RELEASE ORAL
Qty: 90 TABLET | Refills: 1 | Status: SHIPPED | OUTPATIENT
Start: 2021-03-05 | End: 2021-10-18

## 2021-03-05 RX ORDER — SPIRONOLACTONE 50 MG/1
50 TABLET, FILM COATED ORAL DAILY
Qty: 90 TABLET | Refills: 1 | Status: SHIPPED | OUTPATIENT
Start: 2021-03-05 | End: 2021-07-20

## 2021-03-05 RX ORDER — ATORVASTATIN CALCIUM 10 MG/1
10 TABLET, FILM COATED ORAL DAILY
Qty: 90 TABLET | Refills: 1 | Status: SHIPPED | OUTPATIENT
Start: 2021-03-05 | End: 2021-10-18

## 2021-03-05 RX ORDER — METOPROLOL SUCCINATE 200 MG/1
200 TABLET, EXTENDED RELEASE ORAL DAILY
Qty: 90 TABLET | Refills: 1 | Status: SHIPPED | OUTPATIENT
Start: 2021-03-05 | End: 2021-10-18

## 2021-03-05 RX ORDER — INSULIN DETEMIR 100 [IU]/ML
40 INJECTION, SOLUTION SUBCUTANEOUS NIGHTLY
Qty: 2 BOX | Refills: 3 | Status: SHIPPED | OUTPATIENT
Start: 2021-03-05 | End: 2021-11-17

## 2021-03-08 NOTE — PROGRESS NOTES
Pt overdue FOR diagnostic MAMMO and US    ( abnormal screening mammo 11/20)     Orders previously completed    Please schedule

## 2021-03-16 ENCOUNTER — LAB VISIT (OUTPATIENT)
Dept: LAB | Facility: HOSPITAL | Age: 81
End: 2021-03-16
Attending: INTERNAL MEDICINE
Payer: MEDICARE

## 2021-03-16 DIAGNOSIS — Z17.0 MALIGNANT NEOPLASM OF LEFT BREAST IN FEMALE, ESTROGEN RECEPTOR POSITIVE, UNSPECIFIED SITE OF BREAST: ICD-10-CM

## 2021-03-16 DIAGNOSIS — E78.5 HYPERLIPIDEMIA LDL GOAL <100: ICD-10-CM

## 2021-03-16 DIAGNOSIS — E03.9 HYPOTHYROIDISM (ACQUIRED): ICD-10-CM

## 2021-03-16 DIAGNOSIS — C50.912 MALIGNANT NEOPLASM OF LEFT BREAST IN FEMALE, ESTROGEN RECEPTOR POSITIVE, UNSPECIFIED SITE OF BREAST: ICD-10-CM

## 2021-03-16 LAB
ALBUMIN SERPL BCP-MCNC: 3.7 G/DL (ref 3.5–5.2)
ALP SERPL-CCNC: 58 U/L (ref 55–135)
ALT SERPL W/O P-5'-P-CCNC: 10 U/L (ref 10–44)
ANION GAP SERPL CALC-SCNC: 9 MMOL/L (ref 8–16)
AST SERPL-CCNC: 15 U/L (ref 10–40)
BASOPHILS # BLD AUTO: 0.06 K/UL (ref 0–0.2)
BASOPHILS NFR BLD: 0.8 % (ref 0–1.9)
BILIRUB SERPL-MCNC: 0.5 MG/DL (ref 0.1–1)
BUN SERPL-MCNC: 21 MG/DL (ref 8–23)
CALCIUM SERPL-MCNC: 10 MG/DL (ref 8.7–10.5)
CHLORIDE SERPL-SCNC: 106 MMOL/L (ref 95–110)
CHOLEST SERPL-MCNC: 115 MG/DL (ref 120–199)
CHOLEST/HDLC SERPL: 2.8 {RATIO} (ref 2–5)
CO2 SERPL-SCNC: 27 MMOL/L (ref 23–29)
CREAT SERPL-MCNC: 1.2 MG/DL (ref 0.5–1.4)
DIFFERENTIAL METHOD: ABNORMAL
EOSINOPHIL # BLD AUTO: 0.4 K/UL (ref 0–0.5)
EOSINOPHIL NFR BLD: 5.3 % (ref 0–8)
ERYTHROCYTE [DISTWIDTH] IN BLOOD BY AUTOMATED COUNT: 13.6 % (ref 11.5–14.5)
EST. GFR  (AFRICAN AMERICAN): 50 ML/MIN/1.73 M^2
EST. GFR  (NON AFRICAN AMERICAN): 43 ML/MIN/1.73 M^2
ESTIMATED AVG GLUCOSE: 177 MG/DL (ref 68–131)
GLUCOSE SERPL-MCNC: 152 MG/DL (ref 70–110)
HBA1C MFR BLD: 7.8 % (ref 4–5.6)
HCT VFR BLD AUTO: 39.4 % (ref 37–48.5)
HDLC SERPL-MCNC: 41 MG/DL (ref 40–75)
HDLC SERPL: 35.7 % (ref 20–50)
HGB BLD-MCNC: 12.5 G/DL (ref 12–16)
IMM GRANULOCYTES # BLD AUTO: 0.02 K/UL (ref 0–0.04)
IMM GRANULOCYTES NFR BLD AUTO: 0.3 % (ref 0–0.5)
LDLC SERPL CALC-MCNC: 50.8 MG/DL (ref 63–159)
LYMPHOCYTES # BLD AUTO: 3.2 K/UL (ref 1–4.8)
LYMPHOCYTES NFR BLD: 39.7 % (ref 18–48)
MCH RBC QN AUTO: 30.3 PG (ref 27–31)
MCHC RBC AUTO-ENTMCNC: 31.7 G/DL (ref 32–36)
MCV RBC AUTO: 96 FL (ref 82–98)
MONOCYTES # BLD AUTO: 0.6 K/UL (ref 0.3–1)
MONOCYTES NFR BLD: 7.4 % (ref 4–15)
NEUTROPHILS # BLD AUTO: 3.7 K/UL (ref 1.8–7.7)
NEUTROPHILS NFR BLD: 46.5 % (ref 38–73)
NONHDLC SERPL-MCNC: 74 MG/DL
NRBC BLD-RTO: 0 /100 WBC
PLATELET # BLD AUTO: 379 K/UL (ref 150–350)
PMV BLD AUTO: 10.9 FL (ref 9.2–12.9)
POTASSIUM SERPL-SCNC: 4.3 MMOL/L (ref 3.5–5.1)
PROT SERPL-MCNC: 7.4 G/DL (ref 6–8.4)
RBC # BLD AUTO: 4.12 M/UL (ref 4–5.4)
SODIUM SERPL-SCNC: 142 MMOL/L (ref 136–145)
T4 FREE SERPL-MCNC: 1.06 NG/DL (ref 0.71–1.51)
TRIGL SERPL-MCNC: 116 MG/DL (ref 30–150)
TSH SERPL DL<=0.005 MIU/L-ACNC: 4.31 UIU/ML (ref 0.4–4)
WBC # BLD AUTO: 7.95 K/UL (ref 3.9–12.7)

## 2021-03-16 PROCEDURE — 84443 ASSAY THYROID STIM HORMONE: CPT | Performed by: INTERNAL MEDICINE

## 2021-03-16 PROCEDURE — 80061 LIPID PANEL: CPT | Performed by: INTERNAL MEDICINE

## 2021-03-16 PROCEDURE — 85025 COMPLETE CBC W/AUTO DIFF WBC: CPT | Performed by: INTERNAL MEDICINE

## 2021-03-16 PROCEDURE — 36415 COLL VENOUS BLD VENIPUNCTURE: CPT | Mod: PO | Performed by: INTERNAL MEDICINE

## 2021-03-16 PROCEDURE — 80053 COMPREHEN METABOLIC PANEL: CPT | Performed by: INTERNAL MEDICINE

## 2021-03-16 PROCEDURE — 83036 HEMOGLOBIN GLYCOSYLATED A1C: CPT | Performed by: INTERNAL MEDICINE

## 2021-03-16 PROCEDURE — 84439 ASSAY OF FREE THYROXINE: CPT | Performed by: INTERNAL MEDICINE

## 2021-03-17 ENCOUNTER — OFFICE VISIT (OUTPATIENT)
Dept: HEMATOLOGY/ONCOLOGY | Facility: CLINIC | Age: 81
End: 2021-03-17
Payer: MEDICARE

## 2021-03-17 VITALS
BODY MASS INDEX: 27.55 KG/M2 | HEIGHT: 64 IN | SYSTOLIC BLOOD PRESSURE: 171 MMHG | OXYGEN SATURATION: 95 % | TEMPERATURE: 98 F | WEIGHT: 161.38 LBS | DIASTOLIC BLOOD PRESSURE: 76 MMHG | HEART RATE: 67 BPM

## 2021-03-17 DIAGNOSIS — Z79.811 USE OF AROMATASE INHIBITORS: ICD-10-CM

## 2021-03-17 DIAGNOSIS — Z17.0 MALIGNANT NEOPLASM OF LEFT BREAST IN FEMALE, ESTROGEN RECEPTOR POSITIVE, UNSPECIFIED SITE OF BREAST: Primary | ICD-10-CM

## 2021-03-17 DIAGNOSIS — C50.912 MALIGNANT NEOPLASM OF LEFT BREAST IN FEMALE, ESTROGEN RECEPTOR POSITIVE, UNSPECIFIED SITE OF BREAST: Primary | ICD-10-CM

## 2021-03-17 DIAGNOSIS — I10 ESSENTIAL HYPERTENSION: ICD-10-CM

## 2021-03-17 DIAGNOSIS — M81.0 OSTEOPOROSIS, UNSPECIFIED OSTEOPOROSIS TYPE, UNSPECIFIED PATHOLOGICAL FRACTURE PRESENCE: ICD-10-CM

## 2021-03-17 PROCEDURE — 3077F SYST BP >= 140 MM HG: CPT | Mod: CPTII,S$GLB,, | Performed by: INTERNAL MEDICINE

## 2021-03-17 PROCEDURE — 99214 OFFICE O/P EST MOD 30 MIN: CPT | Mod: S$GLB,,, | Performed by: INTERNAL MEDICINE

## 2021-03-17 PROCEDURE — 99214 PR OFFICE/OUTPT VISIT, EST, LEVL IV, 30-39 MIN: ICD-10-PCS | Mod: S$GLB,,, | Performed by: INTERNAL MEDICINE

## 2021-03-17 PROCEDURE — 1157F PR ADVANCE CARE PLAN OR EQUIV PRESENT IN MEDICAL RECORD: ICD-10-PCS | Mod: S$GLB,,, | Performed by: INTERNAL MEDICINE

## 2021-03-17 PROCEDURE — 99999 PR PBB SHADOW E&M-EST. PATIENT-LVL V: ICD-10-PCS | Mod: PBBFAC,,, | Performed by: INTERNAL MEDICINE

## 2021-03-17 PROCEDURE — 3078F DIAST BP <80 MM HG: CPT | Mod: CPTII,S$GLB,, | Performed by: INTERNAL MEDICINE

## 2021-03-17 PROCEDURE — 1159F MED LIST DOCD IN RCRD: CPT | Mod: S$GLB,,, | Performed by: INTERNAL MEDICINE

## 2021-03-17 PROCEDURE — 99499 RISK ADDL DX/OHS AUDIT: ICD-10-PCS | Mod: S$GLB,,, | Performed by: INTERNAL MEDICINE

## 2021-03-17 PROCEDURE — 1126F PR PAIN SEVERITY QUANTIFIED, NO PAIN PRESENT: ICD-10-PCS | Mod: S$GLB,,, | Performed by: INTERNAL MEDICINE

## 2021-03-17 PROCEDURE — 99499 UNLISTED E&M SERVICE: CPT | Mod: S$GLB,,, | Performed by: INTERNAL MEDICINE

## 2021-03-17 PROCEDURE — 1101F PT FALLS ASSESS-DOCD LE1/YR: CPT | Mod: CPTII,S$GLB,, | Performed by: INTERNAL MEDICINE

## 2021-03-17 PROCEDURE — 3288F PR FALLS RISK ASSESSMENT DOCUMENTED: ICD-10-PCS | Mod: CPTII,S$GLB,, | Performed by: INTERNAL MEDICINE

## 2021-03-17 PROCEDURE — 3077F PR MOST RECENT SYSTOLIC BLOOD PRESSURE >= 140 MM HG: ICD-10-PCS | Mod: CPTII,S$GLB,, | Performed by: INTERNAL MEDICINE

## 2021-03-17 PROCEDURE — 1157F ADVNC CARE PLAN IN RCRD: CPT | Mod: S$GLB,,, | Performed by: INTERNAL MEDICINE

## 2021-03-17 PROCEDURE — 3288F FALL RISK ASSESSMENT DOCD: CPT | Mod: CPTII,S$GLB,, | Performed by: INTERNAL MEDICINE

## 2021-03-17 PROCEDURE — 1159F PR MEDICATION LIST DOCUMENTED IN MEDICAL RECORD: ICD-10-PCS | Mod: S$GLB,,, | Performed by: INTERNAL MEDICINE

## 2021-03-17 PROCEDURE — 99999 PR PBB SHADOW E&M-EST. PATIENT-LVL V: CPT | Mod: PBBFAC,,, | Performed by: INTERNAL MEDICINE

## 2021-03-17 PROCEDURE — 1101F PR PT FALLS ASSESS DOC 0-1 FALLS W/OUT INJ PAST YR: ICD-10-PCS | Mod: CPTII,S$GLB,, | Performed by: INTERNAL MEDICINE

## 2021-03-17 PROCEDURE — 1126F AMNT PAIN NOTED NONE PRSNT: CPT | Mod: S$GLB,,, | Performed by: INTERNAL MEDICINE

## 2021-03-17 PROCEDURE — 3078F PR MOST RECENT DIASTOLIC BLOOD PRESSURE < 80 MM HG: ICD-10-PCS | Mod: CPTII,S$GLB,, | Performed by: INTERNAL MEDICINE

## 2021-03-25 ENCOUNTER — IMMUNIZATION (OUTPATIENT)
Dept: OBSTETRICS AND GYNECOLOGY | Facility: CLINIC | Age: 81
End: 2021-03-25

## 2021-03-25 DIAGNOSIS — Z23 NEED FOR VACCINATION: Primary | ICD-10-CM

## 2021-03-25 PROCEDURE — 91300 COVID-19, MRNA, LNP-S, PF, 30 MCG/0.3 ML DOSE VACCINE: CPT | Mod: S$GLB,,, | Performed by: FAMILY MEDICINE

## 2021-03-25 PROCEDURE — 0002A COVID-19, MRNA, LNP-S, PF, 30 MCG/0.3 ML DOSE VACCINE: CPT | Mod: CV19,S$GLB,, | Performed by: FAMILY MEDICINE

## 2021-03-25 PROCEDURE — 0002A COVID-19, MRNA, LNP-S, PF, 30 MCG/0.3 ML DOSE VACCINE: ICD-10-PCS | Mod: CV19,S$GLB,, | Performed by: FAMILY MEDICINE

## 2021-03-25 PROCEDURE — 91300 COVID-19, MRNA, LNP-S, PF, 30 MCG/0.3 ML DOSE VACCINE: ICD-10-PCS | Mod: S$GLB,,, | Performed by: FAMILY MEDICINE

## 2021-04-16 RX ORDER — METFORMIN HYDROCHLORIDE 500 MG/1
TABLET, EXTENDED RELEASE ORAL
Qty: 270 TABLET | Refills: 0 | Status: CANCELLED | OUTPATIENT
Start: 2021-04-16

## 2021-04-19 RX ORDER — METFORMIN HYDROCHLORIDE 500 MG/1
TABLET, EXTENDED RELEASE ORAL
Qty: 270 TABLET | Refills: 0 | Status: SHIPPED | OUTPATIENT
Start: 2021-04-19 | End: 2021-07-19

## 2021-05-31 ENCOUNTER — INFUSION (OUTPATIENT)
Dept: INFUSION THERAPY | Facility: HOSPITAL | Age: 81
End: 2021-05-31
Attending: INTERNAL MEDICINE
Payer: MEDICARE

## 2021-05-31 VITALS
HEART RATE: 69 BPM | SYSTOLIC BLOOD PRESSURE: 145 MMHG | DIASTOLIC BLOOD PRESSURE: 64 MMHG | RESPIRATION RATE: 17 BRPM | TEMPERATURE: 98 F | OXYGEN SATURATION: 98 %

## 2021-05-31 DIAGNOSIS — M85.80 OSTEOPENIA, UNSPECIFIED LOCATION: Primary | ICD-10-CM

## 2021-05-31 PROCEDURE — 63600175 PHARM REV CODE 636 W HCPCS: Mod: JG | Performed by: INTERNAL MEDICINE

## 2021-05-31 PROCEDURE — 96372 THER/PROPH/DIAG INJ SC/IM: CPT

## 2021-05-31 RX ADMIN — DENOSUMAB 60 MG: 60 INJECTION SUBCUTANEOUS at 09:05

## 2021-06-15 ENCOUNTER — HOSPITAL ENCOUNTER (OUTPATIENT)
Dept: RADIOLOGY | Facility: CLINIC | Age: 81
Discharge: HOME | End: 2021-06-15
Attending: INTERNAL MEDICINE
Payer: MEDICARE

## 2021-06-15 DIAGNOSIS — M81.0 OSTEOPOROSIS, UNSPECIFIED OSTEOPOROSIS TYPE, UNSPECIFIED PATHOLOGICAL FRACTURE PRESENCE: ICD-10-CM

## 2021-06-15 PROCEDURE — 77080 DEXA BONE DENSITY SPINE HIP: ICD-10-PCS | Mod: 26,,, | Performed by: INTERNAL MEDICINE

## 2021-06-15 PROCEDURE — 77080 DXA BONE DENSITY AXIAL: CPT | Mod: TC,PO

## 2021-06-15 PROCEDURE — 77080 DXA BONE DENSITY AXIAL: CPT | Mod: 26,,, | Performed by: INTERNAL MEDICINE

## 2021-06-17 ENCOUNTER — OFFICE VISIT (OUTPATIENT)
Dept: HEMATOLOGY/ONCOLOGY | Facility: CLINIC | Age: 81
End: 2021-06-17
Payer: MEDICARE

## 2021-06-17 ENCOUNTER — TELEPHONE (OUTPATIENT)
Dept: HEMATOLOGY/ONCOLOGY | Facility: CLINIC | Age: 81
End: 2021-06-17

## 2021-06-17 VITALS
BODY MASS INDEX: 27.36 KG/M2 | HEIGHT: 64 IN | OXYGEN SATURATION: 96 % | DIASTOLIC BLOOD PRESSURE: 64 MMHG | TEMPERATURE: 98 F | SYSTOLIC BLOOD PRESSURE: 133 MMHG | WEIGHT: 160.25 LBS | HEART RATE: 66 BPM

## 2021-06-17 DIAGNOSIS — M81.8 OTHER OSTEOPOROSIS WITHOUT CURRENT PATHOLOGICAL FRACTURE: ICD-10-CM

## 2021-06-17 DIAGNOSIS — Z17.0 MALIGNANT NEOPLASM OF LEFT BREAST IN FEMALE, ESTROGEN RECEPTOR POSITIVE, UNSPECIFIED SITE OF BREAST: Primary | ICD-10-CM

## 2021-06-17 DIAGNOSIS — Z79.811 LONG TERM (CURRENT) USE OF AROMATASE INHIBITORS: ICD-10-CM

## 2021-06-17 DIAGNOSIS — C50.912 MALIGNANT NEOPLASM OF LEFT BREAST IN FEMALE, ESTROGEN RECEPTOR POSITIVE, UNSPECIFIED SITE OF BREAST: Primary | ICD-10-CM

## 2021-06-17 DIAGNOSIS — I10 ESSENTIAL HYPERTENSION: ICD-10-CM

## 2021-06-17 PROCEDURE — 1157F ADVNC CARE PLAN IN RCRD: CPT | Mod: S$GLB,,, | Performed by: INTERNAL MEDICINE

## 2021-06-17 PROCEDURE — 3288F FALL RISK ASSESSMENT DOCD: CPT | Mod: CPTII,S$GLB,, | Performed by: INTERNAL MEDICINE

## 2021-06-17 PROCEDURE — 1157F PR ADVANCE CARE PLAN OR EQUIV PRESENT IN MEDICAL RECORD: ICD-10-PCS | Mod: S$GLB,,, | Performed by: INTERNAL MEDICINE

## 2021-06-17 PROCEDURE — 99499 UNLISTED E&M SERVICE: CPT | Mod: S$GLB,,, | Performed by: INTERNAL MEDICINE

## 2021-06-17 PROCEDURE — 99214 OFFICE O/P EST MOD 30 MIN: CPT | Mod: S$GLB,,, | Performed by: INTERNAL MEDICINE

## 2021-06-17 PROCEDURE — 1159F MED LIST DOCD IN RCRD: CPT | Mod: S$GLB,,, | Performed by: INTERNAL MEDICINE

## 2021-06-17 PROCEDURE — 99999 PR PBB SHADOW E&M-EST. PATIENT-LVL V: ICD-10-PCS | Mod: PBBFAC,,, | Performed by: INTERNAL MEDICINE

## 2021-06-17 PROCEDURE — 3288F PR FALLS RISK ASSESSMENT DOCUMENTED: ICD-10-PCS | Mod: CPTII,S$GLB,, | Performed by: INTERNAL MEDICINE

## 2021-06-17 PROCEDURE — 1159F PR MEDICATION LIST DOCUMENTED IN MEDICAL RECORD: ICD-10-PCS | Mod: S$GLB,,, | Performed by: INTERNAL MEDICINE

## 2021-06-17 PROCEDURE — 1126F PR PAIN SEVERITY QUANTIFIED, NO PAIN PRESENT: ICD-10-PCS | Mod: S$GLB,,, | Performed by: INTERNAL MEDICINE

## 2021-06-17 PROCEDURE — 99999 PR PBB SHADOW E&M-EST. PATIENT-LVL V: CPT | Mod: PBBFAC,,, | Performed by: INTERNAL MEDICINE

## 2021-06-17 PROCEDURE — 1101F PT FALLS ASSESS-DOCD LE1/YR: CPT | Mod: CPTII,S$GLB,, | Performed by: INTERNAL MEDICINE

## 2021-06-17 PROCEDURE — 99499 RISK ADDL DX/OHS AUDIT: ICD-10-PCS | Mod: S$GLB,,, | Performed by: INTERNAL MEDICINE

## 2021-06-17 PROCEDURE — 1126F AMNT PAIN NOTED NONE PRSNT: CPT | Mod: S$GLB,,, | Performed by: INTERNAL MEDICINE

## 2021-06-17 PROCEDURE — 99214 PR OFFICE/OUTPT VISIT, EST, LEVL IV, 30-39 MIN: ICD-10-PCS | Mod: S$GLB,,, | Performed by: INTERNAL MEDICINE

## 2021-06-17 PROCEDURE — 1101F PR PT FALLS ASSESS DOC 0-1 FALLS W/OUT INJ PAST YR: ICD-10-PCS | Mod: CPTII,S$GLB,, | Performed by: INTERNAL MEDICINE

## 2021-07-19 RX ORDER — METFORMIN HYDROCHLORIDE 500 MG/1
TABLET, EXTENDED RELEASE ORAL
Qty: 270 TABLET | Refills: 0 | Status: SHIPPED | OUTPATIENT
Start: 2021-07-19 | End: 2021-10-15

## 2021-10-14 ENCOUNTER — LAB VISIT (OUTPATIENT)
Dept: LAB | Facility: HOSPITAL | Age: 81
End: 2021-10-14
Attending: INTERNAL MEDICINE
Payer: MEDICARE

## 2021-10-14 DIAGNOSIS — C50.912 MALIGNANT NEOPLASM OF LEFT BREAST IN FEMALE, ESTROGEN RECEPTOR POSITIVE, UNSPECIFIED SITE OF BREAST: ICD-10-CM

## 2021-10-14 DIAGNOSIS — Z17.0 MALIGNANT NEOPLASM OF LEFT BREAST IN FEMALE, ESTROGEN RECEPTOR POSITIVE, UNSPECIFIED SITE OF BREAST: ICD-10-CM

## 2021-10-14 LAB
ALBUMIN SERPL BCP-MCNC: 3.6 G/DL (ref 3.5–5.2)
ALP SERPL-CCNC: 63 U/L (ref 55–135)
ALT SERPL W/O P-5'-P-CCNC: 14 U/L (ref 10–44)
ANION GAP SERPL CALC-SCNC: 10 MMOL/L (ref 8–16)
AST SERPL-CCNC: 16 U/L (ref 10–40)
BASOPHILS # BLD AUTO: 0.09 K/UL (ref 0–0.2)
BASOPHILS NFR BLD: 1 % (ref 0–1.9)
BILIRUB SERPL-MCNC: 0.4 MG/DL (ref 0.1–1)
BUN SERPL-MCNC: 22 MG/DL (ref 8–23)
CALCIUM SERPL-MCNC: 10.2 MG/DL (ref 8.7–10.5)
CHLORIDE SERPL-SCNC: 108 MMOL/L (ref 95–110)
CO2 SERPL-SCNC: 22 MMOL/L (ref 23–29)
CREAT SERPL-MCNC: 1.2 MG/DL (ref 0.5–1.4)
DIFFERENTIAL METHOD: ABNORMAL
EOSINOPHIL # BLD AUTO: 0.5 K/UL (ref 0–0.5)
EOSINOPHIL NFR BLD: 5 % (ref 0–8)
ERYTHROCYTE [DISTWIDTH] IN BLOOD BY AUTOMATED COUNT: 13.9 % (ref 11.5–14.5)
EST. GFR  (AFRICAN AMERICAN): 49.3 ML/MIN/1.73 M^2
EST. GFR  (NON AFRICAN AMERICAN): 42.8 ML/MIN/1.73 M^2
GLUCOSE SERPL-MCNC: 165 MG/DL (ref 70–110)
HCT VFR BLD AUTO: 37.8 % (ref 37–48.5)
HGB BLD-MCNC: 12.2 G/DL (ref 12–16)
IMM GRANULOCYTES # BLD AUTO: 0.01 K/UL (ref 0–0.04)
IMM GRANULOCYTES NFR BLD AUTO: 0.1 % (ref 0–0.5)
LYMPHOCYTES # BLD AUTO: 3.3 K/UL (ref 1–4.8)
LYMPHOCYTES NFR BLD: 35.7 % (ref 18–48)
MCH RBC QN AUTO: 30.8 PG (ref 27–31)
MCHC RBC AUTO-ENTMCNC: 32.3 G/DL (ref 32–36)
MCV RBC AUTO: 96 FL (ref 82–98)
MONOCYTES # BLD AUTO: 0.8 K/UL (ref 0.3–1)
MONOCYTES NFR BLD: 8.7 % (ref 4–15)
NEUTROPHILS # BLD AUTO: 4.6 K/UL (ref 1.8–7.7)
NEUTROPHILS NFR BLD: 49.5 % (ref 38–73)
NRBC BLD-RTO: 0 /100 WBC
PLATELET # BLD AUTO: 355 K/UL (ref 150–450)
PMV BLD AUTO: 10.7 FL (ref 9.2–12.9)
POTASSIUM SERPL-SCNC: 4.6 MMOL/L (ref 3.5–5.1)
PROT SERPL-MCNC: 7.2 G/DL (ref 6–8.4)
RBC # BLD AUTO: 3.96 M/UL (ref 4–5.4)
SODIUM SERPL-SCNC: 140 MMOL/L (ref 136–145)
WBC # BLD AUTO: 9.36 K/UL (ref 3.9–12.7)

## 2021-10-14 PROCEDURE — 80053 COMPREHEN METABOLIC PANEL: CPT | Performed by: INTERNAL MEDICINE

## 2021-10-14 PROCEDURE — 36415 COLL VENOUS BLD VENIPUNCTURE: CPT | Mod: PO | Performed by: INTERNAL MEDICINE

## 2021-10-14 PROCEDURE — 85025 COMPLETE CBC W/AUTO DIFF WBC: CPT | Performed by: INTERNAL MEDICINE

## 2021-10-15 RX ORDER — METFORMIN HYDROCHLORIDE 500 MG/1
TABLET, EXTENDED RELEASE ORAL
Qty: 270 TABLET | Refills: 0 | Status: SHIPPED | OUTPATIENT
Start: 2021-10-15 | End: 2022-01-11

## 2021-10-18 ENCOUNTER — OFFICE VISIT (OUTPATIENT)
Dept: HEMATOLOGY/ONCOLOGY | Facility: CLINIC | Age: 81
End: 2021-10-18
Payer: MEDICARE

## 2021-10-18 VITALS
SYSTOLIC BLOOD PRESSURE: 142 MMHG | DIASTOLIC BLOOD PRESSURE: 61 MMHG | BODY MASS INDEX: 27.48 KG/M2 | OXYGEN SATURATION: 99 % | HEART RATE: 63 BPM | HEIGHT: 64 IN | WEIGHT: 160.94 LBS

## 2021-10-18 DIAGNOSIS — Z79.811 LONG TERM (CURRENT) USE OF AROMATASE INHIBITORS: ICD-10-CM

## 2021-10-18 DIAGNOSIS — I10 ESSENTIAL HYPERTENSION: ICD-10-CM

## 2021-10-18 DIAGNOSIS — Z17.0 MALIGNANT NEOPLASM OF LEFT BREAST IN FEMALE, ESTROGEN RECEPTOR POSITIVE, UNSPECIFIED SITE OF BREAST: Primary | ICD-10-CM

## 2021-10-18 DIAGNOSIS — C50.912 MALIGNANT NEOPLASM OF LEFT BREAST IN FEMALE, ESTROGEN RECEPTOR POSITIVE, UNSPECIFIED SITE OF BREAST: Primary | ICD-10-CM

## 2021-10-18 DIAGNOSIS — E78.5 HYPERLIPIDEMIA LDL GOAL <100: ICD-10-CM

## 2021-10-18 DIAGNOSIS — Z12.31 ENCOUNTER FOR SCREENING MAMMOGRAM FOR MALIGNANT NEOPLASM OF BREAST: ICD-10-CM

## 2021-10-18 PROCEDURE — 99999 PR PBB SHADOW E&M-EST. PATIENT-LVL V: CPT | Mod: PBBFAC,,, | Performed by: INTERNAL MEDICINE

## 2021-10-18 PROCEDURE — 1159F MED LIST DOCD IN RCRD: CPT | Mod: CPTII,S$GLB,, | Performed by: INTERNAL MEDICINE

## 2021-10-18 PROCEDURE — 3077F SYST BP >= 140 MM HG: CPT | Mod: CPTII,S$GLB,, | Performed by: INTERNAL MEDICINE

## 2021-10-18 PROCEDURE — 3288F FALL RISK ASSESSMENT DOCD: CPT | Mod: CPTII,S$GLB,, | Performed by: INTERNAL MEDICINE

## 2021-10-18 PROCEDURE — 1159F PR MEDICATION LIST DOCUMENTED IN MEDICAL RECORD: ICD-10-PCS | Mod: CPTII,S$GLB,, | Performed by: INTERNAL MEDICINE

## 2021-10-18 PROCEDURE — 3077F PR MOST RECENT SYSTOLIC BLOOD PRESSURE >= 140 MM HG: ICD-10-PCS | Mod: CPTII,S$GLB,, | Performed by: INTERNAL MEDICINE

## 2021-10-18 PROCEDURE — 1157F ADVNC CARE PLAN IN RCRD: CPT | Mod: CPTII,S$GLB,, | Performed by: INTERNAL MEDICINE

## 2021-10-18 PROCEDURE — 1101F PT FALLS ASSESS-DOCD LE1/YR: CPT | Mod: CPTII,S$GLB,, | Performed by: INTERNAL MEDICINE

## 2021-10-18 PROCEDURE — 1126F AMNT PAIN NOTED NONE PRSNT: CPT | Mod: CPTII,S$GLB,, | Performed by: INTERNAL MEDICINE

## 2021-10-18 PROCEDURE — 1101F PR PT FALLS ASSESS DOC 0-1 FALLS W/OUT INJ PAST YR: ICD-10-PCS | Mod: CPTII,S$GLB,, | Performed by: INTERNAL MEDICINE

## 2021-10-18 PROCEDURE — 3078F DIAST BP <80 MM HG: CPT | Mod: CPTII,S$GLB,, | Performed by: INTERNAL MEDICINE

## 2021-10-18 PROCEDURE — 99499 RISK ADDL DX/OHS AUDIT: ICD-10-PCS | Mod: S$GLB,,, | Performed by: INTERNAL MEDICINE

## 2021-10-18 PROCEDURE — 99214 PR OFFICE/OUTPT VISIT, EST, LEVL IV, 30-39 MIN: ICD-10-PCS | Mod: S$GLB,,, | Performed by: INTERNAL MEDICINE

## 2021-10-18 PROCEDURE — 1126F PR PAIN SEVERITY QUANTIFIED, NO PAIN PRESENT: ICD-10-PCS | Mod: CPTII,S$GLB,, | Performed by: INTERNAL MEDICINE

## 2021-10-18 PROCEDURE — 99214 OFFICE O/P EST MOD 30 MIN: CPT | Mod: S$GLB,,, | Performed by: INTERNAL MEDICINE

## 2021-10-18 PROCEDURE — 3078F PR MOST RECENT DIASTOLIC BLOOD PRESSURE < 80 MM HG: ICD-10-PCS | Mod: CPTII,S$GLB,, | Performed by: INTERNAL MEDICINE

## 2021-10-18 PROCEDURE — 99499 UNLISTED E&M SERVICE: CPT | Mod: S$GLB,,, | Performed by: INTERNAL MEDICINE

## 2021-10-18 PROCEDURE — 3288F PR FALLS RISK ASSESSMENT DOCUMENTED: ICD-10-PCS | Mod: CPTII,S$GLB,, | Performed by: INTERNAL MEDICINE

## 2021-10-18 PROCEDURE — 1157F PR ADVANCE CARE PLAN OR EQUIV PRESENT IN MEDICAL RECORD: ICD-10-PCS | Mod: CPTII,S$GLB,, | Performed by: INTERNAL MEDICINE

## 2021-10-18 PROCEDURE — 99999 PR PBB SHADOW E&M-EST. PATIENT-LVL V: ICD-10-PCS | Mod: PBBFAC,,, | Performed by: INTERNAL MEDICINE

## 2021-10-18 RX ORDER — NIFEDIPINE 90 MG/1
TABLET, EXTENDED RELEASE ORAL
Qty: 90 TABLET | Refills: 0 | Status: SHIPPED | OUTPATIENT
Start: 2021-10-18 | End: 2022-02-14

## 2021-10-18 RX ORDER — METOPROLOL SUCCINATE 200 MG/1
TABLET, EXTENDED RELEASE ORAL
Qty: 90 TABLET | Refills: 0 | Status: SHIPPED | OUTPATIENT
Start: 2021-10-18 | End: 2022-01-28

## 2021-10-18 RX ORDER — ATORVASTATIN CALCIUM 10 MG/1
TABLET, FILM COATED ORAL
Qty: 90 TABLET | Refills: 0 | Status: SHIPPED | OUTPATIENT
Start: 2021-10-18 | End: 2022-01-28

## 2021-10-21 ENCOUNTER — CLINICAL SUPPORT (OUTPATIENT)
Dept: FAMILY MEDICINE | Facility: CLINIC | Age: 81
End: 2021-10-21
Payer: MEDICARE

## 2021-10-21 VITALS — TEMPERATURE: 98 F

## 2021-10-21 DIAGNOSIS — Z23 NEEDS FLU SHOT: Primary | ICD-10-CM

## 2021-10-21 PROCEDURE — 99999 PR PBB SHADOW E&M-EST. PATIENT-LVL II: CPT | Mod: PBBFAC,,,

## 2021-10-21 PROCEDURE — 90694 FLU VACCINE - QUADRIVALENT - ADJUVANTED: ICD-10-PCS | Mod: S$GLB,,, | Performed by: INTERNAL MEDICINE

## 2021-10-21 PROCEDURE — 90694 VACC AIIV4 NO PRSRV 0.5ML IM: CPT | Mod: S$GLB,,, | Performed by: INTERNAL MEDICINE

## 2021-10-21 PROCEDURE — G0008 ADMIN INFLUENZA VIRUS VAC: HCPCS | Mod: S$GLB,,, | Performed by: INTERNAL MEDICINE

## 2021-10-21 PROCEDURE — G0008 FLU VACCINE - QUADRIVALENT - ADJUVANTED: ICD-10-PCS | Mod: S$GLB,,, | Performed by: INTERNAL MEDICINE

## 2021-10-21 PROCEDURE — 99999 PR PBB SHADOW E&M-EST. PATIENT-LVL II: ICD-10-PCS | Mod: PBBFAC,,,

## 2021-11-17 RX ORDER — INSULIN LISPRO 100 [IU]/ML
INJECTION, SOLUTION INTRAVENOUS; SUBCUTANEOUS
Qty: 30 ML | Refills: 0 | Status: SHIPPED | OUTPATIENT
Start: 2021-11-17 | End: 2022-05-09 | Stop reason: SDUPTHER

## 2021-11-17 RX ORDER — PEN NEEDLE, DIABETIC 30 GX3/16"
NEEDLE, DISPOSABLE MISCELLANEOUS
Qty: 150 EACH | Refills: 0 | Status: SHIPPED | OUTPATIENT
Start: 2021-11-17 | End: 2022-07-12 | Stop reason: SDUPTHER

## 2021-11-17 RX ORDER — INSULIN LISPRO 100 [IU]/ML
INJECTION, SOLUTION INTRAVENOUS; SUBCUTANEOUS
Qty: 30 ML | Refills: 5 | OUTPATIENT
Start: 2021-11-17

## 2021-11-17 RX ORDER — INSULIN DETEMIR 100 [IU]/ML
40 INJECTION, SOLUTION SUBCUTANEOUS NIGHTLY
Qty: 15 ML | Refills: 0 | Status: SHIPPED | OUTPATIENT
Start: 2021-11-17 | End: 2022-01-28

## 2021-11-17 RX ORDER — PEN NEEDLE, DIABETIC 30 GX3/16"
NEEDLE, DISPOSABLE MISCELLANEOUS
Qty: 100 EACH | Refills: 0 | Status: SHIPPED | OUTPATIENT
Start: 2021-11-17 | End: 2022-07-12

## 2021-11-23 ENCOUNTER — TELEPHONE (OUTPATIENT)
Dept: FAMILY MEDICINE | Facility: CLINIC | Age: 81
End: 2021-11-23
Payer: MEDICARE

## 2021-11-24 ENCOUNTER — OFFICE VISIT (OUTPATIENT)
Dept: FAMILY MEDICINE | Facility: CLINIC | Age: 81
End: 2021-11-24
Payer: MEDICARE

## 2021-11-24 ENCOUNTER — HOSPITAL ENCOUNTER (OUTPATIENT)
Dept: RADIOLOGY | Facility: HOSPITAL | Age: 81
Discharge: HOME | End: 2021-11-24
Attending: INTERNAL MEDICINE
Payer: MEDICARE

## 2021-11-24 VITALS
HEIGHT: 64 IN | OXYGEN SATURATION: 97 % | DIASTOLIC BLOOD PRESSURE: 66 MMHG | WEIGHT: 161.63 LBS | TEMPERATURE: 98 F | SYSTOLIC BLOOD PRESSURE: 136 MMHG | HEART RATE: 63 BPM | BODY MASS INDEX: 27.59 KG/M2

## 2021-11-24 DIAGNOSIS — Z17.0 MALIGNANT NEOPLASM OF LEFT BREAST IN FEMALE, ESTROGEN RECEPTOR POSITIVE, UNSPECIFIED SITE OF BREAST: ICD-10-CM

## 2021-11-24 DIAGNOSIS — C50.912 MALIGNANT NEOPLASM OF LEFT BREAST IN FEMALE, ESTROGEN RECEPTOR POSITIVE, UNSPECIFIED SITE OF BREAST: ICD-10-CM

## 2021-11-24 DIAGNOSIS — I10 ESSENTIAL HYPERTENSION: ICD-10-CM

## 2021-11-24 DIAGNOSIS — E78.5 HYPERLIPIDEMIA LDL GOAL <100: ICD-10-CM

## 2021-11-24 DIAGNOSIS — E66.3 OVERWEIGHT (BMI 25.0-29.9): ICD-10-CM

## 2021-11-24 DIAGNOSIS — Z12.11 SCREENING FOR COLON CANCER: ICD-10-CM

## 2021-11-24 DIAGNOSIS — Z12.31 ENCOUNTER FOR SCREENING MAMMOGRAM FOR MALIGNANT NEOPLASM OF BREAST: ICD-10-CM

## 2021-11-24 PROCEDURE — 99214 PR OFFICE/OUTPT VISIT, EST, LEVL IV, 30-39 MIN: ICD-10-PCS | Mod: S$GLB,,, | Performed by: NURSE PRACTITIONER

## 2021-11-24 PROCEDURE — 77063 MAMMO DIGITAL SCREENING BILAT WITH TOMO: ICD-10-PCS | Mod: 26,,, | Performed by: RADIOLOGY

## 2021-11-24 PROCEDURE — 77067 MAMMO DIGITAL SCREENING BILAT WITH TOMO: ICD-10-PCS | Mod: 26,,, | Performed by: RADIOLOGY

## 2021-11-24 PROCEDURE — 99999 PR PBB SHADOW E&M-EST. PATIENT-LVL V: CPT | Mod: PBBFAC,,, | Performed by: NURSE PRACTITIONER

## 2021-11-24 PROCEDURE — 77063 BREAST TOMOSYNTHESIS BI: CPT | Mod: 26,,, | Performed by: RADIOLOGY

## 2021-11-24 PROCEDURE — 99999 PR PBB SHADOW E&M-EST. PATIENT-LVL V: ICD-10-PCS | Mod: PBBFAC,,, | Performed by: NURSE PRACTITIONER

## 2021-11-24 PROCEDURE — 1157F PR ADVANCE CARE PLAN OR EQUIV PRESENT IN MEDICAL RECORD: ICD-10-PCS | Mod: CPTII,S$GLB,, | Performed by: NURSE PRACTITIONER

## 2021-11-24 PROCEDURE — 77067 SCR MAMMO BI INCL CAD: CPT | Mod: 26,,, | Performed by: RADIOLOGY

## 2021-11-24 PROCEDURE — 99214 OFFICE O/P EST MOD 30 MIN: CPT | Mod: S$GLB,,, | Performed by: NURSE PRACTITIONER

## 2021-11-24 PROCEDURE — 1157F ADVNC CARE PLAN IN RCRD: CPT | Mod: CPTII,S$GLB,, | Performed by: NURSE PRACTITIONER

## 2021-11-24 PROCEDURE — 77067 SCR MAMMO BI INCL CAD: CPT | Mod: TC,PO

## 2021-12-09 ENCOUNTER — TELEPHONE (OUTPATIENT)
Dept: FAMILY MEDICINE | Facility: CLINIC | Age: 81
End: 2021-12-09
Payer: MEDICARE

## 2021-12-13 ENCOUNTER — TELEPHONE (OUTPATIENT)
Dept: FAMILY MEDICINE | Facility: CLINIC | Age: 81
End: 2021-12-13
Payer: MEDICARE

## 2021-12-13 RX ORDER — LANCETS
EACH MISCELLANEOUS
Qty: 200 EACH | Refills: 11 | Status: SHIPPED | OUTPATIENT
Start: 2021-12-13 | End: 2022-05-09 | Stop reason: SDUPTHER

## 2021-12-14 ENCOUNTER — INFUSION (OUTPATIENT)
Dept: INFUSION THERAPY | Facility: HOSPITAL | Age: 81
End: 2021-12-14
Attending: INTERNAL MEDICINE
Payer: MEDICARE

## 2021-12-14 VITALS
RESPIRATION RATE: 16 BRPM | SYSTOLIC BLOOD PRESSURE: 128 MMHG | HEART RATE: 63 BPM | TEMPERATURE: 99 F | OXYGEN SATURATION: 98 % | DIASTOLIC BLOOD PRESSURE: 64 MMHG

## 2021-12-14 DIAGNOSIS — M85.80 OSTEOPENIA, UNSPECIFIED LOCATION: Primary | ICD-10-CM

## 2021-12-14 PROCEDURE — 96372 THER/PROPH/DIAG INJ SC/IM: CPT

## 2021-12-14 PROCEDURE — 63600175 PHARM REV CODE 636 W HCPCS: Mod: JG | Performed by: INTERNAL MEDICINE

## 2021-12-14 RX ADMIN — DENOSUMAB 60 MG: 60 INJECTION SUBCUTANEOUS at 09:12

## 2021-12-22 DIAGNOSIS — E03.9 HYPOTHYROIDISM (ACQUIRED): ICD-10-CM

## 2021-12-22 DIAGNOSIS — I10 ESSENTIAL HYPERTENSION: ICD-10-CM

## 2021-12-22 RX ORDER — LEVOTHYROXINE SODIUM 50 UG/1
TABLET ORAL
Qty: 90 TABLET | Refills: 0 | OUTPATIENT
Start: 2021-12-22

## 2021-12-22 RX ORDER — SPIRONOLACTONE 50 MG/1
50 TABLET, FILM COATED ORAL DAILY
Qty: 90 TABLET | Refills: 0 | OUTPATIENT
Start: 2021-12-22

## 2022-01-11 RX ORDER — METFORMIN HYDROCHLORIDE 500 MG/1
TABLET, EXTENDED RELEASE ORAL
Qty: 270 TABLET | Refills: 0 | Status: SHIPPED | OUTPATIENT
Start: 2022-01-11 | End: 2022-03-21

## 2022-01-16 DIAGNOSIS — I10 ESSENTIAL HYPERTENSION: ICD-10-CM

## 2022-01-16 DIAGNOSIS — E78.5 HYPERLIPIDEMIA LDL GOAL <100: ICD-10-CM

## 2022-01-16 NOTE — TELEPHONE ENCOUNTER
Care Due:                  Date            Visit Type   Department     Provider  --------------------------------------------------------------------------------                                             [facility]/ INTERNAL  Laurentia Renetta  Last Visit: 03-      None         MED/ PEDS      Hanykelkilo Ley                                           [facility]/ INTERNAL  Laurentia Renetta  Next Visit: 03-      None         MED/ PEDS      Hanykeler  Av                                                            Last  Test          Frequency    Reason                     Performed    Due Date  --------------------------------------------------------------------------------    HBA1C.......  6 months...  metFORMIN................  03- 09-    TSH.........  12 months..  levothyroxine............  03- 03-    Powered by [employer]ed [employer]. Reference number: [id number].   1/16/2022 5:45:49 AM CST

## 2022-01-20 DIAGNOSIS — E03.9 HYPOTHYROIDISM (ACQUIRED): ICD-10-CM

## 2022-01-20 DIAGNOSIS — I10 ESSENTIAL HYPERTENSION: ICD-10-CM

## 2022-01-21 RX ORDER — LOSARTAN POTASSIUM 100 MG/1
TABLET ORAL
Qty: 90 TABLET | Refills: 0 | Status: SHIPPED | OUTPATIENT
Start: 2022-01-21

## 2022-01-21 NOTE — TELEPHONE ENCOUNTER
No new care gaps identified.  Powered by [employer]. Reference number: [id number].   1/20/2022 8:30:14 PM CST

## 2022-01-22 RX ORDER — SPIRONOLACTONE 50 MG/1
TABLET, FILM COATED ORAL
Qty: 90 TABLET | Refills: 0 | Status: SHIPPED | OUTPATIENT
Start: 2022-01-22 | End: 2022-04-18

## 2022-01-22 RX ORDER — LEVOTHYROXINE SODIUM 50 UG/1
TABLET ORAL
Qty: 90 TABLET | Refills: 0 | Status: SHIPPED | OUTPATIENT
Start: 2022-01-22 | End: 2022-04-18

## 2022-01-22 NOTE — TELEPHONE ENCOUNTER
Refill Authorization Note   Joel Condon  is requesting a refill authorization.  Brief Assessment and Rationale for Refill:  Approve     Medication Therapy Plan:  MRM resolved    Medication Reconciliation Completed: No   Comments:   --->Care Gap information included below if applicable.   Orders Placed This Encounter    levothyroxine (SYNTHROID) 50 MCG tablet    spironolactone (ALDACTONE) 50 MG tablet      Requested Prescriptions   Signed Prescriptions Disp Refills    levothyroxine (SYNTHROID) 50 MCG tablet 90 tablet 0     Sig: TAKE 1 TABLET(50 MCG) BY MOUTH EVERY MORNING       Endocrinology:  Hypothyroid Agents Failed - 1/20/2022  8:29 PM        Failed - TSH in normal range and within 360 days     TSH   Date Value Ref Range Status   03/16/2021 4.311 (H) 0.400 - 4.000 uIU/mL Final   03/14/2017 3.315 0.400 - 4.000 uIU/mL Final   06/15/2016 2.329 0.400 - 4.000 uIU/mL Final              Passed - Patient is at least 18 years old        Passed - Valid encounter within last 15 months     Recent Visits  Date Type Provider Dept   03/05/21 Office Visit Shelby Ley MD [facility] Family Med/ Internal Med/ Peds   08/05/20 Office Visit Shelby Ley MD [facility] Family Med/ Internal Med/ Peds   Showing recent visits within past 720 days and meeting all other requirements  Future Appointments  No visits were found meeting these conditions.  Showing future appointments within next 150 days and meeting all other requirements      Future Appointments              In 1 month LAB, [facility] [place] [facility]    In 1 month Deborah Parr MD [facility] [place] - Hematology Oncology, [facility]    In 1 month Shelby Ley MD [facility] Family Medicine, [place]    In 4 months CHAIR 01 [facility] - Infusion, [facility]                Passed - Manual Review: FT4 is not required if last TSH is WNL.        Passed - T4 free within 1080 days     Free T4   Date Value Ref Range Status   03/16/2021 1.06 0.71 - 1.51 ng/dL Final                 spironolactone (ALDACTONE) 50 MG tablet 90 tablet 0     Sig: TAKE 1 TABLET(50 MG) BY MOUTH EVERY DAY       Cardiovascular: Diuretics - Aldosterone Antagonist Passed - 1/20/2022  8:29 PM        Passed - Patient is at least 18 years old        Passed - Last BP in normal range within 360 days     BP Readings from Last 1 Encounters:   12/14/21 128/64               Passed - Valid encounter within last 15 months     Recent Visits  Date Type Provider Dept   03/05/21 Office Visit Shelby Ley MD [facility] Family Med/ Internal Med/ Peds   08/05/20 Office Visit Shelby Ley MD [facility] Family Med/ Internal Med/ Peds   Showing recent visits within past 720 days and meeting all other requirements  Future Appointments  No visits were found meeting these conditions.  Showing future appointments within next 150 days and meeting all other requirements      Future Appointments              In 1 month LAB, [facility] [place] - Lab, [place]    In 1 month Deborah Parr MD [facility] [place] - Hematology Oncology, [facility]    In 1 month Shelby Ley MD [facility] - Family Medicine, [place]    In 4 months CHAIR [address] [place] [address] - Infusion, [facility]                Passed - Cr is 1.4 or below and within 360 days     Lab Results   Component Value Date    CREATININE 1.2 10/14/2021    CREATININE 1.1 06/15/2021    CREATININE 1.2 03/16/2021              Passed - K in normal range and within 360 days     Potassium   Date Value Ref Range Status   10/14/2021 4.6 3.5 - 5.1 mmol/L Final   06/15/2021 5.0 3.5 - 5.1 mmol/L Final   03/16/2021 4.3 3.5 - 5.1 mmol/L Final              Passed - Na is between 130 and 148 and within 360 days     Sodium   Date Value Ref Range Status   10/14/2021 140 136 - 145 mmol/L Final   06/15/2021 140 136 - 145 mmol/L Final   03/16/2021 142 136 - 145 mmol/L Final              Passed - eGFR within 360 days     Lab Results   Component Value Date    EGFRNONAA 42.8 (A) 10/14/2021    EGFRNONAA 47.9 (A) 06/15/2021     EGFRNONAA 43 (A) 03/16/2021                    Appointments  past 12m or future 3m with PCP    Date Provider   Last Visit   3/5/2021 Shelby Ley MD   Next Visit   3/7/2022 Shelby Ley MD   ED visits in past 90 days: 0     Note composed:3:05 PM 01/22/2022

## 2022-01-24 ENCOUNTER — TELEPHONE (OUTPATIENT)
Dept: FAMILY MEDICINE | Facility: CLINIC | Age: 82
End: 2022-01-24
Payer: MEDICARE

## 2022-01-24 NOTE — TELEPHONE ENCOUNTER
----- Message from Shelby Ley MD sent at 1/24/2022  8:44 AM CST -----  Let's see if we can see her sooner. Please address health maintenance.

## 2022-01-28 RX ORDER — METOPROLOL SUCCINATE 200 MG/1
TABLET, EXTENDED RELEASE ORAL
Qty: 90 TABLET | Refills: 0 | Status: SHIPPED | OUTPATIENT
Start: 2022-01-28 | End: 2022-04-18

## 2022-01-28 RX ORDER — ATORVASTATIN CALCIUM 10 MG/1
TABLET, FILM COATED ORAL
Qty: 90 TABLET | Refills: 0 | Status: SHIPPED | OUTPATIENT
Start: 2022-01-28 | End: 2022-04-18

## 2022-01-28 NOTE — TELEPHONE ENCOUNTER
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)  - Allergy/Contraindication (Atorvastatin)     Medication-related problems identified:   Drug-disease interaction  Adverse drug effects  Medication Therapy Plan: Pt reported headaches while on pravastatin- 100% adherence on atorvastatin  Medication reconciliation completed: No      Automatic Epic Protocol Generated Data:    Requested Prescriptions   Pending Prescriptions Disp Refills    atorvastatin (LIPITOR) 10 MG tablet [Pharmacy Med Name: ATORVASTATIN 10MG TABLETS] 90 tablet 0     Sig: TAKE 1 TABLET(10 MG) BY MOUTH EVERY DAY       Cardiovascular:  Antilipid - Statins Passed - 1/16/2022  5:45 AM        Passed - Patient is at least 18 years old        Passed - Valid encounter within last 15 months     Recent Visits  Date Type Provider Dept   03/05/21 Office Visit Shelby Ley MD [facility] Family Med/ Internal Med/ Peds   08/05/20 Office Visit Shelby Ley MD [facility] Family Med/ Internal Med/ Peds   Showing recent visits within past 720 days and meeting all other requirements  Future Appointments  No visits were found meeting these conditions.  Showing future appointments within next 150 days and meeting all other requirements      Future Appointments              In 3 weeks Juan Pablo Ordoñez MD [facility] [place] - Cardiology, [facility] [place]    In 1 month LAB, [facility] [place] - Lab, [place]    In 1 month Shelby Ley MD [facility] - Family Medicine, [place]    In 1 month Deborah Parr MD [facility] [place] - Hematology Oncology, [facility] [place] [facility]    In 4 months CHAIR 01 [facility] - Infusion, [facility]                Passed - ALT is 131 or below and within 360 days     ALT   Date Value Ref Range Status   10/14/2021 14 10 - 44 U/L  Final   06/15/2021 17 10 - 44 U/L Final   03/16/2021 10 10 - 44 U/L Final              Passed - AST is 119 or below and within 360 days     AST   Date Value Ref Range Status   10/14/2021 16 10 - 40 U/L Final   06/15/2021 16 10 - 40 U/L Final   03/16/2021 15 10 - 40 U/L Final              Passed - Total Cholesterol within 360 days     Lab Results   Component Value Date    CHOL 115 (L) 03/16/2021    CHOL 122 02/26/2019    CHOL 113 (L) 04/25/2018              Passed - LDL within 360 days     LDL Cholesterol   Date Value Ref Range Status   03/16/2021 50.8 (L) 63.0 - 159.0 mg/dL Final     Comment:     The National Cholesterol Education Program (NCEP) has set the  following guidelines (reference values) for LDL Cholesterol:  Optimal.......................<130 mg/dL  Borderline High...............130-159 mg/dL  High..........................160-189 mg/dL  Very High.....................>190 mg/dL              Passed - HDL within 360 days     HDL   Date Value Ref Range Status   03/16/2021 41 40 - 75 mg/dL Final     Comment:     The National Cholesterol Education Program (NCEP) has set the  following guidelines (reference values) for HDL Cholesterol:  Low...............<40 mg/dL  Optimal...........>60 mg/dL              Passed - Triglycerides within 360 days     Lab Results   Component Value Date    TRIG 116 03/16/2021    TRIG 146 02/26/2019    TRIG 78 04/25/2018                metoprolol succinate (TOPROL-XL) 200 MG 24 hr tablet [Pharmacy Med Name: METOPROLOL ER SUCCINATE 200MG TABS] 90 tablet 0     Sig: TAKE 1 TABLET(200 MG) BY MOUTH EVERY DAY       Cardiovascular:  Beta Blockers Passed - 1/16/2022  5:45 AM        Passed - Patient is at least 18 years old        Passed - Last BP in normal range within 360 days     BP Readings from Last 1 Encounters:   12/14/21 128/64               Passed - Last Heart Rate in normal range within 360 days     Pulse Readings from Last 1 Encounters:   12/14/21 63              Passed - Valid  encounter within last 15 months     Recent Visits  Date Type Provider Dept   03/05/21 Office Visit Shelby Ley MD [facility] Family Med/ Internal Med/ Peds   08/05/20 Office Visit Shelby Ley MD [facility] Family Med/ Internal Med/ Peds   Showing recent visits within past 720 days and meeting all other requirements  Future Appointments  No visits were found meeting these conditions.  Showing future appointments within next 150 days and meeting all other requirements      Future Appointments              In 3 weeks Juan Pablo Ordoñez MD [facility] [place] - Cardiology, [facility]    In 1 month LAB, [place] - Lab, [place]    In 1 month Shelby Ley MD [facility] - Family Medicine, [place]    In 1 month Deborah Parr MD [facility] [place] - Hematology Oncology, [facility]    In 4 months CHAIR 01 [facility] - Infusion, [facility]                      Appointments  past 12m or future 3m with PCP    Date Provider   Last Visit   3/5/2021 Shelby Ley MD   Next Visit   1/20/2022 Shelby Ley MD   ED visits in past 90 days: 0     Note composed:10:52 AM 01/28/2022

## 2022-02-14 DIAGNOSIS — I10 ESSENTIAL HYPERTENSION: ICD-10-CM

## 2022-02-14 RX ORDER — NIFEDIPINE 90 MG/1
TABLET, EXTENDED RELEASE ORAL
Qty: 90 TABLET | Refills: 0 | Status: SHIPPED | OUTPATIENT
Start: 2022-02-14 | End: 2022-04-18

## 2022-02-14 NOTE — TELEPHONE ENCOUNTER
Care Due:                  Date            Visit Type   Department     Provider  --------------------------------------------------------------------------------                                 -         [facility]                              PRIMARY      MED/ INTERNAL  [facility] Renetta  Last Visit: 03-      CARE ([facility])   MED/ PEDS      Hanykeler  Av                              [facility]                              PRIMARY      MED/ INTERNAL  Laurentia Renetta  Next Visit: 03-      CARE ([facility])   MED/ PEDS      Hanykeler  Av                                                            Last  Test          Frequency    Reason                     Performed    Due Date  --------------------------------------------------------------------------------    Lipid Panel.  12 months..  atorvastatin.............  03- 03-    Powered by [employer]. Reference number: [id number].   2/14/2022 12:15:18 PM CST

## 2022-02-15 DIAGNOSIS — C50.912 MALIGNANT NEOPLASM OF LEFT BREAST IN FEMALE, ESTROGEN RECEPTOR POSITIVE, UNSPECIFIED SITE OF BREAST: ICD-10-CM

## 2022-02-15 DIAGNOSIS — Z17.0 MALIGNANT NEOPLASM OF LEFT BREAST IN FEMALE, ESTROGEN RECEPTOR POSITIVE, UNSPECIFIED SITE OF BREAST: ICD-10-CM

## 2022-02-15 RX ORDER — LETROZOLE 2.5 MG/1
TABLET, FILM COATED ORAL
Qty: 90 TABLET | Refills: 3 | Status: SHIPPED | OUTPATIENT
Start: 2022-02-15 | End: 2023-01-24

## 2022-02-22 ENCOUNTER — TELEPHONE (OUTPATIENT)
Dept: FAMILY MEDICINE | Facility: CLINIC | Age: 82
End: 2022-02-22
Payer: MEDICARE

## 2022-02-22 NOTE — TELEPHONE ENCOUNTER
LVM for pt to call the clinic back.     -Linked A1c to already scheduled lab visit from [facility], Scheduled a urine same day/time.   - Pt needs eye exam scheduled.

## 2022-02-22 NOTE — TELEPHONE ENCOUNTER
----- Message from Shelby Ley MD sent at 2/21/2022 10:48 AM CST -----  Please call patient to complete A1c, urine for microalbumin and eye exam.

## 2022-03-02 ENCOUNTER — TELEPHONE (OUTPATIENT)
Dept: HEMATOLOGY/ONCOLOGY | Facility: CLINIC | Age: 82
End: 2022-03-02
Payer: MEDICARE

## 2022-03-02 DIAGNOSIS — Z17.0 MALIGNANT NEOPLASM OF LEFT BREAST IN FEMALE, ESTROGEN RECEPTOR POSITIVE, UNSPECIFIED SITE OF BREAST: Primary | ICD-10-CM

## 2022-03-02 DIAGNOSIS — C50.912 MALIGNANT NEOPLASM OF LEFT BREAST IN FEMALE, ESTROGEN RECEPTOR POSITIVE, UNSPECIFIED SITE OF BREAST: Primary | ICD-10-CM

## 2022-03-07 ENCOUNTER — OFFICE VISIT (OUTPATIENT)
Dept: FAMILY MEDICINE | Facility: CLINIC | Age: 82
End: 2022-03-07
Payer: MEDICARE

## 2022-03-07 ENCOUNTER — TELEPHONE (OUTPATIENT)
Dept: FAMILY MEDICINE | Facility: CLINIC | Age: 82
End: 2022-03-07
Payer: MEDICARE

## 2022-03-07 VITALS
TEMPERATURE: 98 F | OXYGEN SATURATION: 96 % | BODY MASS INDEX: 27.89 KG/M2 | SYSTOLIC BLOOD PRESSURE: 160 MMHG | HEIGHT: 64 IN | WEIGHT: 163.38 LBS | HEART RATE: 69 BPM | DIASTOLIC BLOOD PRESSURE: 58 MMHG

## 2022-03-07 DIAGNOSIS — C50.912 MALIGNANT NEOPLASM OF LEFT BREAST IN FEMALE, ESTROGEN RECEPTOR POSITIVE, UNSPECIFIED SITE OF BREAST: ICD-10-CM

## 2022-03-07 DIAGNOSIS — Z79.4 LONG-TERM INSULIN USE: ICD-10-CM

## 2022-03-07 DIAGNOSIS — E66.3 OVERWEIGHT (BMI 25.0-29.9): ICD-10-CM

## 2022-03-07 DIAGNOSIS — I10 ESSENTIAL HYPERTENSION: ICD-10-CM

## 2022-03-07 DIAGNOSIS — K21.9 GASTROESOPHAGEAL REFLUX DISEASE WITHOUT ESOPHAGITIS: Chronic | ICD-10-CM

## 2022-03-07 DIAGNOSIS — M85.80 OSTEOPENIA AFTER MENOPAUSE: ICD-10-CM

## 2022-03-07 DIAGNOSIS — E78.5 HYPERLIPIDEMIA LDL GOAL <100: ICD-10-CM

## 2022-03-07 DIAGNOSIS — Z17.0 MALIGNANT NEOPLASM OF LEFT BREAST IN FEMALE, ESTROGEN RECEPTOR POSITIVE, UNSPECIFIED SITE OF BREAST: ICD-10-CM

## 2022-03-07 DIAGNOSIS — E03.9 HYPOTHYROIDISM (ACQUIRED): ICD-10-CM

## 2022-03-07 DIAGNOSIS — Z12.11 COLON CANCER SCREENING: ICD-10-CM

## 2022-03-07 DIAGNOSIS — Z00.00 ROUTINE MEDICAL EXAM: Primary | ICD-10-CM

## 2022-03-07 DIAGNOSIS — Z78.0 OSTEOPENIA AFTER MENOPAUSE: ICD-10-CM

## 2022-03-07 PROCEDURE — 3051F HG A1C>EQUAL 7.0%<8.0%: CPT | Mod: CPTII,S$GLB,, | Performed by: INTERNAL MEDICINE

## 2022-03-07 PROCEDURE — 3051F PR MOST RECENT HEMOGLOBIN A1C LEVEL 7.0 - < 8.0%: ICD-10-PCS | Mod: CPTII,S$GLB,, | Performed by: INTERNAL MEDICINE

## 2022-03-07 PROCEDURE — 1157F PR ADVANCE CARE PLAN OR EQUIV PRESENT IN MEDICAL RECORD: ICD-10-PCS | Mod: CPTII,S$GLB,, | Performed by: INTERNAL MEDICINE

## 2022-03-07 PROCEDURE — 3077F PR MOST RECENT SYSTOLIC BLOOD PRESSURE >= 140 MM HG: ICD-10-PCS | Mod: CPTII,S$GLB,, | Performed by: INTERNAL MEDICINE

## 2022-03-07 PROCEDURE — 1157F ADVNC CARE PLAN IN RCRD: CPT | Mod: CPTII,S$GLB,, | Performed by: INTERNAL MEDICINE

## 2022-03-07 PROCEDURE — 1159F PR MEDICATION LIST DOCUMENTED IN MEDICAL RECORD: ICD-10-PCS | Mod: CPTII,S$GLB,, | Performed by: INTERNAL MEDICINE

## 2022-03-07 PROCEDURE — 1159F MED LIST DOCD IN RCRD: CPT | Mod: CPTII,S$GLB,, | Performed by: INTERNAL MEDICINE

## 2022-03-07 PROCEDURE — 3078F PR MOST RECENT DIASTOLIC BLOOD PRESSURE < 80 MM HG: ICD-10-PCS | Mod: CPTII,S$GLB,, | Performed by: INTERNAL MEDICINE

## 2022-03-07 PROCEDURE — 1160F RVW MEDS BY RX/DR IN RCRD: CPT | Mod: CPTII,S$GLB,, | Performed by: INTERNAL MEDICINE

## 2022-03-07 PROCEDURE — 99999 PR PBB SHADOW E&M-EST. PATIENT-LVL V: ICD-10-PCS | Mod: PBBFAC,,, | Performed by: INTERNAL MEDICINE

## 2022-03-07 PROCEDURE — 1126F AMNT PAIN NOTED NONE PRSNT: CPT | Mod: CPTII,S$GLB,, | Performed by: INTERNAL MEDICINE

## 2022-03-07 PROCEDURE — 1126F PR PAIN SEVERITY QUANTIFIED, NO PAIN PRESENT: ICD-10-PCS | Mod: CPTII,S$GLB,, | Performed by: INTERNAL MEDICINE

## 2022-03-07 PROCEDURE — 3078F DIAST BP <80 MM HG: CPT | Mod: CPTII,S$GLB,, | Performed by: INTERNAL MEDICINE

## 2022-03-07 PROCEDURE — 3288F FALL RISK ASSESSMENT DOCD: CPT | Mod: CPTII,S$GLB,, | Performed by: INTERNAL MEDICINE

## 2022-03-07 PROCEDURE — 1101F PT FALLS ASSESS-DOCD LE1/YR: CPT | Mod: CPTII,S$GLB,, | Performed by: INTERNAL MEDICINE

## 2022-03-07 PROCEDURE — 1160F PR REVIEW ALL MEDS BY PRESCRIBER/CLIN PHARMACIST DOCUMENTED: ICD-10-PCS | Mod: CPTII,S$GLB,, | Performed by: INTERNAL MEDICINE

## 2022-03-07 PROCEDURE — 3077F SYST BP >= 140 MM HG: CPT | Mod: CPTII,S$GLB,, | Performed by: INTERNAL MEDICINE

## 2022-03-07 PROCEDURE — 99214 PR OFFICE/OUTPT VISIT, EST, LEVL IV, 30-39 MIN: ICD-10-PCS | Mod: S$GLB,,, | Performed by: INTERNAL MEDICINE

## 2022-03-07 PROCEDURE — 3288F PR FALLS RISK ASSESSMENT DOCUMENTED: ICD-10-PCS | Mod: CPTII,S$GLB,, | Performed by: INTERNAL MEDICINE

## 2022-03-07 PROCEDURE — 99214 OFFICE O/P EST MOD 30 MIN: CPT | Mod: S$GLB,,, | Performed by: INTERNAL MEDICINE

## 2022-03-07 PROCEDURE — 1101F PR PT FALLS ASSESS DOC 0-1 FALLS W/OUT INJ PAST YR: ICD-10-PCS | Mod: CPTII,S$GLB,, | Performed by: INTERNAL MEDICINE

## 2022-03-07 PROCEDURE — 99999 PR PBB SHADOW E&M-EST. PATIENT-LVL V: CPT | Mod: PBBFAC,,, | Performed by: INTERNAL MEDICINE

## 2022-03-07 NOTE — PROGRESS NOTES
HISTORY OF PRESENT ILLNESS:  Joel Condon is a 81 y.o. female who presents to the clinic today for a routine physical exam. Her last physical exam was approximately 1 years(s) ago.        PAST MEDICAL HISTORY:  Past Medical History:   Diagnosis Date    Arthritis     Breast cancer     Cataract     Colon polyp     Diabetes mellitus     Diabetic retinopathy     Hyperlipidemia LDL goal < 100     Hypertension     Hypothyroidism     Osteoporosis, post-menopausal     Overweight(278.02)     Proteinuria     Type II or unspecified type diabetes mellitus with renal manifestations, uncontrolled(250.42)        PAST SURGICAL HISTORY:  Past Surgical History:   Procedure Laterality Date    APPENDECTOMY      BREAST BIOPSY      BREAST LUMPECTOMY      BREAST SURGERY Left 12/13/2017    tumor removal    CATARACT EXTRACTION W/  INTRAOCULAR LENS IMPLANT Left 4/24/14    OS (Dr. Arce)    CATARACT EXTRACTION W/  INTRAOCULAR LENS IMPLANT Right 06/12/2014    OD (Dr. Arce)    CHOLECYSTECTOMY      COLONOSCOPY N/A 4/21/2017    Procedure: COLONOSCOPY;  Surgeon: Homar Payan MD;  Location: [facility] ENDO;  Service: Endoscopy;  Laterality: N/A;    COLONOSCOPY N/A 6/29/2018    Procedure: COLONOSCOPY;  Surgeon: Mykel Boles MD;  Location: [facility] ENDO;  Service: Endoscopy;  Laterality: N/A;    EYE SURGERY  04/24/2014 & 06/12/2014    HYSTERECTOMY      total    left eye cataract surgery  4/24/14    OOPHORECTOMY         SOCIAL HISTORY:  Social History     Socioeconomic History    Marital status:     Number of children: 3   Occupational History    Occupation: retired   Tobacco Use    Smoking status: Never Smoker    Smokeless tobacco: Never Used   Substance and Sexual Activity    Alcohol use: No    Drug use: No    Sexual activity: Not Currently     Partners: Male       FAMILY HISTORY:  Family History   Problem Relation Age of Onset    Diabetes Mother     Heart disease Mother     Hypertension Mother      Stroke Mother     Diabetes Sister     Hypertension Sister     Diabetes Sister     Hypertension Sister     Diabetes Sister     Hypertension Sister     Hypertension Sister     Diabetes Sister     Diabetes Brother     Diabetes Brother     Diabetes Brother     Prostate cancer Brother     Amblyopia Neg Hx     Blindness Neg Hx     Cataracts Neg Hx     Glaucoma Neg Hx     Macular degeneration Neg Hx     Retinal detachment Neg Hx     Strabismus Neg Hx        ALLERGIES AND MEDICATIONS: updated and reviewed.  Review of patient's allergies indicates:   Allergen Reactions    Lisinopril Other (See Comments)     Cough      Hydrochlorothiazide (bulk) Other (See Comments)     Elevated pt's blood pressure making her feel bad    Pravastatin Other (See Comments)     headaches     Medication List with Changes/Refills   Current Medications    ASCORBIC ACID, VITAMIN C, (VITAMIN C) 100 MG TABLET    Take 100 mg by mouth once daily.    ASPIRIN (ECOTRIN) 81 MG EC TABLET    Take 1 tablet (81 mg total) by mouth once daily.    ATORVASTATIN (LIPITOR) 10 MG TABLET    TAKE 1 TABLET(10 MG) BY MOUTH EVERY DAY    BLOOD SUGAR DIAGNOSTIC STRP    To check BG two times daily, to use with insurance preferred meter    CHOLECALCIFEROL, VITAMIN D3, (VITAMIN D3) 100 MCG (4,000 UNIT) CAP    Take by mouth.    CYANOCOBALAMIN, VITAMIN B-12, MISC    by Misc.(Non-Drug; Combo Route) route.    INSULIN LISPRO (HUMALOG KWIKPEN INSULIN) 100 UNIT/ML PEN    Injects 28 units three times daily before meals    INSULIN SYRINGE-NEEDLE U-100 1 ML 31 GAUGE X 5/16 SYRG    USE THREE TIMES DAILY AS DIRECTED    LANCETS MISC    To check BG two times daily, to use with insurance preferred meter    LETROZOLE (FEMARA) 2.5 MG TAB    Take 1 tab by mouth daily.    LEVEMIR FLEXTOUCH U-100 INSULN 100 UNIT/ML (3 ML) INPN PEN    ADMINISTER 40 UNITS UNDER THE SKIN EVERY EVENING    LEVOTHYROXINE (SYNTHROID) 50 MCG TABLET    TAKE 1 TABLET(50 MCG) BY MOUTH EVERY MORNING     "LOSARTAN (COZAAR) 100 MG TABLET    TAKE 1 TABLET BY MOUTH DAILY    METFORMIN (GLUCOPHAGE-XR) 500 MG ER 24HR TABLET    TAKE 2 TABLETS BY MOUTH IN THE MORNING AND 1 TABLET IN THE EVENING    METOPROLOL SUCCINATE (TOPROL-XL) 200 MG 24 HR TABLET    TAKE 1 TABLET(200 MG) BY MOUTH EVERY DAY    NIFEDIPINE (PROCARDIA-XL) 90 MG (OSM) 24 HR TABLET    TAKE 1 TABLET(90 MG) BY MOUTH EVERY DAY    OMEPRAZOLE (PRILOSEC) 20 MG CAPSULE    TAKE 1 CAPSULE(20 MG) BY MOUTH DAILY AS NEEDED FOR HEARTBURN    PEN NEEDLE, DIABETIC (BD SHIV 2ND GEN PEN NEEDLE) 32 GAUGE X 5/32" NDLE    Use 4x/day    PEN NEEDLE, DIABETIC (BD ULTRA-FINE SHORT PEN NEEDLE) 31 GAUGE X 5/16" NDLE    Use 1x/day    SPIRONOLACTONE (ALDACTONE) 50 MG TABLET    TAKE 1 TABLET(50 MG) BY MOUTH EVERY DAY    VITAMIN E ACETATE (VITAMIN E ORAL)    Take by mouth.          CARE TEAM:  Patient Care Team:  Shelby Ley MD as PCP - General (Internal Medicine)  Deborah Parr MD as Consulting Physician (Hematology and Oncology)  Rosy Mcknight NP as Nurse Practitioner (Endocrinology)  Juan Pablo Ordoñez MD as Consulting Physician (Cardiology)  Mary Waterman LPN as Licensed Practical Nurse           SCREENING HISTORY:  Health Maintenance       Date Due Completion Date    Diabetes Urine Screening 11/08/2020 11/8/2019    Eye Exam 01/14/2021 1/14/2020    Override on 5/2/2017: Done    Override on 11/4/2012: Done    Colonoscopy 06/29/2021 6/29/2018    Override on 4/1/2007: Done    Hemoglobin A1c 09/16/2021 3/16/2021    Foot Exam 03/05/2022 3/5/2021    Override on 6/28/2017: Done    Override on 3/1/2017: Done    Override on 5/20/2015: Done    Lipid Panel 03/16/2022 3/16/2021    DEXA Scan 06/15/2024 6/15/2021    TETANUS VACCINE 06/15/2026 6/15/2016            REVIEW OF SYSTEMS:   The patient reports: good dietary habits.  The patient reports : that they do not exercise.  Review of Systems   Constitutional: Negative for chills, fatigue, fever and unexpected weight change.   HENT: " "Negative for congestion and postnasal drip.    Eyes: Negative for pain and visual disturbance.   Respiratory: Negative for cough, shortness of breath and wheezing.    Cardiovascular: Negative for chest pain, palpitations and leg swelling.   Gastrointestinal: Negative for abdominal pain, constipation, diarrhea, nausea and vomiting.   Genitourinary: Negative for dysuria.   Musculoskeletal: Negative for arthralgias and back pain.   Skin: Negative for rash.   Neurological: Negative for weakness and headaches.        She feels like she is becoming forgetful.  She does not drive much anymore.   Psychiatric/Behavioral: Negative for dysphoric mood and sleep disturbance. The patient is not nervous/anxious.       ROS (Optional)-: no pelvic pain  Breast ROS (Optional)-: negative for breast lumps/discharge            Physical Examination:   Vitals:    03/07/22 0856   BP: (!) 160/58   Pulse: 69   Temp: 98.4 °F (36.9 °C)     Weight: 74.1 kg (163 lb 5.8 oz)   Height: 5' 4" (162.6 cm)   Body mass index is 28.04 kg/m².      Patient did not require to have a chaperone present during the exam today.    General appearance - alert, well appearing, and in no distress, overweight; exam limited as patient did not get onto the examination table  Psychiatric - alert, oriented to person, place, and time, normal behavior, speech, dress, motor activity and thought process, fair to good memory  Eyes - pupils equal and reactive, extraocular eye movements intact, sclera anicteric  Mouth - not examined; patient wearing mask due to Covid 19 pandemic  Neck - supple, no significant adenopathy, carotids upstroke normal bilaterally, no bruits  Lymphatics - no palpable cervical lymphadenopathy  Chest - clear to auscultation, no wheezes, rales or rhonchi, symmetric air entry  Heart - normal rate and regular rhythm, no gallops noted  Neurological - alert, normal speech, no focal findings or movement disorder noted, cranial nerves II through XII " intact  Musculoskeletal - patient noted to have Moderate osteoarthritic changes to both knee joints. No joint effusions noted., no muscular tenderness noted  Extremities - no pedal edema noted  Skin - normal coloration, no suspicious skin lesions  Diabetic Foot Exam                                                                                                                                                                                                                      Protective Sensation (w/ 10 gram monofilament):  Right: Decreased  Left: Decreased    Visual Inspection:  Normal -  Bilateral except Onychomycosis -  Bilateral (especially the big toenails)    Pedal Pulses:   Right: Present  Left: Present    Posterior tibialis:   Right:Present  Left: Present         Labs:  Lab Results   Component Value Date    HGBA1C 7.8 (H) 03/16/2021    HGBA1C 8.6 (H) 12/04/2020    HGBA1C 8.5 (H) 11/08/2019      Lab Results   Component Value Date    CHOL 115 (L) 03/16/2021    CHOL 122 02/26/2019    CHOL 113 (L) 04/25/2018     Lab Results   Component Value Date    LDLCALC 50.8 (L) 03/16/2021    LDLCALC 45.8 (L) 02/26/2019    LDLCALC 49.4 (L) 04/25/2018         ASSESSMENT AND PLAN:  1. Routine medical exam  Counseled on age appropriate medical preventative services including age appropriate cancer screenings, age appropriate eye and dental exams, over all nutritional health, need for a consistent exercise regimen, and an over all push towards maintaining a vigorous and active lifestyle.  Counseled on age appropriate vaccines and discussed upcoming health care needs based on age/gender. Discussed good sleep hygiene and stress management.    2. Type 2 diabetes, uncontrolled, with neuropathy/3. Uncontrolled type 2 diabetes with stage 3 chronic kidney disease GFR 30-59/4. Type 2 diabetes, uncontrolled, with retinopathy/5. 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 Long-term insulin use  Diabetes  is under fair control at this time (due to hyperglycemia) for age and comorbid conditions.   We discussed diabetic diet and regular exercise.   We discussed home blood sugar monitoring, if appropriate - the patient should test twice daily and as needed.   Continue current medication regimen. Patient is followed by endocrinology.  Diabetic complications addressed: Stable decreased kidney function. Observe. Patient counseled to avoid/minimize the use of anti-inflammatory  Medication. Discussed to stay well hydrated. Also discussed with patient that good control of blood pressure and/or diabetes, if present, will help to prevent progression. Neuropathy pain controlled. Discussed daily foot exam using a mirror.  Patient was counseled on the need for yearly eye exam to screen for/monitor diabetic retinopathy and yearly diabetic foot exam.    7. Essential hypertension  Blood pressure is not controlled today.   We discussed low salt diet and regular exercise. Patient reports that they have not taken any decongestant or anti-inflammatory medication recently (patient educated that these medications can increase blood pressure).    Medication changes made today: None. She thinks her pressure is up as she did not take her medication this morning.  We will call the patient tomorrow to get a home BP reading.  Patient also asked to check blood pressure at home on a regular basis and bring recordings to next office visit.   Patient did not want to enroll in the digital hypertension program at this time.     8. Hyperlipidemia LDL goal <100  We discussed low fat diet and regular exercise.The current medical regimen is effective;  continue present plan and medications.   - Lipid Panel; Future    9. Hypothyroidism (acquired)  Patient is clinically euthyroid. Continue current regimen.  - TSH; Future    10. Malignant neoplasm of left breast in female, estrogen receptor positive, unspecified site of breast  The current medical regimen is  effective;  continue present plan and medications.   Followed by: Hematology/Oncology.     11. Gastroesophageal reflux disease without esophagitis  Symptoms controlled: yes - takes medication occasionally as needed.   Reflux precautions discussed (eliminate tobacco if a smoker; minimize caffeine, chocolate and red/white peppermint intake; avoid heavy and spicy meals; don't lay down within 2-3 hours after eating; minimize the intake of NSAIDs). Medication as needed.   Patient asked to take medication breaks, if possible - discussed chronic use can limit calcium absorption (which can lead to osteopenia/osteoporosis), increases the risk for intestinal infections, and can cause kidney damage. There are also some newer studies that show possible increased risk of mortality.    12. Osteopenia after menopause  We discussed adequate calcium and vitamin D supplementation. We discussed fall precautions. She is up to date on her BMD. Continue current treatment regimen (on Prolia injections q6 mos with Oncology).    13. Overweight (BMI 25.0-29.9)  The patient is asked to make an attempt to improve diet and exercise patterns to aid in medical management of this problem.    14. Colon cancer screening    - Case Request Endoscopy: COLONOSCOPY          Orders Placed This Encounter   Procedures    Lipid Panel    TSH      Follow up in about 6 months (around 9/7/2022), or if symptoms worsen or fail to improve, for follow up chronic medical conditions.. or sooner as needed.

## 2022-03-07 NOTE — TELEPHONE ENCOUNTER
----- Message from Shelby Ley MD sent at 3/3/2022 12:54 PM CST -----  Please call patient to schedule labs (A1c and lipid panel) and address health maintenance prior to next office visit.

## 2022-03-07 NOTE — TELEPHONE ENCOUNTER
Pt seen today in office, scheduled for labs, 6 mon f/u, and eye exam. Patient verbally understood.

## 2022-03-08 ENCOUNTER — LAB VISIT (OUTPATIENT)
Dept: LAB | Facility: HOSPITAL | Age: 82
End: 2022-03-08
Attending: INTERNAL MEDICINE
Payer: MEDICARE

## 2022-03-08 ENCOUNTER — TELEPHONE (OUTPATIENT)
Dept: FAMILY MEDICINE | Facility: CLINIC | Age: 82
End: 2022-03-08
Payer: MEDICARE

## 2022-03-08 DIAGNOSIS — E03.9 HYPOTHYROIDISM (ACQUIRED): ICD-10-CM

## 2022-03-08 DIAGNOSIS — Z17.0 MALIGNANT NEOPLASM OF LEFT BREAST IN FEMALE, ESTROGEN RECEPTOR POSITIVE, UNSPECIFIED SITE OF BREAST: ICD-10-CM

## 2022-03-08 DIAGNOSIS — E78.5 HYPERLIPIDEMIA LDL GOAL <100: ICD-10-CM

## 2022-03-08 DIAGNOSIS — C50.912 MALIGNANT NEOPLASM OF LEFT BREAST IN FEMALE, ESTROGEN RECEPTOR POSITIVE, UNSPECIFIED SITE OF BREAST: ICD-10-CM

## 2022-03-08 LAB
ALBUMIN SERPL BCP-MCNC: 3.7 G/DL (ref 3.5–5.2)
ALP SERPL-CCNC: 59 U/L (ref 55–135)
ALT SERPL W/O P-5'-P-CCNC: 18 U/L (ref 10–44)
ANION GAP SERPL CALC-SCNC: 7 MMOL/L (ref 8–16)
AST SERPL-CCNC: 16 U/L (ref 10–40)
BASOPHILS # BLD AUTO: 0.07 K/UL (ref 0–0.2)
BASOPHILS NFR BLD: 0.8 % (ref 0–1.9)
BILIRUB SERPL-MCNC: 0.6 MG/DL (ref 0.1–1)
BUN SERPL-MCNC: 21 MG/DL (ref 8–23)
CALCIUM SERPL-MCNC: 10 MG/DL (ref 8.7–10.5)
CHLORIDE SERPL-SCNC: 107 MMOL/L (ref 95–110)
CHOLEST SERPL-MCNC: 120 MG/DL (ref 120–199)
CHOLEST/HDLC SERPL: 2.6 {RATIO} (ref 2–5)
CO2 SERPL-SCNC: 26 MMOL/L (ref 23–29)
CREAT SERPL-MCNC: 1.2 MG/DL (ref 0.5–1.4)
DIFFERENTIAL METHOD: ABNORMAL
EOSINOPHIL # BLD AUTO: 0.4 K/UL (ref 0–0.5)
EOSINOPHIL NFR BLD: 4.5 % (ref 0–8)
ERYTHROCYTE [DISTWIDTH] IN BLOOD BY AUTOMATED COUNT: 13.8 % (ref 11.5–14.5)
EST. GFR  (AFRICAN AMERICAN): 49 ML/MIN/1.73 M^2
EST. GFR  (NON AFRICAN AMERICAN): 43 ML/MIN/1.73 M^2
ESTIMATED AVG GLUCOSE: 223 MG/DL (ref 68–131)
GLUCOSE SERPL-MCNC: 239 MG/DL (ref 70–110)
HBA1C MFR BLD: 9.4 % (ref 4–5.6)
HCT VFR BLD AUTO: 41.4 % (ref 37–48.5)
HDLC SERPL-MCNC: 47 MG/DL (ref 40–75)
HDLC SERPL: 39.2 % (ref 20–50)
HGB BLD-MCNC: 13 G/DL (ref 12–16)
IMM GRANULOCYTES # BLD AUTO: 0.01 K/UL (ref 0–0.04)
IMM GRANULOCYTES NFR BLD AUTO: 0.1 % (ref 0–0.5)
LDLC SERPL CALC-MCNC: 50.8 MG/DL (ref 63–159)
LYMPHOCYTES # BLD AUTO: 3.6 K/UL (ref 1–4.8)
LYMPHOCYTES NFR BLD: 40.2 % (ref 18–48)
MCH RBC QN AUTO: 30.2 PG (ref 27–31)
MCHC RBC AUTO-ENTMCNC: 31.4 G/DL (ref 32–36)
MCV RBC AUTO: 96 FL (ref 82–98)
MONOCYTES # BLD AUTO: 0.6 K/UL (ref 0.3–1)
MONOCYTES NFR BLD: 6.7 % (ref 4–15)
NEUTROPHILS # BLD AUTO: 4.2 K/UL (ref 1.8–7.7)
NEUTROPHILS NFR BLD: 47.7 % (ref 38–73)
NONHDLC SERPL-MCNC: 73 MG/DL
NRBC BLD-RTO: 0 /100 WBC
PLATELET # BLD AUTO: 353 K/UL (ref 150–450)
PMV BLD AUTO: 10.7 FL (ref 9.2–12.9)
POTASSIUM SERPL-SCNC: 4.6 MMOL/L (ref 3.5–5.1)
PROT SERPL-MCNC: 7.3 G/DL (ref 6–8.4)
RBC # BLD AUTO: 4.31 M/UL (ref 4–5.4)
SODIUM SERPL-SCNC: 140 MMOL/L (ref 136–145)
TRIGL SERPL-MCNC: 111 MG/DL (ref 30–150)
TSH SERPL DL<=0.005 MIU/L-ACNC: 3.27 UIU/ML (ref 0.4–4)
WBC # BLD AUTO: 8.83 K/UL (ref 3.9–12.7)

## 2022-03-08 PROCEDURE — 83036 HEMOGLOBIN GLYCOSYLATED A1C: CPT | Performed by: NURSE PRACTITIONER

## 2022-03-08 PROCEDURE — 84443 ASSAY THYROID STIM HORMONE: CPT | Performed by: INTERNAL MEDICINE

## 2022-03-08 PROCEDURE — 80061 LIPID PANEL: CPT | Performed by: INTERNAL MEDICINE

## 2022-03-08 PROCEDURE — 80053 COMPREHEN METABOLIC PANEL: CPT | Performed by: INTERNAL MEDICINE

## 2022-03-08 PROCEDURE — 85025 COMPLETE CBC W/AUTO DIFF WBC: CPT | Performed by: INTERNAL MEDICINE

## 2022-03-08 PROCEDURE — 36415 COLL VENOUS BLD VENIPUNCTURE: CPT | Mod: PO | Performed by: INTERNAL MEDICINE

## 2022-03-08 NOTE — TELEPHONE ENCOUNTER
----- Message from Penny Kim MA sent at 3/7/2022  9:37 AM CST -----  Regarding: BP  Call pt to get a BP Check after she's taken her BP medications #

## 2022-03-10 ENCOUNTER — TELEPHONE (OUTPATIENT)
Dept: FAMILY MEDICINE | Facility: CLINIC | Age: 82
End: 2022-03-10
Payer: MEDICARE

## 2022-03-10 NOTE — TELEPHONE ENCOUNTER
Please call patient: all of her recent lab results were ok except her diabetes control has worsened significantly.  I recommend a low sugar/low carbohydrate diet and regular physical activity. She needs to make sure to take her medication on a regular basis.  I recommend she follow up with SOLANGE Saez (please make appointment for patient).

## 2022-03-21 RX ORDER — METFORMIN HYDROCHLORIDE 500 MG/1
TABLET, EXTENDED RELEASE ORAL
Qty: 270 TABLET | Refills: 1 | Status: SHIPPED | OUTPATIENT
Start: 2022-03-21 | End: 2022-07-12 | Stop reason: SDUPTHER

## 2022-03-30 NOTE — TELEPHONE ENCOUNTER
No new care gaps identified.  Powered by [email or web address]. Reference number: [id number].   3/30/2022 2:52:08 PM CDT

## 2022-03-31 RX ORDER — INSULIN DETEMIR 100 [IU]/ML
INJECTION, SOLUTION SUBCUTANEOUS
Qty: 39 ML | Refills: 1 | Status: SHIPPED | OUTPATIENT
Start: 2022-03-31 | End: 2022-05-09 | Stop reason: SDUPTHER

## 2022-03-31 NOTE — TELEPHONE ENCOUNTER
Refill Authorization Note   Joel Condno  is requesting a refill authorization.  Brief Assessment and Rationale for Refill:  Approve     Medication Therapy Plan:       Medication Reconciliation Completed: No   Comments:   --->Care Gap information included below if applicable.       Requested Prescriptions   Pending Prescriptions Disp Refills    LEVEMIR FLEXTOUCH U-100 INSULN 100 unit/mL (3 mL) InPn pen [Pharmacy Med Name: LEVEMIR FLEX TOUCH PEN INJ 3ML] 39 mL 1     Sig: ADMINISTER 40 UNITS UNDER THE SKIN EVERY EVENING       Endocrinology:  Diabetes - Insulins Passed - 3/30/2022  2:51 PM        Passed - Patient is at least 18 years old        Passed - Valid encounter within last 15 months     Recent Visits  Date Type Provider Dept   03/07/22 Office Visit Shelby Ley MD [facility] Family Med/ Internal Med/ Peds   03/05/21 Office Visit Shelby Ley MD [facility] Family Med/ Internal Med/ Peds   08/05/20 Office Visit Shelby Ley MD [facility] Family Med/ Internal Med/ Peds   Showing recent visits within past 720 days and meeting all other requirements  Future Appointments  No visits were found meeting these conditions.  Showing future appointments within next 150 days and meeting all other requirements      Future Appointments              In 2 weeks [place] [facility] [place] [facility]    In 1 month Jose R Salazar OD [facility]    In 2 months CHAIR [address] [place] [address] - Infusion, [facility]    In 5 months Shelby Ley MD [facility] [place]                Passed - Matches previous order       Previous Authorizing Provider: Shelby Ley MD (insulin detemir U-100 (LEVEMIR FLEXTOUCH U-100 INSULN) 100 unit/mL (3 mL) InPn pen)  Previous Pharmacy: [id number] - [place] - [address] AT [facility] [address]            Passed - No ED/Hospital visits since last PCP visit     Last PCP Visit: 3/7/2022 Last Admission: 6/29/2018 Last ED  Visit:           Passed - HBA1C within 180 days     Lab Results   Component Value Date    HGBA1C 9.4 (H) 03/08/2022    HGBA1C 7.8 (H) 03/16/2021    HGBA1C 8.6 (H) 12/04/2020              Passed - eGFR is 30 or above and within 360 days     Lab Results   Component Value Date    EGFRNONAA 43 (A) 03/08/2022    EGFRNONAA 42.8 (A) 10/14/2021    EGFRNONAA 47.9 (A) 06/15/2021                Passed - Cr is 1.39 or below and within 360 days     Lab Results   Component Value Date    CREATININE 1.2 03/08/2022    CREATININE 1.2 10/14/2021    CREATININE 1.1 06/15/2021                  Appointments  past 12m or future 3m with PCP    Date Provider   Last Visit   3/7/2022 Shelby Ley MD   Next Visit   9/7/2022 Shelby Ley MD   ED visits in past 90 days: 0     Note composed:1:23 PM 03/31/2022

## 2022-04-16 DIAGNOSIS — E78.5 HYPERLIPIDEMIA LDL GOAL <100: ICD-10-CM

## 2022-04-16 DIAGNOSIS — I10 ESSENTIAL HYPERTENSION: ICD-10-CM

## 2022-04-16 NOTE — TELEPHONE ENCOUNTER
No new care gaps identified.  Powered by [employer] by A[employer]. Reference number: [id number].   4/16/2022 5:41:47 AM CDT

## 2022-04-17 DIAGNOSIS — I10 ESSENTIAL HYPERTENSION: ICD-10-CM

## 2022-04-17 DIAGNOSIS — E03.9 HYPOTHYROIDISM (ACQUIRED): ICD-10-CM

## 2022-04-17 NOTE — TELEPHONE ENCOUNTER
No new care gaps identified.  Powered by [employer]. Reference number: [id number].   4/17/2022 6:25:29 AM CDT

## 2022-04-18 RX ORDER — ATORVASTATIN CALCIUM 10 MG/1
TABLET, FILM COATED ORAL
Qty: 90 TABLET | Refills: 1 | Status: SHIPPED | OUTPATIENT
Start: 2022-04-18 | End: 2022-10-26

## 2022-04-18 RX ORDER — NIFEDIPINE 90 MG/1
TABLET, EXTENDED RELEASE ORAL
Qty: 90 TABLET | Refills: 1 | Status: SHIPPED | OUTPATIENT
Start: 2022-04-18 | End: 2022-10-26

## 2022-04-18 RX ORDER — LEVOTHYROXINE SODIUM 50 UG/1
TABLET ORAL
Qty: 90 TABLET | Refills: 3 | Status: SHIPPED | OUTPATIENT
Start: 2022-04-18 | End: 2023-04-19

## 2022-04-18 RX ORDER — METOPROLOL SUCCINATE 200 MG/1
TABLET, EXTENDED RELEASE ORAL
Qty: 90 TABLET | Refills: 1 | Status: SHIPPED | OUTPATIENT
Start: 2022-04-18 | End: 2022-10-26

## 2022-04-18 RX ORDER — SPIRONOLACTONE 50 MG/1
TABLET, FILM COATED ORAL
Qty: 90 TABLET | Refills: 1 | Status: SHIPPED | OUTPATIENT
Start: 2022-04-18 | End: 2022-10-26

## 2022-04-18 NOTE — TELEPHONE ENCOUNTER
Refill Routing Note   Medication(s) are not appropriate for processing by [facility] for the following reason(s):      - Required vitals are abnormal    [facility] action(s):  Approve  Route       Medication Therapy Plan: ROUTE; Spironolactone; BP Elevated  Medication reconciliation completed: No     Appointments  past 12m or future 3m with PCP    Date Provider   Last Visit   3/7/2022 Shelby Ley MD   Next Visit   9/7/2022 Shelby Ley MD   ED visits in past 90 days: 0        Note composed:12:17 PM 04/18/2022

## 2022-04-22 ENCOUNTER — TELEPHONE (OUTPATIENT)
Dept: FAMILY MEDICINE | Facility: CLINIC | Age: 82
End: 2022-04-22
Payer: MEDICARE

## 2022-04-22 NOTE — TELEPHONE ENCOUNTER
----- Message from Jing Dustin sent at 4/22/2022 11:42 AM CDT -----  Contact: Almita DILLON  Type: Call Back      Who called: Almita debbi DILLON      What is the request in detail: Almita debbi DILLON is requesting a call back. She states that patient is calling to request her diabetic supplies. She states that the patient is testing 4 times a day and she would need an updated order to reflect that. She is also requesting the medical necessity form, clinical notes, and signed order. She states that it can be sent via fax to [phone]. Please advise.       Can the clinic reply by [facility]? No      Would the patient rather a call back or a response via [facility]? Call back       Best call back number: [phone]      Additional Information:

## 2022-04-22 NOTE — TELEPHONE ENCOUNTER
Patient has uncontrolled DM. She needs to follow up with Rosy Mcknight NP ASAP - she can then write for high utilization of test strips.

## 2022-04-23 NOTE — TELEPHONE ENCOUNTER
Spoke with pt informed her of message below. Pt verbalized understanding states PHN told her the supplies were being shipped to her.

## 2022-05-02 ENCOUNTER — TELEPHONE (OUTPATIENT)
Dept: FAMILY MEDICINE | Facility: CLINIC | Age: 82
End: 2022-05-02
Payer: MEDICARE

## 2022-05-02 NOTE — TELEPHONE ENCOUNTER
----- Message from Jacqueline Murrieta sent at 5/2/2022 10:47 AM CDT -----  Type: Patient Call Back    Who called: [employer]    What is the request in detail: pt is trying to get more diabetic supplies, states that pt has ran out. Please advise    Can the clinic reply by [facility]? No     Would the patient rather a call back or a response via [facility]? Call     Best call back number: [phone]

## 2022-05-09 ENCOUNTER — OFFICE VISIT (OUTPATIENT)
Dept: ENDOCRINOLOGY | Facility: CLINIC | Age: 82
End: 2022-05-09
Payer: MEDICARE

## 2022-05-09 VITALS
BODY MASS INDEX: 27.89 KG/M2 | SYSTOLIC BLOOD PRESSURE: 145 MMHG | DIASTOLIC BLOOD PRESSURE: 75 MMHG | TEMPERATURE: 98 F | HEART RATE: 69 BPM | WEIGHT: 162.5 LBS

## 2022-05-09 DIAGNOSIS — N18.31 STAGE 3A CHRONIC KIDNEY DISEASE: ICD-10-CM

## 2022-05-09 PROCEDURE — 3288F PR FALLS RISK ASSESSMENT DOCUMENTED: ICD-10-PCS | Mod: CPTII,S$GLB,, | Performed by: NURSE PRACTITIONER

## 2022-05-09 PROCEDURE — 1160F RVW MEDS BY RX/DR IN RCRD: CPT | Mod: CPTII,S$GLB,, | Performed by: NURSE PRACTITIONER

## 2022-05-09 PROCEDURE — 3077F PR MOST RECENT SYSTOLIC BLOOD PRESSURE >= 140 MM HG: ICD-10-PCS | Mod: CPTII,S$GLB,, | Performed by: NURSE PRACTITIONER

## 2022-05-09 PROCEDURE — 99999 PR PBB SHADOW E&M-EST. PATIENT-LVL V: ICD-10-PCS | Mod: PBBFAC,,, | Performed by: NURSE PRACTITIONER

## 2022-05-09 PROCEDURE — 1126F AMNT PAIN NOTED NONE PRSNT: CPT | Mod: CPTII,S$GLB,, | Performed by: NURSE PRACTITIONER

## 2022-05-09 PROCEDURE — 1157F PR ADVANCE CARE PLAN OR EQUIV PRESENT IN MEDICAL RECORD: ICD-10-PCS | Mod: CPTII,S$GLB,, | Performed by: NURSE PRACTITIONER

## 2022-05-09 PROCEDURE — 1101F PT FALLS ASSESS-DOCD LE1/YR: CPT | Mod: CPTII,S$GLB,, | Performed by: NURSE PRACTITIONER

## 2022-05-09 PROCEDURE — 99215 PR OFFICE/OUTPT VISIT, EST, LEVL V, 40-54 MIN: ICD-10-PCS | Mod: S$GLB,,, | Performed by: NURSE PRACTITIONER

## 2022-05-09 PROCEDURE — 3078F PR MOST RECENT DIASTOLIC BLOOD PRESSURE < 80 MM HG: ICD-10-PCS | Mod: CPTII,S$GLB,, | Performed by: NURSE PRACTITIONER

## 2022-05-09 PROCEDURE — 3078F DIAST BP <80 MM HG: CPT | Mod: CPTII,S$GLB,, | Performed by: NURSE PRACTITIONER

## 2022-05-09 PROCEDURE — 3288F FALL RISK ASSESSMENT DOCD: CPT | Mod: CPTII,S$GLB,, | Performed by: NURSE PRACTITIONER

## 2022-05-09 PROCEDURE — 99999 PR PBB SHADOW E&M-EST. PATIENT-LVL V: CPT | Mod: PBBFAC,,, | Performed by: NURSE PRACTITIONER

## 2022-05-09 PROCEDURE — 1101F PR PT FALLS ASSESS DOC 0-1 FALLS W/OUT INJ PAST YR: ICD-10-PCS | Mod: CPTII,S$GLB,, | Performed by: NURSE PRACTITIONER

## 2022-05-09 PROCEDURE — 1126F PR PAIN SEVERITY QUANTIFIED, NO PAIN PRESENT: ICD-10-PCS | Mod: CPTII,S$GLB,, | Performed by: NURSE PRACTITIONER

## 2022-05-09 PROCEDURE — 99499 UNLISTED E&M SERVICE: CPT | Mod: S$GLB,,, | Performed by: NURSE PRACTITIONER

## 2022-05-09 PROCEDURE — 99215 OFFICE O/P EST HI 40 MIN: CPT | Mod: S$GLB,,, | Performed by: NURSE PRACTITIONER

## 2022-05-09 PROCEDURE — 1159F PR MEDICATION LIST DOCUMENTED IN MEDICAL RECORD: ICD-10-PCS | Mod: CPTII,S$GLB,, | Performed by: NURSE PRACTITIONER

## 2022-05-09 PROCEDURE — 1159F MED LIST DOCD IN RCRD: CPT | Mod: CPTII,S$GLB,, | Performed by: NURSE PRACTITIONER

## 2022-05-09 PROCEDURE — 3077F SYST BP >= 140 MM HG: CPT | Mod: CPTII,S$GLB,, | Performed by: NURSE PRACTITIONER

## 2022-05-09 PROCEDURE — 99499 RISK ADDL DX/OHS AUDIT: ICD-10-PCS | Mod: S$GLB,,, | Performed by: NURSE PRACTITIONER

## 2022-05-09 PROCEDURE — 1160F PR REVIEW ALL MEDS BY PRESCRIBER/CLIN PHARMACIST DOCUMENTED: ICD-10-PCS | Mod: CPTII,S$GLB,, | Performed by: NURSE PRACTITIONER

## 2022-05-09 PROCEDURE — 1157F ADVNC CARE PLAN IN RCRD: CPT | Mod: CPTII,S$GLB,, | Performed by: NURSE PRACTITIONER

## 2022-05-09 RX ORDER — LANCETS
EACH MISCELLANEOUS
Qty: 400 EACH | Refills: 3 | Status: SHIPPED | OUTPATIENT
Start: 2022-05-09

## 2022-05-09 RX ORDER — INSULIN LISPRO 100 [IU]/ML
INJECTION, SOLUTION INTRAVENOUS; SUBCUTANEOUS
Qty: 60 ML | Refills: 1 | Status: SHIPPED | OUTPATIENT
Start: 2022-05-09 | End: 2022-09-07 | Stop reason: SDUPTHER

## 2022-05-09 RX ORDER — INSULIN DETEMIR 100 [IU]/ML
40 INJECTION, SOLUTION SUBCUTANEOUS DAILY
Qty: 45 ML | Refills: 1 | Status: SHIPPED | OUTPATIENT
Start: 2022-05-09 | End: 2022-07-12 | Stop reason: ALTCHOICE

## 2022-05-09 RX ORDER — DULAGLUTIDE 0.75 MG/.5ML
0.75 INJECTION, SOLUTION SUBCUTANEOUS
Qty: 4 PEN | Refills: 5 | Status: SHIPPED | OUTPATIENT
Start: 2022-05-09 | End: 2022-07-12 | Stop reason: SDUPTHER

## 2022-05-09 NOTE — PROGRESS NOTES
CC: This 81 y.o. Black or  female  is here for evaluation of  T2DM along with comorbidities indicated in the Visit Diagnosis section of this encounter.    HPI: Joel Condon was diagnosed with T2DM in 1994.  Oral agents started at diagnosis.       Prior visit 12/2020  Pt had reported before she could not tolerate Humalog d/t nausea but has continued to take it and is tolerating Humalog well.   a1c is 8.6%.   BP is high. Pt has not taken her medications yet. Waits to take them when she eats breakfast.   Plan Difficult to evaluate glucose control without blood sugar logs.   Again, she declines dietician visit because she believes she knows what to do. Suspect BG fluctuates due to carbohydrate unawareness and inconsistencies in diet.   After much persuasion, she agrees to under  Continuous Glucose Monitoring (CGM) study.       Interval history  Pt has not been seen for 1.5 years. Pt states f/u has been difficult because of limited transportation.  A1c improved from 8.6% in 12/2020 to 7.8% in 3/2021; now is up to 9.4%.   States she eats what she wants but in small amounts - she has been eating sweets and fruit.       LAST DIABETES EDUCATION: years ago at [facility]. And also a class done by [employer] early 2017    HOSPITALIZED FOR DIABETES  -  No.    PRESCRIBED DIABETES MEDICATIONS:   Levemir Flextouch 40 units every night at 10 pm   Humalog Kwikpen 28 units ac  metformin xr 1000 mg AM and 500 mg PM      Misses medication doses - denies     Injects  To upper abdomen, c/o knots in her abdomen from injections.     DM COMPLICATIONS: peripheral neuropathy    SIGNIFICANT DIABETES MED HISTORY:   Metformin started at initial ov 8/17/17  Diarrhea on metformin even w/ psyllium fiber supplement   Humalog - nausea and weakness       SELF MONITORING BLOOD GLUCOSE: Checks blood glucose at home -   Fasting high 100s to 200s.   Bedtime 200s       HYPOGLYCEMIC EPISODES: feels bad and dizzy sometimes after she takes  Humalog. Does not test her BG at those times.     CURRENT DIET: drinks diet cranberry juice, diet soda, tea w/ splenda.  Eats 2-3 meals/day, skips breakfast when she wakes up late. Snacks on fruit, popcorn.     Diet recall: breakfast - 1 hashbrown, tea with lemon and AS. Supper was smothered okra, rice, diet green tea.       CURRENT EXERCISE:  None d/t hip pain       BP (!) 145/75 (BP Location: Right arm)   Pulse 69   Temp 98.1 °F (36.7 °C) (Oral)   Wt 73.7 kg (162 lb 8 oz)   BMI 27.89 kg/m²       ROS:   CONSTITUTIONAL: Appetite low, denies fatigue  MS: pain to hands and hip      PHYSICAL EXAM:  GENERAL: Well developed, well nourished. No acute distress.   PSYCH: AAOx3, appropriate mood and affect, conversant, well-groomed. Judgement and insight good.   NEURO: Cranial nerves grossly intact. Speech clear, no tremor.       Hemoglobin A1C   Date Value Ref Range Status   03/08/2022 9.4 (H) 4.0 - 5.6 % Final     Comment:     ADA Screening Guidelines:  5.7-6.4%  Consistent with prediabetes  >or=6.5%  Consistent with diabetes    High levels of fetal hemoglobin interfere with the HbA1C  assay. Heterozygous hemoglobin variants (HbS, HgC, etc)do  not significantly interfere with this assay.   However, presence of multiple variants may affect accuracy.     03/16/2021 7.8 (H) 4.0 - 5.6 % Final     Comment:     ADA Screening Guidelines:  5.7-6.4%  Consistent with prediabetes  >or=6.5%  Consistent with diabetes    High levels of fetal hemoglobin interfere with the HbA1C  assay. Heterozygous hemoglobin variants (HbS, HgC, etc)do  not significantly interfere with this assay.   However, presence of multiple variants may affect accuracy.     12/04/2020 8.6 (H) 4.0 - 5.6 % Final     Comment:     ADA Screening Guidelines:  5.7-6.4%  Consistent with prediabetes  >or=6.5%  Consistent with diabetes  High levels of fetal hemoglobin interfere with the HbA1C  assay. Heterozygous hemoglobin variants (HbS, HgC, etc)do  not significantly  interfere with this assay.   However, presence of multiple variants may affect accuracy.         Lab Results   Component Value Date    CPEPTIDE 1.22 11/26/2018             Chemistry        Component Value Date/Time     03/08/2022 0905    K 4.6 03/08/2022 0905     03/08/2022 0905    CO2 26 03/08/2022 0905    BUN 21 03/08/2022 0905    CREATININE 1.2 03/08/2022 0905     (H) 03/08/2022 0905        Component Value Date/Time    CALCIUM 10.0 03/08/2022 0905    ALKPHOS 59 03/08/2022 0905    AST 16 03/08/2022 0905    ALT 18 03/08/2022 0905    BILITOT 0.6 03/08/2022 0905    ESTGFRAFRICA 49 (A) 03/08/2022 0905    EGFRNONAA 43 (A) 03/08/2022 0905          Lab Results   Component Value Date    LDLCALC 50.8 (L) 03/08/2022      Latest Reference Range & Units 03/08/22 09:05   Cholesterol 120 - 199 mg/dL 120 [1]   HDL 40 - 75 mg/dL 47 [2]   HDL/Cholesterol Ratio 20.0 - 50.0 % 39.2   LDL Cholesterol External 63.0 - 159.0 mg/dL 50.8 (L) [3]   Non-HDL Cholesterol mg/dL 73 [4]   Total Cholesterol/HDL Ratio 2.0 - 5.0  2.6   Triglycerides 30 - 150 mg/dL 111 [5]       Lab Results   Component Value Date    MICALBCREAT 17.8 03/08/2022           ASSESSMENT and PLAN:    A1C GOAL: < 8%      1. 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 Difficult to evaluate glucose trends when pt brings no logs.   Advised -     Reviewed how to rotate insulin injections. Insulin may not work well if injecting in a hardened area.   Avoid eating too much fruit  - limit to 1/2 cup of fruit for 1 snack.     Start Trulicity 0.75 mg once a week. Ok to take it on empty stomach. Reviewed GI s/e.   Reduce Humalog to 22 units before each meal.   Continue Levemir 40 units and metformin.     Test blood sugar before each meal and bedtime, 3-4x/day. Patient declines Dexcom CGM because she has limited transportation and would not be able to complete training.     Mail a blood  sugar log in 2 weeks.     Return to clinic in 2 months with labs prior.       insulin lispro (HUMALOG KWIKPEN INSULIN) 100 unit/mL pen    insulin detemir U-100 (LEVEMIR FLEXTOUCH U-100 INSULN) 100 unit/mL (3 mL) InPn pen    Hemoglobin A1C    Creatinine, Serum    C-Peptide    Glucose, Random   2. Stage 3a chronic kidney disease  Creatinine, Serum           Orders Placed This Encounter   Procedures    Hemoglobin A1C     Standing Status:   Future     Standing Expiration Date:   7/8/2023    Creatinine, Serum     Standing Status:   Future     Standing Expiration Date:   7/8/2023    C-Peptide     Standing Status:   Future     Standing Expiration Date:   7/8/2023    Glucose, Random     Standing Status:   Future     Standing Expiration Date:   7/8/2023        No follow-ups on file.

## 2022-05-09 NOTE — PATIENT INSTRUCTIONS
Reviewed how to rotate insulin injections. Insulin may not work well if injecting in a hardened area.   Avoid eating too much fruit  - limit to 1/2 cup of fruit for 1 snack.     Start Trulicity 0.75 mg once a week. Ok to take it on empty stomach.   Reduce Humalog to 22 units before each meal.   Continue Levemir 40 units and metformin.     Test blood sugar before each meal and bedtime, 3-4x/day. Patient declines Dexcom CGM because she has limited transportation and would not be able to complete training.   Mail a blood sugar log in 2 weeks.     Return to clinic in 2 months with labs prior.

## 2022-05-18 ENCOUNTER — PES CALL (OUTPATIENT)
Dept: ADMINISTRATIVE | Facility: CLINIC | Age: 82
End: 2022-05-18
Payer: MEDICARE

## 2022-05-24 ENCOUNTER — PATIENT OUTREACH (OUTPATIENT)
Dept: ADMINISTRATIVE | Facility: OTHER | Age: 82
End: 2022-05-24
Payer: MEDICARE

## 2022-05-25 ENCOUNTER — TELEPHONE (OUTPATIENT)
Dept: FAMILY MEDICINE | Facility: CLINIC | Age: 82
End: 2022-05-25
Payer: MEDICARE

## 2022-05-25 NOTE — TELEPHONE ENCOUNTER
"----- Message from Alondra Heller sent at 5/25/2022  1:27 PM CDT -----  Regarding: Sonal"[employer]"  .Type: Patient Call Back    Who called: Sonal "[employer]     What is the request in detail: requesting to get a verbal to cancel out pt's requesting for diabetic testing supplies due to testing frequency     Can the clinic reply by [facility]? Call back     Would the patient rather a call back or a response via [facility]?  Call back     Best call back number: [phone]        "

## 2022-05-25 NOTE — TELEPHONE ENCOUNTER
Spoke with Sonal with [facility] she states a rx for diabetic testing supplies was  Sent in on 5/9 from another provider and they received another rx. Sonal was requesting a verbal order to cancel new order. Verbal given.

## 2022-05-31 ENCOUNTER — TELEPHONE (OUTPATIENT)
Dept: HEMATOLOGY/ONCOLOGY | Facility: CLINIC | Age: 82
End: 2022-05-31
Payer: MEDICARE

## 2022-05-31 ENCOUNTER — LAB VISIT (OUTPATIENT)
Dept: LAB | Facility: HOSPITAL | Age: 82
End: 2022-05-31
Payer: MEDICARE

## 2022-05-31 DIAGNOSIS — Z17.0 MALIGNANT NEOPLASM OF LEFT BREAST IN FEMALE, ESTROGEN RECEPTOR POSITIVE, UNSPECIFIED SITE OF BREAST: Primary | ICD-10-CM

## 2022-05-31 DIAGNOSIS — C50.912 MALIGNANT NEOPLASM OF LEFT BREAST IN FEMALE, ESTROGEN RECEPTOR POSITIVE, UNSPECIFIED SITE OF BREAST: Primary | ICD-10-CM

## 2022-05-31 DIAGNOSIS — C50.912 MALIGNANT NEOPLASM OF LEFT BREAST IN FEMALE, ESTROGEN RECEPTOR POSITIVE, UNSPECIFIED SITE OF BREAST: ICD-10-CM

## 2022-05-31 DIAGNOSIS — Z17.0 MALIGNANT NEOPLASM OF LEFT BREAST IN FEMALE, ESTROGEN RECEPTOR POSITIVE, UNSPECIFIED SITE OF BREAST: ICD-10-CM

## 2022-05-31 LAB
ALBUMIN SERPL BCP-MCNC: 3.7 G/DL (ref 3.5–5.2)
ALP SERPL-CCNC: 51 U/L (ref 55–135)
ALT SERPL W/O P-5'-P-CCNC: 16 U/L (ref 10–44)
ANION GAP SERPL CALC-SCNC: 12 MMOL/L (ref 8–16)
AST SERPL-CCNC: 18 U/L (ref 10–40)
BASOPHILS # BLD AUTO: 0.07 K/UL (ref 0–0.2)
BASOPHILS NFR BLD: 0.8 % (ref 0–1.9)
BILIRUB SERPL-MCNC: 0.6 MG/DL (ref 0.1–1)
BUN SERPL-MCNC: 18 MG/DL (ref 8–23)
CALCIUM SERPL-MCNC: 9.7 MG/DL (ref 8.7–10.5)
CHLORIDE SERPL-SCNC: 108 MMOL/L (ref 95–110)
CO2 SERPL-SCNC: 20 MMOL/L (ref 23–29)
CREAT SERPL-MCNC: 1.1 MG/DL (ref 0.5–1.4)
DIFFERENTIAL METHOD: ABNORMAL
EOSINOPHIL # BLD AUTO: 0.4 K/UL (ref 0–0.5)
EOSINOPHIL NFR BLD: 4.2 % (ref 0–8)
ERYTHROCYTE [DISTWIDTH] IN BLOOD BY AUTOMATED COUNT: 13.3 % (ref 11.5–14.5)
EST. GFR  (AFRICAN AMERICAN): 54.8 ML/MIN/1.73 M^2
EST. GFR  (NON AFRICAN AMERICAN): 47.5 ML/MIN/1.73 M^2
GLUCOSE SERPL-MCNC: 249 MG/DL (ref 70–110)
HCT VFR BLD AUTO: 40.6 % (ref 37–48.5)
HGB BLD-MCNC: 13.2 G/DL (ref 12–16)
IMM GRANULOCYTES # BLD AUTO: 0.01 K/UL (ref 0–0.04)
IMM GRANULOCYTES NFR BLD AUTO: 0.1 % (ref 0–0.5)
LYMPHOCYTES # BLD AUTO: 2.4 K/UL (ref 1–4.8)
LYMPHOCYTES NFR BLD: 28.1 % (ref 18–48)
MCH RBC QN AUTO: 31.6 PG (ref 27–31)
MCHC RBC AUTO-ENTMCNC: 32.5 G/DL (ref 32–36)
MCV RBC AUTO: 97 FL (ref 82–98)
MONOCYTES # BLD AUTO: 0.5 K/UL (ref 0.3–1)
MONOCYTES NFR BLD: 5.6 % (ref 4–15)
NEUTROPHILS # BLD AUTO: 5.3 K/UL (ref 1.8–7.7)
NEUTROPHILS NFR BLD: 61.2 % (ref 38–73)
NRBC BLD-RTO: 0 /100 WBC
PLATELET # BLD AUTO: 328 K/UL (ref 150–450)
PMV BLD AUTO: 10.8 FL (ref 9.2–12.9)
POTASSIUM SERPL-SCNC: 4.4 MMOL/L (ref 3.5–5.1)
PROT SERPL-MCNC: 7.2 G/DL (ref 6–8.4)
RBC # BLD AUTO: 4.18 M/UL (ref 4–5.4)
SODIUM SERPL-SCNC: 140 MMOL/L (ref 136–145)
WBC # BLD AUTO: 8.6 K/UL (ref 3.9–12.7)

## 2022-05-31 PROCEDURE — 80053 COMPREHEN METABOLIC PANEL: CPT

## 2022-05-31 PROCEDURE — 85025 COMPLETE CBC W/AUTO DIFF WBC: CPT

## 2022-05-31 PROCEDURE — 36415 COLL VENOUS BLD VENIPUNCTURE: CPT | Mod: PO

## 2022-06-02 ENCOUNTER — INFUSION (OUTPATIENT)
Dept: INFUSION THERAPY | Facility: HOSPITAL | Age: 82
End: 2022-06-02
Attending: INTERNAL MEDICINE
Payer: MEDICARE

## 2022-06-02 ENCOUNTER — OFFICE VISIT (OUTPATIENT)
Dept: HEMATOLOGY/ONCOLOGY | Facility: CLINIC | Age: 82
End: 2022-06-02
Payer: MEDICARE

## 2022-06-02 VITALS
TEMPERATURE: 98 F | OXYGEN SATURATION: 93 % | BODY MASS INDEX: 27.52 KG/M2 | HEART RATE: 75 BPM | WEIGHT: 161.19 LBS | DIASTOLIC BLOOD PRESSURE: 60 MMHG | HEIGHT: 64 IN | SYSTOLIC BLOOD PRESSURE: 150 MMHG

## 2022-06-02 DIAGNOSIS — R68.81 EARLY SATIETY: ICD-10-CM

## 2022-06-02 DIAGNOSIS — M85.80 OSTEOPENIA, UNSPECIFIED LOCATION: ICD-10-CM

## 2022-06-02 DIAGNOSIS — Z17.0 MALIGNANT NEOPLASM OF LEFT BREAST IN FEMALE, ESTROGEN RECEPTOR POSITIVE, UNSPECIFIED SITE OF BREAST: Primary | ICD-10-CM

## 2022-06-02 DIAGNOSIS — Z79.811 LONG TERM (CURRENT) USE OF AROMATASE INHIBITORS: ICD-10-CM

## 2022-06-02 DIAGNOSIS — C50.912 MALIGNANT NEOPLASM OF LEFT BREAST IN FEMALE, ESTROGEN RECEPTOR POSITIVE, UNSPECIFIED SITE OF BREAST: Primary | ICD-10-CM

## 2022-06-02 DIAGNOSIS — I10 ESSENTIAL HYPERTENSION: ICD-10-CM

## 2022-06-02 DIAGNOSIS — M85.80 OSTEOPENIA, UNSPECIFIED LOCATION: Primary | ICD-10-CM

## 2022-06-02 PROCEDURE — 1126F PR PAIN SEVERITY QUANTIFIED, NO PAIN PRESENT: ICD-10-PCS | Mod: CPTII,S$GLB,, | Performed by: INTERNAL MEDICINE

## 2022-06-02 PROCEDURE — 99214 PR OFFICE/OUTPT VISIT, EST, LEVL IV, 30-39 MIN: ICD-10-PCS | Mod: S$GLB,,, | Performed by: INTERNAL MEDICINE

## 2022-06-02 PROCEDURE — 3078F DIAST BP <80 MM HG: CPT | Mod: CPTII,S$GLB,, | Performed by: INTERNAL MEDICINE

## 2022-06-02 PROCEDURE — 1101F PR PT FALLS ASSESS DOC 0-1 FALLS W/OUT INJ PAST YR: ICD-10-PCS | Mod: CPTII,S$GLB,, | Performed by: INTERNAL MEDICINE

## 2022-06-02 PROCEDURE — 3078F PR MOST RECENT DIASTOLIC BLOOD PRESSURE < 80 MM HG: ICD-10-PCS | Mod: CPTII,S$GLB,, | Performed by: INTERNAL MEDICINE

## 2022-06-02 PROCEDURE — 3288F FALL RISK ASSESSMENT DOCD: CPT | Mod: CPTII,S$GLB,, | Performed by: INTERNAL MEDICINE

## 2022-06-02 PROCEDURE — 99214 OFFICE O/P EST MOD 30 MIN: CPT | Mod: S$GLB,,, | Performed by: INTERNAL MEDICINE

## 2022-06-02 PROCEDURE — 99999 PR PBB SHADOW E&M-EST. PATIENT-LVL IV: ICD-10-PCS | Mod: PBBFAC,,, | Performed by: INTERNAL MEDICINE

## 2022-06-02 PROCEDURE — 63600175 PHARM REV CODE 636 W HCPCS: Mod: JG | Performed by: INTERNAL MEDICINE

## 2022-06-02 PROCEDURE — 96372 THER/PROPH/DIAG INJ SC/IM: CPT

## 2022-06-02 PROCEDURE — 3077F SYST BP >= 140 MM HG: CPT | Mod: CPTII,S$GLB,, | Performed by: INTERNAL MEDICINE

## 2022-06-02 PROCEDURE — 1159F MED LIST DOCD IN RCRD: CPT | Mod: CPTII,S$GLB,, | Performed by: INTERNAL MEDICINE

## 2022-06-02 PROCEDURE — 1101F PT FALLS ASSESS-DOCD LE1/YR: CPT | Mod: CPTII,S$GLB,, | Performed by: INTERNAL MEDICINE

## 2022-06-02 PROCEDURE — 99499 UNLISTED E&M SERVICE: CPT | Mod: S$GLB,,, | Performed by: INTERNAL MEDICINE

## 2022-06-02 PROCEDURE — 3288F PR FALLS RISK ASSESSMENT DOCUMENTED: ICD-10-PCS | Mod: CPTII,S$GLB,, | Performed by: INTERNAL MEDICINE

## 2022-06-02 PROCEDURE — 99999 PR PBB SHADOW E&M-EST. PATIENT-LVL IV: CPT | Mod: PBBFAC,,, | Performed by: INTERNAL MEDICINE

## 2022-06-02 PROCEDURE — 99499 RISK ADDL DX/OHS AUDIT: ICD-10-PCS | Mod: S$GLB,,, | Performed by: INTERNAL MEDICINE

## 2022-06-02 PROCEDURE — 1157F ADVNC CARE PLAN IN RCRD: CPT | Mod: CPTII,S$GLB,, | Performed by: INTERNAL MEDICINE

## 2022-06-02 PROCEDURE — 1157F PR ADVANCE CARE PLAN OR EQUIV PRESENT IN MEDICAL RECORD: ICD-10-PCS | Mod: CPTII,S$GLB,, | Performed by: INTERNAL MEDICINE

## 2022-06-02 PROCEDURE — 1159F PR MEDICATION LIST DOCUMENTED IN MEDICAL RECORD: ICD-10-PCS | Mod: CPTII,S$GLB,, | Performed by: INTERNAL MEDICINE

## 2022-06-02 PROCEDURE — 1126F AMNT PAIN NOTED NONE PRSNT: CPT | Mod: CPTII,S$GLB,, | Performed by: INTERNAL MEDICINE

## 2022-06-02 PROCEDURE — 3077F PR MOST RECENT SYSTOLIC BLOOD PRESSURE >= 140 MM HG: ICD-10-PCS | Mod: CPTII,S$GLB,, | Performed by: INTERNAL MEDICINE

## 2022-06-02 RX ADMIN — DENOSUMAB 60 MG: 60 INJECTION SUBCUTANEOUS at 10:06

## 2022-06-02 NOTE — PROGRESS NOTES
Subjective:       Patient ID: Joel Condon is a 81 y.o. female.    Chief Complaint: Breast Cancer      Diagnosis :IDC left breast Stage 1B, [id number] grade 1, ER/VT pos [id number] negative    S/p  left partial mastectomy and SLNB 12/13/2017, closest margin anteriorly at 1mm  S/p  postoperative RT completed 3/13/2018  Letrozole 3/2018-present      Prior Hx:  Patient  is a 81 y.o. postmenopausal female seen today for f/u for  carcinoma of the left breast. She had an abnormal mammogram first noted 10/27/2017. Follow-up mammogram and ultrasound (11/14/17) showed 6mm mass in the 2OC left breast with enlarged axillary LN measuring 17mm boderline.She underwent an  ultrasound guided biopsy  on 11/21/2017 with pathology revealing infiltrating ductal carcinoma of the breast, Grade 1, ER positive 100% ,VT positive 100% Her-2neu negative.  The patient is status post left partial mastectomy and sentinel node biopsy on 12/13/2017.  Final pathology showed 6mm IDC, 1 of 2  LN with 1mm micromet. 1mm anterior margin. She does not routinely do self breast exams. She  has not noted any skin  changes on breast exam. No  nipple discharge. No history of  previous breast biopsies. No  personal history of breast cancer or ovarian cancer. She completed  postoperative RT under the direction of Dr. Wray on 3/13/2018.She is taking adjuvant Letrozole daily ( initiated 3/2018- present) . Trial hold of therapy ( nausea, dizziness) w/minor improvementShe is hearing impaired She completed  second COVID vaccination on March 25th       Interval Hx: She reports life-related stressors   She continues with chronic mild  diffuse arthralgias-stable   Appetite and weight stable   No SOB/cough    Pt reports early satiety x 6mos  No abd pain  No constipation or diarrhea    She is followed by her PCP for Type 2 DM and HTN  She reports blood glucose levels poorly controlled  She was started on new diabetic med        GYN History: Age of menarche was 14. Age of  menopause was 46.  Patient reports hormonal therapy for less than 5 years. Patient is . Age of first live birth was 17. Patient did breast feed for 2 years 8 months.         Past Medical History:   Diagnosis Date    Arthritis     Breast cancer     Cataract     Colon polyp     Diabetes mellitus     Diabetic retinopathy     Hyperlipidemia LDL goal < 100     Hypertension     Hypothyroidism     Osteoporosis, post-menopausal     Overweight(278.02)     Proteinuria     Type II or unspecified type diabetes mellitus with renal manifestations, uncontrolled(250.42)        Past Surgical History:   Procedure Laterality Date    APPENDECTOMY      BREAST BIOPSY      BREAST LUMPECTOMY      BREAST SURGERY Left 2017    tumor removal    CATARACT EXTRACTION W/  INTRAOCULAR LENS IMPLANT Left 14    OS (Dr. Arce)    CATARACT EXTRACTION W/  INTRAOCULAR LENS IMPLANT Right 2014    OD (Dr. Arce)    CHOLECYSTECTOMY      COLONOSCOPY N/A 2017    Procedure: COLONOSCOPY;  Surgeon: Homar Payan MD;  Location: [facility] ENDO;  Service: Endoscopy;  Laterality: N/A;    COLONOSCOPY N/A 2018    Procedure: COLONOSCOPY;  Surgeon: Mykel Boles MD;  Location: [facility] ENDO;  Service: Endoscopy;  Laterality: N/A;    EYE SURGERY  2014 & 2014    HYSTERECTOMY      total    left eye cataract surgery  14    OOPHORECTOMY       Current MEDS; Reviewed and as per MEDCHART    Review of Systems   Constitutional: Negative for appetite change, fatigue, fever and unexpected weight change.   HENT: Negative for mouth sores.    Eyes: Negative for visual disturbance.   Respiratory: Negative for cough and shortness of breath.    Cardiovascular: Negative for chest pain.   Gastrointestinal: Negative for abdominal pain, diarrhea and nausea.        Early satiety   Genitourinary: Negative for frequency.   Musculoskeletal: Positive for arthralgias. Negative for back pain and neck pain.   Skin: Negative  "for rash.   Neurological: Negative for dizziness and headaches.   Hematological: Negative for adenopathy.   Psychiatric/Behavioral: The patient is not nervous/anxious.        Objective:         Vitals:    06/02/22 1001   BP: (!) 150/60   BP Location: Left arm   Patient Position: Sitting   BP Method: Large (Automatic)   Pulse: 75   Temp: 97.9 °F (36.6 °C)   TempSrc: Oral   SpO2: (!) 93%   Weight: 73.1 kg (161 lb 2.5 oz)   Height: 5' 4" (1.626 m)       Physical Exam  Constitutional:       Appearance: She is well-developed.   HENT:      Head: Normocephalic.      Mouth/Throat:      Pharynx: No oropharyngeal exudate.   Eyes:      General: Lids are normal. No scleral icterus.     Conjunctiva/sclera: Conjunctivae normal.   Neck:      Thyroid: No thyromegaly.   Cardiovascular:      Rate and Rhythm: Normal rate and regular rhythm.      Heart sounds: Normal heart sounds. No murmur heard.  Pulmonary:      Breath sounds: Normal breath sounds. No wheezing or rales.   Chest:   Breasts:      Right: No inverted nipple, mass, nipple discharge, skin change, tenderness or supraclavicular adenopathy.      Left: Tenderness present. No inverted nipple, mass, nipple discharge, skin change or supraclavicular adenopathy.       Abdominal:      General: Bowel sounds are normal.      Palpations: Abdomen is soft.      Tenderness: There is no abdominal tenderness. There is no guarding or rebound.   Musculoskeletal:         General: No tenderness. Normal range of motion.      Cervical back: Normal range of motion and neck supple.   Lymphadenopathy:      Cervical: No cervical adenopathy.      Upper Body:      Right upper body: No supraclavicular adenopathy.      Left upper body: No supraclavicular adenopathy.   Skin:     General: Skin is warm and dry.      Findings: No ecchymosis, erythema, petechiae or rash.   Neurological:      Mental Status: She is alert and oriented to person, place, and time.      Cranial Nerves: No cranial nerve deficit.     "  Coordination: Coordination normal.           FINAL PATHOLOGIC DIAGNOSIS 11/21/2017  1. Left breast mass 2:00:  Invasive mammary carcinoma, grade 1 (Geronimo score: Tubule formation 2, nuclear pleomorphism 2, mitotic  activity 1, total score 5), occupying at least 5 mm in one core.  2. Left breast axilla (lymph node):  Benign lymphoid tissue with no evidence of metastatic disease.  Note: Receptor studies and immunohistochemical studies will be performed with supplemental report to follow.  Intradepartmental QC/review obtained. Dr. Neil Irvin concurs with the above diagnostic impressions.    Supplemental Diagnosis  HORMONE RECEPTOR STUDIES:  Virtually 100% of the cells of the carcinoma are strongly nuclear estrogen receptor positive. 60 percent of the cells  are strongly nuclear progesterone receptor positive. There is an abrupt transition from the zone of nuclear  progesterone receptor positive cells to the zone where this receptor is negative. It is possible that there has been  some technical artifact which has led a region of the tissue to not stain, and that actually the vast majority of the  tumor are nuclear progesterone receptor positive. The tumor is HER-2 negative. The positive and negative controls  stained appropriately.        FINAL PATHOLOGIC DIAGNOSIS 12/13/2017   Part 1  Lymph node (1, submitted as left sentinel lymph node count equals 60):  -No evidence of malignancy  Part 2  Lymph node (1, submitted as left sentinel lymph node count equals 20):  -No evidence of malignancy  Part 3  Breast excision (submitted as left partial mastectomy):  -Invasive, well differentiated (grade I of III) lobular carcinoma  -Carcinoma is a single focus measuring 0.6 x 0.65 cm (6 x 6.5 mm) located 1 mm from the nearest, anterior  resection margin. All resection margins are free of malignancy.  -No tumor necrosis, hemosiderin vascular or lymphatic permeation, or perineural involvement is identified.  -A microscopic focus  of in situ carcinoma is identified immediately adjacent to the invasive tumor focus  -Carcinoma is graded I of III according to the Melissa modification of the Arriaga-Benson grading scheme  with 2 points each assigned for moderate tubule formation and moderate nuclear pleomorphism and 1.4  minimal mitotic count, a total of 5 of 9 possible points.  -AJCC TN: pT pN0(sn)  -Complete AJCC Staging Form follows:  INVASIVE CARCINOMA OF THE BREAST  Procedure: Partial mastectomy  Node sampling: [place] lymph nodes  Specimen laterality: Left  Tumor site: Not specified  Tumor size: 6.5 x 6.0 mm  Histologic type: Invasive lobular carcinoma  Histologic grade  -Glandular differentiation: Score 2  -Nuclear pleomorphism: Score 2  -Mitotic rate: Score 1  -Overall grade: Score 1  Tumor focality: Single focus of invasive carcinoma  Ductal carcinoma in situ (DCIS): No DCIS present  Margins-invasive carcinoma: Margins uninvolved by invasive carcinoma  -Distance from closest margin: 1 mm, anterior  Lymph nodes  -Total number of lymph nodes examined (sentinel and non-sentinel): 2  -Number sentinel lymph node nodes examined: 2  Pathologic staging  -Primary tumor: pT1b pN0(sn)  -Regional lymph nodes  Modifier: (SN)  Category: pN0  Ancillary studies: E-cadherin negative in tumor cells        Bone Density 2018   Compared to the young normal reference, this study indicates osteopenia of the Lumbar spine and left hip with osteoporosis of the right hip as detailed above.  Compared to prior the bone mineral density of the lumbar spine and left hip has slightly improved with reduced bone mineral density of the right hip as detailed above.    Note: Degenerative changes can falsely elevate measured bone mineral density, particularly in the lumbar spine, and should be considered as part of the final clinical recommendation    MRI C spine w/out contrast 4/3/2019   Cervical spondylosis with posterior marginal osteophytic disc spur complex  predominantly C3-C4 through C6-C7.  No evidence for osseous metastatic disease.      Bone Denstiy 6/15/21   Impression:  LOW BONE MASS WITHOUT SIGNIFICANT CHANGE IN THE HIP BMD COMPARED TO THE PRIOR STUDY.  THE ESTIMATED 10 YEAR PROBABILITY OF HIP FRACTURE IS 2.4% AND OF A MAJOR OSTEOPOROTIC FRACTURE 7.8% RESPECTIVELY USING FRAX.  1. Low bone mass  2. Compared with previous DXA BMD at the total hip has remained stable      Mammo 11/24/21 BI-RADS Category 1: Negative    Assessment:       1. Malignant neoplasm of left breast in female, estrogen receptor positive, unspecified site of breast    2. Long term (current) use of aromatase inhibitors    3. Osteopenia, unspecified location    4. Essential hypertension    5. Early satiety        Plan:    ECOG 1   1-2    80 y/o with multiple medical diagnoses with Stage 1B pT1b (6mm) N1mi M0 grade 1 ER + NH + [id number] IDC carcinoma  of the left breast status post left partial mastectomy and SLNB 12/13/2017  ER + NH + [id number]  12/13/2017.  1 of 2 lymph nodes positive for micromet. Closest margin anteriorly 1mm.  She is Stage IA per AJCC 8th ed. pathologic prognostic staging system.   S/p  postoperative RT completed 3/13/2018  Follow-up Mammo 11/23/21 BI-RADS ASSESSMENT CATEGORY: 2 BENIGN   Plan follow-up mammo   Cont endocrine therapy-tolerating well       3. Cont prolia  Last PROLIA 12/2021  No Dental Clearance indicated ( dentures)  Bone Density  6/15/21 - stable,  Cont bisphosphonate  Cont Ca and Vit D supp    4. Cont BP meds    5. Pt declines ambulatory referral to GI    Ambulatory Referral to GI- pt declines 2/2 transportation       Follow-up  4 mo with cbc,cmp  prior to f/u   All questions posed answered to patient's satisfaction          Advance Care Planning     Power of   I previously initiated the process of advance care planning today and explained the importance of this process to the patient.  I introduced the concept of advance directives to the patient, as  well. Then the patient received detailed information about the importance of designating a Health Care Power of  (HCPOA). She was also instructed to communicate with this person about their wishes for future healthcare, should she become sick and lose decision-making capacity. The patient has not previously appointed a HCPOA. After our discussion, the patient has decided to complete a HCPOA and has appointed her daughter and NAME Isis Roberts   I spent a total time of 16  minutes discussing this issue with the patient.       Marlin Caldera M.D.         Kenney Wray M.D.

## 2022-06-07 ENCOUNTER — PATIENT OUTREACH (OUTPATIENT)
Dept: ADMINISTRATIVE | Facility: HOSPITAL | Age: 82
End: 2022-06-07
Payer: MEDICARE

## 2022-06-07 RX ORDER — BNT162B2 0.23 MG/2.25ML
INJECTION, SUSPENSION INTRAMUSCULAR
COMMUNITY
Start: 2022-02-18 | End: 2022-09-07 | Stop reason: ALTCHOICE

## 2022-06-07 NOTE — PROGRESS NOTES
[facility] MCIP Non-Compliant BP report - Left message for patient to call our office. Blood pressure reading needed.    Overdue Diabetic Eye Exam - Left message for patient to call our office. Please schedule.

## 2022-07-05 ENCOUNTER — LAB VISIT (OUTPATIENT)
Dept: LAB | Facility: HOSPITAL | Age: 82
End: 2022-07-05
Attending: INTERNAL MEDICINE
Payer: MEDICARE

## 2022-07-05 DIAGNOSIS — N18.31 STAGE 3A CHRONIC KIDNEY DISEASE: ICD-10-CM

## 2022-07-05 LAB
C PEPTIDE SERPL-MCNC: 1.41 NG/ML (ref 0.78–5.19)
CREAT SERPL-MCNC: 0.9 MG/DL (ref 0.5–1.4)
EST. GFR  (AFRICAN AMERICAN): >60 ML/MIN/1.73 M^2
EST. GFR  (NON AFRICAN AMERICAN): >60 ML/MIN/1.73 M^2
ESTIMATED AVG GLUCOSE: 180 MG/DL (ref 68–131)
GLUCOSE SERPL-MCNC: 100 MG/DL (ref 70–110)
HBA1C MFR BLD: 7.9 % (ref 4–5.6)

## 2022-07-05 PROCEDURE — 36415 COLL VENOUS BLD VENIPUNCTURE: CPT | Mod: PO | Performed by: NURSE PRACTITIONER

## 2022-07-05 PROCEDURE — 84681 ASSAY OF C-PEPTIDE: CPT | Performed by: NURSE PRACTITIONER

## 2022-07-05 PROCEDURE — 82565 ASSAY OF CREATININE: CPT | Performed by: NURSE PRACTITIONER

## 2022-07-05 PROCEDURE — 83036 HEMOGLOBIN GLYCOSYLATED A1C: CPT | Performed by: NURSE PRACTITIONER

## 2022-07-05 PROCEDURE — 82947 ASSAY GLUCOSE BLOOD QUANT: CPT | Performed by: NURSE PRACTITIONER

## 2022-07-12 ENCOUNTER — OFFICE VISIT (OUTPATIENT)
Dept: ENDOCRINOLOGY | Facility: CLINIC | Age: 82
End: 2022-07-12
Payer: MEDICARE

## 2022-07-12 VITALS
BODY MASS INDEX: 27.29 KG/M2 | HEART RATE: 74 BPM | TEMPERATURE: 98 F | SYSTOLIC BLOOD PRESSURE: 144 MMHG | WEIGHT: 159 LBS | DIASTOLIC BLOOD PRESSURE: 74 MMHG

## 2022-07-12 DIAGNOSIS — E11.3293 MILD NONPROLIFERATIVE DIABETIC RETINOPATHY OF BOTH EYES WITHOUT MACULAR EDEMA ASSOCIATED WITH TYPE 2 DIABETES MELLITUS: ICD-10-CM

## 2022-07-12 DIAGNOSIS — I10 ESSENTIAL HYPERTENSION: ICD-10-CM

## 2022-07-12 PROCEDURE — 3077F PR MOST RECENT SYSTOLIC BLOOD PRESSURE >= 140 MM HG: ICD-10-PCS | Mod: CPTII,S$GLB,, | Performed by: NURSE PRACTITIONER

## 2022-07-12 PROCEDURE — 95251 CONT GLUC MNTR ANALYSIS I&R: CPT | Mod: S$GLB,,, | Performed by: NURSE PRACTITIONER

## 2022-07-12 PROCEDURE — 3077F SYST BP >= 140 MM HG: CPT | Mod: CPTII,S$GLB,, | Performed by: NURSE PRACTITIONER

## 2022-07-12 PROCEDURE — 3051F PR MOST RECENT HEMOGLOBIN A1C LEVEL 7.0 - < 8.0%: ICD-10-PCS | Mod: CPTII,S$GLB,, | Performed by: NURSE PRACTITIONER

## 2022-07-12 PROCEDURE — 3078F DIAST BP <80 MM HG: CPT | Mod: CPTII,S$GLB,, | Performed by: NURSE PRACTITIONER

## 2022-07-12 PROCEDURE — 1157F PR ADVANCE CARE PLAN OR EQUIV PRESENT IN MEDICAL RECORD: ICD-10-PCS | Mod: CPTII,S$GLB,, | Performed by: NURSE PRACTITIONER

## 2022-07-12 PROCEDURE — 1159F MED LIST DOCD IN RCRD: CPT | Mod: CPTII,S$GLB,, | Performed by: NURSE PRACTITIONER

## 2022-07-12 PROCEDURE — 1159F PR MEDICATION LIST DOCUMENTED IN MEDICAL RECORD: ICD-10-PCS | Mod: CPTII,S$GLB,, | Performed by: NURSE PRACTITIONER

## 2022-07-12 PROCEDURE — 99999 PR PBB SHADOW E&M-EST. PATIENT-LVL IV: CPT | Mod: PBBFAC,,, | Performed by: NURSE PRACTITIONER

## 2022-07-12 PROCEDURE — 3078F PR MOST RECENT DIASTOLIC BLOOD PRESSURE < 80 MM HG: ICD-10-PCS | Mod: CPTII,S$GLB,, | Performed by: NURSE PRACTITIONER

## 2022-07-12 PROCEDURE — 1157F ADVNC CARE PLAN IN RCRD: CPT | Mod: CPTII,S$GLB,, | Performed by: NURSE PRACTITIONER

## 2022-07-12 PROCEDURE — 99214 PR OFFICE/OUTPT VISIT, EST, LEVL IV, 30-39 MIN: ICD-10-PCS | Mod: S$GLB,,, | Performed by: NURSE PRACTITIONER

## 2022-07-12 PROCEDURE — 99214 OFFICE O/P EST MOD 30 MIN: CPT | Mod: S$GLB,,, | Performed by: NURSE PRACTITIONER

## 2022-07-12 PROCEDURE — 1160F RVW MEDS BY RX/DR IN RCRD: CPT | Mod: CPTII,S$GLB,, | Performed by: NURSE PRACTITIONER

## 2022-07-12 PROCEDURE — 95251 PR GLUCOSE MONITOR, 72 HOUR, PHYS INTERP: ICD-10-PCS | Mod: S$GLB,,, | Performed by: NURSE PRACTITIONER

## 2022-07-12 PROCEDURE — 1160F PR REVIEW ALL MEDS BY PRESCRIBER/CLIN PHARMACIST DOCUMENTED: ICD-10-PCS | Mod: CPTII,S$GLB,, | Performed by: NURSE PRACTITIONER

## 2022-07-12 PROCEDURE — 99999 PR PBB SHADOW E&M-EST. PATIENT-LVL IV: ICD-10-PCS | Mod: PBBFAC,,, | Performed by: NURSE PRACTITIONER

## 2022-07-12 PROCEDURE — 1126F AMNT PAIN NOTED NONE PRSNT: CPT | Mod: CPTII,S$GLB,, | Performed by: NURSE PRACTITIONER

## 2022-07-12 PROCEDURE — 1126F PR PAIN SEVERITY QUANTIFIED, NO PAIN PRESENT: ICD-10-PCS | Mod: CPTII,S$GLB,, | Performed by: NURSE PRACTITIONER

## 2022-07-12 PROCEDURE — 3051F HG A1C>EQUAL 7.0%<8.0%: CPT | Mod: CPTII,S$GLB,, | Performed by: NURSE PRACTITIONER

## 2022-07-12 RX ORDER — PEN NEEDLE, DIABETIC 30 GX3/16"
NEEDLE, DISPOSABLE MISCELLANEOUS
Qty: 400 EACH | Refills: 3 | Status: SHIPPED | OUTPATIENT
Start: 2022-07-12 | End: 2022-09-22

## 2022-07-12 RX ORDER — INSULIN DEGLUDEC 100 U/ML
40 INJECTION, SOLUTION SUBCUTANEOUS DAILY
Qty: 12 PEN | Refills: 2 | Status: SHIPPED | OUTPATIENT
Start: 2022-07-12 | End: 2022-09-07

## 2022-07-12 RX ORDER — METFORMIN HYDROCHLORIDE 500 MG/1
TABLET, EXTENDED RELEASE ORAL
Qty: 270 TABLET | Refills: 1 | Status: SHIPPED | OUTPATIENT
Start: 2022-07-12 | End: 2022-10-03 | Stop reason: SDUPTHER

## 2022-07-12 RX ORDER — DULAGLUTIDE 0.75 MG/.5ML
0.75 INJECTION, SOLUTION SUBCUTANEOUS
Qty: 12 PEN | Refills: 1 | Status: SHIPPED | OUTPATIENT
Start: 2022-07-12 | End: 2022-10-03 | Stop reason: SDUPTHER

## 2022-07-12 NOTE — PROGRESS NOTES
CC: This 81 y.o. Black or  female  is here for evaluation of  T2DM along with comorbidities indicated in the Visit Diagnosis section of this encounter.    HPI: Joel Condon was diagnosed with T2DM in 1994.  Oral agents started at diagnosis.         Prior visit 5/2022  Pt has not been seen for 1.5 years. Pt states f/u has been difficult because of limited transportation.  A1c improved from 8.6% in 12/2020 to 7.8% in 3/2021; now is up to 9.4%.   States she eats what she wants but in small amounts - she has been eating sweets and fruit.   Plan Uncontrolled DM secondary to poor f/u and diet. Difficult to evaluate glucose trends when pt brings no logs.   Advised - Reviewed how to rotate insulin injections. Insulin may not work well if injecting in a hardened area.   Avoid eating too much fruit  - limit to 1/2 cup of fruit for 1 snack.   Start Trulicity 0.75 mg once a week. Ok to take it on empty stomach. Reviewed GI s/e.   Reduce Humalog to 22 units before each meal.   Continue Levemir 40 units and metformin.   Test blood sugar before each meal and bedtime, 3-4x/day. Patient declines Dexcom CGM because she has limited transportation and would not be able to complete training.   Mail a blood sugar log in 2 weeks. Return to clinic in 2 months with labs prior.       Interval history  a1c is down from 9.4 to 7.9%.   She has started Trulicity and c/o nausea for about 1/2 hour after injection. Appetite is a bit lower.   She is now using transportation from  and can get to her appts.       LAST DIABETES EDUCATION: years ago at [facility]. And also a class done by [facility] early 2017    HOSPITALIZED FOR DIABETES  -  No.    PRESCRIBED DIABETES MEDICATIONS:   Trulicity 0.75 mg weekly   Levemir Flextouch 40 units every night at 10 pm   Humalog Kwikpen 28 units ac  metformin xr 1000 mg AM and 500 mg PM      Misses medication doses - denies     Rotates injections - yes     DM COMPLICATIONS: peripheral  neuropathy    SIGNIFICANT DIABETES MED HISTORY:   Metformin started at initial ov 8/17/17  Diarrhea on metformin even w/ psyllium fiber supplement   Humalog - nausea and weakness       SELF MONITORING BLOOD GLUCOSE: Checks blood glucose at home 4x/day.                 HYPOGLYCEMIC EPISODES: c/o low BG drop overnight and corrects with Boost     CURRENT DIET: drinks diet cranberry juice, diet soda, tea w/ splenda.  Eats 2-3 meals/day, skips breakfast when she wakes up late.          CURRENT EXERCISE:  None d/t hip pain       BP (!) 144/74   Pulse 74   Temp 98.4 °F (36.9 °C)   Wt 72.1 kg (159 lb)   BMI 27.29 kg/m²       ROS:   CONSTITUTIONAL: Appetite low, denies fatigue  MS: pain to hands and hip      PHYSICAL EXAM:  GENERAL: Well developed, well nourished. No acute distress.   PSYCH: AAOx3, appropriate mood and affect, conversant, well-groomed. Judgement and insight good.   NEURO: Cranial nerves grossly intact. Speech clear, no tremor.       Hemoglobin A1C   Date Value Ref Range Status   07/05/2022 7.9 (H) 4.0 - 5.6 % Final     Comment:     ADA Screening Guidelines:  5.7-6.4%  Consistent with prediabetes  >or=6.5%  Consistent with diabetes    High levels of fetal hemoglobin interfere with the HbA1C  assay. Heterozygous hemoglobin variants (HbS, HgC, etc)do  not significantly interfere with this assay.   However, presence of multiple variants may affect accuracy.     03/08/2022 9.4 (H) 4.0 - 5.6 % Final     Comment:     ADA Screening Guidelines:  5.7-6.4%  Consistent with prediabetes  >or=6.5%  Consistent with diabetes    High levels of fetal hemoglobin interfere with the HbA1C  assay. Heterozygous hemoglobin variants (HbS, HgC, etc)do  not significantly interfere with this assay.   However, presence of multiple variants may affect accuracy.     03/16/2021 7.8 (H) 4.0 - 5.6 % Final     Comment:     ADA Screening Guidelines:  5.7-6.4%  Consistent with prediabetes  >or=6.5%  Consistent with diabetes    High levels  of fetal hemoglobin interfere with the HbA1C  assay. Heterozygous hemoglobin variants (HbS, HgC, etc)do  not significantly interfere with this assay.   However, presence of multiple variants may affect accuracy.         Lab Results   Component Value Date    CPEPTIDE 1.41 07/05/2022        Latest Reference Range & Units 07/05/22 08:50   Glucose 70 - 110 mg/dL 100         Chemistry        Component Value Date/Time     05/31/2022 1235    K 4.4 05/31/2022 1235     05/31/2022 1235    CO2 20 (L) 05/31/2022 1235    BUN 18 05/31/2022 1235    CREATININE 0.9 07/05/2022 0850     07/05/2022 0850        Component Value Date/Time    CALCIUM 9.7 05/31/2022 1235    ALKPHOS 51 (L) 05/31/2022 1235    AST 18 05/31/2022 1235    ALT 16 05/31/2022 1235    BILITOT 0.6 05/31/2022 1235    ESTGFRAFRICA >60.0 07/05/2022 0850    EGFRNONAA >60.0 07/05/2022 0850          Lab Results   Component Value Date    LDLCALC 50.8 (L) 03/08/2022      Latest Reference Range & Units 03/08/22 09:05   Cholesterol 120 - 199 mg/dL 120 [1]   HDL 40 - 75 mg/dL 47 [2]   HDL/Cholesterol Ratio 20.0 - 50.0 % 39.2   LDL Cholesterol External 63.0 - 159.0 mg/dL 50.8 (L) [3]   Non-HDL Cholesterol mg/dL 73 [4]   Total Cholesterol/HDL Ratio 2.0 - 5.0  2.6   Triglycerides 30 - 150 mg/dL 111 [5]       Lab Results   Component Value Date    MICALBCREAT 17.8 03/08/2022           ASSESSMENT and PLAN:    A1C GOAL: < 8%      1. Diabetes mellitus type 2, uncontrolled, with complications  Change Trulicity to bedtime schedule to avoid nausea and take every week. Ok to take at same time as Levemir.   Continue Levemir 40 units once daily. Finish Levemir out then switch to Tresiba at 40 units once daily.   Continue Humalog at 22 units before meals and metformin.     Check glucose if having low blood sugar feeling overnight. Call with any issues especially if blood sugar is dropping less than 80 regularly. If blood sugar is less than 80, then drink juice or eat 3  glucose tablets or peppermints.     Patient wishes to hold off on glucose sensor monitor at this time.     Undergo  Continuous Glucose Monitor (CGM) study to better evaluate glucose trends.   Return to clinic in 3 months for CGM study with labs prior.     GLUCOSE MONITORING CONTINUOUS MIN 72 HOURS    Hemoglobin A1C   2. Essential hypertension  Continue current tx    3. Mild nonproliferative diabetic retinopathy of both eyes without macular edema associated with type 2 diabetes mellitus  Improve glycemic control.   F/u with eye doctor          Orders Placed This Encounter   Procedures    Hemoglobin A1C     Standing Status:   Future     Standing Expiration Date:   9/10/2023    GLUCOSE MONITORING CONTINUOUS MIN 72 HOURS     Standing Status:   Future     Standing Expiration Date:   7/13/2023        Follow up in about 3 months (around 10/12/2022).

## 2022-07-12 NOTE — PATIENT INSTRUCTIONS
Change Trulicity to bedtime schedule to avoid nausea and take every week. Ok to take at same time as Levemir.   Continue Levemir 40 units once daily. Finish Levemir out then switch to Tresiba at 40 units once daily.   Continue Humalog at 22 units before meals and metformin.     Check glucose if having low blood sugar feeling overnight. Call with any issues especially if blood sugar is dropping less than 80 regularly. If blood sugar is less than 80, then drink juice or eat 3 glucose tablets or peppermints.     Patient wishes to hold off on glucose sensor monitor at this time.     Undergo  Continuous Glucose Monitor (CGM) study to better evaluate glucose trends.   Return to clinic in 3 months for CGM study with labs prior.

## 2022-08-22 ENCOUNTER — PES CALL (OUTPATIENT)
Dept: ADMINISTRATIVE | Facility: CLINIC | Age: 82
End: 2022-08-22
Payer: MEDICARE

## 2022-09-07 ENCOUNTER — OFFICE VISIT (OUTPATIENT)
Dept: FAMILY MEDICINE | Facility: CLINIC | Age: 82
End: 2022-09-07
Payer: MEDICARE

## 2022-09-07 VITALS
OXYGEN SATURATION: 97 % | TEMPERATURE: 98 F | HEART RATE: 76 BPM | WEIGHT: 155.88 LBS | HEIGHT: 64 IN | SYSTOLIC BLOOD PRESSURE: 138 MMHG | DIASTOLIC BLOOD PRESSURE: 54 MMHG | BODY MASS INDEX: 26.61 KG/M2

## 2022-09-07 DIAGNOSIS — C50.912 MALIGNANT NEOPLASM OF LEFT BREAST IN FEMALE, ESTROGEN RECEPTOR POSITIVE, UNSPECIFIED SITE OF BREAST: ICD-10-CM

## 2022-09-07 DIAGNOSIS — E03.9 HYPOTHYROIDISM (ACQUIRED): ICD-10-CM

## 2022-09-07 DIAGNOSIS — Z17.0 MALIGNANT NEOPLASM OF LEFT BREAST IN FEMALE, ESTROGEN RECEPTOR POSITIVE, UNSPECIFIED SITE OF BREAST: ICD-10-CM

## 2022-09-07 DIAGNOSIS — Z78.0 OSTEOPENIA AFTER MENOPAUSE: ICD-10-CM

## 2022-09-07 DIAGNOSIS — I10 ESSENTIAL HYPERTENSION: ICD-10-CM

## 2022-09-07 DIAGNOSIS — M85.80 OSTEOPENIA AFTER MENOPAUSE: ICD-10-CM

## 2022-09-07 DIAGNOSIS — Z12.31 SCREENING MAMMOGRAM, ENCOUNTER FOR: ICD-10-CM

## 2022-09-07 DIAGNOSIS — E66.3 OVERWEIGHT (BMI 25.0-29.9): ICD-10-CM

## 2022-09-07 DIAGNOSIS — E78.5 HYPERLIPIDEMIA LDL GOAL <100: ICD-10-CM

## 2022-09-07 PROCEDURE — 99999 PR PBB SHADOW E&M-EST. PATIENT-LVL V: ICD-10-PCS | Mod: PBBFAC,,, | Performed by: INTERNAL MEDICINE

## 2022-09-07 PROCEDURE — 3078F PR MOST RECENT DIASTOLIC BLOOD PRESSURE < 80 MM HG: ICD-10-PCS | Mod: CPTII,S$GLB,, | Performed by: INTERNAL MEDICINE

## 2022-09-07 PROCEDURE — 1160F PR REVIEW ALL MEDS BY PRESCRIBER/CLIN PHARMACIST DOCUMENTED: ICD-10-PCS | Mod: CPTII,S$GLB,, | Performed by: INTERNAL MEDICINE

## 2022-09-07 PROCEDURE — 3288F FALL RISK ASSESSMENT DOCD: CPT | Mod: CPTII,S$GLB,, | Performed by: INTERNAL MEDICINE

## 2022-09-07 PROCEDURE — 1126F PR PAIN SEVERITY QUANTIFIED, NO PAIN PRESENT: ICD-10-PCS | Mod: CPTII,S$GLB,, | Performed by: INTERNAL MEDICINE

## 2022-09-07 PROCEDURE — 3075F PR MOST RECENT SYSTOLIC BLOOD PRESS GE 130-139MM HG: ICD-10-PCS | Mod: CPTII,S$GLB,, | Performed by: INTERNAL MEDICINE

## 2022-09-07 PROCEDURE — 3288F PR FALLS RISK ASSESSMENT DOCUMENTED: ICD-10-PCS | Mod: CPTII,S$GLB,, | Performed by: INTERNAL MEDICINE

## 2022-09-07 PROCEDURE — 1160F RVW MEDS BY RX/DR IN RCRD: CPT | Mod: CPTII,S$GLB,, | Performed by: INTERNAL MEDICINE

## 2022-09-07 PROCEDURE — 99999 PR PBB SHADOW E&M-EST. PATIENT-LVL V: CPT | Mod: PBBFAC,,, | Performed by: INTERNAL MEDICINE

## 2022-09-07 PROCEDURE — 3078F DIAST BP <80 MM HG: CPT | Mod: CPTII,S$GLB,, | Performed by: INTERNAL MEDICINE

## 2022-09-07 PROCEDURE — 1157F ADVNC CARE PLAN IN RCRD: CPT | Mod: CPTII,S$GLB,, | Performed by: INTERNAL MEDICINE

## 2022-09-07 PROCEDURE — 1126F AMNT PAIN NOTED NONE PRSNT: CPT | Mod: CPTII,S$GLB,, | Performed by: INTERNAL MEDICINE

## 2022-09-07 PROCEDURE — 99214 PR OFFICE/OUTPT VISIT, EST, LEVL IV, 30-39 MIN: ICD-10-PCS | Mod: S$GLB,,, | Performed by: INTERNAL MEDICINE

## 2022-09-07 PROCEDURE — 1159F MED LIST DOCD IN RCRD: CPT | Mod: CPTII,S$GLB,, | Performed by: INTERNAL MEDICINE

## 2022-09-07 PROCEDURE — 3075F SYST BP GE 130 - 139MM HG: CPT | Mod: CPTII,S$GLB,, | Performed by: INTERNAL MEDICINE

## 2022-09-07 PROCEDURE — 99214 OFFICE O/P EST MOD 30 MIN: CPT | Mod: S$GLB,,, | Performed by: INTERNAL MEDICINE

## 2022-09-07 PROCEDURE — 1157F PR ADVANCE CARE PLAN OR EQUIV PRESENT IN MEDICAL RECORD: ICD-10-PCS | Mod: CPTII,S$GLB,, | Performed by: INTERNAL MEDICINE

## 2022-09-07 PROCEDURE — 1101F PT FALLS ASSESS-DOCD LE1/YR: CPT | Mod: CPTII,S$GLB,, | Performed by: INTERNAL MEDICINE

## 2022-09-07 PROCEDURE — 1101F PR PT FALLS ASSESS DOC 0-1 FALLS W/OUT INJ PAST YR: ICD-10-PCS | Mod: CPTII,S$GLB,, | Performed by: INTERNAL MEDICINE

## 2022-09-07 PROCEDURE — 1159F PR MEDICATION LIST DOCUMENTED IN MEDICAL RECORD: ICD-10-PCS | Mod: CPTII,S$GLB,, | Performed by: INTERNAL MEDICINE

## 2022-09-07 RX ORDER — INSULIN LISPRO 100 [IU]/ML
INJECTION, SOLUTION INTRAVENOUS; SUBCUTANEOUS
Qty: 60 ML | Refills: 2 | Status: SHIPPED | OUTPATIENT
Start: 2022-09-07 | End: 2023-04-27 | Stop reason: SDUPTHER

## 2022-09-07 RX ORDER — INSULIN DETEMIR 100 [IU]/ML
INJECTION, SOLUTION SUBCUTANEOUS
COMMUNITY
Start: 2022-08-02 | End: 2022-10-03 | Stop reason: SDUPTHER

## 2022-09-07 NOTE — PROGRESS NOTES
274}  HISTORY OF PRESENT ILLNESS:  Joel Condon is a 82 y.o. female who presents to the clinic today for General Diabetes Follow-up  .     The patient presents to clinic today for follow-up of her type 2 diabetes mellitus complicated by retinopathy/neuropathy/chronic kidney disease stage 3, hypertension, and hyperlipidemia.  She does not check her blood pressures at home.  She reports improvement in her diabetic dietary habits.  She does admit to having a sweet tooth.  She has been seeing endocrinology who has made some medication changes.  Her last A1c result was much improved from previous.  She is followed by Oncology for her left breast cancer.  She is due for a mammogram.  She denies temperature intolerance or unexplained changes in her weight.      PAST MEDICAL HISTORY:  Past Medical History:   Diagnosis Date    Arthritis     Breast cancer     Cataract     Colon polyp     Diabetes mellitus     Diabetic retinopathy     Hyperlipidemia LDL goal < 100     Hypertension     Hypothyroidism     Osteoporosis, post-menopausal     Overweight(278.02)     Proteinuria     Type II or unspecified type diabetes mellitus with renal manifestations, uncontrolled(250.42)        PAST SURGICAL HISTORY:  Past Surgical History:   Procedure Laterality Date    APPENDECTOMY      BREAST BIOPSY      BREAST LUMPECTOMY      BREAST SURGERY Left 12/13/2017    tumor removal    CATARACT EXTRACTION W/  INTRAOCULAR LENS IMPLANT Left 4/24/14    OS (Dr. Arce)    CATARACT EXTRACTION W/  INTRAOCULAR LENS IMPLANT Right 06/12/2014    OD (Dr. Arce)    CHOLECYSTECTOMY      COLONOSCOPY N/A 4/21/2017    Procedure: COLONOSCOPY;  Surgeon: Homar Payan MD;  Location: [facility] ENDO;  Service: Endoscopy;  Laterality: N/A;    COLONOSCOPY N/A 6/29/2018    Procedure: COLONOSCOPY;  Surgeon: Mykel Boles MD;  Location: [facility] ENDO;  Service: Endoscopy;  Laterality: N/A;    EYE SURGERY  04/24/2014 & 06/12/2014    HYSTERECTOMY      total    left eye  cataract surgery  4/24/14    OOPHORECTOMY         SOCIAL HISTORY:  Social History     Socioeconomic History    Marital status:     Number of children: 3   Occupational History    Occupation: retired   Tobacco Use    Smoking status: Never    Smokeless tobacco: Never   Substance and Sexual Activity    Alcohol use: No    Drug use: No    Sexual activity: Not Currently     Partners: Male       FAMILY HISTORY:  Family History   Problem Relation Age of Onset    Diabetes Mother     Heart disease Mother     Hypertension Mother     Stroke Mother     Diabetes Sister     Hypertension Sister     Diabetes Sister     Hypertension Sister     Diabetes Sister     Hypertension Sister     Hypertension Sister     Diabetes Sister     Diabetes Brother     Diabetes Brother     Diabetes Brother     Prostate cancer Brother     Amblyopia Neg Hx     Blindness Neg Hx     Cataracts Neg Hx     Glaucoma Neg Hx     Macular degeneration Neg Hx     Retinal detachment Neg Hx     Strabismus Neg Hx        ALLERGIES AND MEDICATIONS: updated and reviewed.  Review of patient's allergies indicates:   Allergen Reactions    Lisinopril Other (See Comments)     Cough      Hydrochlorothiazide (bulk) Other (See Comments)     Elevated pt's blood pressure making her feel bad    Pravastatin Other (See Comments)     headaches     Medication List with Changes/Refills   Current Medications    ASCORBIC ACID, VITAMIN C, (VITAMIN C) 100 MG TABLET    Take 100 mg by mouth once daily.    ASPIRIN (ECOTRIN) 81 MG EC TABLET    Take 1 tablet (81 mg total) by mouth once daily.    ATORVASTATIN (LIPITOR) 10 MG TABLET    TAKE 1 TABLET(10 MG) BY MOUTH EVERY DAY    BLOOD SUGAR DIAGNOSTIC STRP    To check BG 4 times daily, to use with insurance preferred meter. e11.65    CHOLECALCIFEROL, VITAMIN D3, (VITAMIN D3) 100 MCG (4,000 UNIT) CAP    Take by mouth.    CYANOCOBALAMIN, VITAMIN B-12, MISC    by Misc.(Non-Drug; Combo Route) route.    DULAGLUTIDE (TRULICITY) 0.75 MG/0.5 ML PEN  "INJECTOR    Inject 0.75 mg into the skin every 7 days.    INSULIN SYRINGE-NEEDLE U-100 1 ML 31 GAUGE X 5/16 SYRG    USE THREE TIMES DAILY AS DIRECTED    LANCETS MISC    To check BG 4 times daily, to use with insurance preferred meter. e11.65    LETROZOLE (FEMARA) 2.5 MG TAB    Take 1 tab by mouth daily.    LEVEMIR FLEXTOUCH U-100 INSULN 100 UNIT/ML (3 ML) INPN PEN    SMARTSI Unit(s) SUB-Q Daily    LEVOTHYROXINE (SYNTHROID) 50 MCG TABLET    TAKE 1 TABLET(50 MCG) BY MOUTH EVERY MORNING    LOSARTAN (COZAAR) 100 MG TABLET    TAKE 1 TABLET BY MOUTH DAILY    METFORMIN (GLUCOPHAGE-XR) 500 MG ER 24HR TABLET    Take 2 tablets every morning and 1 tablet every evening    METOPROLOL SUCCINATE (TOPROL-XL) 200 MG 24 HR TABLET    TAKE 1 TABLET(200 MG) BY MOUTH EVERY DAY    NIFEDIPINE (PROCARDIA-XL) 90 MG (OSM) 24 HR TABLET    TAKE 1 TABLET(90 MG) BY MOUTH EVERY DAY    OMEPRAZOLE (PRILOSEC) 20 MG CAPSULE    TAKE 1 CAPSULE(20 MG) BY MOUTH DAILY AS NEEDED FOR HEARTBURN    PEN NEEDLE, DIABETIC (BD SHIV 2ND GEN PEN NEEDLE) 32 GAUGE X 5/32" NDLE    Use 4x/day    SPIRONOLACTONE (ALDACTONE) 50 MG TABLET    TAKE 1 TABLET(50 MG) BY MOUTH EVERY DAY    VITAMIN E ACETATE (VITAMIN E ORAL)    Take by mouth.   Changed and/or Refilled Medications    Modified Medication Previous Medication    INSULIN LISPRO (HUMALOG KWIKPEN INSULIN) 100 UNIT/ML PEN insulin lispro (HUMALOG KWIKPEN INSULIN) 100 unit/mL pen       Injects 22 units three times daily before meals    Injects 22 units three times daily before meals   Discontinued Medications    INSULIN DEGLUDEC (TRESIBA FLEXTOUCH U-100) 100 UNIT/ML (3 ML) INSULIN PEN    Inject 40 Units into the skin once daily.    PFIZER COVID-19 RUBIO VACCN,PF, 30 MCG/0.3 ML INJECTION             CARE TEAM:  Patient Care Team:  Shelby Ley MD as PCP - General (Internal Medicine)  Deborah Parr MD as Consulting Physician (Hematology and Oncology)  Rosy Mcknight NP as Nurse Practitioner " "(Endocrinology)  Juan Pablo Ordoñez MD as Consulting Physician (Cardiology)  Mary Waterman LPN as Care Coordinator         REVIEW OF SYSTEMS:  Review of Systems   Constitutional:  Negative for chills, fatigue, fever and unexpected weight change.   HENT:  Negative for congestion and postnasal drip.    Eyes:  Negative for pain and visual disturbance.   Respiratory:  Negative for cough, shortness of breath and wheezing.    Cardiovascular:  Negative for chest pain, palpitations and leg swelling.   Gastrointestinal:  Negative for abdominal pain, constipation, diarrhea, nausea and vomiting.   Genitourinary:  Negative for dysuria.   Musculoskeletal:  Positive for arthralgias (- chronic; stable).   Skin:  Negative for rash.   Neurological:  Negative for weakness and headaches.   Psychiatric/Behavioral:  Negative for dysphoric mood and sleep disturbance. The patient is not nervous/anxious.        PHYSICAL EXAM: 274}  Vitals:    09/07/22 0844   BP: (!) 138/54   Pulse: 76   Temp: 98.3 °F (36.8 °C)     Weight: 70.7 kg (155 lb 13.8 oz)   Height: 5' 4" (162.6 cm)   Body mass index is 26.75 kg/m².      General appearance - alert, well appearing, and in no distress, overweight  Psychiatric - alert, oriented to person, place, and time, normal behavior, speech, dress, motor activity and thought process  Eyes - pupils equal and reactive, extraocular eye movements intact, sclera anicteric  Mouth - not examined; patient wearing mask due to Covid 19 pandemic  Chest - clear to auscultation, no wheezes, rales or rhonchi, symmetric air entry  Heart - normal rate and regular rhythm, no gallops noted  Neurological - alert, normal speech, no focal findings, cranial nerves II through XII intact  Musculoskeletal - patient noted to have Moderate osteoarthritic changes to both knee joints. No joint effusions noted., no muscular tenderness noted  Extremities - no pedal edema noted  Skin - normal coloration, no suspicious skin " lesions      Labs:  Lab Results   Component Value Date    HGBA1C 7.9 (H) 07/05/2022    HGBA1C 9.4 (H) 03/08/2022    HGBA1C 7.8 (H) 03/16/2021      Lab Results   Component Value Date    CHOL 120 03/08/2022    CHOL 115 (L) 03/16/2021    CHOL 122 02/26/2019     Lab Results   Component Value Date    LDLCALC 50.8 (L) 03/08/2022    LDLCALC 50.8 (L) 03/16/2021    LDLCALC 45.8 (L) 02/26/2019         ASSESSMENT AND PLAN:  274}  1. Type 2 diabetes, uncontrolled, with retinopathy/2. Type 2 diabetes, uncontrolled, with neuropathy/3. Uncontrolled type 2 diabetes with stage 3 chronic kidney disease GFR 30-59/4. 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   We discussed diabetic diet and regular exercise.   We discussed home blood sugar monitoring, if appropriate - the patient should test three times per day with meals and as needed.   Continue current medication regimen. Patient is followed by endocrinology.  Diabetic complications addressed: Stable decreased kidney function. Observe. Patient counseled to avoid/minimize the use of anti-inflammatory  Medication. Discussed to stay well hydrated. Also discussed with patient that good control of blood pressure and/or diabetes, if present, will help to prevent progression. Neuropathy pain controlled.   Patient was counseled on the need for yearly eye exam to screen for/monitor diabetic retinopathy and yearly diabetic foot exam.      5. Essential hypertension  The current medical regimen is effective;  continue present plan and medications. Recommended patient to check home readings to monitor and see me for followup as scheduled or sooner as needed.   Discussed sodium restriction, maintaining ideal body weight and regular exercise program as physiologic means to continue to achieve blood  pressure control in addition to medication compliance.  Patient was educated that both decongestant and anti-inflammatory medication may raise blood pressure.  The patient is not active on the digital hypertension program.      6. Hyperlipidemia LDL goal <100  Lab Results   Component Value Date    LDLCALC 50.8 (L) 03/08/2022     We discussed low fat diet and regular exercise.The current medical regimen is effective;  continue present plan and medications.       7. Malignant neoplasm of left breast in female, estrogen receptor positive, unspecified site of breast  The current medical regimen is effective;  continue present plan and medications.   Followed by: Hematology/Oncology.       8. Hypothyroidism (acquired)  Patient is clinically euthyroid. Continue current regimen.      9. Osteopenia after menopause  We discussed adequate calcium and vitamin D supplementation. We discussed fall precautions. She is up to date on her BMD. Continue current treatment regimen (on q6 month Prolia injections per hematology/oncology).      10. Overweight (BMI 25.0-29.9)  BMI Readings from Last 3 Encounters:   09/07/22 26.75 kg/m²   07/12/22 27.29 kg/m²   06/02/22 27.66 kg/m²     The patient is asked to make an attempt to improve diet and exercise patterns to aid in medical management of this problem.      11. Screening mammogram, encounter for    - Mammo Digital Screening Bilat w/ [facility]; Future         Orders Placed This Encounter   Procedures    Mammo Digital Screening Bilat w/ [facility]      Follow up in about 6 months (around 3/7/2023), or if symptoms worsen or fail to improve, for annual exam. or sooner as needed.

## 2022-09-26 ENCOUNTER — LAB VISIT (OUTPATIENT)
Dept: LAB | Facility: HOSPITAL | Age: 82
End: 2022-09-26
Attending: INTERNAL MEDICINE
Payer: MEDICARE

## 2022-09-26 ENCOUNTER — CLINICAL SUPPORT (OUTPATIENT)
Dept: ENDOCRINOLOGY | Facility: CLINIC | Age: 82
End: 2022-09-26
Payer: MEDICARE

## 2022-09-26 DIAGNOSIS — C50.912 MALIGNANT NEOPLASM OF LEFT BREAST IN FEMALE, ESTROGEN RECEPTOR POSITIVE, UNSPECIFIED SITE OF BREAST: ICD-10-CM

## 2022-09-26 DIAGNOSIS — Z17.0 MALIGNANT NEOPLASM OF LEFT BREAST IN FEMALE, ESTROGEN RECEPTOR POSITIVE, UNSPECIFIED SITE OF BREAST: ICD-10-CM

## 2022-09-26 LAB
ALBUMIN SERPL BCP-MCNC: 3.7 G/DL (ref 3.5–5.2)
ALP SERPL-CCNC: 55 U/L (ref 55–135)
ALT SERPL W/O P-5'-P-CCNC: 18 U/L (ref 10–44)
ANION GAP SERPL CALC-SCNC: 9 MMOL/L (ref 8–16)
AST SERPL-CCNC: 18 U/L (ref 10–40)
BASOPHILS # BLD AUTO: 0.08 K/UL (ref 0–0.2)
BASOPHILS NFR BLD: 0.9 % (ref 0–1.9)
BILIRUB SERPL-MCNC: 0.4 MG/DL (ref 0.1–1)
BUN SERPL-MCNC: 12 MG/DL (ref 8–23)
CALCIUM SERPL-MCNC: 10.2 MG/DL (ref 8.7–10.5)
CHLORIDE SERPL-SCNC: 111 MMOL/L (ref 95–110)
CO2 SERPL-SCNC: 23 MMOL/L (ref 23–29)
CREAT SERPL-MCNC: 0.9 MG/DL (ref 0.5–1.4)
DIFFERENTIAL METHOD: ABNORMAL
EOSINOPHIL # BLD AUTO: 0.4 K/UL (ref 0–0.5)
EOSINOPHIL NFR BLD: 5 % (ref 0–8)
ERYTHROCYTE [DISTWIDTH] IN BLOOD BY AUTOMATED COUNT: 13.8 % (ref 11.5–14.5)
EST. GFR  (NO RACE VARIABLE): >60 ML/MIN/1.73 M^2
ESTIMATED AVG GLUCOSE: 166 MG/DL (ref 68–131)
GLUCOSE SERPL-MCNC: 128 MG/DL (ref 70–110)
HBA1C MFR BLD: 7.4 % (ref 4–5.6)
HCT VFR BLD AUTO: 38.2 % (ref 37–48.5)
HGB BLD-MCNC: 13.1 G/DL (ref 12–16)
IMM GRANULOCYTES # BLD AUTO: 0.01 K/UL (ref 0–0.04)
IMM GRANULOCYTES NFR BLD AUTO: 0.1 % (ref 0–0.5)
LYMPHOCYTES # BLD AUTO: 3.2 K/UL (ref 1–4.8)
LYMPHOCYTES NFR BLD: 37.7 % (ref 18–48)
MCH RBC QN AUTO: 31.3 PG (ref 27–31)
MCHC RBC AUTO-ENTMCNC: 34.3 G/DL (ref 32–36)
MCV RBC AUTO: 91 FL (ref 82–98)
MONOCYTES # BLD AUTO: 0.5 K/UL (ref 0.3–1)
MONOCYTES NFR BLD: 6.2 % (ref 4–15)
NEUTROPHILS # BLD AUTO: 4.3 K/UL (ref 1.8–7.7)
NEUTROPHILS NFR BLD: 50.1 % (ref 38–73)
NRBC BLD-RTO: 0 /100 WBC
PLATELET # BLD AUTO: 322 K/UL (ref 150–450)
PMV BLD AUTO: 9.5 FL (ref 9.2–12.9)
POTASSIUM SERPL-SCNC: 4.1 MMOL/L (ref 3.5–5.1)
PROT SERPL-MCNC: 7.6 G/DL (ref 6–8.4)
RBC # BLD AUTO: 4.18 M/UL (ref 4–5.4)
SODIUM SERPL-SCNC: 143 MMOL/L (ref 136–145)
WBC # BLD AUTO: 8.57 K/UL (ref 3.9–12.7)

## 2022-09-26 PROCEDURE — 36415 COLL VENOUS BLD VENIPUNCTURE: CPT | Performed by: INTERNAL MEDICINE

## 2022-09-26 PROCEDURE — 83036 HEMOGLOBIN GLYCOSYLATED A1C: CPT | Performed by: NURSE PRACTITIONER

## 2022-09-26 PROCEDURE — 85025 COMPLETE CBC W/AUTO DIFF WBC: CPT | Performed by: INTERNAL MEDICINE

## 2022-09-26 PROCEDURE — 99499 UNLISTED E&M SERVICE: CPT | Mod: S$GLB,,, | Performed by: HOSPITALIST

## 2022-09-26 PROCEDURE — 99499 NO LOS: ICD-10-PCS | Mod: S$GLB,,, | Performed by: HOSPITALIST

## 2022-09-26 PROCEDURE — 80053 COMPREHEN METABOLIC PANEL: CPT | Performed by: INTERNAL MEDICINE

## 2022-09-26 NOTE — PROGRESS NOTES
Patient is here today to participate in a Continuous Glucose Monitoring Study.  Patient will wear a Dexcom for 7 days in a blinded study.  Patient will be provided with a Dexcom sensor and transmitter and a copy of the Glucose Monitoring Patient Log to fill out during the study.  A detailed explanation of Continuous Glucose Monitoring was provided.   Instructed patient to record meals, drinks,  snacks, activity, and all diabetes medications on glucose log.  Site for insertion was selected and prepared with a sterile alcohol swab and allowed to dry. Glucose Sensor Serial Number [id number] was inserted to left lower quadrant.  Insertion of sensor done in clinic, individually, in private. Time: 15 minutes

## 2022-09-29 DIAGNOSIS — Z17.0 MALIGNANT NEOPLASM OF LEFT BREAST IN FEMALE, ESTROGEN RECEPTOR POSITIVE, UNSPECIFIED SITE OF BREAST: Primary | ICD-10-CM

## 2022-09-29 DIAGNOSIS — C50.912 MALIGNANT NEOPLASM OF LEFT BREAST IN FEMALE, ESTROGEN RECEPTOR POSITIVE, UNSPECIFIED SITE OF BREAST: Primary | ICD-10-CM

## 2022-10-03 ENCOUNTER — OFFICE VISIT (OUTPATIENT)
Dept: ENDOCRINOLOGY | Facility: CLINIC | Age: 82
End: 2022-10-03
Payer: MEDICARE

## 2022-10-03 ENCOUNTER — OFFICE VISIT (OUTPATIENT)
Dept: HEMATOLOGY/ONCOLOGY | Facility: CLINIC | Age: 82
End: 2022-10-03
Payer: MEDICARE

## 2022-10-03 ENCOUNTER — CLINICAL SUPPORT (OUTPATIENT)
Dept: ENDOCRINOLOGY | Facility: CLINIC | Age: 82
End: 2022-10-03
Payer: MEDICARE

## 2022-10-03 VITALS
SYSTOLIC BLOOD PRESSURE: 135 MMHG | DIASTOLIC BLOOD PRESSURE: 68 MMHG | WEIGHT: 154 LBS | BODY MASS INDEX: 26.43 KG/M2 | TEMPERATURE: 98 F | HEART RATE: 73 BPM

## 2022-10-03 VITALS
WEIGHT: 153 LBS | TEMPERATURE: 98 F | BODY MASS INDEX: 26.12 KG/M2 | OXYGEN SATURATION: 96 % | SYSTOLIC BLOOD PRESSURE: 190 MMHG | DIASTOLIC BLOOD PRESSURE: 76 MMHG | HEART RATE: 72 BPM | HEIGHT: 64 IN

## 2022-10-03 DIAGNOSIS — Z79.811 LONG TERM (CURRENT) USE OF AROMATASE INHIBITORS: ICD-10-CM

## 2022-10-03 DIAGNOSIS — E11.65 TYPE 2 DIABETES MELLITUS WITH HYPERGLYCEMIA, WITH LONG-TERM CURRENT USE OF INSULIN: Primary | ICD-10-CM

## 2022-10-03 DIAGNOSIS — Z79.4 TYPE 2 DIABETES MELLITUS WITH HYPERGLYCEMIA, WITH LONG-TERM CURRENT USE OF INSULIN: Primary | ICD-10-CM

## 2022-10-03 DIAGNOSIS — R68.81 EARLY SATIETY: ICD-10-CM

## 2022-10-03 DIAGNOSIS — E11.3293 MILD NONPROLIFERATIVE DIABETIC RETINOPATHY OF BOTH EYES WITHOUT MACULAR EDEMA ASSOCIATED WITH TYPE 2 DIABETES MELLITUS: ICD-10-CM

## 2022-10-03 DIAGNOSIS — M85.80 OSTEOPENIA, UNSPECIFIED LOCATION: ICD-10-CM

## 2022-10-03 DIAGNOSIS — I10 ESSENTIAL HYPERTENSION: ICD-10-CM

## 2022-10-03 DIAGNOSIS — Z17.0 MALIGNANT NEOPLASM OF LEFT BREAST IN FEMALE, ESTROGEN RECEPTOR POSITIVE, UNSPECIFIED SITE OF BREAST: Primary | ICD-10-CM

## 2022-10-03 DIAGNOSIS — C50.912 MALIGNANT NEOPLASM OF LEFT BREAST IN FEMALE, ESTROGEN RECEPTOR POSITIVE, UNSPECIFIED SITE OF BREAST: Primary | ICD-10-CM

## 2022-10-03 PROCEDURE — 95250 PR GLUCOSE MONITORING,72 HRS,SUB-Q SENSOR: ICD-10-PCS | Mod: S$GLB,,, | Performed by: NURSE PRACTITIONER

## 2022-10-03 PROCEDURE — 1159F MED LIST DOCD IN RCRD: CPT | Mod: CPTII,S$GLB,, | Performed by: NURSE PRACTITIONER

## 2022-10-03 PROCEDURE — 1157F PR ADVANCE CARE PLAN OR EQUIV PRESENT IN MEDICAL RECORD: ICD-10-PCS | Mod: CPTII,S$GLB,, | Performed by: NURSE PRACTITIONER

## 2022-10-03 PROCEDURE — 3288F FALL RISK ASSESSMENT DOCD: CPT | Mod: CPTII,S$GLB,, | Performed by: INTERNAL MEDICINE

## 2022-10-03 PROCEDURE — 99499 UNLISTED E&M SERVICE: CPT | Mod: S$GLB,,, | Performed by: NURSE PRACTITIONER

## 2022-10-03 PROCEDURE — 1159F PR MEDICATION LIST DOCUMENTED IN MEDICAL RECORD: ICD-10-PCS | Mod: CPTII,S$GLB,, | Performed by: NURSE PRACTITIONER

## 2022-10-03 PROCEDURE — 99999 PR PBB SHADOW E&M-EST. PATIENT-LVL IV: ICD-10-PCS | Mod: PBBFAC,,, | Performed by: NURSE PRACTITIONER

## 2022-10-03 PROCEDURE — 3077F SYST BP >= 140 MM HG: CPT | Mod: CPTII,S$GLB,, | Performed by: INTERNAL MEDICINE

## 2022-10-03 PROCEDURE — 1101F PR PT FALLS ASSESS DOC 0-1 FALLS W/OUT INJ PAST YR: ICD-10-PCS | Mod: CPTII,S$GLB,, | Performed by: INTERNAL MEDICINE

## 2022-10-03 PROCEDURE — 99499 NO LOS: ICD-10-PCS | Mod: S$GLB,,, | Performed by: NURSE PRACTITIONER

## 2022-10-03 PROCEDURE — 1126F AMNT PAIN NOTED NONE PRSNT: CPT | Mod: CPTII,S$GLB,, | Performed by: INTERNAL MEDICINE

## 2022-10-03 PROCEDURE — 1160F RVW MEDS BY RX/DR IN RCRD: CPT | Mod: CPTII,S$GLB,, | Performed by: NURSE PRACTITIONER

## 2022-10-03 PROCEDURE — 3078F DIAST BP <80 MM HG: CPT | Mod: CPTII,S$GLB,, | Performed by: INTERNAL MEDICINE

## 2022-10-03 PROCEDURE — 95250 CONT GLUC MNTR PHYS/QHP EQP: CPT | Mod: S$GLB,,, | Performed by: NURSE PRACTITIONER

## 2022-10-03 PROCEDURE — 1157F PR ADVANCE CARE PLAN OR EQUIV PRESENT IN MEDICAL RECORD: ICD-10-PCS | Mod: CPTII,S$GLB,, | Performed by: INTERNAL MEDICINE

## 2022-10-03 PROCEDURE — 1157F ADVNC CARE PLAN IN RCRD: CPT | Mod: CPTII,S$GLB,, | Performed by: NURSE PRACTITIONER

## 2022-10-03 PROCEDURE — 99214 OFFICE O/P EST MOD 30 MIN: CPT | Mod: S$GLB,,, | Performed by: INTERNAL MEDICINE

## 2022-10-03 PROCEDURE — 3078F PR MOST RECENT DIASTOLIC BLOOD PRESSURE < 80 MM HG: ICD-10-PCS | Mod: CPTII,S$GLB,, | Performed by: NURSE PRACTITIONER

## 2022-10-03 PROCEDURE — 3078F PR MOST RECENT DIASTOLIC BLOOD PRESSURE < 80 MM HG: ICD-10-PCS | Mod: CPTII,S$GLB,, | Performed by: INTERNAL MEDICINE

## 2022-10-03 PROCEDURE — 1126F PR PAIN SEVERITY QUANTIFIED, NO PAIN PRESENT: ICD-10-PCS | Mod: CPTII,S$GLB,, | Performed by: INTERNAL MEDICINE

## 2022-10-03 PROCEDURE — 99214 OFFICE O/P EST MOD 30 MIN: CPT | Mod: S$GLB,,, | Performed by: NURSE PRACTITIONER

## 2022-10-03 PROCEDURE — 3075F PR MOST RECENT SYSTOLIC BLOOD PRESS GE 130-139MM HG: ICD-10-PCS | Mod: CPTII,S$GLB,, | Performed by: NURSE PRACTITIONER

## 2022-10-03 PROCEDURE — 1160F PR REVIEW ALL MEDS BY PRESCRIBER/CLIN PHARMACIST DOCUMENTED: ICD-10-PCS | Mod: CPTII,S$GLB,, | Performed by: NURSE PRACTITIONER

## 2022-10-03 PROCEDURE — 99999 PR PBB SHADOW E&M-EST. PATIENT-LVL V: ICD-10-PCS | Mod: PBBFAC,,, | Performed by: INTERNAL MEDICINE

## 2022-10-03 PROCEDURE — 95251 PR GLUCOSE MONITOR, 72 HOUR, PHYS INTERP: ICD-10-PCS | Mod: S$GLB,,, | Performed by: NURSE PRACTITIONER

## 2022-10-03 PROCEDURE — 3288F PR FALLS RISK ASSESSMENT DOCUMENTED: ICD-10-PCS | Mod: CPTII,S$GLB,, | Performed by: INTERNAL MEDICINE

## 2022-10-03 PROCEDURE — 99999 PR PBB SHADOW E&M-EST. PATIENT-LVL IV: CPT | Mod: PBBFAC,,, | Performed by: NURSE PRACTITIONER

## 2022-10-03 PROCEDURE — 95251 CONT GLUC MNTR ANALYSIS I&R: CPT | Mod: S$GLB,,, | Performed by: NURSE PRACTITIONER

## 2022-10-03 PROCEDURE — 3075F SYST BP GE 130 - 139MM HG: CPT | Mod: CPTII,S$GLB,, | Performed by: NURSE PRACTITIONER

## 2022-10-03 PROCEDURE — 1101F PT FALLS ASSESS-DOCD LE1/YR: CPT | Mod: CPTII,S$GLB,, | Performed by: INTERNAL MEDICINE

## 2022-10-03 PROCEDURE — 1159F PR MEDICATION LIST DOCUMENTED IN MEDICAL RECORD: ICD-10-PCS | Mod: CPTII,S$GLB,, | Performed by: INTERNAL MEDICINE

## 2022-10-03 PROCEDURE — 1159F MED LIST DOCD IN RCRD: CPT | Mod: CPTII,S$GLB,, | Performed by: INTERNAL MEDICINE

## 2022-10-03 PROCEDURE — 99214 PR OFFICE/OUTPT VISIT, EST, LEVL IV, 30-39 MIN: ICD-10-PCS | Mod: S$GLB,,, | Performed by: NURSE PRACTITIONER

## 2022-10-03 PROCEDURE — 1157F ADVNC CARE PLAN IN RCRD: CPT | Mod: CPTII,S$GLB,, | Performed by: INTERNAL MEDICINE

## 2022-10-03 PROCEDURE — 99999 PR PBB SHADOW E&M-EST. PATIENT-LVL V: CPT | Mod: PBBFAC,,, | Performed by: INTERNAL MEDICINE

## 2022-10-03 PROCEDURE — 3077F PR MOST RECENT SYSTOLIC BLOOD PRESSURE >= 140 MM HG: ICD-10-PCS | Mod: CPTII,S$GLB,, | Performed by: INTERNAL MEDICINE

## 2022-10-03 PROCEDURE — 3078F DIAST BP <80 MM HG: CPT | Mod: CPTII,S$GLB,, | Performed by: NURSE PRACTITIONER

## 2022-10-03 PROCEDURE — 99214 PR OFFICE/OUTPT VISIT, EST, LEVL IV, 30-39 MIN: ICD-10-PCS | Mod: S$GLB,,, | Performed by: INTERNAL MEDICINE

## 2022-10-03 RX ORDER — METFORMIN HYDROCHLORIDE 500 MG/1
TABLET, EXTENDED RELEASE ORAL
Qty: 270 TABLET | Refills: 2 | Status: SHIPPED | OUTPATIENT
Start: 2022-10-03 | End: 2023-04-27 | Stop reason: SDUPTHER

## 2022-10-03 RX ORDER — DULAGLUTIDE 0.75 MG/.5ML
0.75 INJECTION, SOLUTION SUBCUTANEOUS
Qty: 12 PEN | Refills: 2 | Status: SHIPPED | OUTPATIENT
Start: 2022-10-03 | End: 2023-04-27 | Stop reason: SDUPTHER

## 2022-10-03 RX ORDER — INSULIN DETEMIR 100 [IU]/ML
INJECTION, SOLUTION SUBCUTANEOUS
Qty: 30 ML | Refills: 2 | Status: SHIPPED | OUTPATIENT
Start: 2022-10-03 | End: 2023-04-27 | Stop reason: SDUPTHER

## 2022-10-03 NOTE — PROGRESS NOTES
CC: This 82 y.o. Black or  female  is here for evaluation of  T2DM along with comorbidities indicated in the Visit Diagnosis section of this encounter.    HPI: Joel Condon was diagnosed with T2DM in 1994.  Oral agents started at diagnosis.         Prior visit 7/2022  a1c is down from 9.4 to 7.9%.   She has started Trulicity and c/o nausea for about 1/2 hour after injection. Appetite is a bit lower.   She is now using transportation from  and can get to her appts.   Plan Change Trulicity to bedtime schedule to avoid nausea and take every week. Ok to take at same time as Levemir.   Continue Levemir 40 units once daily. Finish Levemir out then switch to Tresiba at 40 units once daily.   Continue Humalog at 22 units before meals and metformin.   Check glucose if having low blood sugar feeling overnight. Call with any issues especially if blood sugar is dropping less than 80 regularly. If blood sugar is less than 80, then drink juice or eat 3 glucose tablets or peppermints.   Patient wishes to hold off on glucose sensor monitor at this time.   Undergo  Continuous Glucose Monitor (CGM) study to better evaluate glucose trends.   Return to clinic in 3 months for CGM study with labs prior.     Interval history  A1c is down from 7.9 to 7.4%.   She returns for blinded CGM study. She brings in food diary.     She has been injecting Humalog 1 hour pc.     CGM interpretation: BGs overall at goal. Highest BGs after lunch and especially after dinner d/t high carb diet and late Humalog administration. Lowest BGs in the 70s in the evening secondary to late Humalog dose. BG did drop to 70s overnight once. No hyperglycemia noted after breakfast.       LAST DIABETES EDUCATION: years ago at [facility]. And also a class done by [facility] early 2017    HOSPITALIZED FOR DIABETES  -  No.    PRESCRIBED DIABETES MEDICATIONS:   Trulicity 0.75 mg weekly   Levemir Flextouch 40 units every night at 10 pm   Humalog Kwikpen  22 units ac  metformin xr 1000 mg AM and 500 mg PM      Misses medication doses - denies     Rotates injections - yes     DM COMPLICATIONS: peripheral neuropathy    SIGNIFICANT DIABETES MED HISTORY:   Metformin started at initial ov 8/17/17  Diarrhea on metformin even w/ psyllium fiber supplement   Humalog - nausea and weakness       SELF MONITORING BLOOD GLUCOSE: Checks blood glucose at home 4x/day.         HYPOGLYCEMIC EPISODES: c/o low BG drop overnight and corrects with Boost     CURRENT DIET: drinks diet cranberry juice, diet soda, tea w/ splenda, occasional soda.  Eats 2-3 meals/day, skips breakfast when she wakes up late.      Bedtime snack because she's hungry - 1/2 ham sandwich; or jello with fruit (~24 grams CHO)  Lunch - sandwich sometimes with grapes.   Dinner- heaviest meal. Food diary: fried chicken; fried fish with mac n cheese; shrimp pasta.     CURRENT EXERCISE:  None d/t hip pain     CGM study DONE 9/2022    /68   Pulse 73   Temp 98 °F (36.7 °C)   Wt 69.9 kg (154 lb)   BMI 26.43 kg/m²       ROS:   CONSTITUTIONAL: Appetite low, denies fatigue  GI: mild nausea after she injects Trulicity, resolves quickly, tolerable       PHYSICAL EXAM:  GENERAL: Well developed, well nourished. No acute distress.   PSYCH: AAOx3, appropriate mood and affect, conversant, well-groomed. Judgement and insight good.   NEURO: Cranial nerves grossly intact. Speech clear, no tremor.       Hemoglobin A1C   Date Value Ref Range Status   09/26/2022 7.4 (H) 4.0 - 5.6 % Final     Comment:     ADA Screening Guidelines:  5.7-6.4%  Consistent with prediabetes  >or=6.5%  Consistent with diabetes    High levels of fetal hemoglobin interfere with the HbA1C  assay. Heterozygous hemoglobin variants (HbS, HgC, etc)do  not significantly interfere with this assay.   However, presence of multiple variants may affect accuracy.     07/05/2022 7.9 (H) 4.0 - 5.6 % Final     Comment:     ADA Screening Guidelines:  5.7-6.4%  Consistent  with prediabetes  >or=6.5%  Consistent with diabetes    High levels of fetal hemoglobin interfere with the HbA1C  assay. Heterozygous hemoglobin variants (HbS, HgC, etc)do  not significantly interfere with this assay.   However, presence of multiple variants may affect accuracy.     03/08/2022 9.4 (H) 4.0 - 5.6 % Final     Comment:     ADA Screening Guidelines:  5.7-6.4%  Consistent with prediabetes  >or=6.5%  Consistent with diabetes    High levels of fetal hemoglobin interfere with the HbA1C  assay. Heterozygous hemoglobin variants (HbS, HgC, etc)do  not significantly interfere with this assay.   However, presence of multiple variants may affect accuracy.         Lab Results   Component Value Date    CPEPTIDE 1.41 07/05/2022        Latest Reference Range & Units 07/05/22 08:50   Glucose 70 - 110 mg/dL 100         Chemistry        Component Value Date/Time     09/26/2022 0842    K 4.1 09/26/2022 0842     (H) 09/26/2022 0842    CO2 23 09/26/2022 0842    BUN 12 09/26/2022 0842    CREATININE 0.9 09/26/2022 0842     (H) 09/26/2022 0842        Component Value Date/Time    CALCIUM 10.2 09/26/2022 0842    ALKPHOS 55 09/26/2022 0842    AST 18 09/26/2022 0842    ALT 18 09/26/2022 0842    BILITOT 0.4 09/26/2022 0842    ESTGFRAFRICA >60.0 07/05/2022 0850    EGFRNONAA >60.0 07/05/2022 0850          Lab Results   Component Value Date    LDLCALC 50.8 (L) 03/08/2022      Latest Reference Range & Units 03/08/22 09:05   Cholesterol 120 - 199 mg/dL 120 [1]   HDL 40 - 75 mg/dL 47 [2]   HDL/Cholesterol Ratio 20.0 - 50.0 % 39.2   LDL Cholesterol External 63.0 - 159.0 mg/dL 50.8 (L) [3]   Non-HDL Cholesterol mg/dL 73 [4]   Total Cholesterol/HDL Ratio 2.0 - 5.0  2.6   Triglycerides 30 - 150 mg/dL 111 [5]       Lab Results   Component Value Date    MICALBCREAT 17.8 03/08/2022           ASSESSMENT and PLAN:    A1C GOAL: < 8%    1. Type 2 diabetes mellitus with hyperglycemia, with long-term current use of insulin  Reduce  Levemir from 40 to 36 units nightly.   Continue Trulicity, metformin, and Humalog.   Make sure to take Humalog BEFORE meals ideally, or at the latest 15 minutes after eating.   Improve diet - avoid cold drinks, limit fruit intake.   Return to clinic in 3 months with labs prior.      Hemoglobin A1C      2. Essential hypertension  controlled      3. Mild nonproliferative diabetic retinopathy of both eyes without macular edema associated with type 2 diabetes mellitus  Maintain DM control.           Orders Placed This Encounter   Procedures    Hemoglobin A1C     Standing Status:   Future     Standing Expiration Date:   12/2/2023          Follow up in about 3 months (around 1/3/2023).

## 2022-10-03 NOTE — PATIENT INSTRUCTIONS
Reduce Levemir from 40 to 36 units nightly.   Continue Trulicity, metformin, and Humalog.   Make sure to take Humalog BEFORE meals ideally, or at the latest 15 minutes after eating.   Improve diet - avoid cold drinks, limit fruit intake.   Return to clinic in 3 months with labs prior.

## 2022-10-03 NOTE — Clinical Note
Mammo upcoming - GIVE PT PAPER APPT TIMES/DATE Follow-up  4 mo with cbc,cmp  prior to f/u  All questions posed answered to patient's satisfaction

## 2022-10-03 NOTE — PROGRESS NOTES
Subjective:       Patient ID: Joel Condon is a 82 y.o. female.    Chief Complaint: Breast Cancer      Diagnosis :IDC left breast Stage 1B, [id number] grade 1, ER/TN pos [id number] negative    S/p  left partial mastectomy and SLNB 12/13/2017, closest margin anteriorly at 1mm  S/p  postoperative RT completed 3/13/2018  Letrozole 3/2018-present      Prior Hx:  Patient  is a 82 y.o. postmenopausal female seen today for f/u for  carcinoma of the left breast. She had an abnormal mammogram first noted 10/27/2017. Follow-up mammogram and ultrasound (11/14/17) showed 6mm mass in the 2OC left breast with enlarged axillary LN measuring 17mm boderline.She underwent an  ultrasound guided biopsy  on 11/21/2017 with pathology revealing infiltrating ductal carcinoma of the breast, Grade 1, ER positive 100% ,TN positive 100% Her-2neu negative.  The patient is status post left partial mastectomy and sentinel node biopsy on 12/13/2017.  Final pathology showed 6mm IDC, 1 of 2  LN with 1mm micromet. 1mm anterior margin. She does not routinely do self breast exams. She  has not noted any skin  changes on breast exam. No  nipple discharge. No history of  previous breast biopsies. No  personal history of breast cancer or ovarian cancer. She completed  postoperative RT under the direction of Dr. Wray on 3/13/2018.She is taking adjuvant Letrozole daily ( initiated 3/2018- present) . Trial hold of therapy ( nausea, dizziness) w/minor improvementShe is hearing impaired She completed  second COVID vaccination on March 25th       Interval Hx: Pt arrives 1 hr late for visit and still seen   Doing well   No new issues  In good spirits   chronic mild  diffuse arthralgias-stable   Appetite and weight stable   No SOB/cough  Pt reports early satiety past few months  No abd pain  No constipation or diarrhea  She declines GI follow-up     She is followed by her PCP for Type 2 DM and HTN  HbA1C improved       GYN History: Age of menarche was 14. Age of  menopause was 46.  Patient reports hormonal therapy for less than 5 years. Patient is . Age of first live birth was 17. Patient did breast feed for 2 years 8 months.         Past Medical History:   Diagnosis Date    Arthritis     Breast cancer     Cataract     Colon polyp     Diabetes mellitus     Diabetic retinopathy     Hyperlipidemia LDL goal < 100     Hypertension     Hypothyroidism     Osteoporosis, post-menopausal     Overweight(278.02)     Proteinuria     Type II or unspecified type diabetes mellitus with renal manifestations, uncontrolled(250.42)        Past Surgical History:   Procedure Laterality Date    APPENDECTOMY      BREAST BIOPSY      BREAST LUMPECTOMY      BREAST SURGERY Left 2017    tumor removal    CATARACT EXTRACTION W/  INTRAOCULAR LENS IMPLANT Left 14    OS (Dr. Arce)    CATARACT EXTRACTION W/  INTRAOCULAR LENS IMPLANT Right 2014    OD (Dr. Arce)    CHOLECYSTECTOMY      COLONOSCOPY N/A 2017    Procedure: COLONOSCOPY;  Surgeon: Homar Payan MD;  Location: [facility] ENDO;  Service: Endoscopy;  Laterality: N/A;    COLONOSCOPY N/A 2018    Procedure: COLONOSCOPY;  Surgeon: Mykel Boles MD;  Location: [facility] ENDO;  Service: Endoscopy;  Laterality: N/A;    EYE SURGERY  2014 & 2014    HYSTERECTOMY      total    left eye cataract surgery  14    OOPHORECTOMY       Current MEDS; Reviewed and as per MEDCHART    Review of Systems   Constitutional:  Negative for appetite change, fatigue, fever and unexpected weight change.   HENT:  Negative for mouth sores.    Eyes:  Negative for visual disturbance.   Respiratory:  Negative for cough and shortness of breath.    Cardiovascular:  Negative for chest pain.   Gastrointestinal:  Negative for abdominal pain, diarrhea and nausea.        Early satiety   Genitourinary:  Negative for frequency.   Musculoskeletal:  Positive for arthralgias. Negative for back pain and neck pain.   Skin:  Negative for rash.  "  Neurological:  Negative for dizziness and headaches.   Hematological:  Negative for adenopathy.   Psychiatric/Behavioral:  The patient is not nervous/anxious.      Objective:           Vitals:    10/03/22 1319   BP: (!) 190/76   BP Location: Right arm   Patient Position: Sitting   BP Method: Large (Automatic)   Pulse: 72   Temp: 98 °F (36.7 °C)   TempSrc: Oral   SpO2: 96%   Weight: 69.4 kg (153 lb)   Height: 5' 4" (1.626 m)         Physical Exam  Constitutional:       Appearance: She is well-developed.   HENT:      Head: Normocephalic.      Mouth/Throat:      Pharynx: No oropharyngeal exudate.   Eyes:      General: Lids are normal. No scleral icterus.     Conjunctiva/sclera: Conjunctivae normal.   Neck:      Thyroid: No thyromegaly.   Cardiovascular:      Rate and Rhythm: Normal rate and regular rhythm.      Heart sounds: Normal heart sounds. No murmur heard.  Pulmonary:      Breath sounds: Normal breath sounds. No wheezing or rales.   Chest:   Breasts:     Right: No inverted nipple, mass, nipple discharge, skin change or tenderness.      Left: Tenderness present. No inverted nipple, mass, nipple discharge or skin change.   Abdominal:      General: Bowel sounds are normal.      Palpations: Abdomen is soft.      Tenderness: There is no abdominal tenderness. There is no guarding or rebound.   Musculoskeletal:         General: No tenderness. Normal range of motion.      Cervical back: Normal range of motion and neck supple.   Lymphadenopathy:      Cervical: No cervical adenopathy.      Upper Body:      Right upper body: No supraclavicular adenopathy.      Left upper body: No supraclavicular adenopathy.   Skin:     General: Skin is warm and dry.      Findings: No ecchymosis, erythema, petechiae or rash.   Neurological:      Mental Status: She is alert and oriented to person, place, and time.      Cranial Nerves: No cranial nerve deficit.      Coordination: Coordination normal.         FINAL PATHOLOGIC DIAGNOSIS " 11/21/2017  1. Left breast mass 2:00:  Invasive mammary carcinoma, grade 1 (T[place]oma score: Tubule formation 2, nuclear pleomorphism 2, mitotic  activity 1, total score 5), occupying at least 5 mm in one core.  2. Left breast axilla (lymph node):  Benign lymphoid tissue with no evidence of metastatic disease.  Note: Receptor studies and immunohistochemical studies will be performed with supplemental report to follow.  Intradepartmental QC/review obtained. Dr. Neil Irvin concurs with the above diagnostic impressions.    Supplemental Diagnosis  HORMONE RECEPTOR STUDIES:  Virtually 100% of the cells of the carcinoma are strongly nuclear estrogen receptor positive. 60 percent of the cells  are strongly nuclear progesterone receptor positive. There is an abrupt transition from the zone of nuclear  progesterone receptor positive cells to the zone where this receptor is negative. It is possible that there has been  some technical artifact which has led a region of the tissue to not stain, and that actually the vast majority of the  tumor are nuclear progesterone receptor positive. The tumor is HER-2 negative. The positive and negative controls  stained appropriately.        FINAL PATHOLOGIC DIAGNOSIS 12/13/2017   Part 1  Lymph node (1, submitted as left sentinel lymph node count equals 60):  -No evidence of malignancy  Part 2  Lymph node (1, submitted as left sentinel lymph node count equals 20):  -No evidence of malignancy  Part 3  Breast excision (submitted as left partial mastectomy):  -Invasive, well differentiated (grade I of III) lobular carcinoma  -Carcinoma is a single focus measuring 0.6 x 0.65 cm (6 x 6.5 mm) located 1 mm from the nearest, anterior  resection margin. All resection margins are free of malignancy.  -No tumor necrosis, hemosiderin vascular or lymphatic permeation, or perineural involvement is identified.  -A microscopic focus of in situ carcinoma is identified immediately adjacent to the invasive  tumor focus  -Carcinoma is graded I of III according to the Melissa modification of the Arriaga-Benson grading scheme  with 2 points each assigned for moderate tubule formation and moderate nuclear pleomorphism and 1.4  minimal mitotic count, a total of 5 of 9 possible points.  -AJCC TN: pT pN0(sn)  -Complete AJCC Staging Form follows:  INVASIVE CARCINOMA OF THE BREAST  Procedure: Partial mastectomy  Node sampling: [place] lymph nodes  Specimen laterality: Left  Tumor site: Not specified  Tumor size: 6.5 x 6.0 mm  Histologic type: Invasive lobular carcinoma  Histologic grade  -Glandular differentiation: Score 2  -Nuclear pleomorphism: Score 2  -Mitotic rate: Score 1  -Overall grade: Score 1  Tumor focality: Single focus of invasive carcinoma  Ductal carcinoma in situ (DCIS): No DCIS present  Margins-invasive carcinoma: Margins uninvolved by invasive carcinoma  -Distance from closest margin: 1 mm, anterior  Lymph nodes  -Total number of lymph nodes examined (sentinel and non-sentinel): 2  -Number sentinel lymph node nodes examined: 2  Pathologic staging  -Primary tumor: pT1b pN0(sn)  -Regional lymph nodes  Modifier: (SN)  Category: pN0  Ancillary studies: E-cadherin negative in tumor cells        Bone Density 2018   Compared to the young normal reference, this study indicates osteopenia of the Lumbar spine and left hip with osteoporosis of the right hip as detailed above.  Compared to prior the bone mineral density of the lumbar spine and left hip has slightly improved with reduced bone mineral density of the right hip as detailed above.    Note: Degenerative changes can falsely elevate measured bone mineral density, particularly in the lumbar spine, and should be considered as part of the final clinical recommendation    MRI C spine w/out contrast 4/3/2019   Cervical spondylosis with posterior marginal osteophytic disc spur complex predominantly C3-C4 through C6-C7.  No evidence for osseous metastatic  disease.      Bone Denstiy 6/15/21   Impression:  LOW BONE MASS WITHOUT SIGNIFICANT CHANGE IN THE HIP BMD COMPARED TO THE PRIOR STUDY.  THE ESTIMATED 10 YEAR PROBABILITY OF HIP FRACTURE IS 2.4% AND OF A MAJOR OSTEOPOROTIC FRACTURE 7.8% RESPECTIVELY USING FRAX.  1. Low bone mass  2. Compared with previous DXA BMD at the total hip has remained stable      Mammo 11/24/21 BI-RADS Category 1: Negative    Assessment:       1. Malignant neoplasm of left breast in female, estrogen receptor positive, unspecified site of breast    2. Long term (current) use of aromatase inhibitors    3. Osteopenia, unspecified location    4. Essential hypertension    5. Early satiety          Plan:    ECOG 1   1-2    82 y/o with multiple medical diagnoses with Stage 1B pT1b (6mm) N1mi M0 grade 1 ER + FL + [id number] IDC carcinoma  of the left breast status post left partial mastectomy and SLNB 12/13/2017  ER + FL + [id number]  12/13/2017.  1 of 2 lymph nodes positive for micromet. Closest margin anteriorly 1mm.  She is Stage IA per AJCC 8th ed. pathologic prognostic staging system.   S/p  postoperative RT completed 3/13/2018  Follow-up Mammo 11/23/21 BI-RADS ASSESSMENT CATEGORY: 2 BENIGN   Plan follow-up mammo Nov 22   Cont endocrine therapy-tolerating well       3. Cont prolia  Last PROLIA 12/2021  No Dental Clearance indicated ( dentures)  Bone Density  6/15/21 - stable,  Cont bisphosphonate  Cont Ca and Vit D supp  Plan bone density follow-up 2023    4. Poorly controlled  Asymptomatic  Did not take her bp meds this am       5. Pt continues to decline  ambulatory referral to GI    Ambulatory Referral to GI- pt declines 2/2 transportation       Mammo upcoming - GIVE PT PAPER APPT TIMES/DATE  Follow-up  4 mo with cbc,cmp  prior to f/u   All questions posed answered to patient's satisfaction      Advance Care Planning     Power of   I previously initiated the process of advance care planning today and explained the importance of this process  to the patient.  I introduced the concept of advance directives to the patient, as well. Then the patient received detailed information about the importance of designating a Health Care Power of  (HCPOA). She was also instructed to communicate with this person about their wishes for future healthcare, should she become sick and lose decision-making capacity. The patient has not previously appointed a HCPOA. After our discussion, the patient has decided to complete a HCPOA and has appointed her daughter and NAME Isis Roberts   I spent a total time of 16  minutes discussing this issue with the patient.       Marlin Caldera M.D.         Kenney Wray M.D.

## 2022-10-14 DIAGNOSIS — Z12.11 COLON CANCER SCREENING: Primary | ICD-10-CM

## 2022-11-23 ENCOUNTER — CLINICAL SUPPORT (OUTPATIENT)
Dept: ENDOSCOPY | Facility: HOSPITAL | Age: 82
End: 2022-11-23
Attending: INTERNAL MEDICINE
Payer: MEDICARE

## 2022-11-23 DIAGNOSIS — Z12.11 COLON CANCER SCREENING: ICD-10-CM

## 2022-11-23 DIAGNOSIS — Z86.010 HISTORY OF COLON POLYPS: Primary | ICD-10-CM

## 2022-11-23 NOTE — PLAN OF CARE
We attempted to reach you for your scheduled PAT appointment x2 calls but you were not available. Please contact Endoscopy Scheduling at # [phone] or visit your [facility] portal to reschedule your PAT appointment.

## 2022-11-28 ENCOUNTER — HOSPITAL ENCOUNTER (OUTPATIENT)
Dept: RADIOLOGY | Facility: HOSPITAL | Age: 82
Discharge: HOME | End: 2022-11-28
Attending: INTERNAL MEDICINE
Payer: MEDICARE

## 2022-11-28 DIAGNOSIS — Z12.31 SCREENING MAMMOGRAM, ENCOUNTER FOR: ICD-10-CM

## 2022-11-28 PROCEDURE — 77063 BREAST TOMOSYNTHESIS BI: CPT | Mod: 26,,, | Performed by: RADIOLOGY

## 2022-11-28 PROCEDURE — 77067 MAMMO DIGITAL SCREENING BILAT WITH TOMO: ICD-10-PCS | Mod: 26,,, | Performed by: RADIOLOGY

## 2022-11-28 PROCEDURE — 77067 SCR MAMMO BI INCL CAD: CPT | Mod: TC,PO

## 2022-11-28 PROCEDURE — 77067 SCR MAMMO BI INCL CAD: CPT | Mod: 26,,, | Performed by: RADIOLOGY

## 2022-11-28 PROCEDURE — 77063 MAMMO DIGITAL SCREENING BILAT WITH TOMO: ICD-10-PCS | Mod: 26,,, | Performed by: RADIOLOGY

## 2022-11-28 PROCEDURE — 77063 BREAST TOMOSYNTHESIS BI: CPT | Mod: TC,PO

## 2023-02-28 ENCOUNTER — TELEPHONE (OUTPATIENT)
Dept: FAMILY MEDICINE | Facility: CLINIC | Age: 83
End: 2023-02-28
Payer: MEDICARE

## 2023-02-28 ENCOUNTER — TELEPHONE (OUTPATIENT)
Dept: ENDOCRINOLOGY | Facility: CLINIC | Age: 83
End: 2023-02-28
Payer: MEDICARE

## 2023-02-28 DIAGNOSIS — E03.9 HYPOTHYROIDISM (ACQUIRED): ICD-10-CM

## 2023-02-28 DIAGNOSIS — E11.65 POORLY CONTROLLED TYPE 2 DIABETES MELLITUS WITH RETINOPATHY: ICD-10-CM

## 2023-02-28 DIAGNOSIS — I12.9 BENIGN HYPERTENSION WITH CHRONIC KIDNEY DISEASE, STAGE III: Primary | ICD-10-CM

## 2023-02-28 DIAGNOSIS — E78.5 HYPERLIPIDEMIA LDL GOAL <100: ICD-10-CM

## 2023-02-28 DIAGNOSIS — N18.30 BENIGN HYPERTENSION WITH CHRONIC KIDNEY DISEASE, STAGE III: Primary | ICD-10-CM

## 2023-02-28 DIAGNOSIS — E11.319 POORLY CONTROLLED TYPE 2 DIABETES MELLITUS WITH RETINOPATHY: ICD-10-CM

## 2023-02-28 NOTE — TELEPHONE ENCOUNTER
----- Message from Malaika Mina sent at 2/28/2023  3:52 PM CST -----  Regarding: [id number]  Type:  Sooner Appointment Request    Patient is requesting a sooner appointment.  Patient declined first available appointment listed as well as another facility and provider .  Patient will not accept being placed on the waitlist and is requesting a message be sent to doctor.    Name of Caller:     When is the first available appointment? 5/11 @ 9:30am    Symptoms: diabetes 2, follow up appt, refills    Would the patient rather a call back or a response via [facility] [employer]? no    Best Call Back Number: [phone]      Additional Information: pt states she can not wait that long to see the doctor because she need her refills done . Also she need an early morning appt due to transportation.       Cigarettes

## 2023-03-01 ENCOUNTER — LAB VISIT (OUTPATIENT)
Dept: LAB | Facility: HOSPITAL | Age: 83
End: 2023-03-01
Attending: INTERNAL MEDICINE
Payer: MEDICARE

## 2023-03-01 DIAGNOSIS — E11.65 POORLY CONTROLLED TYPE 2 DIABETES MELLITUS WITH RETINOPATHY: ICD-10-CM

## 2023-03-01 DIAGNOSIS — N18.30 BENIGN HYPERTENSION WITH CHRONIC KIDNEY DISEASE, STAGE III: ICD-10-CM

## 2023-03-01 DIAGNOSIS — E03.9 HYPOTHYROIDISM (ACQUIRED): ICD-10-CM

## 2023-03-01 DIAGNOSIS — E78.5 HYPERLIPIDEMIA LDL GOAL <100: ICD-10-CM

## 2023-03-01 DIAGNOSIS — E11.319 POORLY CONTROLLED TYPE 2 DIABETES MELLITUS WITH RETINOPATHY: ICD-10-CM

## 2023-03-01 DIAGNOSIS — I12.9 BENIGN HYPERTENSION WITH CHRONIC KIDNEY DISEASE, STAGE III: ICD-10-CM

## 2023-03-01 LAB
ALBUMIN SERPL BCP-MCNC: 3.8 G/DL (ref 3.5–5.2)
ALP SERPL-CCNC: 67 U/L (ref 55–135)
ALT SERPL W/O P-5'-P-CCNC: 25 U/L (ref 10–44)
ANION GAP SERPL CALC-SCNC: 11 MMOL/L (ref 8–16)
AST SERPL-CCNC: 21 U/L (ref 10–40)
BASOPHILS # BLD AUTO: 0.04 K/UL (ref 0–0.2)
BASOPHILS NFR BLD: 0.4 % (ref 0–1.9)
BILIRUB SERPL-MCNC: 0.5 MG/DL (ref 0.1–1)
BUN SERPL-MCNC: 12 MG/DL (ref 8–23)
CALCIUM SERPL-MCNC: 9.8 MG/DL (ref 8.7–10.5)
CHLORIDE SERPL-SCNC: 109 MMOL/L (ref 95–110)
CHOLEST SERPL-MCNC: 110 MG/DL (ref 120–199)
CHOLEST/HDLC SERPL: 2.4 {RATIO} (ref 2–5)
CO2 SERPL-SCNC: 23 MMOL/L (ref 23–29)
CREAT SERPL-MCNC: 1 MG/DL (ref 0.5–1.4)
DIFFERENTIAL METHOD: ABNORMAL
EOSINOPHIL # BLD AUTO: 0.5 K/UL (ref 0–0.5)
EOSINOPHIL NFR BLD: 4.9 % (ref 0–8)
ERYTHROCYTE [DISTWIDTH] IN BLOOD BY AUTOMATED COUNT: 14.2 % (ref 11.5–14.5)
EST. GFR  (NO RACE VARIABLE): 56.6 ML/MIN/1.73 M^2
ESTIMATED AVG GLUCOSE: 166 MG/DL (ref 68–131)
GLUCOSE SERPL-MCNC: 132 MG/DL (ref 70–110)
HBA1C MFR BLD: 7.4 % (ref 4–5.6)
HCT VFR BLD AUTO: 40.6 % (ref 37–48.5)
HDLC SERPL-MCNC: 45 MG/DL (ref 40–75)
HDLC SERPL: 40.9 % (ref 20–50)
HGB BLD-MCNC: 12.4 G/DL (ref 12–16)
IMM GRANULOCYTES # BLD AUTO: 0.02 K/UL (ref 0–0.04)
IMM GRANULOCYTES NFR BLD AUTO: 0.2 % (ref 0–0.5)
LDLC SERPL CALC-MCNC: 47 MG/DL (ref 63–159)
LYMPHOCYTES # BLD AUTO: 3.1 K/UL (ref 1–4.8)
LYMPHOCYTES NFR BLD: 32.8 % (ref 18–48)
MCH RBC QN AUTO: 29.9 PG (ref 27–31)
MCHC RBC AUTO-ENTMCNC: 30.5 G/DL (ref 32–36)
MCV RBC AUTO: 98 FL (ref 82–98)
MONOCYTES # BLD AUTO: 0.7 K/UL (ref 0.3–1)
MONOCYTES NFR BLD: 7.7 % (ref 4–15)
NEUTROPHILS # BLD AUTO: 5 K/UL (ref 1.8–7.7)
NEUTROPHILS NFR BLD: 54 % (ref 38–73)
NONHDLC SERPL-MCNC: 65 MG/DL
NRBC BLD-RTO: 0 /100 WBC
PLATELET # BLD AUTO: 343 K/UL (ref 150–450)
PMV BLD AUTO: 10.2 FL (ref 9.2–12.9)
POTASSIUM SERPL-SCNC: 4.1 MMOL/L (ref 3.5–5.1)
PROT SERPL-MCNC: 7.2 G/DL (ref 6–8.4)
RBC # BLD AUTO: 4.15 M/UL (ref 4–5.4)
SODIUM SERPL-SCNC: 143 MMOL/L (ref 136–145)
TRIGL SERPL-MCNC: 90 MG/DL (ref 30–150)
TSH SERPL DL<=0.005 MIU/L-ACNC: 2.86 UIU/ML (ref 0.4–4)
WBC # BLD AUTO: 9.3 K/UL (ref 3.9–12.7)

## 2023-03-01 PROCEDURE — 84443 ASSAY THYROID STIM HORMONE: CPT | Performed by: INTERNAL MEDICINE

## 2023-03-01 PROCEDURE — 85025 COMPLETE CBC W/AUTO DIFF WBC: CPT | Performed by: INTERNAL MEDICINE

## 2023-03-01 PROCEDURE — 83036 HEMOGLOBIN GLYCOSYLATED A1C: CPT | Performed by: INTERNAL MEDICINE

## 2023-03-01 PROCEDURE — 80053 COMPREHEN METABOLIC PANEL: CPT | Performed by: INTERNAL MEDICINE

## 2023-03-01 PROCEDURE — 36415 COLL VENOUS BLD VENIPUNCTURE: CPT | Mod: PO | Performed by: INTERNAL MEDICINE

## 2023-03-01 PROCEDURE — 80061 LIPID PANEL: CPT | Performed by: INTERNAL MEDICINE

## 2023-03-08 ENCOUNTER — OFFICE VISIT (OUTPATIENT)
Dept: FAMILY MEDICINE | Facility: CLINIC | Age: 83
End: 2023-03-08
Payer: MEDICARE

## 2023-03-08 VITALS
OXYGEN SATURATION: 96 % | DIASTOLIC BLOOD PRESSURE: 62 MMHG | WEIGHT: 152.56 LBS | TEMPERATURE: 98 F | BODY MASS INDEX: 26.05 KG/M2 | SYSTOLIC BLOOD PRESSURE: 132 MMHG | HEIGHT: 64 IN | HEART RATE: 71 BPM

## 2023-03-08 DIAGNOSIS — E03.9 HYPOTHYROIDISM (ACQUIRED): ICD-10-CM

## 2023-03-08 DIAGNOSIS — E11.40 POORLY CONTROLLED TYPE 2 DIABETES MELLITUS WITH NEUROPATHY: ICD-10-CM

## 2023-03-08 DIAGNOSIS — E66.3 OVERWEIGHT (BMI 25.0-29.9): ICD-10-CM

## 2023-03-08 DIAGNOSIS — Z00.00 ROUTINE MEDICAL EXAM: Primary | ICD-10-CM

## 2023-03-08 DIAGNOSIS — Z79.4 LONG-TERM INSULIN USE: ICD-10-CM

## 2023-03-08 DIAGNOSIS — E11.65 POORLY CONTROLLED TYPE 2 DIABETES MELLITUS WITH RETINOPATHY: ICD-10-CM

## 2023-03-08 DIAGNOSIS — N18.30 BENIGN HYPERTENSION WITH CHRONIC KIDNEY DISEASE, STAGE III: ICD-10-CM

## 2023-03-08 DIAGNOSIS — K21.9 GASTROESOPHAGEAL REFLUX DISEASE WITHOUT ESOPHAGITIS: Chronic | ICD-10-CM

## 2023-03-08 DIAGNOSIS — C50.912 MALIGNANT NEOPLASM OF LEFT BREAST IN FEMALE, ESTROGEN RECEPTOR POSITIVE, UNSPECIFIED SITE OF BREAST: ICD-10-CM

## 2023-03-08 DIAGNOSIS — M85.80 OSTEOPENIA AFTER MENOPAUSE: ICD-10-CM

## 2023-03-08 DIAGNOSIS — Z78.0 OSTEOPENIA AFTER MENOPAUSE: ICD-10-CM

## 2023-03-08 DIAGNOSIS — Z17.0 MALIGNANT NEOPLASM OF LEFT BREAST IN FEMALE, ESTROGEN RECEPTOR POSITIVE, UNSPECIFIED SITE OF BREAST: ICD-10-CM

## 2023-03-08 DIAGNOSIS — E11.319 POORLY CONTROLLED TYPE 2 DIABETES MELLITUS WITH RETINOPATHY: ICD-10-CM

## 2023-03-08 DIAGNOSIS — E78.5 HYPERLIPIDEMIA LDL GOAL <100: ICD-10-CM

## 2023-03-08 DIAGNOSIS — I12.9 BENIGN HYPERTENSION WITH CHRONIC KIDNEY DISEASE, STAGE III: ICD-10-CM

## 2023-03-08 DIAGNOSIS — E11.65 POORLY CONTROLLED TYPE 2 DIABETES MELLITUS WITH NEUROPATHY: ICD-10-CM

## 2023-03-08 PROCEDURE — 3288F FALL RISK ASSESSMENT DOCD: CPT | Mod: CPTII,S$GLB,, | Performed by: INTERNAL MEDICINE

## 2023-03-08 PROCEDURE — 99999 PR PBB SHADOW E&M-EST. PATIENT-LVL V: ICD-10-PCS | Mod: PBBFAC,,, | Performed by: INTERNAL MEDICINE

## 2023-03-08 PROCEDURE — 99397 PER PM REEVAL EST PAT 65+ YR: CPT | Mod: GZ,S$GLB,, | Performed by: INTERNAL MEDICINE

## 2023-03-08 PROCEDURE — 1160F PR REVIEW ALL MEDS BY PRESCRIBER/CLIN PHARMACIST DOCUMENTED: ICD-10-PCS | Mod: CPTII,S$GLB,, | Performed by: INTERNAL MEDICINE

## 2023-03-08 PROCEDURE — 3075F PR MOST RECENT SYSTOLIC BLOOD PRESS GE 130-139MM HG: ICD-10-PCS | Mod: CPTII,S$GLB,, | Performed by: INTERNAL MEDICINE

## 2023-03-08 PROCEDURE — 1101F PT FALLS ASSESS-DOCD LE1/YR: CPT | Mod: CPTII,S$GLB,, | Performed by: INTERNAL MEDICINE

## 2023-03-08 PROCEDURE — 99397 PR PREVENTIVE VISIT,EST,65 & OVER: ICD-10-PCS | Mod: GZ,S$GLB,, | Performed by: INTERNAL MEDICINE

## 2023-03-08 PROCEDURE — 1159F PR MEDICATION LIST DOCUMENTED IN MEDICAL RECORD: ICD-10-PCS | Mod: CPTII,S$GLB,, | Performed by: INTERNAL MEDICINE

## 2023-03-08 PROCEDURE — 1157F ADVNC CARE PLAN IN RCRD: CPT | Mod: CPTII,S$GLB,, | Performed by: INTERNAL MEDICINE

## 2023-03-08 PROCEDURE — 1160F RVW MEDS BY RX/DR IN RCRD: CPT | Mod: CPTII,S$GLB,, | Performed by: INTERNAL MEDICINE

## 2023-03-08 PROCEDURE — 1101F PR PT FALLS ASSESS DOC 0-1 FALLS W/OUT INJ PAST YR: ICD-10-PCS | Mod: CPTII,S$GLB,, | Performed by: INTERNAL MEDICINE

## 2023-03-08 PROCEDURE — 3288F PR FALLS RISK ASSESSMENT DOCUMENTED: ICD-10-PCS | Mod: CPTII,S$GLB,, | Performed by: INTERNAL MEDICINE

## 2023-03-08 PROCEDURE — 1159F MED LIST DOCD IN RCRD: CPT | Mod: CPTII,S$GLB,, | Performed by: INTERNAL MEDICINE

## 2023-03-08 PROCEDURE — 3078F PR MOST RECENT DIASTOLIC BLOOD PRESSURE < 80 MM HG: ICD-10-PCS | Mod: CPTII,S$GLB,, | Performed by: INTERNAL MEDICINE

## 2023-03-08 PROCEDURE — 3078F DIAST BP <80 MM HG: CPT | Mod: CPTII,S$GLB,, | Performed by: INTERNAL MEDICINE

## 2023-03-08 PROCEDURE — 99999 PR PBB SHADOW E&M-EST. PATIENT-LVL V: CPT | Mod: PBBFAC,,, | Performed by: INTERNAL MEDICINE

## 2023-03-08 PROCEDURE — 1126F AMNT PAIN NOTED NONE PRSNT: CPT | Mod: CPTII,S$GLB,, | Performed by: INTERNAL MEDICINE

## 2023-03-08 PROCEDURE — 1157F PR ADVANCE CARE PLAN OR EQUIV PRESENT IN MEDICAL RECORD: ICD-10-PCS | Mod: CPTII,S$GLB,, | Performed by: INTERNAL MEDICINE

## 2023-03-08 PROCEDURE — 3075F SYST BP GE 130 - 139MM HG: CPT | Mod: CPTII,S$GLB,, | Performed by: INTERNAL MEDICINE

## 2023-03-08 PROCEDURE — 1126F PR PAIN SEVERITY QUANTIFIED, NO PAIN PRESENT: ICD-10-PCS | Mod: CPTII,S$GLB,, | Performed by: INTERNAL MEDICINE

## 2023-03-08 RX ORDER — OMEPRAZOLE 20 MG/1
CAPSULE, DELAYED RELEASE ORAL
Qty: 90 CAPSULE | Refills: 1 | Status: SHIPPED | OUTPATIENT
Start: 2023-03-08

## 2023-03-08 NOTE — PROGRESS NOTES
HISTORY OF PRESENT ILLNESS:  Joel Condon is a 82 y.o. female who presents to the clinic today for a routine physical exam. Her last physical exam was approximately 1 years(s) ago.      Objective    PAST MEDICAL HISTORY:  Past Medical History:   Diagnosis Date    Arthritis     Breast cancer     Cataract     Colon polyp     Diabetes mellitus     Diabetic retinopathy     Hyperlipidemia LDL goal < 100     Hypertension     Hypothyroidism     Osteoporosis, post-menopausal     Overweight(278.02)     Proteinuria     Type II or unspecified type diabetes mellitus with renal manifestations, uncontrolled(250.42)        PAST SURGICAL HISTORY:  Past Surgical History:   Procedure Laterality Date    APPENDECTOMY      BREAST BIOPSY      BREAST LUMPECTOMY      BREAST SURGERY Left 12/13/2017    tumor removal    CATARACT EXTRACTION W/  INTRAOCULAR LENS IMPLANT Left 4/24/14    OS (Dr. Arce)    CATARACT EXTRACTION W/  INTRAOCULAR LENS IMPLANT Right 06/12/2014    OD (Dr. Arce)    CHOLECYSTECTOMY      COLONOSCOPY N/A 4/21/2017    Procedure: COLONOSCOPY;  Surgeon: Homar Payan MD;  Location: [facility] ENDO;  Service: Endoscopy;  Laterality: N/A;    COLONOSCOPY N/A 6/29/2018    Procedure: COLONOSCOPY;  Surgeon: Mykel Boles MD;  Location: [facility] ENDO;  Service: Endoscopy;  Laterality: N/A;    EYE SURGERY  04/24/2014 & 06/12/2014    HYSTERECTOMY      total    left eye cataract surgery  4/24/14    OOPHORECTOMY         SOCIAL HISTORY:  Social History     Socioeconomic History    Marital status:     Number of children: 3   Occupational History    Occupation: retired   Tobacco Use    Smoking status: Never    Smokeless tobacco: Never   Substance and Sexual Activity    Alcohol use: No    Drug use: No    Sexual activity: Not Currently     Partners: Male       FAMILY HISTORY:  Family History   Problem Relation Age of Onset    Diabetes Mother     Heart disease Mother     Hypertension Mother     Stroke Mother     Diabetes Sister      Hypertension Sister     Diabetes Sister     Hypertension Sister     Diabetes Sister     Hypertension Sister     Hypertension Sister     Diabetes Sister     Diabetes Brother     Diabetes Brother     Diabetes Brother     Prostate cancer Brother     Amblyopia Neg Hx     Blindness Neg Hx     Cataracts Neg Hx     Glaucoma Neg Hx     Macular degeneration Neg Hx     Retinal detachment Neg Hx     Strabismus Neg Hx        ALLERGIES AND MEDICATIONS: updated and reviewed.  Review of patient's allergies indicates:   Allergen Reactions    Lisinopril Other (See Comments)     Cough      Hydrochlorothiazide (bulk) Other (See Comments)     Elevated pt's blood pressure making her feel bad    Pravastatin Other (See Comments)     headaches     Medication List with Changes/Refills   Current Medications    ASCORBIC ACID, VITAMIN C, (VITAMIN C) 100 MG TABLET    Take 100 mg by mouth once daily.    ASPIRIN (ECOTRIN) 81 MG EC TABLET    Take 1 tablet (81 mg total) by mouth once daily.    ATORVASTATIN (LIPITOR) 10 MG TABLET    TAKE 1 TABLET(10 MG) BY MOUTH EVERY DAY    BLOOD SUGAR DIAGNOSTIC STRP    To check BG 4 times daily, to use with insurance preferred meter. e11.65    CHOLECALCIFEROL, VITAMIN D3, (VITAMIN D3) 100 MCG (4,000 UNIT) CAP    Take by mouth.    CYANOCOBALAMIN, VITAMIN B-12, MISC    by Misc.(Non-Drug; Combo Route) route.    DULAGLUTIDE (TRULICITY) 0.75 MG/0.5 ML PEN INJECTOR    Inject 0.75 mg into the skin every 7 days.    INSULIN DETEMIR U-100 (LEVEMIR FLEXTOUCH U-100 INSULN) 100 UNIT/ML (3 ML) INPN PEN    Inject 36 units once daily    INSULIN LISPRO (HUMALOG KWIKPEN INSULIN) 100 UNIT/ML PEN    Injects 22 units three times daily before meals    INSULIN SYRINGE-NEEDLE U-100 1 ML 31 GAUGE X 5/16 SYRG    USE THREE TIMES DAILY AS DIRECTED    LANCETS MISC    To check BG 4 times daily, to use with insurance preferred meter. e11.65    LETROZOLE (FEMARA) 2.5 MG TAB    TAKE 1 TABLET BY MOUTH DAILY.    LEVOTHYROXINE (SYNTHROID) 50 MCG  "TABLET    TAKE 1 TABLET(50 MCG) BY MOUTH EVERY MORNING    LOSARTAN (COZAAR) 100 MG TABLET    TAKE 1 TABLET BY MOUTH DAILY    METFORMIN (GLUCOPHAGE-XR) 500 MG ER 24HR TABLET    Take 2 tablets every morning and 1 tablet every evening    METOPROLOL SUCCINATE (TOPROL-XL) 200 MG 24 HR TABLET    TAKE 1 TABLET(200 MG) BY MOUTH EVERY DAY    NIFEDIPINE (PROCARDIA-XL) 90 MG (OSM) 24 HR TABLET    TAKE 1 TABLET(90 MG) BY MOUTH EVERY DAY    PEN NEEDLE, DIABETIC (BD ULTRA-FINE SHORT PEN NEEDLE) 31 GAUGE X 5/16" NDLE    USE FOUR TIMES DAILY    SPIRONOLACTONE (ALDACTONE) 50 MG TABLET    TAKE 1 TABLET(50 MG) BY MOUTH EVERY DAY    VITAMIN E ACETATE (VITAMIN E ORAL)    Take by mouth.   Changed and/or Refilled Medications    Modified Medication Previous Medication    OMEPRAZOLE (PRILOSEC) 20 MG CAPSULE omeprazole (PRILOSEC) 20 MG capsule       TAKE 1 CAPSULE(20 MG) BY MOUTH DAILY AS NEEDED FOR HEARTBURN    TAKE 1 CAPSULE(20 MG) BY MOUTH DAILY AS NEEDED FOR HEARTBURN          CARE TEAM:  Patient Care Team:  Shelby Ley MD as PCP - General (Internal Medicine)  Deborah Parr MD as Consulting Physician (Hematology and Oncology)  Rosy Mcknight NP as Nurse Practitioner (Endocrinology)  Juan Pablo Ordoñez MD as Consulting Physician (Cardiology)  Mary Waterman LPN as Care Coordinator             SCREENING HISTORY:  Health Maintenance         Date Due Completion Date    Eye Exam 01/14/2021 1/14/2020    Override on 5/2/2017: Done    Override on 11/4/2012: Done    COVID-19 Vaccine (4 - Booster for Pfizer series) 04/15/2022 2/18/2022    DEXA Scan 06/15/2023 6/15/2021    Hemoglobin A1c 09/01/2023 3/1/2023    Mammogram 11/28/2023 11/28/2022    Diabetes Urine Screening 03/01/2024 3/1/2023    Lipid Panel 03/01/2024 3/1/2023    TETANUS VACCINE 06/15/2026 6/15/2016              REVIEW OF SYSTEMS:   The patient reports : good dietary habits.  The patient reports  : that they do not exercise regularly, but stay active.  Review of " "Systems   Constitutional:  Negative for chills and fever.   HENT:  Negative for congestion.    Respiratory:  Negative for cough and shortness of breath.    Cardiovascular:  Negative for chest pain.   Gastrointestinal:  Negative for abdominal pain.   Genitourinary:  Negative for dysuria.   Musculoskeletal:  Positive for arthralgias (stable; chronic).   Skin:  Negative for rash.   Neurological:  Negative for weakness.    ROS (Optional)-: no pelvic pain  Breast ROS (Optional)-: negative for breast lumps/discharge          Physical Examination: 274}  Vitals:    03/08/23 1019   BP: 132/62   Pulse: 71   Temp: 98.1 °F (36.7 °C)   TempSrc: Oral   SpO2: 96%   Weight: 69.2 kg (152 lb 8.9 oz)   Height: 5' 4" (1.626 m)        Body mass index is 26.19 kg/m².        General appearance - alert, well appearing, and in no distress, overweight  Psychiatric - alert, oriented to person, place, and time, normal behavior, speech, dress, motor activity and thought process  Eyes - pupils equal and reactive, extraocular eye movements intact, sclera anicteric  Mouth - not examined  Neck - supple, no significant adenopathy, carotids upstroke normal bilaterally, no bruits  Lymphatics - no palpable cervical lymphadenopathy  Chest - clear to auscultation, no wheezes, rales or rhonchi, symmetric air entry  Heart - normal rate and regular rhythm, no gallops noted  Neurological - alert, normal speech, no focal findings; cranial nerves II through XII intact  Musculoskeletal - patient noted to have Moderate osteoarthritic changes to both knee joints. No joint effusions noted.  Extremities - no pedal edema noted  Skin - normal coloration, no suspicious skin lesions      Labs:  Lab Results   Component Value Date    HGBA1C 7.4 (H) 03/01/2023    HGBA1C 7.4 (H) 09/26/2022    HGBA1C 7.9 (H) 07/05/2022      Lab Results   Component Value Date    CHOL 110 (L) 03/01/2023    CHOL 120 03/08/2022    CHOL 115 (L) 03/16/2021     Lab Results   Component Value Date "    LDLCALC 47.0 (L) 03/01/2023    LDLCALC 50.8 (L) 03/08/2022    LDLCALC 50.8 (L) 03/16/2021         ASSESSMENT AND PLAN:  274}  1. Routine medical exam  Counseled on age appropriate medical preventative services including age appropriate cancer screenings, age appropriate eye and dental exams, over all nutritional health, need for a consistent exercise regimen, and an over all push towards maintaining a vigorous and active lifestyle.  Counseled on age appropriate vaccines and discussed upcoming health care needs based on age/gender. Discussed good sleep hygiene and stress management.    2. 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 Poorly controlled type 2 diabetes mellitus with neuropathy/8. Long-term insulin use  Lab Results   Component Value Date    HGBA1C 7.4 (H) 03/01/2023     Diabetes is under fair control at this time (due to hyperglycemia) for age and comorbid conditions.   We discussed diabetic diet and regular exercise.   We discussed home blood sugar monitoring, if appropriate - the patient should test twice daily and as needed.   Continue current medication regimen. Patient is followed by endocrinology.  Diabetic complications addressed: Neuropathy pain controlled.   Patient was counseled on the need for yearly eye exam to screen for/monitor diabetic retinopathy and yearly diabetic foot exam.    4. Benign hypertension with chronic kidney disease, stage III  BP Readings from Last 1 Encounters:   03/08/23 132/62      Discussed sodium restriction, maintaining ideal body weight and regular exercise program as physiologic means to achieve blood pressure control. The patient will strive towards this.   The current medical regimen is effective;  continue present plan and medications. Recommended patient to check home readings to monitor and see me for followup as scheduled or sooner as needed.   Patient was educated that both decongestant and anti-inflammatory medication may raise blood  pressure.  Stable decreased kidney function. Observe. Patient counseled to avoid/minimize the use of anti-inflammatory  Medication. Discussed to stay well hydrated. Also discussed with patient that good control of blood pressure and/or diabetes, if present, will help to prevent progression.  The patient is not active on the digital hypertension program.    5. Hyperlipidemia LDL goal <100  Lab Results   Component Value Date    CHOL 110 (L) 03/01/2023     Lab Results   Component Value Date    HDL 45 03/01/2023     Lab Results   Component Value Date    LDLCALC 47.0 (L) 03/01/2023     Lab Results   Component Value Date    TRIG 90 03/01/2023     Lab Results   Component Value Date    LDLCALC 47.0 (L) 03/01/2023     We discussed low fat diet and regular exercise.The current medical regimen is effective;  continue present plan and medications.   Overview:  - cannot tolerate pravastatin      6. Malignant neoplasm of left breast in female, estrogen receptor positive, unspecified site of breast  The current medical regimen is effective;  continue present plan and medications.   Followed by: Hematology/Oncology.   Overview:  -diagnosed 11/2017  -Stage 1b pT1b (6mm) N1mi M0 grade 1 ER + KS + [id number] infiltrating ductal carcinoma  of the left breast status post left partial mastectomy and SLNB  on 12/13/2017.  1 of 2 lymph nodes positive for micromet. She is Stage IA per AJCC 8th ed. pathologic prognostic staging system.  - completed radiation with Dr. Wray on 3/2018  - on femara  -followed by oncology, Dr. Parr        7. Hypothyroidism (acquired)  Lab Results   Component Value Date    TSH 2.864 03/01/2023     Patient is clinically euthyroid. Continue current regimen.      9. Osteopenia after menopause  We discussed adequate calcium and vitamin D supplementation. We discussed fall precautions. She is up to date on her BMD. Continue current treatment regimen as per oncology recommendation/treatment plan (Prolia injection q6  months).  Overview:  - s/p Prolia 5/2018, 11/2018, 5/2019, 11/2019, 5/2020, 11/2020, 5/31/2021, 12/14/2021, 6/2/2022      10. Overweight (BMI 25.0-29.9)  BMI Readings from Last 3 Encounters:   03/08/23 26.19 kg/m²   10/03/22 26.26 kg/m²   10/03/22 26.43 kg/m²     The patient is asked to make an attempt to improve diet and exercise patterns to aid in medical management of this problem.    11. Gastroesophageal reflux disease without esophagitis  Symptoms controlled: not at this time. She has not been taking the omeprazole. She has minimal caffeine and no chocolate intake but does eat peppermints regularly when she feels her blood sugar is low. Recommended she change to glucose tablets, if needed and resume omeprazole. She will let me know if her symptoms worsen or persist..   Reflux precautions discussed (eliminate tobacco if a smoker; minimize caffeine, chocolate and red/white peppermint intake; avoid heavy and spicy meals; don't lay down within 2-3 hours after eating; minimize the intake of NSAIDs). Medication as needed.   Patient asked to take medication breaks, if possible - discussed chronic use can limit calcium absorption (which can lead to osteopenia/osteoporosis), increases the risk for intestinal infections, and can cause kidney damage. There are also some newer studies that show possible increased risk of mortality.  -     omeprazole (PRILOSEC) 20 MG capsule; TAKE 1 CAPSULE(20 MG) BY MOUTH DAILY AS NEEDED FOR HEARTBURN  Dispense: 90 capsule; Refill: 1             No orders of the defined types were placed in this encounter.     Follow up in about 6 months (around 9/8/2023), or if symptoms worsen or fail to improve, for follow up chronic medical conditions.. or sooner as needed.

## 2023-03-16 DIAGNOSIS — I10 ESSENTIAL HYPERTENSION: ICD-10-CM

## 2023-03-16 RX ORDER — METOPROLOL SUCCINATE 200 MG/1
TABLET, EXTENDED RELEASE ORAL
Qty: 90 TABLET | Refills: 3 | Status: SHIPPED | OUTPATIENT
Start: 2023-03-16 | End: 2023-04-25

## 2023-03-16 NOTE — TELEPHONE ENCOUNTER
No new care gaps identified.  [facility] Embedded Care Gaps. Reference number: [id number]. 3/16/2023   12:09:16 PM CDT

## 2023-03-16 NOTE — TELEPHONE ENCOUNTER
Refill Decision Note   Joel Condon  is requesting a refill authorization.  Brief Assessment and Rationale for Refill:  Approve     Medication Therapy Plan:       Medication Reconciliation Completed: No   Comments:     No Care Gaps recommended.     Note composed:6:39 PM 03/16/2023

## 2023-03-17 ENCOUNTER — TELEPHONE (OUTPATIENT)
Dept: FAMILY MEDICINE | Facility: CLINIC | Age: 83
End: 2023-03-17
Payer: MEDICARE

## 2023-03-17 NOTE — TELEPHONE ENCOUNTER
----- Message from Shelby Ley MD sent at 3/17/2023  9:11 AM CDT -----  Please call patient to complete eye exam.

## 2023-04-11 ENCOUNTER — OFFICE VISIT (OUTPATIENT)
Dept: HEMATOLOGY/ONCOLOGY | Facility: CLINIC | Age: 83
End: 2023-04-11
Payer: MEDICARE

## 2023-04-11 VITALS
DIASTOLIC BLOOD PRESSURE: 83 MMHG | BODY MASS INDEX: 26.76 KG/M2 | HEIGHT: 64 IN | OXYGEN SATURATION: 98 % | SYSTOLIC BLOOD PRESSURE: 141 MMHG | HEART RATE: 70 BPM | WEIGHT: 156.75 LBS

## 2023-04-11 DIAGNOSIS — Z17.0 MALIGNANT NEOPLASM OF LEFT BREAST IN FEMALE, ESTROGEN RECEPTOR POSITIVE, UNSPECIFIED SITE OF BREAST: Primary | ICD-10-CM

## 2023-04-11 DIAGNOSIS — I10 ESSENTIAL HYPERTENSION: ICD-10-CM

## 2023-04-11 DIAGNOSIS — M85.80 OSTEOPENIA, UNSPECIFIED LOCATION: ICD-10-CM

## 2023-04-11 DIAGNOSIS — Z79.811 LONG TERM (CURRENT) USE OF AROMATASE INHIBITORS: ICD-10-CM

## 2023-04-11 DIAGNOSIS — C50.912 MALIGNANT NEOPLASM OF LEFT BREAST IN FEMALE, ESTROGEN RECEPTOR POSITIVE, UNSPECIFIED SITE OF BREAST: Primary | ICD-10-CM

## 2023-04-11 PROCEDURE — 3079F PR MOST RECENT DIASTOLIC BLOOD PRESSURE 80-89 MM HG: ICD-10-PCS | Mod: CPTII,S$GLB,, | Performed by: INTERNAL MEDICINE

## 2023-04-11 PROCEDURE — 3077F PR MOST RECENT SYSTOLIC BLOOD PRESSURE >= 140 MM HG: ICD-10-PCS | Mod: CPTII,S$GLB,, | Performed by: INTERNAL MEDICINE

## 2023-04-11 PROCEDURE — 3079F DIAST BP 80-89 MM HG: CPT | Mod: CPTII,S$GLB,, | Performed by: INTERNAL MEDICINE

## 2023-04-11 PROCEDURE — 99214 OFFICE O/P EST MOD 30 MIN: CPT | Mod: S$GLB,,, | Performed by: INTERNAL MEDICINE

## 2023-04-11 PROCEDURE — 1157F PR ADVANCE CARE PLAN OR EQUIV PRESENT IN MEDICAL RECORD: ICD-10-PCS | Mod: CPTII,S$GLB,, | Performed by: INTERNAL MEDICINE

## 2023-04-11 PROCEDURE — 1125F AMNT PAIN NOTED PAIN PRSNT: CPT | Mod: CPTII,S$GLB,, | Performed by: INTERNAL MEDICINE

## 2023-04-11 PROCEDURE — 99214 PR OFFICE/OUTPT VISIT, EST, LEVL IV, 30-39 MIN: ICD-10-PCS | Mod: S$GLB,,, | Performed by: INTERNAL MEDICINE

## 2023-04-11 PROCEDURE — 1125F PR PAIN SEVERITY QUANTIFIED, PAIN PRESENT: ICD-10-PCS | Mod: CPTII,S$GLB,, | Performed by: INTERNAL MEDICINE

## 2023-04-11 PROCEDURE — 1101F PT FALLS ASSESS-DOCD LE1/YR: CPT | Mod: CPTII,S$GLB,, | Performed by: INTERNAL MEDICINE

## 2023-04-11 PROCEDURE — 1101F PR PT FALLS ASSESS DOC 0-1 FALLS W/OUT INJ PAST YR: ICD-10-PCS | Mod: CPTII,S$GLB,, | Performed by: INTERNAL MEDICINE

## 2023-04-11 PROCEDURE — 99999 PR PBB SHADOW E&M-EST. PATIENT-LVL II: CPT | Mod: PBBFAC,,, | Performed by: INTERNAL MEDICINE

## 2023-04-11 PROCEDURE — 99999 PR PBB SHADOW E&M-EST. PATIENT-LVL II: ICD-10-PCS | Mod: PBBFAC,,, | Performed by: INTERNAL MEDICINE

## 2023-04-11 PROCEDURE — 3288F FALL RISK ASSESSMENT DOCD: CPT | Mod: CPTII,S$GLB,, | Performed by: INTERNAL MEDICINE

## 2023-04-11 PROCEDURE — 1157F ADVNC CARE PLAN IN RCRD: CPT | Mod: CPTII,S$GLB,, | Performed by: INTERNAL MEDICINE

## 2023-04-11 PROCEDURE — 3077F SYST BP >= 140 MM HG: CPT | Mod: CPTII,S$GLB,, | Performed by: INTERNAL MEDICINE

## 2023-04-11 PROCEDURE — 3288F PR FALLS RISK ASSESSMENT DOCUMENTED: ICD-10-PCS | Mod: CPTII,S$GLB,, | Performed by: INTERNAL MEDICINE

## 2023-04-11 NOTE — PROGRESS NOTES
Subjective:       Patient ID: Joel Condon is a 82 y.o. female.    Chief Complaint: No chief complaint on file.        Diagnosis :IDC left breast Stage 1B, [id number] grade 1, ER/RI pos Bpr2ojx negative    S/p  left partial mastectomy and SLNB 12/13/2017, closest margin anteriorly at 1mm  S/p  postoperative RT completed 3/13/2018  Letrozole 3/2018-4/2023      Prior Hx:  Patient  is a 82 y.o. postmenopausal female seen today for f/u for  carcinoma of the left breast. She had an abnormal mammogram first noted 10/27/2017. Follow-up mammogram and ultrasound (11/14/17) showed 6mm mass in the 2OC left breast with enlarged axillary LN measuring 17mm boderline.She underwent an  ultrasound guided biopsy  on 11/21/2017 with pathology revealing infiltrating ductal carcinoma of the breast, Grade 1, ER positive 100% ,RI positive 100% Her-2neu negative.  The patient is status post left partial mastectomy and sentinel node biopsy on 12/13/2017.  Final pathology showed 6mm IDC, 1 of 2  LN with 1mm micromet. 1mm anterior margin. She does not routinely do self breast exams. She  has not noted any skin  changes on breast exam. No  nipple discharge. No history of  previous breast biopsies. No  personal history of breast cancer or ovarian cancer. She completed  postoperative RT under the direction of Dr. Wray on 3/13/2018.She is taking adjuvant Letrozole daily ( initiated 3/2018- present) . Trial hold of therapy ( nausea, dizziness) w/minor improvementShe is hearing impaired She completed  second COVID vaccination on March 25th       Interval Hx:   Doing well   No new issues  Remains on AI  chronic mild  diffuse arthralgias-stable   Appetite and weight stable   No SOB/cough  Pt reports  reflux symptoms improved with prescribed meds  She declines GI follow-up     She is followed by her PCP for Type 2 DM and HTN  HbA1C improved       GYN History: Age of menarche was 14. Age of menopause was 46.  Patient reports hormonal therapy for less  than 5 years. Patient is . Age of first live birth was 17. Patient did breast feed for 2 years 8 months.         Past Medical History:   Diagnosis Date    Arthritis     Breast cancer     Cataract     Colon polyp     Diabetes mellitus     Diabetic retinopathy     Hyperlipidemia LDL goal < 100     Hypertension     Hypothyroidism     Osteoporosis, post-menopausal     Overweight(278.02)     Proteinuria     Type II or unspecified type diabetes mellitus with renal manifestations, uncontrolled(250.42)        Past Surgical History:   Procedure Laterality Date    APPENDECTOMY      BREAST BIOPSY      BREAST LUMPECTOMY      BREAST SURGERY Left 2017    tumor removal    CATARACT EXTRACTION W/  INTRAOCULAR LENS IMPLANT Left 14    OS (Dr. Arce)    CATARACT EXTRACTION W/  INTRAOCULAR LENS IMPLANT Right 2014    OD (Dr. Arce)    CHOLECYSTECTOMY      COLONOSCOPY N/A 2017    Procedure: COLONOSCOPY;  Surgeon: Homar Payan MD;  Location: [facility] ENDO;  Service: Endoscopy;  Laterality: N/A;    COLONOSCOPY N/A 2018    Procedure: COLONOSCOPY;  Surgeon: Mykel Boles MD;  Location: [facility] ENDO;  Service: Endoscopy;  Laterality: N/A;    EYE SURGERY  2014 & 2014    HYSTERECTOMY      total    left eye cataract surgery  14    OOPHORECTOMY       Current MEDS; Reviewed and as per MEDCHART    Review of Systems   Constitutional:  Negative for appetite change, fatigue, fever and unexpected weight change.   HENT:  Negative for mouth sores.    Eyes:  Negative for visual disturbance.   Respiratory:  Negative for cough and shortness of breath.    Cardiovascular:  Negative for chest pain.   Gastrointestinal:  Negative for abdominal pain, diarrhea and nausea.        Early satiety   Genitourinary:  Negative for frequency.   Musculoskeletal:  Positive for arthralgias. Negative for back pain and neck pain.   Skin:  Negative for rash.   Neurological:  Negative for dizziness and headaches.  "  Hematological:  Negative for adenopathy.   Psychiatric/Behavioral:  The patient is not nervous/anxious.      Objective:           Vitals:    04/11/23 0941   BP: (!) 141/83   BP Location: Right arm   Patient Position: Sitting   BP Method: Large (Automatic)   Pulse: 70   SpO2: 98%   Weight: 71.1 kg (156 lb 12 oz)   Height: 5' 4" (1.626 m)           Physical Exam  Constitutional:       Appearance: She is well-developed.   HENT:      Head: Normocephalic.      Mouth/Throat:      Pharynx: No oropharyngeal exudate.   Eyes:      General: Lids are normal. No scleral icterus.     Conjunctiva/sclera: Conjunctivae normal.   Neck:      Thyroid: No thyromegaly.   Cardiovascular:      Rate and Rhythm: Normal rate and regular rhythm.      Heart sounds: Normal heart sounds. No murmur heard.  Pulmonary:      Breath sounds: Normal breath sounds. No wheezing or rales.   Chest:   Breasts:     Right: No inverted nipple, mass, nipple discharge, skin change or tenderness.      Left: Tenderness present. No inverted nipple, mass, nipple discharge or skin change.   Abdominal:      General: Bowel sounds are normal.      Palpations: Abdomen is soft.      Tenderness: There is no abdominal tenderness. There is no guarding or rebound.   Musculoskeletal:         General: No tenderness. Normal range of motion.      Cervical back: Normal range of motion and neck supple.   Lymphadenopathy:      Cervical: No cervical adenopathy.      Upper Body:      Right upper body: No supraclavicular adenopathy.      Left upper body: No supraclavicular adenopathy.   Skin:     General: Skin is warm and dry.      Findings: No ecchymosis, erythema, petechiae or rash.   Neurological:      Mental Status: She is alert and oriented to person, place, and time.      Cranial Nerves: No cranial nerve deficit.      Coordination: Coordination normal.         FINAL PATHOLOGIC DIAGNOSIS 11/21/2017  1. Left breast mass 2:00:  Invasive mammary carcinoma, grade 1 (Geronimo score: " Tubule formation 2, nuclear pleomorphism 2, mitotic  activity 1, total score 5), occupying at least 5 mm in one core.  2. Left breast axilla (lymph node):  Benign lymphoid tissue with no evidence of metastatic disease.  Note: Receptor studies and immunohistochemical studies will be performed with supplemental report to follow.  Intradepartmental QC/review obtained. Dr. Neil Irvin concurs with the above diagnostic impressions.    Supplemental Diagnosis  HORMONE RECEPTOR STUDIES:  Virtually 100% of the cells of the carcinoma are strongly nuclear estrogen receptor positive. 60 percent of the cells  are strongly nuclear progesterone receptor positive. There is an abrupt transition from the zone of nuclear  progesterone receptor positive cells to the zone where this receptor is negative. It is possible that there has been  some technical artifact which has led a region of the tissue to not stain, and that actually the vast majority of the  tumor are nuclear progesterone receptor positive. The tumor is HER-2 negative. The positive and negative controls  stained appropriately.        FINAL PATHOLOGIC DIAGNOSIS 12/13/2017   Part 1  Lymph node (1, submitted as left sentinel lymph node count equals 60):  -No evidence of malignancy  Part 2  Lymph node (1, submitted as left sentinel lymph node count equals 20):  -No evidence of malignancy  Part 3  Breast excision (submitted as left partial mastectomy):  -Invasive, well differentiated (grade I of III) lobular carcinoma  -Carcinoma is a single focus measuring 0.6 x 0.65 cm (6 x 6.5 mm) located 1 mm from the nearest, anterior  resection margin. All resection margins are free of malignancy.  -No tumor necrosis, hemosiderin vascular or lymphatic permeation, or perineural involvement is identified.  -A microscopic focus of in situ carcinoma is identified immediately adjacent to the invasive tumor focus  -Carcinoma is graded I of III according to the Melissa modification of the  Bloom-Benson grading scheme  with 2 points each assigned for moderate tubule formation and moderate nuclear pleomorphism and 1.4  minimal mitotic count, a total of 5 of 9 possible points.  -AJCC TN: pT pN0(sn)  -Complete AJCC Staging Form follows:  INVASIVE CARCINOMA OF THE BREAST  Procedure: Partial mastectomy  Node sampling: Xenia lymph nodes  Specimen laterality: Left  Tumor site: Not specified  Tumor size: 6.5 x 6.0 mm  Histologic type: Invasive lobular carcinoma  Histologic grade  -Glandular differentiation: Score 2  -Nuclear pleomorphism: Score 2  -Mitotic rate: Score 1  -Overall grade: Score 1  Tumor focality: Single focus of invasive carcinoma  Ductal carcinoma in situ (DCIS): No DCIS present  Margins-invasive carcinoma: Margins uninvolved by invasive carcinoma  -Distance from closest margin: 1 mm, anterior  Lymph nodes  -Total number of lymph nodes examined (sentinel and non-sentinel): 2  -Number sentinel lymph node nodes examined: 2  Pathologic staging  -Primary tumor: pT1b pN0(sn)  -Regional lymph nodes  Modifier: (SN)  Category: pN0  Ancillary studies: E-cadherin negative in tumor cells        Bone Density 2018   Compared to the young normal reference, this study indicates osteopenia of the Lumbar spine and left hip with osteoporosis of the right hip as detailed above.  Compared to prior the bone mineral density of the lumbar spine and left hip has slightly improved with reduced bone mineral density of the right hip as detailed above.    Note: Degenerative changes can falsely elevate measured bone mineral density, particularly in the lumbar spine, and should be considered as part of the final clinical recommendation    MRI C spine w/out contrast 4/3/2019   Cervical spondylosis with posterior marginal osteophytic disc spur complex predominantly C3-C4 through C6-C7.  No evidence for osseous metastatic disease.      Bone Denstiy 6/15/21   Impression:  LOW BONE MASS WITHOUT SIGNIFICANT CHANGE IN THE  HIP BMD COMPARED TO THE PRIOR STUDY.  THE ESTIMATED 10 YEAR PROBABILITY OF HIP FRACTURE IS 2.4% AND OF A MAJOR OSTEOPOROTIC FRACTURE 7.8% RESPECTIVELY USING FRAX.  1. Low bone mass  2. Compared with previous DXA BMD at the total hip has remained stable      Mammo 11/24/21 BI-RADS Category 1: Negative    Assessment:       1. Malignant neoplasm of left breast in female, estrogen receptor positive, unspecified site of breast    2. Long term (current) use of aromatase inhibitors    3. Osteopenia, unspecified location    4. Essential hypertension            Plan:    ECOG 1   1-2    80 y/o with multiple medical diagnoses with Stage 1B pT1b (6mm) N1mi M0 grade 1 ER + NH + [id number] IDC carcinoma  of the left breast status post left partial mastectomy and SLNB 12/13/2017  ER + NH + OLE3ely  12/13/2017.  1 of 2 lymph nodes positive for micromet. Closest margin anteriorly 1mm.  She is Stage IA per AJCC 8th ed. pathologic prognostic staging system.   S/p  postoperative RT completed 3/13/2018  Follow-up Mammo 11/23/21 BI-RADS ASSESSMENT CATEGORY: 2 BENIGN   Plan follow-up mammo Nov 22   Completed 5 yrs of adjuvant endocrine therapy with aromatase inhibitor      3. Last PROLIA 6/2022  No Dental Clearance indicated ( dentures)  Bone Density  6/15/21 - stable,  Cont bisphosphonate  Cont Ca and Vit D supp  Plan bone density follow-up 2023    4. stable  Cont bp meds           Follow-up  6 mo with cbc,cmp  prior to f/u   All questions posed answered to patient's satisfaction      Advance Care Planning     Power of   I previously initiated the process of advance care planning today and explained the importance of this process to the patient.  I introduced the concept of advance directives to the patient, as well. Then the patient received detailed information about the importance of designating a Health Care Power of  (HCPOA). She was also instructed to communicate with this person about their wishes for future  healthcare, should she become sick and lose decision-making capacity. The patient has not previously appointed a HCPOA. After our discussion, the patient has decided to complete a HCPOA and has appointed her daughter and NAME Isis Roberts   I spent a total time of 16  minutes discussing this issue with the patient.       Marlin Caldera M.D.         Kenney Wray M.D.

## 2023-04-19 VITALS — DIASTOLIC BLOOD PRESSURE: 62 MMHG | SYSTOLIC BLOOD PRESSURE: 132 MMHG

## 2023-04-19 DIAGNOSIS — E78.5 HYPERLIPIDEMIA LDL GOAL <100: ICD-10-CM

## 2023-04-19 DIAGNOSIS — E03.9 HYPOTHYROIDISM (ACQUIRED): ICD-10-CM

## 2023-04-19 DIAGNOSIS — I10 ESSENTIAL HYPERTENSION: ICD-10-CM

## 2023-04-19 RX ORDER — NIFEDIPINE 90 MG/1
TABLET, EXTENDED RELEASE ORAL
Qty: 90 TABLET | Refills: 1 | Status: SHIPPED | OUTPATIENT
Start: 2023-04-19 | End: 2023-10-13

## 2023-04-19 RX ORDER — LEVOTHYROXINE SODIUM 50 UG/1
TABLET ORAL
Qty: 90 TABLET | Refills: 3 | Status: SHIPPED | OUTPATIENT
Start: 2023-04-19 | End: 2024-03-22

## 2023-04-19 RX ORDER — ATORVASTATIN CALCIUM 10 MG/1
TABLET, FILM COATED ORAL
Qty: 90 TABLET | Refills: 3 | Status: SHIPPED | OUTPATIENT
Start: 2023-04-19 | End: 2024-03-22

## 2023-04-19 NOTE — TELEPHONE ENCOUNTER
Refill Routing Note   Medication(s) are not appropriate for processing by [facility] for the following reason(s):      Required vitals abnormal: BP (!) 141/83    [facility] action(s):  Defer  Approve          Medication reconciliation completed: No     Appointments  past 12m or future 3m with PCP    Date Provider   Last Visit   3/8/2023 Shelby Ley MD   Next Visit   9/20/2023 Shelby Ley MD   ED visits in past 90 days: 0        Note composed:11:19 AM 04/19/2023

## 2023-04-19 NOTE — TELEPHONE ENCOUNTER
No new care gaps identified.  [facility] Embedded Care Gaps. Reference number: [id number]. 4/19/2023   5:43:59 AM CDT

## 2023-04-27 ENCOUNTER — OFFICE VISIT (OUTPATIENT)
Dept: ENDOCRINOLOGY | Facility: CLINIC | Age: 83
End: 2023-04-27
Payer: MEDICARE

## 2023-04-27 VITALS
TEMPERATURE: 98 F | WEIGHT: 156 LBS | SYSTOLIC BLOOD PRESSURE: 138 MMHG | DIASTOLIC BLOOD PRESSURE: 67 MMHG | HEART RATE: 86 BPM | BODY MASS INDEX: 26.78 KG/M2

## 2023-04-27 DIAGNOSIS — N18.31 STAGE 3A CHRONIC KIDNEY DISEASE: ICD-10-CM

## 2023-04-27 DIAGNOSIS — E11.65 TYPE 2 DIABETES MELLITUS WITH HYPERGLYCEMIA, WITH LONG-TERM CURRENT USE OF INSULIN: Primary | ICD-10-CM

## 2023-04-27 DIAGNOSIS — Z79.4 TYPE 2 DIABETES MELLITUS WITH HYPERGLYCEMIA, WITH LONG-TERM CURRENT USE OF INSULIN: Primary | ICD-10-CM

## 2023-04-27 DIAGNOSIS — E11.3293 MILD NONPROLIFERATIVE DIABETIC RETINOPATHY OF BOTH EYES WITHOUT MACULAR EDEMA ASSOCIATED WITH TYPE 2 DIABETES MELLITUS: ICD-10-CM

## 2023-04-27 PROCEDURE — 1157F ADVNC CARE PLAN IN RCRD: CPT | Mod: CPTII,S$GLB,, | Performed by: NURSE PRACTITIONER

## 2023-04-27 PROCEDURE — 1160F PR REVIEW ALL MEDS BY PRESCRIBER/CLIN PHARMACIST DOCUMENTED: ICD-10-PCS | Mod: CPTII,S$GLB,, | Performed by: NURSE PRACTITIONER

## 2023-04-27 PROCEDURE — 3075F PR MOST RECENT SYSTOLIC BLOOD PRESS GE 130-139MM HG: ICD-10-PCS | Mod: CPTII,S$GLB,, | Performed by: NURSE PRACTITIONER

## 2023-04-27 PROCEDURE — 1159F MED LIST DOCD IN RCRD: CPT | Mod: CPTII,S$GLB,, | Performed by: NURSE PRACTITIONER

## 2023-04-27 PROCEDURE — 99999 PR PBB SHADOW E&M-EST. PATIENT-LVL IV: ICD-10-PCS | Mod: PBBFAC,,, | Performed by: NURSE PRACTITIONER

## 2023-04-27 PROCEDURE — 99214 OFFICE O/P EST MOD 30 MIN: CPT | Mod: S$GLB,,, | Performed by: NURSE PRACTITIONER

## 2023-04-27 PROCEDURE — 3078F PR MOST RECENT DIASTOLIC BLOOD PRESSURE < 80 MM HG: ICD-10-PCS | Mod: CPTII,S$GLB,, | Performed by: NURSE PRACTITIONER

## 2023-04-27 PROCEDURE — 99999 PR PBB SHADOW E&M-EST. PATIENT-LVL IV: CPT | Mod: PBBFAC,,, | Performed by: NURSE PRACTITIONER

## 2023-04-27 PROCEDURE — 1160F RVW MEDS BY RX/DR IN RCRD: CPT | Mod: CPTII,S$GLB,, | Performed by: NURSE PRACTITIONER

## 2023-04-27 PROCEDURE — 3078F DIAST BP <80 MM HG: CPT | Mod: CPTII,S$GLB,, | Performed by: NURSE PRACTITIONER

## 2023-04-27 PROCEDURE — 99214 PR OFFICE/OUTPT VISIT, EST, LEVL IV, 30-39 MIN: ICD-10-PCS | Mod: S$GLB,,, | Performed by: NURSE PRACTITIONER

## 2023-04-27 PROCEDURE — 1157F PR ADVANCE CARE PLAN OR EQUIV PRESENT IN MEDICAL RECORD: ICD-10-PCS | Mod: CPTII,S$GLB,, | Performed by: NURSE PRACTITIONER

## 2023-04-27 PROCEDURE — 1159F PR MEDICATION LIST DOCUMENTED IN MEDICAL RECORD: ICD-10-PCS | Mod: CPTII,S$GLB,, | Performed by: NURSE PRACTITIONER

## 2023-04-27 PROCEDURE — 3075F SYST BP GE 130 - 139MM HG: CPT | Mod: CPTII,S$GLB,, | Performed by: NURSE PRACTITIONER

## 2023-04-27 RX ORDER — INSULIN LISPRO 100 [IU]/ML
INJECTION, SOLUTION INTRAVENOUS; SUBCUTANEOUS
Qty: 60 ML | Refills: 2 | Status: SHIPPED | OUTPATIENT
Start: 2023-04-27 | End: 2023-08-03 | Stop reason: SDUPTHER

## 2023-04-27 RX ORDER — INSULIN DETEMIR 100 [IU]/ML
INJECTION, SOLUTION SUBCUTANEOUS
Qty: 30 ML | Refills: 2 | Status: SHIPPED | OUTPATIENT
Start: 2023-04-27 | End: 2023-08-03 | Stop reason: SDUPTHER

## 2023-04-27 RX ORDER — DULAGLUTIDE 0.75 MG/.5ML
0.75 INJECTION, SOLUTION SUBCUTANEOUS
Qty: 12 PEN | Refills: 2 | Status: SHIPPED | OUTPATIENT
Start: 2023-04-27 | End: 2024-01-30 | Stop reason: SDUPTHER

## 2023-04-27 RX ORDER — METFORMIN HYDROCHLORIDE 500 MG/1
TABLET, EXTENDED RELEASE ORAL
Qty: 270 TABLET | Refills: 2 | Status: SHIPPED | OUTPATIENT
Start: 2023-04-27 | End: 2024-01-30 | Stop reason: SDUPTHER

## 2023-04-27 RX ORDER — PEN NEEDLE, DIABETIC 30 GX3/16"
NEEDLE, DISPOSABLE MISCELLANEOUS
Qty: 400 EACH | Refills: 3 | Status: SHIPPED | OUTPATIENT
Start: 2023-04-27

## 2023-04-27 NOTE — PROGRESS NOTES
"CC: This 82 y.o. Black or  female  is here for evaluation of  T2DM along with comorbidities indicated in the Visit Diagnosis section of this encounter.    HPI: Joel Condon was diagnosed with T2DM in 1994.  Oral agents started at diagnosis.         Prior visit 10/2022  A1c is down from 7.9 to 7.4%.   She returns for blinded CGM study. She brings in food diary.   She has been injecting Humalog 1 hour pc.   CGM interpretation: BGs overall at goal. Highest BGs after lunch and especially after dinner d/t high carb diet and late Humalog administration. Lowest BGs in the 70s in the evening secondary to late Humalog dose. BG did drop to 70s overnight once. No hyperglycemia noted after breakfast.   Plan Reduce Levemir from 40 to 36 units nightly.   Continue Trulicity, metformin, and Humalog.   Make sure to take Humalog BEFORE meals ideally, or at the latest 15 minutes after eating.   Improve diet - avoid cold drinks, limit fruit intake.   Return to clinic in 3 months with labs prior.      Interval hx  Pt last seen several months ago in October.   A1c remained the same at 7.4% in March as it was in Sept.     Pt continues to take Levemir 40 units, did not reduce as advised.         LAST DIABETES EDUCATION: years ago at [facility]. And also a class done by [employer] early 2017    HOSPITALIZED FOR DIABETES  -  No.    PRESCRIBED DIABETES MEDICATIONS:   Trulicity 0.75 mg weekly on Saturdays   Levemir Flextouch 40 units every night at 10 pm  Humalog Kwikpen 22 units ac -   metformin xr 1000 mg AM and 500 mg PM      Misses medication doses - delays taking metformin when she leaves her house d/t urgent bm's.      Rotates injections - yes     Skips Humalog if BG < 150. Afraid to take insulin if glucose is "low" because her BG will drop too low. Denies skipping Levemir.         DM COMPLICATIONS: peripheral neuropathy    SIGNIFICANT DIABETES MED HISTORY:   Metformin started at initial ov 8/17/17  Diarrhea on " metformin even w/ psyllium fiber supplement   Humalog - nausea and weakness       SELF MONITORING BLOOD GLUCOSE: Checks blood glucose at home 4x/day - after meals. No logs. Recalls with difficulty:   After breakfast 100-180s  After lunch 200s       HYPOGLYCEMIC EPISODES: c/o low BG drop overnight a few times a week and corrects with OJ     CURRENT DIET: drinks diet cranberry juice, diet soda, tea w/ splenda, occasional soda.  Eats 2-3 meals/day, skips breakfast when she wakes up late.      Breakfast - egg/sausage; oatmeal   Dinner- heaviest meal  Overnight snacks when she's watching tv.     CURRENT EXERCISE:  None d/t hip pain     CGM study DONE 9/2022    /67   Pulse 86   Temp 97.9 °F (36.6 °C)   Wt 70.8 kg (156 lb)   BMI 26.78 kg/m²       ROS:   CONSTITUTIONAL: Appetite good, denies fatigue  GI: + urgent BMs after taking metformin; + nausea for a few hours after Trulicity injection       PHYSICAL EXAM:  GENERAL: Well developed, well nourished. No acute distress.   PSYCH: AAOx3, appropriate mood and affect, conversant, well-groomed. Judgement and insight good.   NEURO: Cranial nerves grossly intact. Speech clear, no tremor.       Hemoglobin A1C   Date Value Ref Range Status   03/01/2023 7.4 (H) 4.0 - 5.6 % Final     Comment:     ADA Screening Guidelines:  5.7-6.4%  Consistent with prediabetes  >or=6.5%  Consistent with diabetes    High levels of fetal hemoglobin interfere with the HbA1C  assay. Heterozygous hemoglobin variants (HbS, HgC, etc)do  not significantly interfere with this assay.   However, presence of multiple variants may affect accuracy.     09/26/2022 7.4 (H) 4.0 - 5.6 % Final     Comment:     ADA Screening Guidelines:  5.7-6.4%  Consistent with prediabetes  >or=6.5%  Consistent with diabetes    High levels of fetal hemoglobin interfere with the HbA1C  assay. Heterozygous hemoglobin variants (HbS, HgC, etc)do  not significantly interfere with this assay.   However, presence of multiple  variants may affect accuracy.     07/05/2022 7.9 (H) 4.0 - 5.6 % Final     Comment:     ADA Screening Guidelines:  5.7-6.4%  Consistent with prediabetes  >or=6.5%  Consistent with diabetes    High levels of fetal hemoglobin interfere with the HbA1C  assay. Heterozygous hemoglobin variants (HbS, HgC, etc)do  not significantly interfere with this assay.   However, presence of multiple variants may affect accuracy.         Lab Results   Component Value Date    CPEPTIDE 1.41 07/05/2022        Latest Reference Range & Units 07/05/22 08:50   Glucose 70 - 110 mg/dL 100         Chemistry        Component Value Date/Time     03/01/2023 0934    K 4.1 03/01/2023 0934     03/01/2023 0934    CO2 23 03/01/2023 0934    BUN 12 03/01/2023 0934    CREATININE 1.0 03/01/2023 0934     (H) 03/01/2023 0934        Component Value Date/Time    CALCIUM 9.8 03/01/2023 0934    ALKPHOS 67 03/01/2023 0934    AST 21 03/01/2023 0934    ALT 25 03/01/2023 0934    BILITOT 0.5 03/01/2023 0934    ESTGFRAFRICA >60.0 07/05/2022 0850    EGFRNONAA >60.0 07/05/2022 0850           Latest Reference Range & Units 03/01/23 09:34   BUN 8 - 23 mg/dL 12   Creatinine 0.5 - 1.4 mg/dL 1.0   eGFR >60 mL/min/1.73 m^2 56.6 !   !: Data is abnormal    Lab Results   Component Value Date    LDLCALC 47.0 (L) 03/01/2023        Latest Reference Range & Units 03/01/23 09:34   Cholesterol 120 - 199 mg/dL 110 (L)   HDL 40 - 75 mg/dL 45   HDL/Cholesterol Ratio 20.0 - 50.0 % 40.9   LDL Cholesterol External 63.0 - 159.0 mg/dL 47.0 (L)   Non-HDL Cholesterol mg/dL 65   Total Cholesterol/HDL Ratio 2.0 - 5.0  2.4   Triglycerides 30 - 150 mg/dL 90   (L): Data is abnormally low      Lab Results   Component Value Date    MICALBCREAT 33.3 (H) 03/01/2023           ASSESSMENT and PLAN:    A1C GOAL: < 8%    1. Type 2 diabetes mellitus with hyperglycemia, with long-term current use of insulin  Pt continues to administer insulin incorrectly despite frequent reinforcement in  the past. Long discussion again about how to time Humalog ac not pc. Hypoglycemia secondary to excessive basal insulin and/or late Humalog administration. Reviewed how to treat hypoglycemia. Needs to keep glucose at bedside.     Discussed reducing metformin dose to avoid GI s/e but pt would like to continue current dose.     Patient declines higher dose of Trulicity d/t potential s/e.     Reduce Humalog to 18 units before each meal. Do not skip unless blood sugar before meal is less than 100.   If blood sugars start dropping less than 90 after taking Humalog then, reduce Humalog further to 14 units before meals.     Reduce Levemir from 40 to 34 units daily, to avoid blood sugars too low.     Undergo  Continuous Glucose Monitor (CGM) study.    Return to clinic in 3 months for CGM study review. A1c prior to visit.       Hemoglobin A1C    insulin lispro (HUMALOG KWIKPEN INSULIN) 100 unit/mL pen    dulaglutide (TRULICITY) 0.75 mg/0.5 mL pen injector    insulin detemir U-100, Levemir, (LEVEMIR FLEXTOUCH U-100 INSULN) 100 unit/mL (3 mL) InPn pen    metFORMIN (GLUCOPHAGE-XR) 500 MG ER 24hr tablet    GLUCOSE MONITORING CONTINUOUS MIN 72 HOURS      2. Mild nonproliferative diabetic retinopathy of both eyes without macular edema associated with type 2 diabetes mellitus  Improve glycemic control.         3. Stage 3a chronic kidney disease  Stable                 Orders Placed This Encounter   Procedures    Hemoglobin A1C     Standing Status:   Future     Standing Expiration Date:   6/25/2024    GLUCOSE MONITORING CONTINUOUS MIN 72 HOURS     Standing Status:   Future     Standing Expiration Date:   4/27/2024          Follow up in about 3 months (around 7/27/2023).

## 2023-04-27 NOTE — PATIENT INSTRUCTIONS
Discussed reducing metformin dose to avoid GI s/e but pt would like to continue current dose.     Patient declines higher dose of Trulicity.     Reduce Humalog to 18 units before each meal. Do not skip unless blood sugar before meal is less than 100.   If blood sugars start dropping less than 90 after taking Humalog then, reduce Humalog further to 14 units before meals.     Reduce Levemir from 40 to 34 units daily, to avoid blood sugars too low.     Undergo  Continuous Glucose Monitor (CGM) study.    Return to clinic in 3 months for CGM study review. A1c prior to visit.

## 2023-06-13 ENCOUNTER — OFFICE VISIT (OUTPATIENT)
Dept: OPTOMETRY | Facility: CLINIC | Age: 83
End: 2023-06-13
Payer: MEDICARE

## 2023-06-13 DIAGNOSIS — H02.88B MEIBOMIAN GLAND DYSFUNCTION (MGD), BILATERAL, BOTH UPPER AND LOWER LIDS: ICD-10-CM

## 2023-06-13 DIAGNOSIS — H52.13 MYOPIA OF BOTH EYES WITH REGULAR ASTIGMATISM: ICD-10-CM

## 2023-06-13 DIAGNOSIS — H02.88A MEIBOMIAN GLAND DYSFUNCTION (MGD), BILATERAL, BOTH UPPER AND LOWER LIDS: ICD-10-CM

## 2023-06-13 DIAGNOSIS — E11.3293 MILD NONPROLIFERATIVE DIABETIC RETINOPATHY OF BOTH EYES WITHOUT MACULAR EDEMA ASSOCIATED WITH TYPE 2 DIABETES MELLITUS: Primary | ICD-10-CM

## 2023-06-13 DIAGNOSIS — H52.223 MYOPIA OF BOTH EYES WITH REGULAR ASTIGMATISM: ICD-10-CM

## 2023-06-13 DIAGNOSIS — Z96.1 PSEUDOPHAKIA OF BOTH EYES: ICD-10-CM

## 2023-06-13 PROCEDURE — 1101F PT FALLS ASSESS-DOCD LE1/YR: CPT | Mod: CPTII,S$GLB,, | Performed by: OPTOMETRIST

## 2023-06-13 PROCEDURE — 92004 PR EYE EXAM, NEW PATIENT,COMPREHESV: ICD-10-PCS | Mod: S$GLB,,, | Performed by: OPTOMETRIST

## 2023-06-13 PROCEDURE — 2022F DILAT RTA XM EVC RTNOPTHY: CPT | Mod: CPTII,S$GLB,, | Performed by: OPTOMETRIST

## 2023-06-13 PROCEDURE — 1157F PR ADVANCE CARE PLAN OR EQUIV PRESENT IN MEDICAL RECORD: ICD-10-PCS | Mod: CPTII,S$GLB,, | Performed by: OPTOMETRIST

## 2023-06-13 PROCEDURE — 1159F PR MEDICATION LIST DOCUMENTED IN MEDICAL RECORD: ICD-10-PCS | Mod: CPTII,S$GLB,, | Performed by: OPTOMETRIST

## 2023-06-13 PROCEDURE — 1126F PR PAIN SEVERITY QUANTIFIED, NO PAIN PRESENT: ICD-10-PCS | Mod: CPTII,S$GLB,, | Performed by: OPTOMETRIST

## 2023-06-13 PROCEDURE — 92004 COMPRE OPH EXAM NEW PT 1/>: CPT | Mod: S$GLB,,, | Performed by: OPTOMETRIST

## 2023-06-13 PROCEDURE — 92015 PR REFRACTION: ICD-10-PCS | Mod: S$GLB,,, | Performed by: OPTOMETRIST

## 2023-06-13 PROCEDURE — 3288F PR FALLS RISK ASSESSMENT DOCUMENTED: ICD-10-PCS | Mod: CPTII,S$GLB,, | Performed by: OPTOMETRIST

## 2023-06-13 PROCEDURE — 1101F PR PT FALLS ASSESS DOC 0-1 FALLS W/OUT INJ PAST YR: ICD-10-PCS | Mod: CPTII,S$GLB,, | Performed by: OPTOMETRIST

## 2023-06-13 PROCEDURE — 1159F MED LIST DOCD IN RCRD: CPT | Mod: CPTII,S$GLB,, | Performed by: OPTOMETRIST

## 2023-06-13 PROCEDURE — 3288F FALL RISK ASSESSMENT DOCD: CPT | Mod: CPTII,S$GLB,, | Performed by: OPTOMETRIST

## 2023-06-13 PROCEDURE — 92015 DETERMINE REFRACTIVE STATE: CPT | Mod: S$GLB,,, | Performed by: OPTOMETRIST

## 2023-06-13 PROCEDURE — 2022F PR DILATED RETINAL EYE EXAM WITH INTERP/REVIEW: ICD-10-PCS | Mod: CPTII,S$GLB,, | Performed by: OPTOMETRIST

## 2023-06-13 PROCEDURE — 1126F AMNT PAIN NOTED NONE PRSNT: CPT | Mod: CPTII,S$GLB,, | Performed by: OPTOMETRIST

## 2023-06-13 PROCEDURE — 99999 PR PBB SHADOW E&M-EST. PATIENT-LVL III: CPT | Mod: PBBFAC,,, | Performed by: OPTOMETRIST

## 2023-06-13 PROCEDURE — 1157F ADVNC CARE PLAN IN RCRD: CPT | Mod: CPTII,S$GLB,, | Performed by: OPTOMETRIST

## 2023-06-13 PROCEDURE — 99999 PR PBB SHADOW E&M-EST. PATIENT-LVL III: ICD-10-PCS | Mod: PBBFAC,,, | Performed by: OPTOMETRIST

## 2023-06-13 NOTE — PROGRESS NOTES
HPI    Annual Diabetic Eye Exam   Pt states Blurred Vision     Pt reports floaters  Flashes OS corner   Started a couple of months      Pt denies Dry/ Itchy/ Burning/ gritty   Gtt: No     Hemoglobin A1C       Date                     Value               Ref Range             Status                03/01/2023               7.4 (H)             4.0 - 5.6 %           Final                           Last edited by Jose R Salazar, OD on 6/13/2023 11:22 AM.            Assessment /Plan     For exam results, see Encounter Report.    Mild nonproliferative diabetic retinopathy of both eyes without macular edema associated with type 2 diabetes mellitus  -Retinopathy noted today, no ocular treatment necessary.  Continued blood sugar control with primary care physician and monitor at 12 mo dilated fundus exam.    Meibomian gland dysfunction (MGD), bilateral, both upper and lower lids  -Systane Complete PF BID++    Pseudophakia of both eyes  -clear, centered    Myopia of both eyes with regular astigmatism  Eyeglass Final Rx       Eyeglass Final Rx         Sphere Cylinder Axis Dist VA Add    Right -0.25 +1.00 170 20/40 +2.50    Left [place] +0.50 155 20/50 +2.50      Type: Bifocal    Expiration Date: 6/13/2024                      RTC 1 yr

## 2023-06-16 ENCOUNTER — INFUSION (OUTPATIENT)
Dept: INFUSION THERAPY | Facility: HOSPITAL | Age: 83
End: 2023-06-16
Attending: INTERNAL MEDICINE
Payer: MEDICARE

## 2023-06-16 VITALS
OXYGEN SATURATION: 98 % | DIASTOLIC BLOOD PRESSURE: 66 MMHG | TEMPERATURE: 98 F | RESPIRATION RATE: 18 BRPM | HEART RATE: 72 BPM | SYSTOLIC BLOOD PRESSURE: 137 MMHG

## 2023-06-16 DIAGNOSIS — M85.80 OSTEOPENIA, UNSPECIFIED LOCATION: Primary | ICD-10-CM

## 2023-06-16 PROCEDURE — 63600175 PHARM REV CODE 636 W HCPCS: Mod: JZ,JG | Performed by: INTERNAL MEDICINE

## 2023-06-16 PROCEDURE — 96372 THER/PROPH/DIAG INJ SC/IM: CPT

## 2023-06-16 RX ADMIN — DENOSUMAB 60 MG: 60 INJECTION SUBCUTANEOUS at 11:06

## 2023-06-16 NOTE — PLAN OF CARE
Patient arrived to unit for Prolia 60 mg. Denied adverse reactions from previous injections. Calcium WNL. Prolia injection administered subq. Next appointments reviewed. No new or worsening symptoms reported. Discharged from unit in NAD.

## 2023-07-27 ENCOUNTER — CLINICAL SUPPORT (OUTPATIENT)
Dept: ENDOCRINOLOGY | Facility: CLINIC | Age: 83
End: 2023-07-27
Payer: MEDICARE

## 2023-07-27 ENCOUNTER — LAB VISIT (OUTPATIENT)
Dept: LAB | Facility: HOSPITAL | Age: 83
End: 2023-07-27
Attending: NURSE PRACTITIONER
Payer: MEDICARE

## 2023-07-27 DIAGNOSIS — E11.65 TYPE 2 DIABETES MELLITUS WITH HYPERGLYCEMIA, WITH LONG-TERM CURRENT USE OF INSULIN: ICD-10-CM

## 2023-07-27 DIAGNOSIS — Z79.4 TYPE 2 DIABETES MELLITUS WITH HYPERGLYCEMIA, WITH LONG-TERM CURRENT USE OF INSULIN: ICD-10-CM

## 2023-07-27 LAB
ESTIMATED AVG GLUCOSE: 183 MG/DL (ref 68–131)
HBA1C MFR BLD: 8 % (ref 4–5.6)

## 2023-07-27 PROCEDURE — 83036 HEMOGLOBIN GLYCOSYLATED A1C: CPT | Performed by: NURSE PRACTITIONER

## 2023-07-27 PROCEDURE — 99499 NO LOS: ICD-10-PCS | Mod: S$GLB,,, | Performed by: NURSE PRACTITIONER

## 2023-07-27 PROCEDURE — 99499 UNLISTED E&M SERVICE: CPT | Mod: S$GLB,,, | Performed by: NURSE PRACTITIONER

## 2023-07-27 PROCEDURE — 36415 COLL VENOUS BLD VENIPUNCTURE: CPT | Performed by: NURSE PRACTITIONER

## 2023-07-27 NOTE — PROGRESS NOTES
Patient is here today to participate in a Continuous Glucose Monitoring Study.  Patient will wear a Dexcom for 7 days in a blinded study.  Patient will be provided with a Dexcom sensor and transmitter and a copy of the Glucose Monitoring Patient Log to fill out during the study.  A detailed explanation of Continuous Glucose Monitoring was provided.   Instructed patient to record meals, drinks,  snacks, activity, and all diabetes medications on glucose log.  Site for insertion was selected and prepared with a sterile alcohol swab and allowed to dry. Glucose Sensor Serial Number [id number] was inserted to left upper quadrant.  Insertion of sensor done in clinic, individually, in private. Time: 15 minutes

## 2023-07-29 DIAGNOSIS — I10 ESSENTIAL HYPERTENSION: ICD-10-CM

## 2023-07-29 NOTE — TELEPHONE ENCOUNTER
No care due was identified.  [facility] Embedded Care Due Messages. Reference number: [id number].   7/29/2023 3:30:40 PM CDT

## 2023-07-30 RX ORDER — SPIRONOLACTONE 50 MG/1
TABLET, FILM COATED ORAL
Qty: 90 TABLET | Refills: 2 | Status: SHIPPED | OUTPATIENT
Start: 2023-07-30 | End: 2024-03-22

## 2023-07-30 NOTE — TELEPHONE ENCOUNTER
Refill Decision Note   Joel Condon  is requesting a refill authorization.  Brief Assessment and Rationale for Refill:  Approve     Medication Therapy Plan:       Medication Reconciliation Completed: No   Comments:     No Care Gaps recommended.     Note composed:11:41 AM 07/30/2023

## 2023-08-03 ENCOUNTER — CLINICAL SUPPORT (OUTPATIENT)
Dept: ENDOCRINOLOGY | Facility: CLINIC | Age: 83
End: 2023-08-03
Payer: MEDICARE

## 2023-08-03 ENCOUNTER — OFFICE VISIT (OUTPATIENT)
Dept: ENDOCRINOLOGY | Facility: CLINIC | Age: 83
End: 2023-08-03
Payer: MEDICARE

## 2023-08-03 VITALS
SYSTOLIC BLOOD PRESSURE: 132 MMHG | WEIGHT: 157 LBS | DIASTOLIC BLOOD PRESSURE: 70 MMHG | BODY MASS INDEX: 26.95 KG/M2 | TEMPERATURE: 98 F | HEART RATE: 90 BPM

## 2023-08-03 DIAGNOSIS — Z79.4 TYPE 2 DIABETES MELLITUS WITH HYPERGLYCEMIA, WITH LONG-TERM CURRENT USE OF INSULIN: ICD-10-CM

## 2023-08-03 DIAGNOSIS — Z79.4 TYPE 2 DIABETES MELLITUS WITH STAGE 3A CHRONIC KIDNEY DISEASE, WITH LONG-TERM CURRENT USE OF INSULIN: Primary | ICD-10-CM

## 2023-08-03 DIAGNOSIS — I10 ESSENTIAL HYPERTENSION: ICD-10-CM

## 2023-08-03 DIAGNOSIS — N18.31 TYPE 2 DIABETES MELLITUS WITH STAGE 3A CHRONIC KIDNEY DISEASE, WITH LONG-TERM CURRENT USE OF INSULIN: Primary | ICD-10-CM

## 2023-08-03 DIAGNOSIS — E11.22 TYPE 2 DIABETES MELLITUS WITH STAGE 3A CHRONIC KIDNEY DISEASE, WITH LONG-TERM CURRENT USE OF INSULIN: Primary | ICD-10-CM

## 2023-08-03 DIAGNOSIS — E11.65 TYPE 2 DIABETES MELLITUS WITH HYPERGLYCEMIA, WITH LONG-TERM CURRENT USE OF INSULIN: ICD-10-CM

## 2023-08-03 PROCEDURE — 99999 PR PBB SHADOW E&M-EST. PATIENT-LVL V: ICD-10-PCS | Mod: PBBFAC,,, | Performed by: NURSE PRACTITIONER

## 2023-08-03 PROCEDURE — 3075F SYST BP GE 130 - 139MM HG: CPT | Mod: CPTII,S$GLB,, | Performed by: NURSE PRACTITIONER

## 2023-08-03 PROCEDURE — 99214 OFFICE O/P EST MOD 30 MIN: CPT | Mod: S$GLB,,, | Performed by: NURSE PRACTITIONER

## 2023-08-03 PROCEDURE — 1159F PR MEDICATION LIST DOCUMENTED IN MEDICAL RECORD: ICD-10-PCS | Mod: CPTII,S$GLB,, | Performed by: NURSE PRACTITIONER

## 2023-08-03 PROCEDURE — 3075F PR MOST RECENT SYSTOLIC BLOOD PRESS GE 130-139MM HG: ICD-10-PCS | Mod: CPTII,S$GLB,, | Performed by: NURSE PRACTITIONER

## 2023-08-03 PROCEDURE — 99499 NO LOS: ICD-10-PCS | Mod: S$GLB,,, | Performed by: NURSE PRACTITIONER

## 2023-08-03 PROCEDURE — 1157F PR ADVANCE CARE PLAN OR EQUIV PRESENT IN MEDICAL RECORD: ICD-10-PCS | Mod: CPTII,S$GLB,, | Performed by: NURSE PRACTITIONER

## 2023-08-03 PROCEDURE — 3078F PR MOST RECENT DIASTOLIC BLOOD PRESSURE < 80 MM HG: ICD-10-PCS | Mod: CPTII,S$GLB,, | Performed by: NURSE PRACTITIONER

## 2023-08-03 PROCEDURE — 3078F DIAST BP <80 MM HG: CPT | Mod: CPTII,S$GLB,, | Performed by: NURSE PRACTITIONER

## 2023-08-03 PROCEDURE — 1157F ADVNC CARE PLAN IN RCRD: CPT | Mod: CPTII,S$GLB,, | Performed by: NURSE PRACTITIONER

## 2023-08-03 PROCEDURE — 95251 CONT GLUC MNTR ANALYSIS I&R: CPT | Mod: S$GLB,,, | Performed by: NURSE PRACTITIONER

## 2023-08-03 PROCEDURE — 95251 PR GLUCOSE MONITOR, 72 HOUR, PHYS INTERP: ICD-10-PCS | Mod: S$GLB,,, | Performed by: NURSE PRACTITIONER

## 2023-08-03 PROCEDURE — 99499 UNLISTED E&M SERVICE: CPT | Mod: S$GLB,,, | Performed by: NURSE PRACTITIONER

## 2023-08-03 PROCEDURE — 95250 PR GLUCOSE MONITORING,72 HRS,SUB-Q SENSOR: ICD-10-PCS | Mod: S$GLB,,, | Performed by: NURSE PRACTITIONER

## 2023-08-03 PROCEDURE — 1160F RVW MEDS BY RX/DR IN RCRD: CPT | Mod: CPTII,S$GLB,, | Performed by: NURSE PRACTITIONER

## 2023-08-03 PROCEDURE — 99999 PR PBB SHADOW E&M-EST. PATIENT-LVL V: CPT | Mod: PBBFAC,,, | Performed by: NURSE PRACTITIONER

## 2023-08-03 PROCEDURE — 1159F MED LIST DOCD IN RCRD: CPT | Mod: CPTII,S$GLB,, | Performed by: NURSE PRACTITIONER

## 2023-08-03 PROCEDURE — 99214 PR OFFICE/OUTPT VISIT, EST, LEVL IV, 30-39 MIN: ICD-10-PCS | Mod: S$GLB,,, | Performed by: NURSE PRACTITIONER

## 2023-08-03 PROCEDURE — 95250 CONT GLUC MNTR PHYS/QHP EQP: CPT | Mod: S$GLB,,, | Performed by: NURSE PRACTITIONER

## 2023-08-03 PROCEDURE — 1160F PR REVIEW ALL MEDS BY PRESCRIBER/CLIN PHARMACIST DOCUMENTED: ICD-10-PCS | Mod: CPTII,S$GLB,, | Performed by: NURSE PRACTITIONER

## 2023-08-03 RX ORDER — INSULIN LISPRO 100 [IU]/ML
INJECTION, SOLUTION INTRAVENOUS; SUBCUTANEOUS
Qty: 60 ML | Refills: 2 | Status: SHIPPED | OUTPATIENT
Start: 2023-08-03 | End: 2024-01-30 | Stop reason: SDUPTHER

## 2023-08-03 RX ORDER — INSULIN DETEMIR 100 [IU]/ML
INJECTION, SOLUTION SUBCUTANEOUS
Qty: 30 ML | Refills: 2 | Status: SHIPPED | OUTPATIENT
Start: 2023-08-03 | End: 2023-09-20 | Stop reason: SDUPTHER

## 2023-08-03 RX ORDER — BLOOD-GLUCOSE,RECEIVER,CONT
EACH MISCELLANEOUS
Qty: 1 EACH | Refills: 0 | Status: SHIPPED | OUTPATIENT
Start: 2023-08-03

## 2023-08-03 RX ORDER — BLOOD-GLUCOSE SENSOR
EACH MISCELLANEOUS
Qty: 12 EACH | Refills: 3 | Status: SHIPPED | OUTPATIENT
Start: 2023-08-03

## 2023-08-03 NOTE — PATIENT INSTRUCTIONS
See Diabetes Education for Dexcom Continuous Glucose Monitor (CGM) training.   Improve diet - avoid junk food, sweets.     Continue current insulin doses.     Return to clinic in 3 months with labs prior.

## 2023-08-03 NOTE — PROGRESS NOTES
CC: This 82 y.o. Black or  female  is here for evaluation of  T2DM along with comorbidities indicated in the Visit Diagnosis section of this encounter.    HPI: Joel Condon was diagnosed with T2DM in 1994.  Oral agents started at diagnosis.           Prior visit 4/2023  Pt last seen several months ago in October.   A1c remained the same at 7.4% in March as it was in Sept.   Pt continues to take Levemir 40 units, did not reduce as advised.   Plan Pt continues to administer insulin incorrectly despite frequent reinforcement in the past. Long discussion again about how to time Humalog ac not pc. Hypoglycemia secondary to excessive basal insulin and/or late Humalog administration. Reviewed how to treat hypoglycemia. Needs to keep glucose at bedside.   Discussed reducing metformin dose to avoid GI s/e but pt would like to continue current dose.   Patient declines higher dose of Trulicity d/t potential s/e.   Reduce Humalog to 18 units before each meal. Do not skip unless blood sugar before meal is less than 100.   If blood sugars start dropping less than 90 after taking Humalog then, reduce Humalog further to 14 units before meals.   Reduce Levemir from 40 to 34 units daily, to avoid blood sugars too low. Undergo  Continuous Glucose Monitor (CGM) study.  Return to clinic in 3 months for CGM study review. A1c prior to visit.     Interval hx  A1c is up from 7.4 to 8%.   Admits that diet has not been good. She's been eating junk food over the summer while around her grankids.   She has continued prior insulin doses d/t BGs > 200 - Levemir 40 units and Humalog 22 units. Was advised to take 34 units and Humalog 18 units.   She underwent a CGM study. Did not keep food diary d/t difficulty writing from arthritic hands.     CGM interpretation: BGs overall quite high d/t poor diet despite reported medication adherence, especially at night d/t after-dinner snacks.           LAST DIABETES EDUCATION: years ago at  "[facility]. And also a class done by [facility] early 2017    HOSPITALIZED FOR DIABETES  -  No.    PRESCRIBED DIABETES MEDICATIONS:   Trulicity 0.75 mg weekly on Saturdays   Levemir Flextouch - as above  Humalog Kwikpen  - as above   metformin xr 1000 mg AM and 500 mg PM      Misses medication doses - denies; has h/o skipping Humalog if BG is "low" ; denies late Humalog dosing    Rotates injections - yes       DM COMPLICATIONS: peripheral neuropathy    SIGNIFICANT DIABETES MED HISTORY:   Metformin started at initial ov 8/17/17  Diarrhea on metformin even w/ psyllium fiber supplement   Humalog - nausea and weakness       SELF MONITORING BLOOD GLUCOSE: Checks blood glucose at home 4x/day - after meals.     HYPOGLYCEMIC EPISODES: none recent. Some lows noted on CGM report but suspect they were erroneous readings.     CURRENT DIET: drinks water, diet cranberry juice, diet soda, tea w/ splenda.  Eats 2-3 meals/day, skips breakfast when she wakes up late.      Breakfast - egg/sausage; oatmeal   Dinner- heaviest meal  Snack during the day - yogurt.   Bedtime snack - ice cream; yogurt, 6 peanut butter crackers. Likes to eat while she watches tv, goes to bed around 12-1 am.     CURRENT EXERCISE:  None d/t hip pain     CGM study DONE 10/2022    /70   Pulse 90   Temp 98.3 °F (36.8 °C)   Wt 71.2 kg (157 lb)   BMI 26.95 kg/m²       ROS:   CONSTITUTIONAL: Appetite good, denies fatigue  GI: has loose BM after she takes metformin but no diarrhea.  + nausea, mild, improved       PHYSICAL EXAM:  GENERAL: Well developed, well nourished. No acute distress.   PSYCH: AAOx3, appropriate mood and affect, conversant, well-groomed. Judgement and insight good.   NEURO: Cranial nerves grossly intact. Speech clear, no tremor.       Hemoglobin A1C   Date Value Ref Range Status   07/27/2023 8.0 (H) 4.0 - 5.6 % Final     Comment:     ADA Screening Guidelines:  5.7-6.4%  Consistent with prediabetes  >or=6.5%  Consistent with " diabetes    High levels of fetal hemoglobin interfere with the HbA1C  assay. Heterozygous hemoglobin variants (HbS, HgC, etc)do  not significantly interfere with this assay.   However, presence of multiple variants may affect accuracy.     03/01/2023 7.4 (H) 4.0 - 5.6 % Final     Comment:     ADA Screening Guidelines:  5.7-6.4%  Consistent with prediabetes  >or=6.5%  Consistent with diabetes    High levels of fetal hemoglobin interfere with the HbA1C  assay. Heterozygous hemoglobin variants (HbS, HgC, etc)do  not significantly interfere with this assay.   However, presence of multiple variants may affect accuracy.     09/26/2022 7.4 (H) 4.0 - 5.6 % Final     Comment:     ADA Screening Guidelines:  5.7-6.4%  Consistent with prediabetes  >or=6.5%  Consistent with diabetes    High levels of fetal hemoglobin interfere with the HbA1C  assay. Heterozygous hemoglobin variants (HbS, HgC, etc)do  not significantly interfere with this assay.   However, presence of multiple variants may affect accuracy.           Lab Results   Component Value Date    CPEPTIDE 1.41 07/05/2022        Latest Reference Range & Units 07/05/22 08:50   Glucose 70 - 110 mg/dL 100         Chemistry        Component Value Date/Time     03/01/2023 0934    K 4.1 03/01/2023 0934     03/01/2023 0934    CO2 23 03/01/2023 0934    BUN 12 03/01/2023 0934    CREATININE 1.0 03/01/2023 0934     (H) 03/01/2023 0934        Component Value Date/Time    CALCIUM 9.8 03/01/2023 0934    ALKPHOS 67 03/01/2023 0934    AST 21 03/01/2023 0934    ALT 25 03/01/2023 0934    BILITOT 0.5 03/01/2023 0934    ESTGFRAFRICA >60.0 07/05/2022 0850    EGFRNONAA >60.0 07/05/2022 0850           Latest Reference Range & Units 03/01/23 09:34   BUN 8 - 23 mg/dL 12   Creatinine 0.5 - 1.4 mg/dL 1.0   eGFR >60 mL/min/1.73 m^2 56.6 !   !: Data is abnormal    Lab Results   Component Value Date    LDLCALC 47.0 (L) 03/01/2023        Latest Reference Range & Units 03/01/23 09:34    Cholesterol 120 - 199 mg/dL 110 (L)   HDL 40 - 75 mg/dL 45   HDL/Cholesterol Ratio 20.0 - 50.0 % 40.9   LDL Cholesterol External 63.0 - 159.0 mg/dL 47.0 (L)   Non-HDL Cholesterol mg/dL 65   Total Cholesterol/HDL Ratio 2.0 - 5.0  2.4   Triglycerides 30 - 150 mg/dL 90   (L): Data is abnormally low      Lab Results   Component Value Date    MICALBCREAT 33.3 (H) 03/01/2023           ASSESSMENT and PLAN:    A1C GOAL: < 8%      1. Type 2 diabetes mellitus with stage 3a chronic kidney disease, with long-term current use of insulin  Start Dexcom G7 - rx sent to [facility].   See Diabetes Education for Dexcom Continuous Glucose Monitor (CGM) training.   Improve diet - avoid junk food, sweets.     Continue current insulin doses.     Return to clinic in 3 months with labs prior.    Hemoglobin A1C    Creatinine, Serum    Ambulatory referral/consult to Diabetes Education      2. Essential hypertension  controlled        Orders Placed This Encounter   Procedures    Hemoglobin A1C     Standing Status:   Future     Standing Expiration Date:   10/1/2024    Creatinine, Serum     Standing Status:   Future     Standing Expiration Date:   10/1/2024    Ambulatory referral/consult to Diabetes Education     Standing Status:   Future     Standing Expiration Date:   8/3/2024     Referral Priority:   Routine     Referral Type:   Consultation     Referral Reason:   Specialty Services Required     Requested Specialty:   Endocrinology     Number of Visits Requested:   1     Expiration Date:   8/3/2024          Follow up in about 3 months (around 11/3/2023).

## 2023-08-17 ENCOUNTER — CLINICAL SUPPORT (OUTPATIENT)
Dept: DIABETES | Facility: CLINIC | Age: 83
End: 2023-08-17
Payer: MEDICARE

## 2023-08-17 VITALS — HEIGHT: 64 IN | WEIGHT: 157 LBS | BODY MASS INDEX: 26.8 KG/M2

## 2023-08-17 DIAGNOSIS — Z79.4 TYPE 2 DIABETES MELLITUS WITH STAGE 3A CHRONIC KIDNEY DISEASE, WITH LONG-TERM CURRENT USE OF INSULIN: ICD-10-CM

## 2023-08-17 DIAGNOSIS — E11.22 TYPE 2 DIABETES MELLITUS WITH STAGE 3A CHRONIC KIDNEY DISEASE, WITH LONG-TERM CURRENT USE OF INSULIN: ICD-10-CM

## 2023-08-17 DIAGNOSIS — N18.31 TYPE 2 DIABETES MELLITUS WITH STAGE 3A CHRONIC KIDNEY DISEASE, WITH LONG-TERM CURRENT USE OF INSULIN: ICD-10-CM

## 2023-08-17 PROCEDURE — 99999 PR PBB SHADOW E&M-EST. PATIENT-LVL I: CPT | Mod: PBBFAC,,,

## 2023-08-17 PROCEDURE — G0108 DIAB MANAGE TRN  PER INDIV: HCPCS | Mod: S$GLB,,, | Performed by: FAMILY MEDICINE

## 2023-08-17 PROCEDURE — G0108 PR DIAB MANAGE TRN  PER INDIV: ICD-10-PCS | Mod: S$GLB,,, | Performed by: FAMILY MEDICINE

## 2023-08-17 PROCEDURE — 99999 PR PBB SHADOW E&M-EST. PATIENT-LVL I: ICD-10-PCS | Mod: PBBFAC,,,

## 2023-08-20 NOTE — PROGRESS NOTES
Diabetes Care Specialist Progress Note  Author: Isis Cordero RN  Date: 8/17/2023    Program Intake  Reason for Diabetes Program Visit:: Intervention  Type of Intervention:: Individual  Individual: Device Training  Device Training: Personal CGM  Current diabetes risk level:: moderate  In the last 12 months, have you:: none  Permission to speak with others about care:: yes    Lab Results   Component Value Date    HGBA1C 8.0 (H) 07/27/2023       Clinical  Patient Health Rating  Compared to other people your age, how would you rate your health?: Fair    Clinical Assessment  Current Diabetes Treatment: Injectable, Insulin (Trulicity weekly, Levemir 40U @ HS, and Novolog 28U TID w/meals)  Have you ever experienced hypoglycemia (low blood sugar)?: no  Have you ever experienced hyperglycemia (high blood sugar)?: no    Medication Information  How do you obtain your medications?: Patient drives  How many days a week do you miss your medications?: Never  Do you sometimes have difficulty refilling your medications?: No  Medication adherence impacting ability to self-manage diabetes?: No    Labs  Do you have regular lab work to monitor your medications?: Yes  Type of Regular Lab Work: A1c  Where do you get your labs drawn?: [facility]  Lab Compliance Barriers: No    Nutritional Status  Diet: Regular  Change in appetite?: Yes  Recent Changes in Weight: Weight Loss  Was weight loss intentional or unintentional?: Intentional  Current nutritional status an area of need that is impacting patient's ability to self-manage diabetes?: No    Additional Social History  Support  Does anyone support you with your diabetes care?: yes  Who supports you?: self  Who takes you to your medical appointments?: self  Does the current support meet the patient's needs?: Yes  Is Support an area impacting ability to self-manage diabetes?: No    Access to [employer]  Does the patient have access to any of the following devices or  technologies?: Smart phone  Media or technology needs impacting ability to self-manage diabetes?: No    Cognitive/Behavioral Health  Alert and Oriented: Yes  Difficulty Thinking: No  Requires Prompting: No  Requires assistance for routine expression?: No  Cognitive or behavioral barriers impacting ability to self-manage diabetes?: No    Culture/Jehovah's witness  Culture or Anglican beliefs that may impact ability to access healthcare: No    Communication  Language preference: English  Hearing Problems: No  Communication needs impacting ability to self-manage diabetes?: No    Health Literacy  Preferred Learning Method: Face to Face, Reading Materials  How often do you need to have someone help you read instructions, pamphlets, or written material from your doctor or pharmacy?: Never  Health literacy needs impacting ability to self-manage diabetes?: No      Diabetes Self-Management Skills Assessment  Diabetes Disease Process/Treatment Options  Diabetes Disease Process/Treatment Options: Skills Assessment Completed: No  Deferred due to:: Time    Nutrition/Healthy Eating  Nutrition/Healthy Eating Skills Assessment Completed:: No  Deffered due to:: Time    Physical Activity/Exercise  Physical Activity/Exercise Skills Assessment Completed: : No  Deffered due to:: Time    Medications  Patient is able to describe current diabetes management routine.: yes  Diabetes management routine:: injectable medications, insulin  Patient is able to identify current diabetes medications, dosages, and appropriate timing of medications.: yes  Patient understands the purpose of the medications taken for diabetes.: yes  Patient reports problems or concerns with current medication regimen.: no  Medication Skills Assessment Completed:: Yes  Assessment indicates:: Instruction Needed  Area of need?: No    Home Blood Glucose Monitoring  Patient states that blood sugar is checked at home daily.: yes  Monitoring Method:: personal continuous glucose  monitor  Personal CGM type:: Dexcom G7 start today  Patient is able to use personal CGM appropriately.: no  CGM Report reviewed?: no  Home Blood Glucose Monitoring Skills Assessment Completed: : Yes  Assessment indicates:: Instruction Needed, Knowledge deficit  Area of need?: Yes    Acute Complications  Patient is able to identify types of acute complications: No  Acute Complications Skills Assessment Completed: : Yes  Assessment indicates:: Instruction Needed, Knowledge deficit  Area of need?: Yes    Chronic Complications  Chronic Complications Skills Assessment Completed: : No  Deferred due to:: Time    Psychosocial/Coping  Psychosocial/Coping Skills Assessment Completed: : No  Deffered due to:: Time    Assessment Summary and Plan    Based on today's diabetes care assessment, the following areas of need were identified:      Social 8/17/2023   Support No   Access to [employer] No   Cognitive/Behavioral Health No   Culture/Anabaptism No   Communication No   Health Literacy No        Clinical 8/17/2023   Medication Adherence No   Lab Compliance No   Nutritional Status No        Diabetes Self-Management Skills 8/17/2023   Medication No   Home Blood Glucose Monitoring Yes- see care planning   Acute Complications Yes- Reviewed blood sugar values, prevention, detection, signs and symptoms, and treatment of hypoglycemia following rule of 15.           Today's interventions were provided through individual discussion, instruction, and written materials were provided.      Patient verbalized understanding of instruction and written materials.  Pt was able to return back demonstration of instructions today. Patient understood key points, needs reinforcement and further instruction.     Diabetes Self-Management Care Plan:    Today's Diabetes Self-Management Care Plan was developed with Joel's input. Joel has agreed to work toward the following goal(s) to improve his/her overall diabetes control.      Care Plan: Diabetes  Management   Updates made since 7/21/2023 12:00 AM        Problem: Blood Glucose Self-Monitoring         Goal: Patient agrees to change Dexcom G7 sensor every 10 days and review blood sugars per reciever osito least 3 times per day.    Start Date: 8/17/2023   Expected End Date: 8/31/2023   Priority: High   Barriers: No Barriers Identified   Note:    8/17/23 - - - - DEXCOM G7 CGM TRAINING (Pt requires assistance from his dad and sister)  Patient referred to clinic today for Dexcom G7 continuous glucose sensor system training. Pt used the Dexcom  and reviewed the video on starting Dexcom G7 using the . Also reviewed instructions per Dexcom G7 manual.   Overview:  5min glucose reading updates, trending arrows, BG graph screens, battery life indicator, Blue Tooth Symbol.  Menus: trend Graph, start sensor, enter BG, events, Alerts, Settings, Shutdown, Stop Sensor   Settings:                          * Urgent Low: 55 mg/dL                          * Urgent Low Soon: on                          * Low alert: 70 mg/dl and repeat 15 mins                          * High alert: 250 mg/dl                           * Rise rate: off                          * Fall Rate: off                          * Signal Loss: on                           * No Reading: on                           * Always sound: on  Reviewed additional setting options with patient, including Graph Height and Transmitter ID. Dexcom G7 was paired.    Reviewed where to find sensor insertion time and sensor expiration date.   Discussed no need to calibrate sensor during the entirety of the 10-day wear. Discussed that pt can calibrate sensor if desired, but at that time he will need to continue calibrating every 12 hours for sensor to remain accurate. Reviewed appropriate calibration techniques.  Reviewed Dexcom G7 site selection. Site selected and prepped using aseptic technique Inserted to right upper arm. Transmitter/sensor placed per  instructions.  Patient able to demonstrate without difficulty.  Encouraged to review manual prior to starting another sensor.    range 20 feet, but the first 3 hrs keep within 3 feet of transmitter.  Pt instructed on lag time of interstitial fluid from CBG and was advised to tx hypoglycemia and dose insulin based on SMBG values.             Follow Up Plan     Follow up in about 2 weeks (around 8/31/2023) for F/U # dexcom G7 upload, assessment,and MNT.    Today's care plan and follow up schedule was discussed with patient.  Joel verbalized understanding of the care plan, goals, and agrees to follow up plan.        The patient was encouraged to communicate with his/her health care provider/physician and care team regarding his/her condition(s) and treatment.  I provided the patient with my contact information today and encouraged to contact me via phone or [facility] Portal as needed.     Length of Visit   Total Time: 60 Minutes

## 2023-09-12 PROBLEM — E11.65 POORLY CONTROLLED TYPE 2 DIABETES MELLITUS WITH RENAL COMPLICATION: Status: ACTIVE | Noted: 2021-03-05

## 2023-09-12 PROBLEM — E11.29 POORLY CONTROLLED TYPE 2 DIABETES MELLITUS WITH RENAL COMPLICATION: Status: ACTIVE | Noted: 2021-03-05

## 2023-09-18 ENCOUNTER — TELEPHONE (OUTPATIENT)
Dept: FAMILY MEDICINE | Facility: CLINIC | Age: 83
End: 2023-09-18
Payer: MEDICARE

## 2023-09-18 NOTE — TELEPHONE ENCOUNTER
----- Message from Milka Weber sent at 9/15/2023  4:21 PM CDT -----  Name of Caller pt   Reason for Visit/Symptoms pt requesting an appt to reschedule her upcoming appt.nothing available. Call pt  Best Contact Number or Confirm if Mychart Preferred [phone] (home)     Preferred Date/Time of Appointment asap   Interested in Virtual Visit (yes/no)  Additional Information

## 2023-09-20 ENCOUNTER — OFFICE VISIT (OUTPATIENT)
Dept: FAMILY MEDICINE | Facility: CLINIC | Age: 83
End: 2023-09-20
Payer: MEDICARE

## 2023-09-20 VITALS
TEMPERATURE: 98 F | DIASTOLIC BLOOD PRESSURE: 60 MMHG | BODY MASS INDEX: 26.63 KG/M2 | OXYGEN SATURATION: 95 % | SYSTOLIC BLOOD PRESSURE: 132 MMHG | HEIGHT: 64 IN | HEART RATE: 75 BPM | WEIGHT: 156 LBS

## 2023-09-20 DIAGNOSIS — C50.912 MALIGNANT NEOPLASM OF LEFT BREAST IN FEMALE, ESTROGEN RECEPTOR POSITIVE, UNSPECIFIED SITE OF BREAST: ICD-10-CM

## 2023-09-20 DIAGNOSIS — K21.9 GASTROESOPHAGEAL REFLUX DISEASE WITHOUT ESOPHAGITIS: Chronic | ICD-10-CM

## 2023-09-20 DIAGNOSIS — E66.3 OVERWEIGHT (BMI 25.0-29.9): ICD-10-CM

## 2023-09-20 DIAGNOSIS — E11.65 POORLY CONTROLLED TYPE 2 DIABETES MELLITUS WITH NEUROPATHY: ICD-10-CM

## 2023-09-20 DIAGNOSIS — Z23 FLU VACCINE NEED: ICD-10-CM

## 2023-09-20 DIAGNOSIS — E03.9 HYPOTHYROIDISM (ACQUIRED): ICD-10-CM

## 2023-09-20 DIAGNOSIS — I12.9 BENIGN HYPERTENSION WITH CHRONIC KIDNEY DISEASE, STAGE III: ICD-10-CM

## 2023-09-20 DIAGNOSIS — Z12.31 SCREENING MAMMOGRAM, ENCOUNTER FOR: ICD-10-CM

## 2023-09-20 DIAGNOSIS — E11.319 POORLY CONTROLLED TYPE 2 DIABETES MELLITUS WITH RETINOPATHY: Primary | ICD-10-CM

## 2023-09-20 DIAGNOSIS — Z17.0 MALIGNANT NEOPLASM OF LEFT BREAST IN FEMALE, ESTROGEN RECEPTOR POSITIVE, UNSPECIFIED SITE OF BREAST: ICD-10-CM

## 2023-09-20 DIAGNOSIS — E11.3293 MILD NONPROLIFERATIVE DIABETIC RETINOPATHY OF BOTH EYES WITHOUT MACULAR EDEMA ASSOCIATED WITH TYPE 2 DIABETES MELLITUS: ICD-10-CM

## 2023-09-20 DIAGNOSIS — E11.65 POORLY CONTROLLED TYPE 2 DIABETES MELLITUS WITH RETINOPATHY: Primary | ICD-10-CM

## 2023-09-20 DIAGNOSIS — E11.65 POORLY CONTROLLED TYPE 2 DIABETES MELLITUS WITH RENAL COMPLICATION: ICD-10-CM

## 2023-09-20 DIAGNOSIS — Z78.0 OSTEOPENIA AFTER MENOPAUSE: ICD-10-CM

## 2023-09-20 DIAGNOSIS — Z79.4 LONG-TERM INSULIN USE: ICD-10-CM

## 2023-09-20 DIAGNOSIS — M85.80 OSTEOPENIA AFTER MENOPAUSE: ICD-10-CM

## 2023-09-20 DIAGNOSIS — E11.40 POORLY CONTROLLED TYPE 2 DIABETES MELLITUS WITH NEUROPATHY: ICD-10-CM

## 2023-09-20 DIAGNOSIS — N18.30 BENIGN HYPERTENSION WITH CHRONIC KIDNEY DISEASE, STAGE III: ICD-10-CM

## 2023-09-20 DIAGNOSIS — E11.29 POORLY CONTROLLED TYPE 2 DIABETES MELLITUS WITH RENAL COMPLICATION: ICD-10-CM

## 2023-09-20 DIAGNOSIS — E78.5 HYPERLIPIDEMIA LDL GOAL <100: ICD-10-CM

## 2023-09-20 PROCEDURE — 1159F MED LIST DOCD IN RCRD: CPT | Mod: CPTII,S$GLB,, | Performed by: INTERNAL MEDICINE

## 2023-09-20 PROCEDURE — 99999 PR PBB SHADOW E&M-EST. PATIENT-LVL V: ICD-10-PCS | Mod: PBBFAC,,, | Performed by: INTERNAL MEDICINE

## 2023-09-20 PROCEDURE — 3288F FALL RISK ASSESSMENT DOCD: CPT | Mod: CPTII,S$GLB,, | Performed by: INTERNAL MEDICINE

## 2023-09-20 PROCEDURE — 3075F PR MOST RECENT SYSTOLIC BLOOD PRESS GE 130-139MM HG: ICD-10-PCS | Mod: CPTII,S$GLB,, | Performed by: INTERNAL MEDICINE

## 2023-09-20 PROCEDURE — 1160F RVW MEDS BY RX/DR IN RCRD: CPT | Mod: CPTII,S$GLB,, | Performed by: INTERNAL MEDICINE

## 2023-09-20 PROCEDURE — 99214 OFFICE O/P EST MOD 30 MIN: CPT | Mod: S$GLB,,, | Performed by: INTERNAL MEDICINE

## 2023-09-20 PROCEDURE — 99999 PR PBB SHADOW E&M-EST. PATIENT-LVL V: CPT | Mod: PBBFAC,,, | Performed by: INTERNAL MEDICINE

## 2023-09-20 PROCEDURE — 1126F AMNT PAIN NOTED NONE PRSNT: CPT | Mod: CPTII,S$GLB,, | Performed by: INTERNAL MEDICINE

## 2023-09-20 PROCEDURE — 1160F PR REVIEW ALL MEDS BY PRESCRIBER/CLIN PHARMACIST DOCUMENTED: ICD-10-PCS | Mod: CPTII,S$GLB,, | Performed by: INTERNAL MEDICINE

## 2023-09-20 PROCEDURE — 1101F PR PT FALLS ASSESS DOC 0-1 FALLS W/OUT INJ PAST YR: ICD-10-PCS | Mod: CPTII,S$GLB,, | Performed by: INTERNAL MEDICINE

## 2023-09-20 PROCEDURE — 1126F PR PAIN SEVERITY QUANTIFIED, NO PAIN PRESENT: ICD-10-PCS | Mod: CPTII,S$GLB,, | Performed by: INTERNAL MEDICINE

## 2023-09-20 PROCEDURE — G0008 ADMIN INFLUENZA VIRUS VAC: HCPCS | Mod: S$GLB,,, | Performed by: INTERNAL MEDICINE

## 2023-09-20 PROCEDURE — 1159F PR MEDICATION LIST DOCUMENTED IN MEDICAL RECORD: ICD-10-PCS | Mod: CPTII,S$GLB,, | Performed by: INTERNAL MEDICINE

## 2023-09-20 PROCEDURE — 90694 FLU VACCINE - QUADRIVALENT - ADJUVANTED: ICD-10-PCS | Mod: S$GLB,,, | Performed by: INTERNAL MEDICINE

## 2023-09-20 PROCEDURE — 1157F PR ADVANCE CARE PLAN OR EQUIV PRESENT IN MEDICAL RECORD: ICD-10-PCS | Mod: CPTII,S$GLB,, | Performed by: INTERNAL MEDICINE

## 2023-09-20 PROCEDURE — 1157F ADVNC CARE PLAN IN RCRD: CPT | Mod: CPTII,S$GLB,, | Performed by: INTERNAL MEDICINE

## 2023-09-20 PROCEDURE — 1101F PT FALLS ASSESS-DOCD LE1/YR: CPT | Mod: CPTII,S$GLB,, | Performed by: INTERNAL MEDICINE

## 2023-09-20 PROCEDURE — 90694 VACC AIIV4 NO PRSRV 0.5ML IM: CPT | Mod: S$GLB,,, | Performed by: INTERNAL MEDICINE

## 2023-09-20 PROCEDURE — 3078F PR MOST RECENT DIASTOLIC BLOOD PRESSURE < 80 MM HG: ICD-10-PCS | Mod: CPTII,S$GLB,, | Performed by: INTERNAL MEDICINE

## 2023-09-20 PROCEDURE — 99214 PR OFFICE/OUTPT VISIT, EST, LEVL IV, 30-39 MIN: ICD-10-PCS | Mod: S$GLB,,, | Performed by: INTERNAL MEDICINE

## 2023-09-20 PROCEDURE — 3078F DIAST BP <80 MM HG: CPT | Mod: CPTII,S$GLB,, | Performed by: INTERNAL MEDICINE

## 2023-09-20 PROCEDURE — 3075F SYST BP GE 130 - 139MM HG: CPT | Mod: CPTII,S$GLB,, | Performed by: INTERNAL MEDICINE

## 2023-09-20 PROCEDURE — 3288F PR FALLS RISK ASSESSMENT DOCUMENTED: ICD-10-PCS | Mod: CPTII,S$GLB,, | Performed by: INTERNAL MEDICINE

## 2023-09-20 PROCEDURE — G0008 FLU VACCINE - QUADRIVALENT - ADJUVANTED: ICD-10-PCS | Mod: S$GLB,,, | Performed by: INTERNAL MEDICINE

## 2023-09-20 RX ORDER — INSULIN DETEMIR 100 [IU]/ML
INJECTION, SOLUTION SUBCUTANEOUS
Qty: 30 ML | Refills: 2 | Status: SHIPPED | OUTPATIENT
Start: 2023-09-20 | End: 2024-03-04 | Stop reason: ALTCHOICE

## 2023-09-20 NOTE — PROGRESS NOTES
CHIEF COMPLAINT:   Chief Complaint   Patient presents with    Diabetes     6 month f/u          HISTORY OF PRESENT ILLNESS:  Joel Condon is a 83 y.o. female who presents to the clinic today for Diabetes (6 month f/u)  .     The patient presents to clinic today for follow-up of her type 2 diabetes mellitus complicated by retinopathy/neuropathy, hypertension complicated by chronic kidney disease stage 3, and hyperlipidemia.  She sees endocrinology in the near future regarding her diabetes.  She states her last A1c was elevated as she had been eating a lot of junk food over the summer when she was taking care of her grandchildren.  She is followed by Oncology for her breast cancer.  She denies temperature intolerance or unexplained changes in her weight.  She states her only complaints are intermittent fatigue and occasional early satiety.  Overall she is feeling well.    Subjective    PAST MEDICAL HISTORY:  Past Medical History:   Diagnosis Date    Arthritis     Breast cancer     Cataract     Colon polyp     Diabetes mellitus     Diabetic retinopathy     Hyperlipidemia LDL goal < 100     Hypertension     Hypothyroidism     Osteoporosis, post-menopausal     Overweight(278.02)     Proteinuria     Type II or unspecified type diabetes mellitus with renal manifestations, uncontrolled(250.42)        PAST SURGICAL HISTORY:  Past Surgical History:   Procedure Laterality Date    APPENDECTOMY      BREAST BIOPSY      BREAST LUMPECTOMY      BREAST SURGERY Left 12/13/2017    tumor removal    CATARACT EXTRACTION W/  INTRAOCULAR LENS IMPLANT Left 4/24/14    OS (Dr. Arce)    CATARACT EXTRACTION W/  INTRAOCULAR LENS IMPLANT Right 06/12/2014    OD (Dr. Arce)    CHOLECYSTECTOMY      COLONOSCOPY N/A 4/21/2017    Procedure: COLONOSCOPY;  Surgeon: Homar Payan MD;  Location: [facility];  Service: Endoscopy;  Laterality: N/A;    COLONOSCOPY N/A 6/29/2018    Procedure: COLONOSCOPY;  Surgeon: Mykel Boles MD;  Location: [facility]  ENDO;  Service: Endoscopy;  Laterality: N/A;    EYE SURGERY  04/24/2014 & 06/12/2014    HYSTERECTOMY      total    left eye cataract surgery  4/24/14    OOPHORECTOMY         SOCIAL HISTORY:  Social History     Socioeconomic History    Marital status:     Number of children: 3   Occupational History    Occupation: retired   Tobacco Use    Smoking status: Never    Smokeless tobacco: Never   Substance and Sexual Activity    Alcohol use: No    Drug use: No    Sexual activity: Not Currently     Partners: Male       FAMILY HISTORY:  Family History   Problem Relation Age of Onset    Diabetes Mother     Heart disease Mother     Hypertension Mother     Stroke Mother     Diabetes Sister     Hypertension Sister     Diabetes Sister     Hypertension Sister     Diabetes Sister     Hypertension Sister     Hypertension Sister     Diabetes Sister     Diabetes Brother     Diabetes Brother     Diabetes Brother     Prostate cancer Brother     Amblyopia Neg Hx     Blindness Neg Hx     Cataracts Neg Hx     Glaucoma Neg Hx     Macular degeneration Neg Hx     Retinal detachment Neg Hx     Strabismus Neg Hx        ALLERGIES AND MEDICATIONS: updated and reviewed.  Review of patient's allergies indicates:   Allergen Reactions    Lisinopril Other (See Comments)     Cough      Hydrochlorothiazide (bulk) Other (See Comments)     Elevated pt's blood pressure making her feel bad    Pravastatin Other (See Comments)     headaches     Medication List with Changes/Refills   Current Medications    ASCORBIC ACID, VITAMIN C, (VITAMIN C) 100 MG TABLET    Take 100 mg by mouth once daily.    ASPIRIN (ECOTRIN) 81 MG EC TABLET    Take 1 tablet (81 mg total) by mouth once daily.    ATORVASTATIN (LIPITOR) 10 MG TABLET    TAKE 1 TABLET(10 MG) BY MOUTH EVERY DAY    BLOOD SUGAR DIAGNOSTIC STRP    To check BG 4 times daily, to use with insurance preferred meter. e11.65    BLOOD-GLUCOSE METER,CONTINUOUS (DEXCOM G7 ) MISC    Use with Dexcom G7 sensors  "   BLOOD-GLUCOSE SENSOR (DEXCOM G7 SENSOR) KUNAL    Change every 10 days    CHOLECALCIFEROL, VITAMIN D3, (VITAMIN D3) 100 MCG (4,000 UNIT) CAP    Take by mouth.    CYANOCOBALAMIN, VITAMIN B-12, MISC    by Misc.(Non-Drug; Combo Route) route.    DULAGLUTIDE (TRULICITY) 0.75 MG/0.5 ML PEN INJECTOR    Inject 0.75 mg into the skin every 7 days.    INSULIN LISPRO (HUMALOG KWIKPEN INSULIN) 100 UNIT/ML PEN    Injects 22  units three times daily before meals    LANCETS MISC    To check BG 4 times daily, to use with insurance preferred meter. e11.65    LETROZOLE (FEMARA) 2.5 MG TAB    TAKE 1 TABLET BY MOUTH DAILY.    LEVOTHYROXINE (SYNTHROID) 50 MCG TABLET    TAKE 1 TABLET(50 MCG) BY MOUTH EVERY MORNING    LOSARTAN (COZAAR) 100 MG TABLET    TAKE 1 TABLET BY MOUTH DAILY    METFORMIN (GLUCOPHAGE-XR) 500 MG ER 24HR TABLET    Take 2 tablets every morning and 1 tablet every evening    METOPROLOL SUCCINATE (TOPROL-XL) 200 MG 24 HR TABLET    TAKE 1 TABLET(200 MG) BY MOUTH EVERY DAY    NIFEDIPINE (PROCARDIA-XL) 90 MG (OSM) 24 HR TABLET    TAKE 1 TABLET(90 MG) BY MOUTH EVERY DAY    OMEPRAZOLE (PRILOSEC) 20 MG CAPSULE    TAKE 1 CAPSULE(20 MG) BY MOUTH DAILY AS NEEDED FOR HEARTBURN    PEN NEEDLE, DIABETIC (BD ULTRA-FINE SHORT PEN NEEDLE) 31 GAUGE X 5/16" NDLE    USE FOUR TIMES DAILY    SPIRONOLACTONE (ALDACTONE) 50 MG TABLET    TAKE 1 TABLET(50 MG) BY MOUTH EVERY DAY    VITAMIN E ACETATE (VITAMIN E ORAL)    Take by mouth.   Changed and/or Refilled Medications    Modified Medication Previous Medication    INSULIN DETEMIR U-100, LEVEMIR, (LEVEMIR FLEXTOUCH U100 INSULIN) 100 UNIT/ML (3 ML) INPN PEN insulin detemir U-100, Levemir, (LEVEMIR FLEXTOUCH U100 INSULIN) 100 unit/mL (3 mL) InPn pen       Inject 40 units once daily    Inject 40 units once daily       CARE TEAM:  Patient Care Team:  Shelby Ley MD as PCP - General (Internal Medicine)  Deborah Parr MD as Consulting Physician (Hematology and Oncology)  Rosy Mcknight NP as " "Nurse Practitioner (Endocrinology)  Juan Pablo Ordoñez MD as Consulting Physician (Cardiology)  Mary Waterman LPN as Care Coordinator       REVIEW OF SYSTEMS:  Review of Systems   Constitutional:  Positive for fatigue. Negative for chills and fever.   HENT:  Negative for congestion.    Respiratory:  Negative for cough and shortness of breath.    Cardiovascular:  Negative for chest pain.   Gastrointestinal:  Negative for abdominal pain.   Genitourinary:  Negative for dysuria.   Musculoskeletal:  Positive for arthralgias (mild; intermittent; chronic/stable).             Objective    PHYSICAL EXAMINATION/VITALS:  Vitals:    09/20/23 1019   BP: 132/60   Pulse: 75   Temp: 97.8 °F (36.6 °C)   TempSrc: Oral   SpO2: 95%   Weight: 70.8 kg (155 lb 15.6 oz)   Height: 5' 4" (1.626 m)       Body mass index is 26.77 kg/m².      General appearance - alert, well appearing, and in no distress, overweight  Psychiatric - alert, oriented to person, place, and time, normal behavior, speech, dress, motor activity and thought process  Eyes - pupils equal and reactive, extraocular eye movements intact, sclera anicteric  Lymphatics - no palpable cervical lymphadenopathy  Chest - clear to auscultation, no wheezes, rales or rhonchi, symmetric air entry  Heart - normal rate and regular rhythm  Neurological - alert, normal speech, no focal findings; cranial nerves II through XII intact  Musculoskeletal - patient noted to have Mild-Moderate osteoarthritic changes to both knee joints. No joint effusions noted.  Extremities - no pedal edema noted  Skin - normal coloration, no suspicious skin lesions      LABS:  Lab Results   Component Value Date    HGBA1C 8.0 (H) 07/27/2023    HGBA1C 7.4 (H) 03/01/2023    HGBA1C 7.4 (H) 09/26/2022      Lab Results   Component Value Date    CHOL 110 (L) 03/01/2023    CHOL 120 03/08/2022    CHOL 115 (L) 03/16/2021     Lab Results   Component Value Date    LDLCALC 47.0 (L) 03/01/2023    LDLCALC 50.8 (L) " 03/08/2022    LDLCALC 50.8 (L) 03/16/2021               ASSESSMENT AND PLAN:  1. Poorly controlled type 2 diabetes mellitus with retinopathy/2. Mild nonproliferative diabetic retinopathy of both eyes without macular edema associated with type 2 diabetes mellitus/3. Poorly controlled type 2 diabetes mellitus with neuropathy/4. Poorly controlled type 2 diabetes mellitus with renal complication/5. Long-term insulin use  Lab Results   Component Value Date    HGBA1C 8.0 (H) 07/27/2023     Diabetes is not controlled at this time (due to hyperglycemia) for age and comorbid conditions.   We discussed diabetic diet and regular exercise.   We discussed home blood sugar monitoring, if appropriate - the patient should test twice daily and as needed.   Continue current medication regimen.  Patient is followed by endocrinology.  She will do better with diabetic diet and regular physical activity.  Diabetic complications addressed: Stable decreased kidney function. Observe. Patient counseled to avoid/minimize the use of anti-inflammatory  Medication. Discussed to stay well hydrated. Also discussed with patient that good control of blood pressure and/or diabetes, if present, will help to prevent progression. Neuropathy pain controlled.   Patient was counseled on the need for yearly eye exam to screen for/monitor diabetic retinopathy and yearly diabetic foot exam.    6. Benign hypertension with chronic kidney disease, stage III  BP Readings from Last 1 Encounters:   09/20/23 132/60      Discussed sodium restriction, maintaining ideal body weight and regular exercise program as physiologic means to achieve blood pressure control. The patient will strive towards this.   The current medical regimen is effective;  continue present plan and medications. Recommended patient to check home readings to monitor and see me for followup as scheduled or sooner as needed.   Patient was educated that both decongestant and anti-inflammatory  medication may raise blood pressure.  Stable decreased kidney function. Observe. Patient counseled to avoid/minimize the use of anti-inflammatory  Medication. Discussed to stay well hydrated. Also discussed with patient that good control of blood pressure and/or diabetes, if present, will help to prevent progression.  The patient is not active on the digital hypertension program.    7. Hyperlipidemia LDL goal <100  Lab Results   Component Value Date    CHOL 110 (L) 03/01/2023     Lab Results   Component Value Date    HDL 45 03/01/2023     Lab Results   Component Value Date    LDLCALC 47.0 (L) 03/01/2023     Lab Results   Component Value Date    TRIG 90 03/01/2023     Lab Results   Component Value Date    LDLCALC 47.0 (L) 03/01/2023     We discussed low fat diet and regular exercise.The current medical regimen is effective;  continue present plan and medications.   Overview:  - cannot tolerate pravastatin      8. Malignant neoplasm of left breast in female, estrogen receptor positive, unspecified site of breast  The current medical regimen is effective;  continue present plan and medications.   Followed by: Hematology/Oncology.   Overview:  -diagnosed 11/2017  -Stage 1b pT1b (6mm) N1mi M0 grade 1 ER + TN + [id number] infiltrating ductal carcinoma  of the left breast status post left partial mastectomy and SLNB  on 12/13/2017.  1 of 2 lymph nodes positive for micromet. She is Stage IA per AJCC 8th ed. pathologic prognostic staging system.  - completed radiation with Dr. Wray on 3/2018  - on femara  -followed by oncology, Dr. Parr        9. Hypothyroidism (acquired)  Lab Results   Component Value Date    TSH 2.864 03/01/2023     Patient is clinically euthyroid. Continue current regimen.    10. Gastroesophageal reflux disease without esophagitis  Symptoms controlled: yes - takes medication occasionally as needed.   Reflux precautions discussed (eliminate tobacco if a smoker; minimize caffeine, chocolate and red/white  peppermint intake; avoid heavy and spicy meals; don't lay down within 2-3 hours after eating; minimize the intake of NSAIDs). Medication as needed.   Patient asked to take medication breaks, if possible - discussed chronic use can limit calcium absorption (which can lead to osteopenia/osteoporosis), increases the risk for intestinal infections, and can cause kidney damage. There are also some newer studies that show possible increased risk of mortality.    11. Osteopenia after menopause  We discussed adequate calcium intake (preferably from diet) and vitamin D supplementation. We discussed fall precautions. She is scheduled for her BMD. Further treatment plan will be based on results of bone density testing.  Overview:  - s/p Prolia 5/2018, 11/2018, 5/2019, 11/2019, 5/2020, 11/2020, 5/31/2021, 12/14/2021, 6/2/2022      12. Overweight (BMI 25.0-29.9)  BMI Readings from Last 3 Encounters:   09/20/23 26.77 kg/m²   08/17/23 26.95 kg/m²   08/03/23 26.95 kg/m²     The patient is asked to continue to make an attempt to improve diet and exercise patterns to aid in medical management of this problem.     13. Flu vaccine need    -     Influenza (FLUAD) - Quadrivalent (Adjuvanted) *Preferred* (65+) (PF)      14. Screening mammogram, encounter for  Scheduled.  -     Mammo Digital Screening Bilat w/ Malick; Future; Expected date: 11/29/2023            Orders Placed This Encounter   Procedures    Mammo Digital Screening Bilat w/ Malick    Influenza (FLUAD) - Quadrivalent (Adjuvanted) *Preferred* (65+) (PF)      FOLLOW UP: Follow up in about 6 months (around 3/20/2024), or if symptoms worsen or fail to improve, for annual exam. or sooner as needed.

## 2023-10-13 ENCOUNTER — LAB VISIT (OUTPATIENT)
Dept: LAB | Facility: HOSPITAL | Age: 83
End: 2023-10-13
Attending: INTERNAL MEDICINE
Payer: MEDICARE

## 2023-10-13 DIAGNOSIS — E11.22 TYPE 2 DIABETES MELLITUS WITH STAGE 3A CHRONIC KIDNEY DISEASE, WITH LONG-TERM CURRENT USE OF INSULIN: ICD-10-CM

## 2023-10-13 DIAGNOSIS — Z17.0 MALIGNANT NEOPLASM OF LEFT BREAST IN FEMALE, ESTROGEN RECEPTOR POSITIVE, UNSPECIFIED SITE OF BREAST: ICD-10-CM

## 2023-10-13 DIAGNOSIS — Z79.4 TYPE 2 DIABETES MELLITUS WITH STAGE 3A CHRONIC KIDNEY DISEASE, WITH LONG-TERM CURRENT USE OF INSULIN: ICD-10-CM

## 2023-10-13 DIAGNOSIS — I10 ESSENTIAL HYPERTENSION: ICD-10-CM

## 2023-10-13 DIAGNOSIS — C50.912 MALIGNANT NEOPLASM OF LEFT BREAST IN FEMALE, ESTROGEN RECEPTOR POSITIVE, UNSPECIFIED SITE OF BREAST: ICD-10-CM

## 2023-10-13 DIAGNOSIS — N18.31 TYPE 2 DIABETES MELLITUS WITH STAGE 3A CHRONIC KIDNEY DISEASE, WITH LONG-TERM CURRENT USE OF INSULIN: ICD-10-CM

## 2023-10-13 LAB
ALBUMIN SERPL BCP-MCNC: 3.8 G/DL (ref 3.5–5.2)
ALP SERPL-CCNC: 67 U/L (ref 55–135)
ALT SERPL W/O P-5'-P-CCNC: 16 U/L (ref 10–44)
ANION GAP SERPL CALC-SCNC: 9 MMOL/L (ref 8–16)
AST SERPL-CCNC: 18 U/L (ref 10–40)
BASOPHILS # BLD AUTO: 0.08 K/UL (ref 0–0.2)
BASOPHILS NFR BLD: 0.9 % (ref 0–1.9)
BILIRUB SERPL-MCNC: 0.5 MG/DL (ref 0.1–1)
BUN SERPL-MCNC: 19 MG/DL (ref 8–23)
CALCIUM SERPL-MCNC: 9.9 MG/DL (ref 8.7–10.5)
CHLORIDE SERPL-SCNC: 109 MMOL/L (ref 95–110)
CO2 SERPL-SCNC: 22 MMOL/L (ref 23–29)
CREAT SERPL-MCNC: 1.2 MG/DL (ref 0.5–1.4)
CREAT SERPL-MCNC: 1.2 MG/DL (ref 0.5–1.4)
DIFFERENTIAL METHOD: ABNORMAL
EOSINOPHIL # BLD AUTO: 0.5 K/UL (ref 0–0.5)
EOSINOPHIL NFR BLD: 5.1 % (ref 0–8)
ERYTHROCYTE [DISTWIDTH] IN BLOOD BY AUTOMATED COUNT: 13.4 % (ref 11.5–14.5)
EST. GFR  (NO RACE VARIABLE): 45 ML/MIN/1.73 M^2
EST. GFR  (NO RACE VARIABLE): 45 ML/MIN/1.73 M^2
ESTIMATED AVG GLUCOSE: 177 MG/DL (ref 68–131)
GLUCOSE SERPL-MCNC: 181 MG/DL (ref 70–110)
HBA1C MFR BLD: 7.8 % (ref 4–5.6)
HCT VFR BLD AUTO: 38.7 % (ref 37–48.5)
HGB BLD-MCNC: 12.3 G/DL (ref 12–16)
IMM GRANULOCYTES # BLD AUTO: 0.03 K/UL (ref 0–0.04)
IMM GRANULOCYTES NFR BLD AUTO: 0.3 % (ref 0–0.5)
LYMPHOCYTES # BLD AUTO: 3.3 K/UL (ref 1–4.8)
LYMPHOCYTES NFR BLD: 36.1 % (ref 18–48)
MCH RBC QN AUTO: 30 PG (ref 27–31)
MCHC RBC AUTO-ENTMCNC: 31.8 G/DL (ref 32–36)
MCV RBC AUTO: 94 FL (ref 82–98)
MONOCYTES # BLD AUTO: 0.7 K/UL (ref 0.3–1)
MONOCYTES NFR BLD: 7.7 % (ref 4–15)
NEUTROPHILS # BLD AUTO: 4.6 K/UL (ref 1.8–7.7)
NEUTROPHILS NFR BLD: 49.9 % (ref 38–73)
NRBC BLD-RTO: 0 /100 WBC
PLATELET # BLD AUTO: 369 K/UL (ref 150–450)
PMV BLD AUTO: 10.3 FL (ref 9.2–12.9)
POTASSIUM SERPL-SCNC: 4.8 MMOL/L (ref 3.5–5.1)
PROT SERPL-MCNC: 7.5 G/DL (ref 6–8.4)
RBC # BLD AUTO: 4.1 M/UL (ref 4–5.4)
SODIUM SERPL-SCNC: 140 MMOL/L (ref 136–145)
WBC # BLD AUTO: 9.19 K/UL (ref 3.9–12.7)

## 2023-10-13 PROCEDURE — 85025 COMPLETE CBC W/AUTO DIFF WBC: CPT | Performed by: INTERNAL MEDICINE

## 2023-10-13 PROCEDURE — 80053 COMPREHEN METABOLIC PANEL: CPT | Performed by: INTERNAL MEDICINE

## 2023-10-13 PROCEDURE — 36415 COLL VENOUS BLD VENIPUNCTURE: CPT | Mod: PO | Performed by: INTERNAL MEDICINE

## 2023-10-13 PROCEDURE — 83036 HEMOGLOBIN GLYCOSYLATED A1C: CPT | Performed by: NURSE PRACTITIONER

## 2023-10-13 RX ORDER — NIFEDIPINE 90 MG/1
TABLET, EXTENDED RELEASE ORAL
Qty: 90 TABLET | Refills: 1 | Status: SHIPPED | OUTPATIENT
Start: 2023-10-13

## 2023-10-13 NOTE — TELEPHONE ENCOUNTER
Refill Routing Note   Medication(s) are not appropriate for processing by [facility] for the following reason(s):      Medication outside of protocol    [facility] action(s):  Route Care Due:  None identified              Appointments  past 12m or future 3m with PCP    Date Provider   Last Visit   9/20/2023 Shelby Ley MD   Next Visit   Visit date not found Shelby Ley MD   ED visits in past 90 days: 0        Note composed:9:16 AM 10/13/2023

## 2023-10-13 NOTE — TELEPHONE ENCOUNTER
No care due was identified.  [facility] Embedded Care Due Messages. Reference number: [id number].   10/13/2023 5:45:13 AM CDT

## 2023-11-29 ENCOUNTER — HOSPITAL ENCOUNTER (OUTPATIENT)
Dept: RADIOLOGY | Facility: HOSPITAL | Age: 83
Discharge: HOME | End: 2023-11-29
Attending: INTERNAL MEDICINE
Payer: MEDICARE

## 2023-11-29 DIAGNOSIS — Z12.31 SCREENING MAMMOGRAM, ENCOUNTER FOR: ICD-10-CM

## 2023-11-29 PROCEDURE — 77063 MAMMO DIGITAL SCREENING BILAT WITH TOMO: ICD-10-PCS | Mod: 26,,, | Performed by: RADIOLOGY

## 2023-11-29 PROCEDURE — 77063 BREAST TOMOSYNTHESIS BI: CPT | Mod: 26,,, | Performed by: RADIOLOGY

## 2023-11-29 PROCEDURE — 77067 MAMMO DIGITAL SCREENING BILAT WITH TOMO: ICD-10-PCS | Mod: 26,,, | Performed by: RADIOLOGY

## 2023-11-29 PROCEDURE — 77067 SCR MAMMO BI INCL CAD: CPT | Mod: TC,PO

## 2023-11-29 PROCEDURE — 77067 SCR MAMMO BI INCL CAD: CPT | Mod: 26,,, | Performed by: RADIOLOGY

## 2023-12-22 ENCOUNTER — INFUSION (OUTPATIENT)
Dept: INFUSION THERAPY | Facility: HOSPITAL | Age: 83
End: 2023-12-22
Attending: INTERNAL MEDICINE
Payer: MEDICARE

## 2023-12-22 VITALS
RESPIRATION RATE: 18 BRPM | HEART RATE: 77 BPM | TEMPERATURE: 98 F | SYSTOLIC BLOOD PRESSURE: 127 MMHG | OXYGEN SATURATION: 96 % | DIASTOLIC BLOOD PRESSURE: 58 MMHG

## 2023-12-22 DIAGNOSIS — M85.80 OSTEOPENIA, UNSPECIFIED LOCATION: Primary | ICD-10-CM

## 2023-12-22 PROCEDURE — 96372 THER/PROPH/DIAG INJ SC/IM: CPT

## 2023-12-22 PROCEDURE — 63600175 PHARM REV CODE 636 W HCPCS: Mod: JZ,JG | Performed by: INTERNAL MEDICINE

## 2023-12-22 RX ADMIN — DENOSUMAB 60 MG: 60 INJECTION SUBCUTANEOUS at 09:12

## 2023-12-22 NOTE — PLAN OF CARE
Patient presented to unit for Prolia injection. VSS. No complaints voiced. Injection tolerated without difficulty. Next appointment printed. Ambulatory and in NAD upon discharge.    Problem: Fall Injury Risk  Goal: Absence of Fall and Fall-Related Injury  Outcome: Ongoing, Progressing

## 2024-01-06 DIAGNOSIS — C50.912 MALIGNANT NEOPLASM OF LEFT BREAST IN FEMALE, ESTROGEN RECEPTOR POSITIVE, UNSPECIFIED SITE OF BREAST: ICD-10-CM

## 2024-01-06 DIAGNOSIS — Z17.0 MALIGNANT NEOPLASM OF LEFT BREAST IN FEMALE, ESTROGEN RECEPTOR POSITIVE, UNSPECIFIED SITE OF BREAST: ICD-10-CM

## 2024-01-08 DIAGNOSIS — Z79.811 LONG TERM (CURRENT) USE OF AROMATASE INHIBITORS: ICD-10-CM

## 2024-01-08 DIAGNOSIS — Z17.0 MALIGNANT NEOPLASM OF LEFT BREAST IN FEMALE, ESTROGEN RECEPTOR POSITIVE, UNSPECIFIED SITE OF BREAST: Primary | ICD-10-CM

## 2024-01-08 DIAGNOSIS — C50.912 MALIGNANT NEOPLASM OF LEFT BREAST IN FEMALE, ESTROGEN RECEPTOR POSITIVE, UNSPECIFIED SITE OF BREAST: Primary | ICD-10-CM

## 2024-01-08 RX ORDER — LETROZOLE 2.5 MG/1
TABLET, FILM COATED ORAL
Qty: 90 TABLET | Refills: 3 | Status: SHIPPED | OUTPATIENT
Start: 2024-01-08

## 2024-01-30 ENCOUNTER — OFFICE VISIT (OUTPATIENT)
Dept: ENDOCRINOLOGY | Facility: CLINIC | Age: 84
End: 2024-01-30
Payer: MEDICARE

## 2024-01-30 VITALS
WEIGHT: 156 LBS | TEMPERATURE: 98 F | DIASTOLIC BLOOD PRESSURE: 56 MMHG | SYSTOLIC BLOOD PRESSURE: 124 MMHG | HEART RATE: 71 BPM | BODY MASS INDEX: 26.78 KG/M2

## 2024-01-30 DIAGNOSIS — Z79.4 TYPE 2 DIABETES MELLITUS WITH HYPERGLYCEMIA, WITH LONG-TERM CURRENT USE OF INSULIN: Primary | ICD-10-CM

## 2024-01-30 DIAGNOSIS — E11.65 TYPE 2 DIABETES MELLITUS WITH HYPERGLYCEMIA, WITH LONG-TERM CURRENT USE OF INSULIN: Primary | ICD-10-CM

## 2024-01-30 DIAGNOSIS — R19.7 DIARRHEA, UNSPECIFIED TYPE: ICD-10-CM

## 2024-01-30 DIAGNOSIS — N18.30 STAGE 3 CHRONIC KIDNEY DISEASE, UNSPECIFIED WHETHER STAGE 3A OR 3B CKD: ICD-10-CM

## 2024-01-30 PROCEDURE — 99999 PR PBB SHADOW E&M-EST. PATIENT-LVL IV: CPT | Mod: PBBFAC,,, | Performed by: NURSE PRACTITIONER

## 2024-01-30 PROCEDURE — G2211 COMPLEX E/M VISIT ADD ON: HCPCS | Mod: S$GLB,,, | Performed by: NURSE PRACTITIONER

## 2024-01-30 PROCEDURE — 99215 OFFICE O/P EST HI 40 MIN: CPT | Mod: S$GLB,,, | Performed by: NURSE PRACTITIONER

## 2024-01-30 RX ORDER — DULAGLUTIDE 0.75 MG/.5ML
0.75 INJECTION, SOLUTION SUBCUTANEOUS
Qty: 12 PEN | Refills: 2 | Status: SHIPPED | OUTPATIENT
Start: 2024-01-30 | End: 2024-03-14 | Stop reason: SDUPTHER

## 2024-01-30 RX ORDER — INSULIN LISPRO 100 [IU]/ML
INJECTION, SOLUTION INTRAVENOUS; SUBCUTANEOUS
Qty: 60 ML | Refills: 2 | Status: SHIPPED | OUTPATIENT
Start: 2024-01-30

## 2024-01-30 RX ORDER — METFORMIN HYDROCHLORIDE 500 MG/1
TABLET, EXTENDED RELEASE ORAL
Qty: 180 TABLET | Refills: 2 | Status: SHIPPED | OUTPATIENT
Start: 2024-01-30 | End: 2024-03-22

## 2024-01-30 NOTE — Clinical Note
"Calos Ley, this patient has a difficult time with getting to her appts here d/t limited transportation. She will discuss moving DM management to Primary with you at her appt in April. Although her DM is uncontrolled I am not confident that continued care with me will produce much improvement as she is set in her ways - namely, skipping Humalog ac. She is fearful of taking Humalog ac when BG is "low." Fortunately, it appears that she at least has managed to avoid severe lows/highs and conservative glucose control is reasonable for her. Thanks, Rosy "

## 2024-01-30 NOTE — PROGRESS NOTES
CC: This 83 y.o. Black or  female  is here for evaluation of  T2DM along with comorbidities indicated in the Visit Diagnosis section of this encounter.    HPI: Joel Condon was diagnosed with T2DM in 1994.  Oral agents started at diagnosis.           Prior visit 8/2023  A1c is up from 7.4 to 8%.   Admits that diet has not been good. She's been eating junk food over the summer while around her grankids.   She has continued prior insulin doses d/t BGs > 200 - Levemir 40 units and Humalog 22 units. Was advised to take 34 units and Humalog 18 units.   She underwent a CGM study. Did not keep food diary d/t difficulty writing from arthritic hands.   CGM interpretation: BGs overall quite high d/t poor diet despite reported medication adherence, especially at night d/t after-dinner snacks.   Plan Start Dexcom G7 - rx sent to [facility].   See Diabetes Education for Dexcom Continuous Glucose Monitor (CGM) training.   Improve diet - avoid junk food, sweets.   Continue current insulin doses. Return to clinic in 3 months with labs prior.      Interval hx  No recent A1c available.   Denies heavy intake of sweets.   Reports that Dexcom was always falling off her arm. Does not want to have it on her stomach.     Pt continues old habits - she is skipping Humalog before lunch and dinner because she feels she doesn't need it when her BG is low. She has increased her Humalog dose to 28 units once daily before breakfast.     She c/o diarrhea on metformin. Skip metformin if she has to go somewhere.         LAST DIABETES EDUCATION: years ago at [facility]. And also a class done by [employer] early 2017 8/2023 with W. Pellet       HOSPITALIZED FOR DIABETES  -  No.    PRESCRIBED DIABETES MEDICATIONS:   Trulicity 0.75 mg weekly on Saturdays   Levemir Flextouch 40 units ac   Humalog Kwikpen  22 units ac -- pt taking incorrectly as above   metformin xr 1000 mg AM and 500 mg PM      Misses medication doses - as  above   Rotates injections - yes       DM COMPLICATIONS: peripheral neuropathy    SIGNIFICANT DIABETES MED HISTORY:   Metformin started at initial ov 8/17/17  Diarrhea on metformin even w/ psyllium fiber supplement   Humalog - nausea and weakness       SELF MONITORING BLOOD GLUCOSE: Checks blood glucose at home 2x/day. No logs or meter.   FBG 100s?? Not over 200.   Bedtime 200s especially if eating beans.     HYPOGLYCEMIC EPISODES: reports she sometimes has lows in the 80s    CURRENT DIET: drinks water, diet cranberry juice, diet soda, tea w/ splenda.  Eats 2-3 meals/day, skips breakfast when she wakes up late.      Breakfast - egg/sausage; oatmeal   Dinner- heaviest meal  Snack during the day - yogurt.   Bedtime snack - ice cream; yogurt, 6 peanut butter crackers. Likes to eat while she watches tv, goes to bed around 12-1 am.     CURRENT EXERCISE:  None d/t hip pain     CGM study DONE 10/2022    BP (!) 124/56   Pulse 71   Temp 98.3 °F (36.8 °C)   Wt 70.8 kg (156 lb)   BMI 26.78 kg/m²       ROS:   CONSTITUTIONAL: Appetite good, denies fatigue  GI: + nausea, occasional in the morning; + watery stools       PHYSICAL EXAM:  GENERAL: Well developed, well nourished. No acute distress.   PSYCH: AAOx3, appropriate mood and affect, conversant, well-groomed. Judgement and insight good.   NEURO: Cranial nerves grossly intact. Speech clear, no tremor.       Hemoglobin A1C   Date Value Ref Range Status   10/13/2023 7.8 (H) 4.0 - 5.6 % Final     Comment:     ADA Screening Guidelines:  5.7-6.4%  Consistent with prediabetes  >or=6.5%  Consistent with diabetes    High levels of fetal hemoglobin interfere with the HbA1C  assay. Heterozygous hemoglobin variants (HbS, HgC, etc)do  not significantly interfere with this assay.   However, presence of multiple variants may affect accuracy.     07/27/2023 8.0 (H) 4.0 - 5.6 % Final     Comment:     ADA Screening Guidelines:  5.7-6.4%  Consistent with prediabetes  >or=6.5%  Consistent  with diabetes    High levels of fetal hemoglobin interfere with the HbA1C  assay. Heterozygous hemoglobin variants (HbS, HgC, etc)do  not significantly interfere with this assay.   However, presence of multiple variants may affect accuracy.     03/01/2023 7.4 (H) 4.0 - 5.6 % Final     Comment:     ADA Screening Guidelines:  5.7-6.4%  Consistent with prediabetes  >or=6.5%  Consistent with diabetes    High levels of fetal hemoglobin interfere with the HbA1C  assay. Heterozygous hemoglobin variants (HbS, HgC, etc)do  not significantly interfere with this assay.   However, presence of multiple variants may affect accuracy.           Lab Results   Component Value Date    CPEPTIDE 1.41 07/05/2022        Latest Reference Range & Units 07/05/22 08:50   Glucose 70 - 110 mg/dL 100           Component Value Date/Time     10/13/2023 0855    K 4.8 10/13/2023 0855     10/13/2023 0855    CO2 22 (L) 10/13/2023 0855    BUN 19 10/13/2023 0855    CREATININE 1.2 10/13/2023 0855    CREATININE 1.2 10/13/2023 0855     (H) 10/13/2023 0855    CALCIUM 9.9 10/13/2023 0855    ALKPHOS 67 10/13/2023 0855    AST 18 10/13/2023 0855    ALT 16 10/13/2023 0855    BILITOT 0.5 10/13/2023 0855    EGFRNORACEVR 45 (A) 10/13/2023 0855    EGFRNORACEVR 45 (A) 10/13/2023 0855    ESTGFRAFRICA >60.0 07/05/2022 0850         Lab Results   Component Value Date    LDLCALC 47.0 (L) 03/01/2023        Latest Reference Range & Units 03/01/23 09:34   Cholesterol 120 - 199 mg/dL 110 (L)   HDL 40 - 75 mg/dL 45   HDL/Cholesterol Ratio 20.0 - 50.0 % 40.9   LDL Cholesterol External 63.0 - 159.0 mg/dL 47.0 (L)   Non-HDL Cholesterol mg/dL 65   Total Cholesterol/HDL Ratio 2.0 - 5.0  2.4   Triglycerides 30 - 150 mg/dL 90   (L): Data is abnormally low      Lab Results   Component Value Date    MICALBCREAT 33.3 (H) 03/01/2023           ASSESSMENT and PLAN:    A1C GOAL: < 8%    1. Type 2 diabetes mellitus with hyperglycemia, with long-term current use of insulin   Reduce metformin ER to 500 mg tablet twice daily with food d/t diarrhea.   Continue Trulicity 0.75 mg weekly. Pt declines higher dose d/t potential worsening of nausea.   Prescription will be sent to [facility] to get prior authorization approved.     Unable to optimize insulin doses due to lack of glucose data and no A1c.   Recommend taking lower dose of Humalog at 18 units before each meal - not 28 units once daily.   Skip Humalog if eating light like a salad.     Pt declines another CGM study and will try to wear per personal Dexcom before next visit.     Add A1c to Feb lab appt.     Patient has a hard time making multiple appointments due to limited transportation. She will discuss with Dr. Ley transferring diabetes care back to Primary especially since that location is more convenient.      We will schedule a follow up visit here in Endocrine in 3 months with A1c prior, but pt will call to cancel if she decides to transfer care to Dr. Ley.       dulaglutide (TRULICITY) 0.75 mg/0.5 mL pen injector    insulin lispro (HUMALOG KWIKPEN INSULIN) 100 unit/mL pen    metFORMIN (GLUCOPHAGE-XR) 500 MG ER 24hr tablet    Hemoglobin A1C      2. Diarrhea, unspecified type  Reduce metformin as above.       3. Stage 3 chronic kidney disease, unspecified whether stage 3a or 3b CKD  Improve glucose control.   Avoid hypoglycemia.             Orders Placed This Encounter   Procedures    Hemoglobin A1C     Standing Status:   Standing     Number of Occurrences:   2     Standing Expiration Date:   3/30/2025          Follow up in about 3 months (around 4/30/2024).

## 2024-01-30 NOTE — PATIENT INSTRUCTIONS
Reduce metformin ER to 500 mg tablet twice daily with food.   Continue Trulicity 0.75 mg weekly. Pt declines higher dose d/t potential worsening of nausea.   Prescription will be sent to [facility] to get prior authorization approved.     Unable to adjust insulin doses due to lack of glucose data and no A1c.   Recommend taking lower dose of Humalog at 18 units before each meal - not 28 units once daily.   Skip Humalog if eating light like a salad.     Pt declines another CGM study and will try to wear it before next visit.   Add A1c to Feb lab appt.   Patient has a hard time making multiple appointments due to limited transportation. She will discuss with Dr. Ley transferring diabetes care back to Primary - this location is more convenient for patient.   We will schedule a follow up visit here in Endocrine in 3 months with A1c prior, but pt will call to cancel if she decides to transfer care to Dr. Ley.

## 2024-02-12 ENCOUNTER — HOSPITAL ENCOUNTER (OUTPATIENT)
Dept: RADIOLOGY | Facility: CLINIC | Age: 84
Discharge: HOME | End: 2024-02-12
Attending: INTERNAL MEDICINE
Payer: MEDICARE

## 2024-02-12 DIAGNOSIS — Z79.811 LONG TERM (CURRENT) USE OF AROMATASE INHIBITORS: ICD-10-CM

## 2024-02-12 DIAGNOSIS — Z17.0 MALIGNANT NEOPLASM OF LEFT BREAST IN FEMALE, ESTROGEN RECEPTOR POSITIVE, UNSPECIFIED SITE OF BREAST: ICD-10-CM

## 2024-02-12 DIAGNOSIS — C50.912 MALIGNANT NEOPLASM OF LEFT BREAST IN FEMALE, ESTROGEN RECEPTOR POSITIVE, UNSPECIFIED SITE OF BREAST: ICD-10-CM

## 2024-02-12 PROCEDURE — 77080 DXA BONE DENSITY AXIAL: CPT | Mod: 26,,, | Performed by: INTERNAL MEDICINE

## 2024-02-12 PROCEDURE — 77080 DXA BONE DENSITY AXIAL: CPT | Mod: TC,PO

## 2024-02-27 ENCOUNTER — TELEPHONE (OUTPATIENT)
Dept: HEMATOLOGY/ONCOLOGY | Facility: CLINIC | Age: 84
End: 2024-02-27
Payer: MEDICARE

## 2024-02-27 NOTE — TELEPHONE ENCOUNTER
LM for patient to call the office at her convenience to reschedule her missed appointment with Dr. Parr.

## 2024-03-01 ENCOUNTER — TELEPHONE (OUTPATIENT)
Dept: ENDOCRINOLOGY | Facility: CLINIC | Age: 84
End: 2024-03-01
Payer: MEDICARE

## 2024-03-01 NOTE — TELEPHONE ENCOUNTER
----- Message from Ced Parkinson sent at 3/1/2024  3:21 PM CST -----  Regarding: Layne [facility] pharmacy [phone]   Type: Pharmacy Calling to Clarify an RX    Name of Caller: Layne     Pharmacy Name: [facility]     Prescription Name: insulin lispro (HUMALOG KWIKPEN INSULIN) 100 unit/mL pen    What do they need to clarify?  Pharmacy states the medication has been discontinued since January and needs an alternative sent in , pt is almost out     Can you be contacted via [facility]? Call back     Best Call Back Number:  [phone]    Additional Information:

## 2024-03-04 RX ORDER — INSULIN DEGLUDEC 100 U/ML
40 INJECTION, SOLUTION SUBCUTANEOUS DAILY
Qty: 36 ML | Refills: 2 | Status: SHIPPED | OUTPATIENT
Start: 2024-03-04 | End: 2025-03-04

## 2024-03-14 DIAGNOSIS — Z79.4 TYPE 2 DIABETES MELLITUS WITH HYPERGLYCEMIA, WITH LONG-TERM CURRENT USE OF INSULIN: ICD-10-CM

## 2024-03-14 DIAGNOSIS — E11.65 TYPE 2 DIABETES MELLITUS WITH HYPERGLYCEMIA, WITH LONG-TERM CURRENT USE OF INSULIN: ICD-10-CM

## 2024-03-14 RX ORDER — DULAGLUTIDE 0.75 MG/.5ML
0.75 INJECTION, SOLUTION SUBCUTANEOUS
Qty: 12 PEN | Refills: 2 | Status: SHIPPED | OUTPATIENT
Start: 2024-03-14

## 2024-03-14 NOTE — TELEPHONE ENCOUNTER
----- Message from Ubaldo Soriano sent at 3/14/2024 11:39 AM CDT -----  Regarding: self  Type: RX Refill Request    Who Called:self    Have you contacted your pharmacy:    Refill    RX Name and Strength:dulaglutide (TRULICITY) 0.75 mg/0.5 mL pen injector    Preferred Pharmacy with phone number:  [id number] - [place] - [address] [facility]  [address]  [place] [address]  Phone: [phone] Fax: [phone]      Local or Mail Order:local    Would the patient rather a call back or a response via [facility]? callback    Best Call Back Number:[phone]    Additional Information:no

## 2024-03-22 DIAGNOSIS — E11.65 TYPE 2 DIABETES MELLITUS WITH HYPERGLYCEMIA, WITH LONG-TERM CURRENT USE OF INSULIN: ICD-10-CM

## 2024-03-22 DIAGNOSIS — E03.9 HYPOTHYROIDISM (ACQUIRED): ICD-10-CM

## 2024-03-22 DIAGNOSIS — I10 ESSENTIAL HYPERTENSION: ICD-10-CM

## 2024-03-22 DIAGNOSIS — E78.5 HYPERLIPIDEMIA LDL GOAL <100: ICD-10-CM

## 2024-03-22 DIAGNOSIS — Z79.4 TYPE 2 DIABETES MELLITUS WITH HYPERGLYCEMIA, WITH LONG-TERM CURRENT USE OF INSULIN: ICD-10-CM

## 2024-03-22 RX ORDER — LEVOTHYROXINE SODIUM 50 UG/1
50 TABLET ORAL
Qty: 90 TABLET | Refills: 0 | Status: SHIPPED | OUTPATIENT
Start: 2024-03-22 | End: 2024-04-17 | Stop reason: SDUPTHER

## 2024-03-22 RX ORDER — ATORVASTATIN CALCIUM 10 MG/1
10 TABLET, FILM COATED ORAL DAILY
Qty: 90 TABLET | Refills: 0 | Status: SHIPPED | OUTPATIENT
Start: 2024-03-22 | End: 2024-04-17 | Stop reason: SDUPTHER

## 2024-03-22 RX ORDER — SPIRONOLACTONE 50 MG/1
50 TABLET, FILM COATED ORAL DAILY
Qty: 90 TABLET | Refills: 1 | Status: SHIPPED | OUTPATIENT
Start: 2024-03-22 | End: 2024-04-17 | Stop reason: SDUPTHER

## 2024-03-22 RX ORDER — METFORMIN HYDROCHLORIDE 500 MG/1
TABLET, EXTENDED RELEASE ORAL
Qty: 180 TABLET | Refills: 2 | Status: SHIPPED | OUTPATIENT
Start: 2024-03-22

## 2024-03-22 NOTE — TELEPHONE ENCOUNTER
Refill Routing Note   Medication(s) are not appropriate for processing by [facility] for the following reason(s):        Required labs outdated: Lipid panel, TSH, T4    [facility] action(s):  Defer  Approve     Requires labs : Yes    Medication Therapy Plan:         Appointments  past 12m or future 3m with PCP    Date Provider   Last Visit   9/20/2023 Shelby Ley MD   Next Visit   4/17/2024 Shelby Ley MD   ED visits in past 90 days: 0        Note composed:2:39 PM 03/22/2024

## 2024-03-22 NOTE — TELEPHONE ENCOUNTER
Care Due:                  Date            Visit Type   Department     Provider  --------------------------------------------------------------------------------                                EP -         [facility]                              PRIMARY      MED[facility]NAL  [facility]  Last Visit: 09-      CARE ([facility])   MED/ PEDS      Hanykelkilo Ley                              [facility]                              PRIMARY      MED/ INTERNAL  [facility] [place]  Next Visit: 04-      CARE ([facility])   MED/ PEDS      [address]any[address]eler  Av                                                            Last  Test          Frequency    Reason                     Performed    Due Date  --------------------------------------------------------------------------------    Lipid Panel.  12 months..  atorvastatin.............  03- 02-    [facility] Embedded Care Due Messages. Reference number: [id number].   3/22/2024 5:41:52 AM CDT

## 2024-04-11 PROBLEM — E11.22 TYPE 2 DIABETES MELLITUS WITH STAGE 3A CHRONIC KIDNEY DISEASE, WITH LONG-TERM CURRENT USE OF INSULIN: Status: ACTIVE | Noted: 2021-03-05

## 2024-04-11 PROBLEM — Z79.4 TYPE 2 DIABETES MELLITUS WITH STAGE 3A CHRONIC KIDNEY DISEASE, WITH LONG-TERM CURRENT USE OF INSULIN: Status: ACTIVE | Noted: 2021-03-05

## 2024-04-11 PROBLEM — N18.31 TYPE 2 DIABETES MELLITUS WITH STAGE 3A CHRONIC KIDNEY DISEASE, WITH LONG-TERM CURRENT USE OF INSULIN: Status: ACTIVE | Noted: 2021-03-05

## 2024-04-17 ENCOUNTER — LAB VISIT (OUTPATIENT)
Dept: LAB | Facility: HOSPITAL | Age: 84
End: 2024-04-17
Attending: INTERNAL MEDICINE
Payer: MEDICARE

## 2024-04-17 ENCOUNTER — OFFICE VISIT (OUTPATIENT)
Dept: FAMILY MEDICINE | Facility: CLINIC | Age: 84
End: 2024-04-17
Payer: MEDICARE

## 2024-04-17 VITALS
WEIGHT: 160.69 LBS | DIASTOLIC BLOOD PRESSURE: 62 MMHG | HEART RATE: 72 BPM | TEMPERATURE: 98 F | BODY MASS INDEX: 27.43 KG/M2 | OXYGEN SATURATION: 95 % | HEIGHT: 64 IN | SYSTOLIC BLOOD PRESSURE: 144 MMHG

## 2024-04-17 DIAGNOSIS — E11.65 POORLY CONTROLLED TYPE 2 DIABETES MELLITUS WITH RETINOPATHY: ICD-10-CM

## 2024-04-17 DIAGNOSIS — E11.40 POORLY CONTROLLED TYPE 2 DIABETES MELLITUS WITH NEUROPATHY: ICD-10-CM

## 2024-04-17 DIAGNOSIS — E11.3293 MILD NONPROLIFERATIVE DIABETIC RETINOPATHY OF BOTH EYES WITHOUT MACULAR EDEMA ASSOCIATED WITH TYPE 2 DIABETES MELLITUS: ICD-10-CM

## 2024-04-17 DIAGNOSIS — E11.22 TYPE 2 DIABETES MELLITUS WITH STAGE 3A CHRONIC KIDNEY DISEASE, WITH LONG-TERM CURRENT USE OF INSULIN: ICD-10-CM

## 2024-04-17 DIAGNOSIS — E66.3 OVERWEIGHT (BMI 25.0-29.9): ICD-10-CM

## 2024-04-17 DIAGNOSIS — C50.912 MALIGNANT NEOPLASM OF LEFT BREAST IN FEMALE, ESTROGEN RECEPTOR POSITIVE, UNSPECIFIED SITE OF BREAST: ICD-10-CM

## 2024-04-17 DIAGNOSIS — E78.5 HYPERLIPIDEMIA LDL GOAL <100: ICD-10-CM

## 2024-04-17 DIAGNOSIS — E03.9 HYPOTHYROIDISM (ACQUIRED): ICD-10-CM

## 2024-04-17 DIAGNOSIS — I12.9 BENIGN HYPERTENSION WITH CHRONIC KIDNEY DISEASE, STAGE III: ICD-10-CM

## 2024-04-17 DIAGNOSIS — N18.30 BENIGN HYPERTENSION WITH CHRONIC KIDNEY DISEASE, STAGE III: ICD-10-CM

## 2024-04-17 DIAGNOSIS — Z79.4 LONG-TERM INSULIN USE: ICD-10-CM

## 2024-04-17 DIAGNOSIS — Z00.00 ROUTINE MEDICAL EXAM: Primary | ICD-10-CM

## 2024-04-17 DIAGNOSIS — E11.319 POORLY CONTROLLED TYPE 2 DIABETES MELLITUS WITH RETINOPATHY: ICD-10-CM

## 2024-04-17 DIAGNOSIS — Z79.4 TYPE 2 DIABETES MELLITUS WITH STAGE 3A CHRONIC KIDNEY DISEASE, WITH LONG-TERM CURRENT USE OF INSULIN: ICD-10-CM

## 2024-04-17 DIAGNOSIS — K21.9 GASTROESOPHAGEAL REFLUX DISEASE WITHOUT ESOPHAGITIS: Chronic | ICD-10-CM

## 2024-04-17 DIAGNOSIS — Z17.0 MALIGNANT NEOPLASM OF LEFT BREAST IN FEMALE, ESTROGEN RECEPTOR POSITIVE, UNSPECIFIED SITE OF BREAST: ICD-10-CM

## 2024-04-17 DIAGNOSIS — N18.31 TYPE 2 DIABETES MELLITUS WITH STAGE 3A CHRONIC KIDNEY DISEASE, WITH LONG-TERM CURRENT USE OF INSULIN: ICD-10-CM

## 2024-04-17 DIAGNOSIS — E11.65 POORLY CONTROLLED TYPE 2 DIABETES MELLITUS WITH NEUROPATHY: ICD-10-CM

## 2024-04-17 LAB
ALBUMIN/CREAT UR: 57.4 UG/MG (ref 0–30)
CHOLEST SERPL-MCNC: 143 MG/DL (ref 120–199)
CHOLEST/HDLC SERPL: 2.6 {RATIO} (ref 2–5)
CREAT UR-MCNC: 108 MG/DL (ref 15–325)
HDLC SERPL-MCNC: 54 MG/DL (ref 40–75)
HDLC SERPL: 37.8 % (ref 20–50)
LDLC SERPL CALC-MCNC: 62.6 MG/DL (ref 63–159)
MICROALBUMIN UR DL<=1MG/L-MCNC: 62 UG/ML
NONHDLC SERPL-MCNC: 89 MG/DL
TRIGL SERPL-MCNC: 132 MG/DL (ref 30–150)
TSH SERPL DL<=0.005 MIU/L-ACNC: 2.71 UIU/ML (ref 0.4–4)

## 2024-04-17 PROCEDURE — 36415 COLL VENOUS BLD VENIPUNCTURE: CPT | Mod: PO | Performed by: INTERNAL MEDICINE

## 2024-04-17 PROCEDURE — 80061 LIPID PANEL: CPT | Performed by: INTERNAL MEDICINE

## 2024-04-17 PROCEDURE — 99397 PER PM REEVAL EST PAT 65+ YR: CPT | Mod: S$GLB,,, | Performed by: INTERNAL MEDICINE

## 2024-04-17 PROCEDURE — 1126F AMNT PAIN NOTED NONE PRSNT: CPT | Mod: CPTII,S$GLB,, | Performed by: INTERNAL MEDICINE

## 2024-04-17 PROCEDURE — 1157F ADVNC CARE PLAN IN RCRD: CPT | Mod: CPTII,S$GLB,, | Performed by: INTERNAL MEDICINE

## 2024-04-17 PROCEDURE — 3078F DIAST BP <80 MM HG: CPT | Mod: CPTII,S$GLB,, | Performed by: INTERNAL MEDICINE

## 2024-04-17 PROCEDURE — 1159F MED LIST DOCD IN RCRD: CPT | Mod: CPTII,S$GLB,, | Performed by: INTERNAL MEDICINE

## 2024-04-17 PROCEDURE — 3077F SYST BP >= 140 MM HG: CPT | Mod: CPTII,S$GLB,, | Performed by: INTERNAL MEDICINE

## 2024-04-17 PROCEDURE — 82043 UR ALBUMIN QUANTITATIVE: CPT | Performed by: INTERNAL MEDICINE

## 2024-04-17 PROCEDURE — 99999 PR PBB SHADOW E&M-EST. PATIENT-LVL IV: CPT | Mod: PBBFAC,,, | Performed by: INTERNAL MEDICINE

## 2024-04-17 PROCEDURE — 1160F RVW MEDS BY RX/DR IN RCRD: CPT | Mod: CPTII,S$GLB,, | Performed by: INTERNAL MEDICINE

## 2024-04-17 PROCEDURE — 84443 ASSAY THYROID STIM HORMONE: CPT | Performed by: INTERNAL MEDICINE

## 2024-04-17 PROCEDURE — 3288F FALL RISK ASSESSMENT DOCD: CPT | Mod: CPTII,S$GLB,, | Performed by: INTERNAL MEDICINE

## 2024-04-17 PROCEDURE — 1101F PT FALLS ASSESS-DOCD LE1/YR: CPT | Mod: CPTII,S$GLB,, | Performed by: INTERNAL MEDICINE

## 2024-04-17 RX ORDER — LEVOTHYROXINE SODIUM 50 UG/1
50 TABLET ORAL
Qty: 90 TABLET | Refills: 1 | Status: SHIPPED | OUTPATIENT
Start: 2024-04-17

## 2024-04-17 RX ORDER — SPIRONOLACTONE 50 MG/1
50 TABLET, FILM COATED ORAL DAILY
Qty: 90 TABLET | Refills: 1 | Status: SHIPPED | OUTPATIENT
Start: 2024-04-17

## 2024-04-17 RX ORDER — METOPROLOL SUCCINATE 200 MG/1
200 TABLET, EXTENDED RELEASE ORAL DAILY
Qty: 90 TABLET | Refills: 1 | Status: SHIPPED | OUTPATIENT
Start: 2024-04-17

## 2024-04-17 RX ORDER — NIFEDIPINE 90 MG/1
TABLET, EXTENDED RELEASE ORAL
Qty: 90 TABLET | Refills: 1 | Status: SHIPPED | OUTPATIENT
Start: 2024-04-17

## 2024-04-17 RX ORDER — ATORVASTATIN CALCIUM 10 MG/1
10 TABLET, FILM COATED ORAL DAILY
Qty: 90 TABLET | Refills: 1 | Status: SHIPPED | OUTPATIENT
Start: 2024-04-17

## 2024-04-17 NOTE — PROGRESS NOTES
CHIEF COMPLAINT:   Chief Complaint   Patient presents with    Annual Exam          HISTORY OF PRESENT ILLNESS:  Joel Condon is a 83 y.o. female who presents to the clinic today for a routine physical exam. Her last physical exam was approximately 1 years(s) ago.    Subjective    PAST MEDICAL HISTORY:  Past Medical History:   Diagnosis Date    Arthritis     Breast cancer     Cataract     Colon polyp     Diabetes mellitus     Diabetic retinopathy     Hyperlipidemia LDL goal < 100     Hypertension     Hypothyroidism     Osteoporosis, post-menopausal     Overweight(278.02)     Proteinuria     Type II or unspecified type diabetes mellitus with renal manifestations, uncontrolled(250.42)        PAST SURGICAL HISTORY:  Past Surgical History:   Procedure Laterality Date    APPENDECTOMY      BREAST BIOPSY      BREAST LUMPECTOMY      BREAST SURGERY Left 12/13/2017    tumor removal    CATARACT EXTRACTION W/  INTRAOCULAR LENS IMPLANT Left 4/24/14    OS (Dr. Arce)    CATARACT EXTRACTION W/  INTRAOCULAR LENS IMPLANT Right 06/12/2014    OD (Dr. Arce)    CHOLECYSTECTOMY      COLONOSCOPY N/A 4/21/2017    Procedure: COLONOSCOPY;  Surgeon: Homar Payan MD;  Location: [facility] ENDO;  Service: Endoscopy;  Laterality: N/A;    COLONOSCOPY N/A 6/29/2018    Procedure: COLONOSCOPY;  Surgeon: Mykel Boles MD;  Location: [facility] ENDO;  Service: Endoscopy;  Laterality: N/A;    EYE SURGERY  04/24/2014 & 06/12/2014    HYSTERECTOMY      total    left eye cataract surgery  4/24/14    OOPHORECTOMY         SOCIAL HISTORY:  Social History     Socioeconomic History    Marital status:     Number of children: 3   Occupational History    Occupation: retired   Tobacco Use    Smoking status: Never    Smokeless tobacco: Never   Substance and Sexual Activity    Alcohol use: No    Drug use: No    Sexual activity: Not Currently     Partners: Male       FAMILY HISTORY:  Family History   Problem Relation Name Age of Onset    Diabetes Mother       Heart disease Mother      Hypertension Mother      Stroke Mother      Diabetes Sister Ana     Hypertension Sister Ana     Diabetes Sister Danya     Hypertension Sister Danya     Diabetes Sister Merari     Hypertension Sister Merari     Hypertension Sister Marifer Bernal     Diabetes Sister Marifer Bernal     Diabetes Brother Reed     Diabetes Brother Tutu     Diabetes Brother Blade     Prostate cancer Brother [place]     Amblyopia Neg Hx      Blindness Neg Hx      Cataracts Neg Hx      Glaucoma Neg Hx      Macular degeneration Neg Hx      Retinal detachment Neg Hx      Strabismus Neg Hx         ALLERGIES AND MEDICATIONS: updated and reviewed.  Review of patient's allergies indicates:   Allergen Reactions    Lisinopril Other (See Comments)     Cough      Hydrochlorothiazide (bulk) Other (See Comments)     Elevated pt's blood pressure making her feel bad    Pravastatin Other (See Comments)     headaches     Medication List with Changes/Refills   Current Medications    ASCORBIC ACID, VITAMIN C, (VITAMIN C) 100 MG TABLET    Take 100 mg by mouth once daily.    ASPIRIN (ECOTRIN) 81 MG EC TABLET    Take 1 tablet (81 mg total) by mouth once daily.    BLOOD SUGAR DIAGNOSTIC STRP    To check BG 4 times daily, to use with insurance preferred meter. e11.65    CHOLECALCIFEROL, VITAMIN D3, (VITAMIN D3) 100 MCG (4,000 UNIT) CAP    Take by mouth.    CYANOCOBALAMIN, VITAMIN B-12, MISC    by Misc.(Non-Drug; Combo Route) route.    DULAGLUTIDE (TRULICITY) 0.75 MG/0.5 ML PEN INJECTOR    Inject 0.75 mg into the skin every 7 days.    INSULIN DEGLUDEC (TRESIBA FLEXTOUCH U-100) 100 UNIT/ML (3 ML) INSULIN PEN    Inject 40 Units into the skin once daily.    INSULIN LISPRO (HUMALOG KWIKPEN INSULIN) 100 UNIT/ML PEN    Injects 18  units three times daily before meals    LANCETS MISC    To check BG 4 times daily, to use with insurance preferred meter. e11.65    LETROZOLE (FEMARA) 2.5 MG TAB    TAKE 1 TABLET BY MOUTH DAILY    LOSARTAN  "(COZAAR) 100 MG TABLET    TAKE 1 TABLET BY MOUTH DAILY    METFORMIN (GLUCOPHAGE-XR) 500 MG ER 24HR TABLET    TAKE 1 TABLET BY MOUTH EVERY MORNING AND 1 TABLET EVERY EVENING    OMEPRAZOLE (PRILOSEC) 20 MG CAPSULE    TAKE 1 CAPSULE(20 MG) BY MOUTH DAILY AS NEEDED FOR HEARTBURN    PEN NEEDLE, DIABETIC (BD ULTRA-FINE SHORT PEN NEEDLE) 31 GAUGE X 5/16" NDLE    USE FOUR TIMES DAILY    VITAMIN E ACETATE (VITAMIN E ORAL)    Take by mouth.   Changed and/or Refilled Medications    Modified Medication Previous Medication    ATORVASTATIN (LIPITOR) 10 MG TABLET atorvastatin (LIPITOR) 10 MG tablet       Take 1 tablet (10 mg total) by mouth once daily.    Take 1 tablet (10 mg total) by mouth once daily.    LEVOTHYROXINE (SYNTHROID) 50 MCG TABLET levothyroxine (SYNTHROID) 50 MCG tablet       Take 1 tablet (50 mcg total) by mouth before breakfast.    Take 1 tablet (50 mcg total) by mouth before breakfast.    METOPROLOL SUCCINATE (TOPROL-XL) 200 MG 24 HR TABLET metoprolol succinate (TOPROL-XL) 200 MG 24 hr tablet       Take 1 tablet (200 mg total) by mouth once daily.    TAKE 1 TABLET(200 MG) BY MOUTH EVERY DAY    NIFEDIPINE (PROCARDIA-XL) 90 MG (OSM) 24 HR TABLET NIFEdipine (PROCARDIA-XL) 90 MG (OSM) 24 hr tablet       TAKE 1 TABLET(90 MG) BY MOUTH EVERY DAY    TAKE 1 TABLET(90 MG) BY MOUTH EVERY DAY    SPIRONOLACTONE (ALDACTONE) 50 MG TABLET spironolactone (ALDACTONE) 50 MG tablet       Take 1 tablet (50 mg total) by mouth once daily.    Take 1 tablet (50 mg total) by mouth once daily.   Discontinued Medications    BLOOD-GLUCOSE METER,CONTINUOUS (DEXCOM G7 ) MISC    Use with Dexcom G7 sensors    BLOOD-GLUCOSE SENSOR (DEXCOM G7 SENSOR) KUNAL    Change every 10 days          CARE TEAM:  Patient Care Team:  Shelby Ley MD as PCP - General (Internal Medicine)  Deborah Parr MD as Consulting Physician (Hematology and Oncology)  Rosy Mcknight NP as Nurse Practitioner (Endocrinology)  Juan Pablo Ordoñez MD as " "Consulting Physician (Cardiology)  Mary Waterman LPN as Care Coordinator       SCREENING HISTORY:  Health Maintenance         Date Due Completion Date    RSV Vaccine (Age 60+ and Pregnant patients) (1 - 1-dose 60+ series) Never done ---    COVID-19 Vaccine (4 - 2023-24 season) 09/01/2023 2/18/2022    Lipid Panel 03/01/2024 3/1/2023    Diabetes Urine Screening 03/01/2024 3/1/2023    Eye Exam 06/13/2024 6/13/2023    Override on 5/2/2017: Done    Override on 11/4/2012: Done    Hemoglobin A1c 08/12/2024 2/12/2024    Mammogram 11/29/2024 11/29/2023    DEXA Scan 02/12/2026 2/12/2024    TETANUS VACCINE 06/15/2026 6/15/2016              REVIEW OF SYSTEMS:  The patient reports : fair dietary habits. She admits to having a sweet tooth.  The patient reports  : that they do not exercise.  Review of Systems   Constitutional:  Negative for chills and fever.   HENT:  Negative for congestion.    Respiratory:  Negative for cough and shortness of breath.    Cardiovascular:  Negative for chest pain.   Gastrointestinal:  Negative for abdominal pain.   Genitourinary:  Negative for dysuria.      ROS (Optional)-: no pelvic pain  Breast ROS (Optional)-: negative for breast lumps/discharge          Objective    PHYSICAL EXAMINATION/VITALS:  Vitals:    04/17/24 1009 04/17/24 1046   BP: (!) 144/54 (!) 144/62   Pulse: 72    Temp: 98 °F (36.7 °C)    TempSrc: Oral    SpO2: 95%    Weight: 72.9 kg (160 lb 11.5 oz)    Height: 5' 4" (1.626 m)         Body mass index is 27.59 kg/m².      General appearance - alert, well appearing, and in no distress, overweight, pleasant elderly female, exam limited as patient did not get onto the examination table  Psychiatric - alert, oriented to person, place, and time, normal behavior, speech, dress, motor activity and thought process  Eyes - pupils equal and reactive, extraocular eye movements intact, sclera anicteric  Neck - supple, no significant adenopathy, carotids upstroke normal bilaterally, no " bruits  Lymphatics - no palpable cervical lymphadenopathy  Chest - clear to auscultation, no wheezes, rales or rhonchi, symmetric air entry  Heart - normal rate and regular rhythm  Neurological - alert, normal speech, no focal findings; cranial nerves II through XII intact  Musculoskeletal - patient noted to have Moderate osteoarthritic changes to both knee joints. No joint effusions noted.  Extremities - no pedal edema noted  Skin - normal coloration, no suspicious skin lesions      LABS:  Lab Results   Component Value Date    HGBA1C 7.9 (H) 02/12/2024    HGBA1C 7.8 (H) 10/13/2023    HGBA1C 8.0 (H) 07/27/2023      Lab Results   Component Value Date    CHOL 110 (L) 03/01/2023    CHOL 120 03/08/2022    CHOL 115 (L) 03/16/2021     Lab Results   Component Value Date    LDLCALC 47.0 (L) 03/01/2023    LDLCALC 50.8 (L) 03/08/2022    LDLCALC 50.8 (L) 03/16/2021               ASSESSMENT AND PLAN:   1. Routine medical exam  Counseled on age appropriate medical preventative services including age appropriate cancer screenings, age appropriate eye and dental exams, over all nutritional health, need for a consistent exercise regimen, and an over all push towards maintaining a vigorous and active lifestyle.  Counseled on age appropriate vaccines and discussed upcoming health care needs based on age/gender. Discussed good sleep hygiene and stress management.    2. 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 Mild nonproliferative diabetic retinopathy of both eyes without macular edema associated with type 2 diabetes mellitus/4. Poorly controlled type 2 diabetes mellitus with neuropathy/5. Type 2 diabetes mellitus with stage 3a chronic kidney disease, with long-term current use of insulin/6. Long-term insulin use  The patient's diabetes is overall under fair control.  She denies any problems with low blood sugars.  She has been seeing endocrinology.  She states that she has transportation issues and it is  difficult for her to get to that office on time.  She is wondering if I can take over her diabetes management.  I advised her that I would be willing to do so, but only as long as she stays controlled.  She realizes that it is her dietary habits at often get her into trouble of being uncontrolled.  She will continue to work on this.  Continue current medication regimen for now.  She reports that she has had trouble with a continuous glucose monitor as it will not stay on her arm.  She does not like the idea of having it on her stomach.  She would like to just continue with fingersticks for checking her blood sugar at home.  -     Microalbumin/Creatinine Ratio, Urine; Future; Expected date: 04/17/2024  -     Lipid Panel; Future; Expected date: 04/17/2024      7. Benign hypertension with chronic kidney disease, stage III  BP Readings from Last 1 Encounters:   04/17/24 (!) 144/62      Blood pressure is not controlled today.   We discussed low salt diet and regular exercise. The patient reports that she has not taken any decongestant or anti-inflammatory medication recently (the patient was educated that these medications can increase blood pressure).    Medication changes made today: None.   Patient will call in one week with home blood pressure readings.  The patient was also asked to check blood pressure at home on a regular basis and bring recordings to next office visit.   The patient did not want to enroll in the digital hypertension program at this time.   -     spironolactone (ALDACTONE) 50 MG tablet; Take 1 tablet (50 mg total) by mouth once daily.  Dispense: 90 tablet; Refill: 1  -     NIFEdipine (PROCARDIA-XL) 90 MG (OSM) 24 hr tablet; TAKE 1 TABLET(90 MG) BY MOUTH EVERY DAY  Dispense: 90 tablet; Refill: 1  -     metoprolol succinate (TOPROL-XL) 200 MG 24 hr tablet; Take 1 tablet (200 mg total) by mouth once daily.  Dispense: 90 tablet; Refill: 1  eGFR   Date Value Ref Range Status   02/12/2024 50 (A) >60  mL/min/1.73 m^2 Final   10/13/2023 45 (A) >60 mL/min/1.73 m^2 Final   10/13/2023 45 (A) >60 mL/min/1.73 m^2 Final     Stable decreased kidney function. Observe. Patient counseled to avoid/minimize the use of anti-inflammatory  Medication. Discussed to stay well hydrated. Also discussed with patient that good control of blood pressure and/or diabetes, if present, will help to prevent progression.    8. Hyperlipidemia LDL goal <100  Lab Results   Component Value Date    CHOL 110 (L) 03/01/2023     Lab Results   Component Value Date    HDL 45 03/01/2023     Lab Results   Component Value Date    LDLCALC 47.0 (L) 03/01/2023     Lab Results   Component Value Date    TRIG 90 03/01/2023     Lab Results   Component Value Date    LDLCALC 47.0 (L) 03/01/2023     We discussed low fat diet and regular exercise.The current medical regimen is effective;  continue present plan and medications.   Overview:  - cannot tolerate pravastatin    Orders:  -     atorvastatin (LIPITOR) 10 MG tablet; Take 1 tablet (10 mg total) by mouth once daily.  Dispense: 90 tablet; Refill: 1    9. Hypothyroidism (acquired)  Lab Results   Component Value Date    TSH 2.864 03/01/2023     Patient is clinically euthyroid. Continue current regimen.  -     TSH; Future; Expected date: 04/17/2024  -     levothyroxine (SYNTHROID) 50 MCG tablet; Take 1 tablet (50 mcg total) by mouth before breakfast.  Dispense: 90 tablet; Refill: 1    10. Malignant neoplasm of left breast in female, estrogen receptor positive, unspecified site of breast  The current medical regimen is effective;  continue present plan and medications.   Followed by: Hematology/Oncology.   Overview:  -diagnosed 11/2017  -Stage 1b pT1b (6mm) N1mi M0 grade 1 ER + IA + [place]2etq infiltrating ductal carcinoma  of the left breast status post left partial mastectomy and SLNB  on 12/13/2017.  1 of 2 lymph nodes positive for micromet. She is Stage IA per AJCC 8th ed. pathologic prognostic staging system.  -  completed radiation with Dr. Wray on 3/2018  - on femara  -followed by oncology, Dr. Parr        11. Gastroesophageal reflux disease without esophagitis  Symptoms controlled: yes - takes medication occasionally as needed.   Reflux precautions discussed (eliminate tobacco if a smoker; minimize caffeine, chocolate and red/white peppermint intake; avoid heavy and spicy meals; don't lay down within 2-3 hours after eating; minimize the intake of NSAIDs). Medication as needed.   Patient asked to take medication breaks, if possible - discussed chronic use can limit calcium absorption (which can lead to osteopenia/osteoporosis), increases the risk for intestinal infections, and can cause kidney damage. There are also some newer studies that show possible increased risk of mortality.    12. Overweight (BMI 25.0-29.9)  BMI Readings from Last 3 Encounters:   04/17/24 27.59 kg/m²   01/30/24 26.78 kg/m²   09/20/23 26.77 kg/m²     The patient is asked to make an attempt to improve diet and exercise patterns to aid in medical management of this problem.             Orders Placed This Encounter   Procedures    Lipid Panel    Microalbumin/Creatinine Ratio, Urine    TSH      FOLLOW UP: Follow up in about 6 months (around 10/17/2024), or if symptoms worsen or fail to improve, for follow up chronic medical conditions.. or sooner as needed.

## 2024-06-14 ENCOUNTER — TELEPHONE (OUTPATIENT)
Dept: INFUSION THERAPY | Facility: HOSPITAL | Age: 84
End: 2024-06-14
Payer: MEDICARE

## 2024-08-05 ENCOUNTER — TELEPHONE (OUTPATIENT)
Dept: ENDOCRINOLOGY | Facility: CLINIC | Age: 84
End: 2024-08-05
Payer: MEDICARE

## 2024-08-20 DIAGNOSIS — E11.65 TYPE 2 DIABETES MELLITUS WITH HYPERGLYCEMIA, WITH LONG-TERM CURRENT USE OF INSULIN: ICD-10-CM

## 2024-08-20 DIAGNOSIS — Z79.4 TYPE 2 DIABETES MELLITUS WITH HYPERGLYCEMIA, WITH LONG-TERM CURRENT USE OF INSULIN: ICD-10-CM

## 2024-08-20 RX ORDER — INSULIN LISPRO 100 [IU]/ML
INJECTION, SOLUTION INTRAVENOUS; SUBCUTANEOUS
Qty: 60 ML | Refills: 0 | Status: SHIPPED | OUTPATIENT
Start: 2024-08-20

## 2024-09-03 RX ORDER — PEN NEEDLE, DIABETIC 30 GX3/16"
NEEDLE, DISPOSABLE MISCELLANEOUS
Qty: 400 EACH | Refills: 3 | Status: SHIPPED | OUTPATIENT
Start: 2024-09-03 | End: 2024-09-04

## 2024-09-04 RX ORDER — PEN NEEDLE, DIABETIC 30 GX3/16"
NEEDLE, DISPOSABLE MISCELLANEOUS
Qty: 400 EACH | Refills: 3 | Status: SHIPPED | OUTPATIENT
Start: 2024-09-04

## 2024-09-25 ENCOUNTER — TELEPHONE (OUTPATIENT)
Dept: FAMILY MEDICINE | Facility: CLINIC | Age: 84
End: 2024-09-25
Payer: MEDICARE

## 2024-09-25 DIAGNOSIS — Z12.31 SCREENING MAMMOGRAM, ENCOUNTER FOR: Primary | ICD-10-CM

## 2024-09-25 NOTE — TELEPHONE ENCOUNTER
----- Message from Ubaldo Soriano sent at 9/25/2024  2:42 PM CDT -----  Regarding: self  Type:  Mammogram    Caller is requesting to schedule their annual mammogram appointment.  Order is not listed in EPIC.  Please enter order and contact patient to schedule.  Name of Caller:     Where would they like the mammogram performed? [facility]    Would the patient rather a call back or a response via [facility]? callback    Best Call Back Number: [phone]    Additional Information:

## 2024-10-04 ENCOUNTER — LAB VISIT (OUTPATIENT)
Dept: LAB | Facility: HOSPITAL | Age: 84
End: 2024-10-04
Attending: NURSE PRACTITIONER
Payer: MEDICARE

## 2024-10-04 DIAGNOSIS — E11.65 TYPE 2 DIABETES MELLITUS WITH HYPERGLYCEMIA, WITH LONG-TERM CURRENT USE OF INSULIN: ICD-10-CM

## 2024-10-04 DIAGNOSIS — Z79.4 TYPE 2 DIABETES MELLITUS WITH HYPERGLYCEMIA, WITH LONG-TERM CURRENT USE OF INSULIN: ICD-10-CM

## 2024-10-04 LAB
ESTIMATED AVG GLUCOSE: 169 MG/DL (ref 68–131)
HBA1C MFR BLD: 7.5 % (ref 4–5.6)

## 2024-10-04 PROCEDURE — 36415 COLL VENOUS BLD VENIPUNCTURE: CPT | Mod: PO | Performed by: NURSE PRACTITIONER

## 2024-10-04 PROCEDURE — 83036 HEMOGLOBIN GLYCOSYLATED A1C: CPT | Performed by: NURSE PRACTITIONER

## 2024-10-08 DIAGNOSIS — N18.30 BENIGN HYPERTENSION WITH CHRONIC KIDNEY DISEASE, STAGE III: ICD-10-CM

## 2024-10-08 DIAGNOSIS — I12.9 BENIGN HYPERTENSION WITH CHRONIC KIDNEY DISEASE, STAGE III: ICD-10-CM

## 2024-10-09 RX ORDER — NIFEDIPINE 90 MG/1
TABLET, EXTENDED RELEASE ORAL
Qty: 90 TABLET | Refills: 0 | Status: SHIPPED | OUTPATIENT
Start: 2024-10-09

## 2024-10-09 NOTE — TELEPHONE ENCOUNTER
Refill Routing Note   Medication(s) are not appropriate for processing by [facility] for the following reason(s):        Required vitals abnormal    [facility] action(s):  Defer             Appointments  past 12m or future 3m with PCP    Date Provider   Last Visit   4/17/2024 Shelby Ley MD   Next Visit   Visit date not found Shelby Ley MD   ED visits in past 90 days: 0        Note composed:9:24 AM 10/09/2024

## 2024-10-09 NOTE — TELEPHONE ENCOUNTER
No care due was identified.  [facility] Embedded Care Due Messages. Reference number: [id number].   10/08/2024 9:18:29 PM CDT

## 2024-10-10 ENCOUNTER — OFFICE VISIT (OUTPATIENT)
Dept: ENDOCRINOLOGY | Facility: CLINIC | Age: 84
End: 2024-10-10
Payer: MEDICARE

## 2024-10-10 VITALS
HEART RATE: 65 BPM | WEIGHT: 161 LBS | SYSTOLIC BLOOD PRESSURE: 150 MMHG | TEMPERATURE: 99 F | DIASTOLIC BLOOD PRESSURE: 70 MMHG | BODY MASS INDEX: 27.64 KG/M2

## 2024-10-10 DIAGNOSIS — Z79.4 TYPE 2 DIABETES MELLITUS WITH HYPERGLYCEMIA, WITH LONG-TERM CURRENT USE OF INSULIN: Primary | ICD-10-CM

## 2024-10-10 DIAGNOSIS — E11.65 TYPE 2 DIABETES MELLITUS WITH HYPERGLYCEMIA, WITH LONG-TERM CURRENT USE OF INSULIN: Primary | ICD-10-CM

## 2024-10-10 LAB — GLUCOSE SERPL-MCNC: 156 MG/DL (ref 70–110)

## 2024-10-10 PROCEDURE — 1159F MED LIST DOCD IN RCRD: CPT | Mod: CPTII,S$GLB,, | Performed by: NURSE PRACTITIONER

## 2024-10-10 PROCEDURE — 82962 GLUCOSE BLOOD TEST: CPT | Mod: S$GLB,,, | Performed by: NURSE PRACTITIONER

## 2024-10-10 PROCEDURE — 3077F SYST BP >= 140 MM HG: CPT | Mod: CPTII,S$GLB,, | Performed by: NURSE PRACTITIONER

## 2024-10-10 PROCEDURE — 1157F ADVNC CARE PLAN IN RCRD: CPT | Mod: CPTII,S$GLB,, | Performed by: NURSE PRACTITIONER

## 2024-10-10 PROCEDURE — 99214 OFFICE O/P EST MOD 30 MIN: CPT | Mod: S$GLB,,, | Performed by: NURSE PRACTITIONER

## 2024-10-10 PROCEDURE — 3078F DIAST BP <80 MM HG: CPT | Mod: CPTII,S$GLB,, | Performed by: NURSE PRACTITIONER

## 2024-10-10 PROCEDURE — 1160F RVW MEDS BY RX/DR IN RCRD: CPT | Mod: CPTII,S$GLB,, | Performed by: NURSE PRACTITIONER

## 2024-10-10 PROCEDURE — 99999 PR PBB SHADOW E&M-EST. PATIENT-LVL III: CPT | Mod: PBBFAC,,, | Performed by: NURSE PRACTITIONER

## 2024-10-10 NOTE — PROGRESS NOTES
CC: This 84 y.o. Black or  female  is here for evaluation of  T2DM along with comorbidities indicated in the Visit Diagnosis section of this encounter.    HPI: Joel Condon was diagnosed with T2DM in 1994.  Oral agents started at diagnosis.             Prior visit 1/30/24  No recent A1c available.   Denies heavy intake of sweets.   Reports that Dexcom was always falling off her arm. Does not want to have it on her stomach.   Pt continues old habits - she is skipping Humalog before lunch and dinner because she feels she doesn't need it when her BG is low. She has increased her Humalog dose to 28 units once daily before breakfast.   She c/o diarrhea on metformin. Skip metformin if she has to go somewhere.   Plan Reduce metformin ER to 500 mg tablet twice daily with food d/t diarrhea.   Continue Trulicity 0.75 mg weekly. Pt declines higher dose d/t potential worsening of nausea.   Prescription will be sent to [facility] [place], [place] to get prior authorization approved.   Unable to optimize insulin doses due to lack of glucose data and no A1c.   Recommend taking lower dose of Humalog at 18 units before each meal - not 28 units once daily.   Skip Humalog if eating light like a salad.   Pt declines another CGM study and will try to wear per personal Dexcom before next visit.   Add A1c to Feb lab appt.   Patient has a hard time making multiple appointments due to limited transportation. She will discuss with Dr. Ley transferring diabetes care back to Primary especially since that location is more convenient.   We will schedule a follow up visit here in Endocrine in 3 months with A1c prior, but pt will call to cancel if she decides to transfer care to Dr. eLy.       Interval hx  Last seen several months ago. A1c is down from 7.9 to 7.5%.     Pt continues to take lispro 28 units before breakfast, once daily. Does not take reduced dose before lunch or dinner, as previously advised.    She c/o loose  BMs which she attributes to metformin. She does not want to stop or reduce metformin because it keeps her BMs regular.     She fell last month when her legs gave out on her. She did not lose consciousness or have any dizziness. C/o right hip and left arm pain.       LAST DIABETES EDUCATION: [facility]. And also a class done by [employer] early 2017 8/2023 with W. Pellet       HOSPITALIZED FOR DIABETES  -  No.    PRESCRIBED DIABETES MEDICATIONS:   Trulicity 0.75 mg weekly on Saturdays   Tresiba 40 units qhs   Humalog Kwikpen -  as above   metformin xr 500 mg bid      Misses medication doses - as above   Rotates injections - yes       DM COMPLICATIONS: peripheral neuropathy    SIGNIFICANT DIABETES MED HISTORY:   Metformin started at initial ov 8/17/17  Diarrhea on metformin even w/ psyllium fiber supplement   Humalog - nausea and weakness       SELF MONITORING BLOOD GLUCOSE: Checks blood glucose at home 2x/day. No logs or meter.   Reports BGs 150s to sometimes 200s. Lowest was 118.   POCT glucose 156  - fasting     HYPOGLYCEMIC EPISODES:  none     CURRENT DIET: drinks water, diet cranberry juice, diet soda, tea w/ splenda.  Eats 2-3 meals/day, skips breakfast when she wakes up late.      Dinner- heaviest meal  Snack during the day - yogurt.   Bedtime snack -  Cheese and crackers.     CURRENT EXERCISE:  None d/t hip pain     CGM study DONE 10/2022    BP (!) 150/70   Pulse 65   Temp 98.9 °F (37.2 °C)   Wt 73 kg (161 lb)   BMI 27.64 kg/m²       ROS:   CONSTITUTIONAL: Appetite good, denies fatigue  GI: + loose BMs       PHYSICAL EXAM:  GENERAL: Well developed, well nourished. No acute distress.   PSYCH: AAOx3, appropriate mood and affect, conversant, well-groomed. Judgement and insight good.   NEURO: Cranial nerves grossly intact. Speech clear, no tremor.       Hemoglobin A1C   Date Value Ref Range Status   10/04/2024 7.5 (H) 4.0 - 5.6 % Final     Comment:     ADA Screening Guidelines:  5.7-6.4%   Consistent with prediabetes  >or=6.5%  Consistent with diabetes    High levels of fetal hemoglobin interfere with the HbA1C  assay. Heterozygous hemoglobin variants (HbS, HgC, etc)do  not significantly interfere with this assay.   However, presence of multiple variants may affect accuracy.     02/12/2024 7.9 (H) 4.0 - 5.6 % Final     Comment:     ADA Screening Guidelines:  5.7-6.4%  Consistent with prediabetes  >or=6.5%  Consistent with diabetes    High levels of fetal hemoglobin interfere with the HbA1C  assay. Heterozygous hemoglobin variants (HbS, HgC, etc)do  not significantly interfere with this assay.   However, presence of multiple variants may affect accuracy.     10/13/2023 7.8 (H) 4.0 - 5.6 % Final     Comment:     ADA Screening Guidelines:  5.7-6.4%  Consistent with prediabetes  >or=6.5%  Consistent with diabetes    High levels of fetal hemoglobin interfere with the HbA1C  assay. Heterozygous hemoglobin variants (HbS, HgC, etc)do  not significantly interfere with this assay.   However, presence of multiple variants may affect accuracy.           Lab Results   Component Value Date    CPEPTIDE 1.41 07/05/2022        Latest Reference Range & Units 07/05/22 08:50   Glucose 70 - 110 mg/dL 100           Component Value Date/Time     02/12/2024 0936    K 4.2 02/12/2024 0936     02/12/2024 0936    CO2 24 02/12/2024 0936    BUN 18 02/12/2024 0936    CREATININE 1.1 02/12/2024 0936     (H) 02/12/2024 0936    CALCIUM 9.7 02/12/2024 0936    ALKPHOS 54 (L) 02/12/2024 0936    AST 17 02/12/2024 0936    ALT 17 02/12/2024 0936    BILITOT 0.5 02/12/2024 0936    EGFRNORACEVR 50 (A) 02/12/2024 0936    ESTGFRAFRICA >60.0 07/05/2022 0850         Lab Results   Component Value Date    LDLCALC 62.6 (L) 04/17/2024       Lab Results   Component Value Date    CHOL 143 04/17/2024    CHOL 110 (L) 03/01/2023    CHOL 120 03/08/2022     Lab Results   Component Value Date    HDL 54 04/17/2024    HDL 45 03/01/2023    HDL  47 03/08/2022     Lab Results   Component Value Date    LDLCALC 62.6 (L) 04/17/2024    LDLCALC 47.0 (L) 03/01/2023    LDLCALC 50.8 (L) 03/08/2022     Lab Results   Component Value Date    TRIG 132 04/17/2024    TRIG 90 03/01/2023    TRIG 111 03/08/2022       Lab Results   Component Value Date    CHOLHDL 37.8 04/17/2024    CHOLHDL 40.9 03/01/2023    CHOLHDL 39.2 03/08/2022         Lab Results   Component Value Date    MICALBCREAT 57.4 (H) 04/17/2024           ASSESSMENT and PLAN:    A1C GOAL: < 8%    1. Type 2 diabetes mellitus with hyperglycemia, with long-term current use of insulin  Pt has a very hard time finding transportation to Endocrine department, and she has not closely followed repeated recommendations.   Reported BGs and A1c of 7.5% indicate fair control, acceptable for advanced age.   She will f/u with Primary Care for chronic diabetes management.       POCT Glucose, Hand-Held Device                 Orders Placed This Encounter   Procedures    POCT Glucose, Hand-Held Device

## 2024-10-10 NOTE — Clinical Note
Good afternoon, pt is being transferred back to Primary Care for DM management. She has appt with Shreya later this month. Thanks, Rosy

## 2024-10-22 ENCOUNTER — OFFICE VISIT (OUTPATIENT)
Dept: FAMILY MEDICINE | Facility: CLINIC | Age: 84
End: 2024-10-22
Payer: MEDICARE

## 2024-10-22 VITALS
HEART RATE: 69 BPM | HEIGHT: 64 IN | TEMPERATURE: 98 F | SYSTOLIC BLOOD PRESSURE: 138 MMHG | WEIGHT: 162.38 LBS | BODY MASS INDEX: 27.72 KG/M2 | OXYGEN SATURATION: 98 % | DIASTOLIC BLOOD PRESSURE: 62 MMHG

## 2024-10-22 DIAGNOSIS — Z23 NEEDS FLU SHOT: ICD-10-CM

## 2024-10-22 DIAGNOSIS — N18.30 BENIGN HYPERTENSION WITH CHRONIC KIDNEY DISEASE, STAGE III: Primary | ICD-10-CM

## 2024-10-22 DIAGNOSIS — E78.5 HYPERLIPIDEMIA LDL GOAL <100: ICD-10-CM

## 2024-10-22 DIAGNOSIS — N63.21 MASS OF UPPER OUTER QUADRANT OF LEFT BREAST: ICD-10-CM

## 2024-10-22 DIAGNOSIS — E03.9 HYPOTHYROIDISM (ACQUIRED): ICD-10-CM

## 2024-10-22 DIAGNOSIS — I12.9 BENIGN HYPERTENSION WITH CHRONIC KIDNEY DISEASE, STAGE III: Primary | ICD-10-CM

## 2024-10-22 PROCEDURE — 3288F FALL RISK ASSESSMENT DOCD: CPT | Mod: CPTII,S$GLB,,

## 2024-10-22 PROCEDURE — 1159F MED LIST DOCD IN RCRD: CPT | Mod: CPTII,S$GLB,,

## 2024-10-22 PROCEDURE — G0008 ADMIN INFLUENZA VIRUS VAC: HCPCS | Mod: S$GLB,,,

## 2024-10-22 PROCEDURE — 1100F PTFALLS ASSESS-DOCD GE2>/YR: CPT | Mod: CPTII,S$GLB,,

## 2024-10-22 PROCEDURE — 3075F SYST BP GE 130 - 139MM HG: CPT | Mod: CPTII,S$GLB,,

## 2024-10-22 PROCEDURE — 1126F AMNT PAIN NOTED NONE PRSNT: CPT | Mod: CPTII,S$GLB,,

## 2024-10-22 PROCEDURE — 1160F RVW MEDS BY RX/DR IN RCRD: CPT | Mod: CPTII,S$GLB,,

## 2024-10-22 PROCEDURE — 99999 PR PBB SHADOW E&M-EST. PATIENT-LVL V: CPT | Mod: PBBFAC,,,

## 2024-10-22 PROCEDURE — 3078F DIAST BP <80 MM HG: CPT | Mod: CPTII,S$GLB,,

## 2024-10-22 PROCEDURE — 99214 OFFICE O/P EST MOD 30 MIN: CPT | Mod: S$GLB,,,

## 2024-10-22 PROCEDURE — 90653 IIV ADJUVANT VACCINE IM: CPT | Mod: S$GLB,,,

## 2024-10-22 PROCEDURE — 1157F ADVNC CARE PLAN IN RCRD: CPT | Mod: CPTII,S$GLB,,

## 2024-10-22 RX ORDER — ATORVASTATIN CALCIUM 10 MG/1
10 TABLET, FILM COATED ORAL DAILY
Qty: 90 TABLET | Refills: 1 | Status: SHIPPED | OUTPATIENT
Start: 2024-10-22

## 2024-10-22 RX ORDER — NIFEDIPINE 90 MG/1
TABLET, EXTENDED RELEASE ORAL
Qty: 90 TABLET | Refills: 0 | Status: SHIPPED | OUTPATIENT
Start: 2024-10-22

## 2024-10-22 RX ORDER — METOPROLOL SUCCINATE 200 MG/1
200 TABLET, EXTENDED RELEASE ORAL DAILY
Qty: 90 TABLET | Refills: 1 | Status: SHIPPED | OUTPATIENT
Start: 2024-10-22

## 2024-10-22 RX ORDER — LEVOTHYROXINE SODIUM 50 UG/1
50 TABLET ORAL
Qty: 90 TABLET | Refills: 1 | Status: SHIPPED | OUTPATIENT
Start: 2024-10-22

## 2024-10-22 RX ORDER — SPIRONOLACTONE 50 MG/1
50 TABLET, FILM COATED ORAL DAILY
Qty: 90 TABLET | Refills: 1 | Status: SHIPPED | OUTPATIENT
Start: 2024-10-22

## 2024-10-22 NOTE — PROGRESS NOTES
HPI     Chief Complaint:  Chief Complaint   Patient presents with    Follow-up       Joel Condon is a 84 y.o. female with multiple medical diagnoses as listed in the medical history and problem list that presents for   Chief Complaint   Patient presents with    Follow-up    .     Patient is not known to me with her last appointment in this department on Visit date not found.     HPI  Pt presents for follow up.  Has not taken BP medication today yet.  Blood sugars at home 100s-200s depending on what was eaten. Followed by endocrine.    Assessment & Plan     Problem List Items Addressed This Visit       Benign hypertension with chronic kidney disease, stage III - Primary  BP in clinic today 162/70 recheck 138/62.  Will refill Spironolactone, Metoprolol and Procardia.      Relevant Medications    spironolactone (ALDACTONE) 50 MG tablet    metoprolol succinate (TOPROL-XL) 200 MG 24 hr tablet    NIFEdipine (PROCARDIA-XL) 90 MG (OSM) 24 hr tablet    Hypothyroidism (acquired)  Stable. Will refill Synthroid.    Relevant Medications    levothyroxine (SYNTHROID) 50 MCG tablet    Hyperlipidemia LDL goal <100  Stable. Managed with Atorvastatin. Will refill Atorvastatin.    Overview     - cannot tolerate pravastatin         Relevant Medications    atorvastatin (LIPITOR) 10 MG tablet     Other Visit Diagnoses       Needs flu shot      Due for seasonal flu vaccine, will order flu vaccine today in clinic.      Relevant Medications    influenza (adjuvanted) (Fluad) 45 mcg/0.5 mL IM vaccine (> or = 66 yo) 0.5 mL (Completed)    Mass of upper outer quadrant of left breast      Left breast mass for the past few weeks.  History of breast cancer in 2017.  Will order diagnostic mammo/ultrasound for further evaluation.    Relevant Orders    Mammo Digital Diagnostic Left with Malick    US Breast Left Limited              --------------------------------------------      Health Maintenance:  Health Maintenance         Date Due Completion Date  "   RSV Vaccine (Age 60+ and Pregnant patients) (1 - 1-dose 75+ series) Never done ---    Eye Exam 06/13/2024 6/13/2023    Override on 5/2/2017: Done    Override on 11/4/2012: Done    COVID-19 Vaccine (4 - 2024-25 season) 09/01/2024 2/18/2022    Mammogram 11/29/2024 11/29/2023    Hemoglobin A1c 04/04/2025 10/4/2024    Diabetes Urine Screening 04/17/2025 4/17/2024    Lipid Panel 04/17/2025 4/17/2024    DEXA Scan 02/12/2026 2/12/2024    TETANUS VACCINE 06/15/2026 6/15/2016            Health maintenance reviewed and Flu vaccine ordered    Follow Up:  No follow-ups on file.    Exam     Review of Systems:  (as noted above)  Review of Systems    Physical Exam:   Physical Exam  Constitutional:       General: She is not in acute distress.     Appearance: Normal appearance. She is normal weight. She is not ill-appearing.   Cardiovascular:      Rate and Rhythm: Normal rate.   Pulmonary:      Effort: Pulmonary effort is normal.   Neurological:      Mental Status: She is alert.       Vitals:    10/22/24 1030 10/22/24 1104   BP: (!) 162/70 138/62   BP Location: Right arm    Patient Position: Sitting    Pulse: 69    Temp: 98.1 °F (36.7 °C)    TempSrc: Oral    SpO2: 98%    Weight: 73.6 kg (162 lb 5.9 oz)    Height: 5' 4" (1.626 m)       Body mass index is 27.87 kg/m².        History     Past Medical History:  Past Medical History:   Diagnosis Date    Arthritis     Breast cancer     Cataract     Colon polyp     Diabetes mellitus     Diabetic retinopathy     Hyperlipidemia LDL goal < 100     Hypertension     Hypothyroidism     Osteoporosis, post-menopausal     Overweight(278.02)     Proteinuria     Type II or unspecified type diabetes mellitus with renal manifestations, uncontrolled(250.42)        Past Surgical History:  Past Surgical History:   Procedure Laterality Date    APPENDECTOMY      BREAST BIOPSY      BREAST LUMPECTOMY      BREAST SURGERY Left 12/13/2017    tumor removal    CATARACT EXTRACTION W/  INTRAOCULAR LENS IMPLANT " Left 4/24/14    OS (Dr. Arce)    CATARACT EXTRACTION W/  INTRAOCULAR LENS IMPLANT Right 06/12/2014    OD (Dr. Arce)    CHOLECYSTECTOMY      COLONOSCOPY N/A 4/21/2017    Procedure: COLONOSCOPY;  Surgeon: Homar Payan MD;  Location: [facility] ENDO;  Service: Endoscopy;  Laterality: N/A;    COLONOSCOPY N/A 6/29/2018    Procedure: COLONOSCOPY;  Surgeon: Mykel Boles MD;  Location: [facility] ENDO;  Service: Endoscopy;  Laterality: N/A;    EYE SURGERY  04/24/2014 & 06/12/2014    HYSTERECTOMY      total    left eye cataract surgery  4/24/14    OOPHORECTOMY         Social History:  Social History     Socioeconomic History    Marital status:     Number of children: 3   Occupational History    Occupation: retired   Tobacco Use    Smoking status: Never    Smokeless tobacco: Never   Substance and Sexual Activity    Alcohol use: No    Drug use: No    Sexual activity: Not Currently     Partners: Male       Family History:  Family History   Problem Relation Name Age of Onset    Diabetes Mother      Heart disease Mother      Hypertension Mother      Stroke Mother      Diabetes Sister Ana     Hypertension Sister Ana     Diabetes Sister Mesherly     Hypertension Sister Mehalpaulie     Diabetes Sister Merari     Hypertension Sister Merari     Hypertension Sister Marifer Bernal     Diabetes Sister Marifer Bernal     Diabetes Brother Reed     Diabetes Brother Tutu     Diabetes Brother Blade     Prostate cancer Brother Fanshawe     Amblyopia Neg Hx      Blindness Neg Hx      Cataracts Neg Hx      Glaucoma Neg Hx      Macular degeneration Neg Hx      Retinal detachment Neg Hx      Strabismus Neg Hx         Allergies and Medications: (updated and reviewed)  Review of patient's allergies indicates:   Allergen Reactions    Lisinopril Other (See Comments)     Cough      Hydrochlorothiazide (bulk) Other (See Comments)     Elevated pt's blood pressure making her feel bad    Pravastatin Other (See Comments)     headaches     Current  "Outpatient Medications   Medication Sig Dispense Refill    ascorbic acid, vitamin C, (VITAMIN C) 100 MG tablet Take 100 mg by mouth once daily.      aspirin (ECOTRIN) 81 MG EC tablet Take 1 tablet (81 mg total) by mouth once daily. 90 tablet 3    blood sugar diagnostic Strp To check BG 4 times daily, to use with insurance preferred meter. e11.65 400 strip 3    cholecalciferol, vitamin D3, (VITAMIN D3) 100 mcg (4,000 unit) Cap Take by mouth.      CYANOCOBALAMIN, VITAMIN B-12, MISC by Misc.(Non-Drug; Combo Route) route.      dulaglutide (TRULICITY) 0.75 mg/0.5 mL pen injector Inject 0.75 mg into the skin every 7 days. 12 pen 2    insulin degludec (TRESIBA FLEXTOUCH U-100) 100 unit/mL (3 mL) insulin pen Inject 40 Units into the skin once daily. 36 mL 2    insulin lispro 100 unit/mL pen INJECT 18 UNITS UNDER THE SKIN THREE TIMES DAILY BEFORE MEALS 60 mL 0    lancets Misc To check BG 4 times daily, to use with insurance preferred meter. e11.65 400 each 3    letrozole (FEMARA) 2.5 mg Tab TAKE 1 TABLET BY MOUTH DAILY 90 tablet 3    losartan (COZAAR) 100 MG tablet TAKE 1 TABLET BY MOUTH DAILY 90 tablet 0    metFORMIN (GLUCOPHAGE-XR) 500 MG ER 24hr tablet TAKE 1 TABLET BY MOUTH EVERY MORNING AND 1 TABLET EVERY EVENING 180 tablet 2    omeprazole (PRILOSEC) 20 MG capsule TAKE 1 CAPSULE(20 MG) BY MOUTH DAILY AS NEEDED FOR HEARTBURN 90 capsule 1    pen needle, diabetic (BD ULTRA-FINE SHORT PEN NEEDLE) 31 gauge x 5/16" Ndle USE FOUR TIMES DAILY 400 each 3    vitamin E acetate (VITAMIN E ORAL) Take by mouth.      atorvastatin (LIPITOR) 10 MG tablet Take 1 tablet (10 mg total) by mouth once daily. 90 tablet 1    levothyroxine (SYNTHROID) 50 MCG tablet Take 1 tablet (50 mcg total) by mouth before breakfast. 90 tablet 1    metoprolol succinate (TOPROL-XL) 200 MG 24 hr tablet Take 1 tablet (200 mg total) by mouth once daily. 90 tablet 1    NIFEdipine (PROCARDIA-XL) 90 MG (OSM) 24 hr tablet TAKE 1 TABLET(90 MG) BY MOUTH EVERY DAY 90 " tablet 0    spironolactone (ALDACTONE) 50 MG tablet Take 1 tablet (50 mg total) by mouth once daily. 90 tablet 1     No current facility-administered medications for this visit.       Patient Care Team:  Shelby Ley MD as PCP - General (Internal Medicine)  Deborah Parr MD as Consulting Physician (Hematology and Oncology)  Rosy Mcknight NP as Nurse Practitioner (Endocrinology)  Juan Pablo Ordoñez MD as Consulting Physician (Cardiology)  Mary Waterman LPN as Care Coordinator         - The patient is given an After Visit Summary that lists all medications with directions, allergies, education, orders placed during this encounter and follow-up instructions.      - I have reviewed the patient's medical information including past medical, family, and social history sections including the medications and allergies.      - We discussed the patient's current medications.     This note was created by combination of typed  and MModal dictation.  Transcription errors may be present.  If there are any questions, please contact me.       Shreya Luong NP

## 2024-11-04 ENCOUNTER — HOSPITAL ENCOUNTER (OUTPATIENT)
Dept: RADIOLOGY | Facility: HOSPITAL | Age: 84
Discharge: HOME | End: 2024-11-04
Payer: MEDICARE

## 2024-11-04 DIAGNOSIS — N63.21 MASS OF UPPER OUTER QUADRANT OF LEFT BREAST: ICD-10-CM

## 2024-11-04 PROCEDURE — 77062 BREAST TOMOSYNTHESIS BI: CPT | Mod: 26,,, | Performed by: RADIOLOGY

## 2024-11-04 PROCEDURE — 76642 ULTRASOUND BREAST LIMITED: CPT | Mod: TC,LT

## 2024-11-04 PROCEDURE — 77066 DX MAMMO INCL CAD BI: CPT | Mod: 26,,, | Performed by: RADIOLOGY

## 2024-11-04 PROCEDURE — 77062 BREAST TOMOSYNTHESIS BI: CPT | Mod: TC

## 2024-11-04 PROCEDURE — 76642 ULTRASOUND BREAST LIMITED: CPT | Mod: 26,LT,, | Performed by: RADIOLOGY

## 2024-11-06 NOTE — LETTER
November 6, 2024    Joel Condon  [address]  [place] [address]             [facility]  [address]  [place] [address]  Phone: [phone]  Fax: [phone] Dear Ms. Joel Condon:    Below are the results from your recent visit:    Resulted Orders   Mammo Digital Diagnostic Bilat with Malick    Narrative    Facility:  [facility]  [address] [facility] [place] [address] [phone]    Name: Joel Condon    MRN: [id number]    Result:   Mammo Digital Diagnostic Bilat with Malick  US Breast Left Limited     History:  Patient is 84 y.o. and is seen for mass of upper outer quadrant of left   breast.      Films Compared:  Compared to: 11/29/2023 Mammo Digital Screening Bilat w/ Malick, 11/28/2022   Mammo Digital Screening Bilat w/ Malick, 11/24/2021 Mammo Digital Screening   Bilat w/ Malick, and 11/23/2020 Mammo Digital Screening Bilat w/ Malick     Findings:  This procedure was performed using tomosynthesis. Computer-aided detection   was utilized in the interpretation of this examination.  There are scattered areas of fibroglandular density.     Mammo Digital Diagnostic Bilat with Malick  Bilateral  There is no evidence of suspicious masses, calcifications, or other   abnormal findings.  There is postsurgical change of lumpectomy in the   upper-outer left breast.    US Breast Left Limited  Left  Targeted ultrasound was performed of the area of concern indicated by the   patient There is no evidence of suspicious masses or other abnormal   findings in the left breast.      Impression    No mammographic or sonographic evidence of malignancy. Of note, the   decision to biopsy any palpable finding should be based on clinical   assessment.     BI-RADS Category:   Overall: 2 - Benign     Recommendation:  Routine screening mammogram in 1 year, or as clinically indicated.        Toyin Jordan MD       Your results are within an  acceptable range.  Please don't hesitate to call our office if you have any questions or concerns.      Sincerely,        Wallace Law NP

## 2024-11-18 DIAGNOSIS — E11.65 TYPE 2 DIABETES MELLITUS WITH HYPERGLYCEMIA, WITH LONG-TERM CURRENT USE OF INSULIN: ICD-10-CM

## 2024-11-18 DIAGNOSIS — Z79.4 TYPE 2 DIABETES MELLITUS WITH HYPERGLYCEMIA, WITH LONG-TERM CURRENT USE OF INSULIN: ICD-10-CM

## 2024-11-18 RX ORDER — METFORMIN HYDROCHLORIDE 500 MG/1
TABLET, EXTENDED RELEASE ORAL
Qty: 180 TABLET | Refills: 1 | Status: SHIPPED | OUTPATIENT
Start: 2024-11-18

## 2024-11-18 RX ORDER — DULAGLUTIDE 0.75 MG/.5ML
INJECTION, SOLUTION SUBCUTANEOUS
Qty: 4 PEN | Refills: 5 | Status: SHIPPED | OUTPATIENT
Start: 2024-11-18

## 2024-11-29 RX ORDER — INSULIN DEGLUDEC 100 U/ML
40 INJECTION, SOLUTION SUBCUTANEOUS DAILY
Qty: 36 ML | Refills: 1 | Status: SHIPPED | OUTPATIENT
Start: 2024-11-29

## 2024-11-29 NOTE — TELEPHONE ENCOUNTER
No care due was identified.  [facility] Embedded Care Due Messages. Reference number: [id number].   11/29/2024 4:28:33 PM CST

## 2024-11-29 NOTE — TELEPHONE ENCOUNTER
Refill Routing Note   Medication(s) are not appropriate for processing by [facility] for the following reason(s):        Responsible provider unclear    [facility] action(s):  Defer      Medication Therapy Plan: Last ordered: 3/4/24 by Rosy Mcknight NP. Recent OV but no current order by PCP      Appointments  past 12m or future 3m with PCP    Date Provider   Last Visit   4/17/2024 Shelby Ley MD   Next Visit   4/23/2025 Shelby Ley MD   ED visits in past 90 days: 0        Note composed:4:34 PM 11/29/2024

## 2025-01-25 NOTE — PROGRESS NOTES
Procedure End   Subjective:       Patient ID: Joel Condon is a 77 y.o. female.    Chief Complaint: No chief complaint on file.    HPI   REASON FOR CONSULTATION: Left Breast CA  REFERRING PHYSICIAN: Ashli Caldera M.D.     Patient  is a 77 y.o. postmenopausal female seen today for recently diagnosed carcinoma of the left breast. She had an abnormal mammogram first noted 10/27/2017. Follow-up mammogram and ultrasound (17) showed 6mm mass in the 2OC left breast with enlarged axillary LN measuring 17mm boderline.She underwent an  ultrasound guided biopsy  on 2017 with pathology revealing infiltrating ductal carcinoma of the breast, Grade 1, ER positive 100% ,TN positive 100% Her-2neu negative.  The patient is status post left partial mastectomy and sentinel node biopsy on 2017.  Final pathology showed 6mm IDC, 1 of 2  LN with 1mm micromet. 1mm anterior margin. She does not routinely do self breast exams. She  has not noted any skin  changes on breast exam. No  nipple discharge. No history of  previous breast biopsies. No  personal history of breast cancer or ovarian cancer. She is here for further evaluation.         GYN History: Age of menarche was 14. Age of menopause was 46.  Patient reports hormonal therapy for less than 5 years. Patient is . Age of first live birth was 17. Patient did breast feed for 2 years 8 months.         Past Medical History:   Diagnosis Date    Arthritis     Diabetes mellitus     Hyperlipidemia LDL goal < 100     Hypertension     Hypothyroidism     Osteoporosis, post-menopausal     Overweight(278.02)     Proteinuria     Type II or unspecified type diabetes mellitus with renal manifestations, uncontrolled(250.42)        Past Surgical History:   Procedure Laterality Date    APPENDECTOMY      CATARACT EXTRACTION W/  INTRAOCULAR LENS IMPLANT Left 14    OS (Dr. Arce)    CATARACT EXTRACTION W/  INTRAOCULAR LENS IMPLANT Right 2014    OD (Dr. Arce)     "CHOLECYSTECTOMY      COLONOSCOPY N/A 4/21/2017    Procedure: COLONOSCOPY;  Surgeon: Homar Payan MD;  Location: [facility];  Service: Endoscopy;  Laterality: N/A;    EYE SURGERY  04/24/2014 & 06/12/2014    HYSTERECTOMY      total    left eye cataract surgery  4/24/14    OOPHORECTOMY       Current MEDS; Reviewed and as per MEDCHART    Review of Systems   Constitutional: Negative for appetite change, fatigue, fever and unexpected weight change.   HENT: Negative for mouth sores.    Eyes: Negative for visual disturbance.   Respiratory: Negative for cough and shortness of breath.    Cardiovascular: Negative for chest pain.   Gastrointestinal: Negative for abdominal pain and diarrhea.   Genitourinary: Negative for frequency.   Musculoskeletal: Negative for back pain.   Skin: Negative for rash.   Neurological: Negative for headaches.   Hematological: Negative for adenopathy.   Psychiatric/Behavioral: The patient is not nervous/anxious.        Objective:       Vitals:    01/08/18 0924   BP: 139/65   BP Location: Right arm   Patient Position: Sitting   BP Method: Medium (Automatic)   Pulse: 72   Temp: 97.6 °F (36.4 °C)   TempSrc: Oral   SpO2: 98%   Weight: 78.2 kg (172 lb 6.4 oz)   Height: 5' 4" (1.626 m)       Physical Exam   Constitutional: She is oriented to person, place, and time. She appears well-developed and well-nourished.   HENT:   Head: Normocephalic.   Mouth/Throat: Oropharynx is clear and moist. No oropharyngeal exudate.   Eyes: Conjunctivae and lids are normal. Pupils are equal, round, and reactive to light. No scleral icterus.   Neck: Normal range of motion. Neck supple. No thyromegaly present.   Cardiovascular: Normal rate, regular rhythm and normal heart sounds.    No murmur heard.  Pulmonary/Chest: Breath sounds normal. She has no wheezes. She has no rales. Right breast exhibits no mass, no nipple discharge and no skin change.   Left breast- well-healed surg scar,no masses or axillary LAD noted "   Abdominal: Soft. Bowel sounds are normal. She exhibits no distension and no mass. There is no hepatosplenomegaly. There is no tenderness. There is no rebound and no guarding.   Musculoskeletal: Normal range of motion. She exhibits no edema or tenderness.   Lymphadenopathy:     She has no cervical adenopathy.     She has no axillary adenopathy.        Right: No supraclavicular adenopathy present.        Left: No supraclavicular adenopathy present.   Neurological: She is alert and oriented to person, place, and time. No cranial nerve deficit. Coordination normal.   Skin: Skin is warm and dry. No ecchymosis, no petechiae and no rash noted. No erythema.   Psychiatric: She has a normal mood and affect.         FINAL PATHOLOGIC DIAGNOSIS 11/21/2017  1. Left breast mass 2:00:  Invasive mammary carcinoma, grade 1 (Geronimo score: Tubule formation 2, nuclear pleomorphism 2, mitotic  activity 1, total score 5), occupying at least 5 mm in one core.  2. Left breast axilla (lymph node):  Benign lymphoid tissue with no evidence of metastatic disease.  Note: Receptor studies and immunohistochemical studies will be performed with supplemental report to follow.  Intradepartmental QC/review obtained. Dr. Neil Irvin concurs with the above diagnostic impressions.    Supplemental Diagnosis  HORMONE RECEPTOR STUDIES:  Virtually 100% of the cells of the carcinoma are strongly nuclear estrogen receptor positive. 60 percent of the cells  are strongly nuclear progesterone receptor positive. There is an abrupt transition from the zone of nuclear  progesterone receptor positive cells to the zone where this receptor is negative. It is possible that there has been  some technical artifact which has led a region of the tissue to not stain, and that actually the vast majority of the  tumor are nuclear progesterone receptor positive. The tumor is HER-2 negative. The positive and negative controls  stained appropriately.        FINAL PATHOLOGIC  DIAGNOSIS 12/13/2017   Part 1  Lymph node (1, submitted as left sentinel lymph node count equals 60):  -No evidence of malignancy  Part 2  Lymph node (1, submitted as left sentinel lymph node count equals 20):  -No evidence of malignancy  Part 3  Breast excision (submitted as left partial mastectomy):  -Invasive, well differentiated (grade I of III) lobular carcinoma  -Carcinoma is a single focus measuring 0.6 x 0.65 cm (6 x 6.5 mm) located 1 mm from the nearest, anterior  resection margin. All resection margins are free of malignancy.  -No tumor necrosis, hemosiderin vascular or lymphatic permeation, or perineural involvement is identified.  -A microscopic focus of in situ carcinoma is identified immediately adjacent to the invasive tumor focus  -Carcinoma is graded I of III according to the Melissa modification of the Arriaga-Benson grading scheme  with 2 points each assigned for moderate tubule formation and moderate nuclear pleomorphism and 1.4  minimal mitotic count, a total of 5 of 9 possible points.  -AJCC TN: pT pN0(sn)  -Complete AJCC Staging Form follows:  INVASIVE CARCINOMA OF THE BREAST  Procedure: Partial mastectomy  Node sampling: [place] lymph nodes  Specimen laterality: Left  Tumor site: Not specified  Tumor size: 6.5 x 6.0 mm  Histologic type: Invasive lobular carcinoma  Histologic grade  -Glandular differentiation: Score 2  -Nuclear pleomorphism: Score 2  -Mitotic rate: Score 1  -Overall grade: Score 1  Tumor focality: Single focus of invasive carcinoma  Ductal carcinoma in situ (DCIS): No DCIS present  Margins-invasive carcinoma: Margins uninvolved by invasive carcinoma  -Distance from closest margin: 1 mm, anterior  Lymph nodes  -Total number of lymph nodes examined (sentinel and non-sentinel): 2  -Number sentinel lymph node nodes examined: 2  Pathologic staging  -Primary tumor: pT1b pN0(sn)  -Regional lymph nodes  Modifier: (SN)  Category: pN0  Ancillary studies: E-cadherin negative in tumor  cells      Assessment:       1. Malignant neoplasm of left breast in female, estrogen receptor positive, unspecified site of breast    2. Osteopenia, unspecified location    3. Essential hypertension    4. 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  1 of 2 lymph nodes positive for micromet. She is Stage IA per AJCC 8th ed. pathologic prognostic staging system.  Discussed pathology findings in detail with patient.  Plan ambulatory referral to radiation oncology for evaluation for postoperative radiation therapy. In this setting adjuvant chemotherapy not recommended as risks outweigh benefits. We then focused most of our discussion on adjuvant endocrine therapy. Discussed pros vs cons of endocrine therapy. Potential SE of therapy include ( but not limited to ) arthralgias, hot flashes, osteoporosis, clots. She will follow-up with Rad/Onc and follow-up following completion of RT. All questions posed answered to patient's satisfaction.      Thank you for allowing me to participate the care of your patient    Cc Ashli Caldera M.D.         Kenney Wray M.D.

## 2025-02-12 DIAGNOSIS — Z79.4 TYPE 2 DIABETES MELLITUS WITH HYPERGLYCEMIA, WITH LONG-TERM CURRENT USE OF INSULIN: ICD-10-CM

## 2025-02-12 DIAGNOSIS — E11.65 TYPE 2 DIABETES MELLITUS WITH HYPERGLYCEMIA, WITH LONG-TERM CURRENT USE OF INSULIN: ICD-10-CM

## 2025-02-12 RX ORDER — INSULIN LISPRO 100 [IU]/ML
INJECTION, SOLUTION INTRAVENOUS; SUBCUTANEOUS
Qty: 60 ML | Refills: 0 | Status: SHIPPED | OUTPATIENT
Start: 2025-02-12

## 2025-02-13 DIAGNOSIS — E11.22 TYPE 2 DIABETES MELLITUS WITH STAGE 3A CHRONIC KIDNEY DISEASE, WITH LONG-TERM CURRENT USE OF INSULIN: Primary | ICD-10-CM

## 2025-02-13 DIAGNOSIS — N18.31 TYPE 2 DIABETES MELLITUS WITH STAGE 3A CHRONIC KIDNEY DISEASE, WITH LONG-TERM CURRENT USE OF INSULIN: Primary | ICD-10-CM

## 2025-02-13 DIAGNOSIS — Z79.4 TYPE 2 DIABETES MELLITUS WITH STAGE 3A CHRONIC KIDNEY DISEASE, WITH LONG-TERM CURRENT USE OF INSULIN: Primary | ICD-10-CM

## 2025-02-13 NOTE — TELEPHONE ENCOUNTER
Care Due:                  Date            Visit Type   Department     Provider  --------------------------------------------------------------------------------                                [facility]      MED/ INTERNAL  [facility] [place]  Last Visit: 04-      CARE ([facility])   MED/ PEDS      Eleno Ley                              [facility]/ INTERNAL  [facility] [place]  Next Visit: 04-      CARE ([facility])   MED/ PEDS      Sharifer  Av                                                            Last  Test          Frequency    Reason                     Performed    Due Date  --------------------------------------------------------------------------------    Cr..........  12 months..  insulin..................  02- 02-    HBA1C.......  6 months...  insulin..................  10-   04-    [facility] Embedded Care Due Messages. Reference number: [id number].   2/13/2025 8:35:12 AM CST

## 2025-02-13 NOTE — TELEPHONE ENCOUNTER
----- Message from Melanie sent at 2/12/2025  2:37 PM CST -----  .Type: Patient Call Back    Who called: Self     What is the request in detail: Stated Francine need a new prescription for her diabetic supplies sent over. Ask that the nurse give her a call     Can the clinic reply by [facility]? No     Would the patient rather a call back or a response via [facility]? Call Back     Best call back number:.[phone] ([place])       Additional Information:

## 2025-02-14 RX ORDER — LANCETS
EACH MISCELLANEOUS
Qty: 400 EACH | Refills: 3 | Status: SHIPPED | OUTPATIENT
Start: 2025-02-14

## 2025-02-14 RX ORDER — PEN NEEDLE, DIABETIC 30 GX3/16"
NEEDLE, DISPOSABLE MISCELLANEOUS
Qty: 400 EACH | Refills: 3 | Status: SHIPPED | OUTPATIENT
Start: 2025-02-14

## 2025-02-17 ENCOUNTER — TELEPHONE (OUTPATIENT)
Dept: FAMILY MEDICINE | Facility: CLINIC | Age: 85
End: 2025-02-17
Payer: MEDICARE

## 2025-02-17 NOTE — TELEPHONE ENCOUNTER
----- Message from Kat sent at 2/17/2025  4:34 PM CST -----  Regarding: patient call back  Type: Patient Call BackWho called: Chrissy with [facility] What is the request in detail: calling to check on a fax for prescription Can the clinic reply by [facility]? No Would the patient rather a call back or a response via [facility]? Call Best call back number: [phone] [facility] [address]

## 2025-04-03 DIAGNOSIS — I12.9 BENIGN HYPERTENSION WITH CHRONIC KIDNEY DISEASE, STAGE III: ICD-10-CM

## 2025-04-03 DIAGNOSIS — N18.30 BENIGN HYPERTENSION WITH CHRONIC KIDNEY DISEASE, STAGE III: ICD-10-CM

## 2025-04-03 RX ORDER — NIFEDIPINE 90 MG/1
90 TABLET, EXTENDED RELEASE ORAL
Qty: 90 TABLET | Refills: 0 | Status: SHIPPED | OUTPATIENT
Start: 2025-04-03

## 2025-04-03 NOTE — TELEPHONE ENCOUNTER
Refill Decision Note   [facility] Taurus  is requesting a refill authorization.  Brief Assessment and Rationale for Refill:  Approve     Medication Therapy Plan:         Comments:     Note composed:10:55 AM 04/03/2025

## 2025-04-03 NOTE — TELEPHONE ENCOUNTER
No care due was identified.  [facility] Embedded Care Due Messages. Reference number: [id number].   4/03/2025 10:55:40 AM CDT

## 2025-04-04 ENCOUNTER — TELEPHONE (OUTPATIENT)
Dept: FAMILY MEDICINE | Facility: CLINIC | Age: 85
End: 2025-04-04
Payer: MEDICARE

## 2025-04-04 DIAGNOSIS — E11.22 TYPE 2 DIABETES MELLITUS WITH STAGE 3A CHRONIC KIDNEY DISEASE, WITH LONG-TERM CURRENT USE OF INSULIN: ICD-10-CM

## 2025-04-04 DIAGNOSIS — N18.30 BENIGN HYPERTENSION WITH CHRONIC KIDNEY DISEASE, STAGE III: ICD-10-CM

## 2025-04-04 DIAGNOSIS — I12.9 BENIGN HYPERTENSION WITH CHRONIC KIDNEY DISEASE, STAGE III: ICD-10-CM

## 2025-04-04 DIAGNOSIS — N18.31 TYPE 2 DIABETES MELLITUS WITH STAGE 3A CHRONIC KIDNEY DISEASE, WITH LONG-TERM CURRENT USE OF INSULIN: ICD-10-CM

## 2025-04-04 DIAGNOSIS — Z79.4 TYPE 2 DIABETES MELLITUS WITH STAGE 3A CHRONIC KIDNEY DISEASE, WITH LONG-TERM CURRENT USE OF INSULIN: ICD-10-CM

## 2025-04-04 DIAGNOSIS — E03.9 HYPOTHYROIDISM (ACQUIRED): ICD-10-CM

## 2025-04-04 DIAGNOSIS — E78.5 HYPERLIPIDEMIA LDL GOAL <100: Primary | ICD-10-CM

## 2025-04-04 DIAGNOSIS — M85.80 OSTEOPENIA AFTER MENOPAUSE: ICD-10-CM

## 2025-04-04 DIAGNOSIS — Z78.0 OSTEOPENIA AFTER MENOPAUSE: ICD-10-CM

## 2025-04-09 ENCOUNTER — TELEPHONE (OUTPATIENT)
Dept: FAMILY MEDICINE | Facility: CLINIC | Age: 85
End: 2025-04-09
Payer: MEDICARE

## 2025-04-09 NOTE — TELEPHONE ENCOUNTER
----- Message from Ashley sent at 4/8/2025  1:04 PM CDT -----  Regarding: self  Who Called: selfTeresao Left Message for Patient: Ray Mcknight,Does the patient know what this is regarding?: apptWould the patient rather a call back or a response via [facility]?  Call [facility] Call Back Number: [phone] Additional Information:Thank you.

## 2025-04-17 ENCOUNTER — LAB VISIT (OUTPATIENT)
Dept: LAB | Facility: HOSPITAL | Age: 85
End: 2025-04-17
Attending: INTERNAL MEDICINE
Payer: MEDICARE

## 2025-04-17 DIAGNOSIS — M85.80 OSTEOPENIA AFTER MENOPAUSE: ICD-10-CM

## 2025-04-17 DIAGNOSIS — E03.9 HYPOTHYROIDISM (ACQUIRED): ICD-10-CM

## 2025-04-17 DIAGNOSIS — I12.9 BENIGN HYPERTENSION WITH CHRONIC KIDNEY DISEASE, STAGE III: ICD-10-CM

## 2025-04-17 DIAGNOSIS — Z78.0 OSTEOPENIA AFTER MENOPAUSE: ICD-10-CM

## 2025-04-17 DIAGNOSIS — N18.30 BENIGN HYPERTENSION WITH CHRONIC KIDNEY DISEASE, STAGE III: ICD-10-CM

## 2025-04-17 DIAGNOSIS — N18.31 TYPE 2 DIABETES MELLITUS WITH STAGE 3A CHRONIC KIDNEY DISEASE, WITH LONG-TERM CURRENT USE OF INSULIN: ICD-10-CM

## 2025-04-17 DIAGNOSIS — E11.22 TYPE 2 DIABETES MELLITUS WITH STAGE 3A CHRONIC KIDNEY DISEASE, WITH LONG-TERM CURRENT USE OF INSULIN: ICD-10-CM

## 2025-04-17 DIAGNOSIS — E78.5 HYPERLIPIDEMIA LDL GOAL <100: ICD-10-CM

## 2025-04-17 DIAGNOSIS — Z79.4 TYPE 2 DIABETES MELLITUS WITH STAGE 3A CHRONIC KIDNEY DISEASE, WITH LONG-TERM CURRENT USE OF INSULIN: ICD-10-CM

## 2025-04-17 LAB
25(OH)D3+25(OH)D2 SERPL-MCNC: 31 NG/ML (ref 30–96)
ABSOLUTE EOSINOPHIL (OHS): 0.38 K/UL
ABSOLUTE MONOCYTE (OHS): 0.71 K/UL (ref 0.3–1)
ABSOLUTE NEUTROPHIL COUNT (OHS): 5.36 K/UL (ref 1.8–7.7)
ALBUMIN SERPL BCP-MCNC: 3.6 G/DL (ref 3.5–5.2)
ALP SERPL-CCNC: 75 UNIT/L (ref 40–150)
ALT SERPL W/O P-5'-P-CCNC: 28 UNIT/L (ref 10–44)
ANION GAP (OHS): 13 MMOL/L (ref 8–16)
AST SERPL-CCNC: 26 UNIT/L (ref 11–45)
BASOPHILS # BLD AUTO: 0.08 K/UL
BASOPHILS NFR BLD AUTO: 0.8 %
BILIRUB SERPL-MCNC: 0.5 MG/DL (ref 0.1–1)
BUN SERPL-MCNC: 19 MG/DL (ref 8–23)
CALCIUM SERPL-MCNC: 9.4 MG/DL (ref 8.7–10.5)
CHLORIDE SERPL-SCNC: 108 MMOL/L (ref 95–110)
CHOLEST SERPL-MCNC: 111 MG/DL (ref 120–199)
CHOLEST/HDLC SERPL: 2.5 {RATIO} (ref 2–5)
CO2 SERPL-SCNC: 22 MMOL/L (ref 23–29)
CREAT SERPL-MCNC: 1.1 MG/DL (ref 0.5–1.4)
EAG (OHS): 203 MG/DL (ref 68–131)
ERYTHROCYTE [DISTWIDTH] IN BLOOD BY AUTOMATED COUNT: 13.5 % (ref 11.5–14.5)
GFR SERPLBLD CREATININE-BSD FMLA CKD-EPI: 50 ML/MIN/1.73/M2
GLUCOSE SERPL-MCNC: 152 MG/DL (ref 70–110)
HBA1C MFR BLD: 8.7 % (ref 4–5.6)
HCT VFR BLD AUTO: 42.7 % (ref 37–48.5)
HDLC SERPL-MCNC: 45 MG/DL (ref 40–75)
HDLC SERPL: 40.5 % (ref 20–50)
HGB BLD-MCNC: 13.3 GM/DL (ref 12–16)
IMM GRANULOCYTES # BLD AUTO: 0.03 K/UL (ref 0–0.04)
IMM GRANULOCYTES NFR BLD AUTO: 0.3 % (ref 0–0.5)
LDLC SERPL CALC-MCNC: 45.6 MG/DL (ref 63–159)
LYMPHOCYTES # BLD AUTO: 2.93 K/UL (ref 1–4.8)
MCH RBC QN AUTO: 30 PG (ref 27–31)
MCHC RBC AUTO-ENTMCNC: 31.1 G/DL (ref 32–36)
MCV RBC AUTO: 96 FL (ref 82–98)
NONHDLC SERPL-MCNC: 66 MG/DL
NUCLEATED RBC (/100WBC) (OHS): 0 /100 WBC
PLATELET # BLD AUTO: 347 K/UL (ref 150–450)
PMV BLD AUTO: 10.6 FL (ref 9.2–12.9)
POTASSIUM SERPL-SCNC: 4.1 MMOL/L (ref 3.5–5.1)
PROT SERPL-MCNC: 7.2 GM/DL (ref 6–8.4)
PTH-INTACT SERPL-MCNC: 156.1 PG/ML (ref 9–77)
RBC # BLD AUTO: 4.43 M/UL (ref 4–5.4)
RELATIVE EOSINOPHIL (OHS): 4 %
RELATIVE LYMPHOCYTE (OHS): 30.9 % (ref 18–48)
RELATIVE MONOCYTE (OHS): 7.5 % (ref 4–15)
RELATIVE NEUTROPHIL (OHS): 56.5 % (ref 38–73)
SODIUM SERPL-SCNC: 143 MMOL/L (ref 136–145)
TRIGL SERPL-MCNC: 102 MG/DL (ref 30–150)
TSH SERPL-ACNC: 3.63 UIU/ML (ref 0.4–4)
WBC # BLD AUTO: 9.49 K/UL (ref 3.9–12.7)

## 2025-04-17 PROCEDURE — 83036 HEMOGLOBIN GLYCOSYLATED A1C: CPT

## 2025-04-17 PROCEDURE — 83970 ASSAY OF PARATHORMONE: CPT

## 2025-04-17 PROCEDURE — 83718 ASSAY OF LIPOPROTEIN: CPT

## 2025-04-17 PROCEDURE — 82306 VITAMIN D 25 HYDROXY: CPT

## 2025-04-17 PROCEDURE — 85025 COMPLETE CBC W/AUTO DIFF WBC: CPT

## 2025-04-17 PROCEDURE — 36415 COLL VENOUS BLD VENIPUNCTURE: CPT | Mod: PO

## 2025-04-17 PROCEDURE — 80053 COMPREHEN METABOLIC PANEL: CPT

## 2025-04-17 PROCEDURE — 84443 ASSAY THYROID STIM HORMONE: CPT

## 2025-04-23 ENCOUNTER — OFFICE VISIT (OUTPATIENT)
Dept: FAMILY MEDICINE | Facility: CLINIC | Age: 85
End: 2025-04-23
Payer: MEDICARE

## 2025-04-23 ENCOUNTER — LAB VISIT (OUTPATIENT)
Dept: LAB | Facility: HOSPITAL | Age: 85
End: 2025-04-23
Attending: INTERNAL MEDICINE
Payer: MEDICARE

## 2025-04-23 VITALS
RESPIRATION RATE: 18 BRPM | BODY MASS INDEX: 28.42 KG/M2 | HEART RATE: 77 BPM | WEIGHT: 166.44 LBS | HEIGHT: 64 IN | OXYGEN SATURATION: 96 % | TEMPERATURE: 99 F | SYSTOLIC BLOOD PRESSURE: 126 MMHG | DIASTOLIC BLOOD PRESSURE: 70 MMHG

## 2025-04-23 DIAGNOSIS — M25.552 LEFT HIP PAIN: ICD-10-CM

## 2025-04-23 DIAGNOSIS — E11.65 POORLY CONTROLLED TYPE 2 DIABETES MELLITUS WITH RETINOPATHY: ICD-10-CM

## 2025-04-23 DIAGNOSIS — Z79.4 TYPE 2 DIABETES MELLITUS WITH STAGE 3A CHRONIC KIDNEY DISEASE, WITH LONG-TERM CURRENT USE OF INSULIN: ICD-10-CM

## 2025-04-23 DIAGNOSIS — M16.9 OSTEOARTHROSIS, HIP: ICD-10-CM

## 2025-04-23 DIAGNOSIS — E11.319 POORLY CONTROLLED TYPE 2 DIABETES MELLITUS WITH RETINOPATHY: ICD-10-CM

## 2025-04-23 DIAGNOSIS — I12.9 BENIGN HYPERTENSION WITH CHRONIC KIDNEY DISEASE, STAGE III: ICD-10-CM

## 2025-04-23 DIAGNOSIS — Z23 NEED FOR COVID-19 VACCINE: ICD-10-CM

## 2025-04-23 DIAGNOSIS — C50.912 MALIGNANT NEOPLASM OF LEFT BREAST IN FEMALE, ESTROGEN RECEPTOR POSITIVE, UNSPECIFIED SITE OF BREAST: ICD-10-CM

## 2025-04-23 DIAGNOSIS — M85.80 OSTEOPENIA AFTER MENOPAUSE: ICD-10-CM

## 2025-04-23 DIAGNOSIS — N18.31 TYPE 2 DIABETES MELLITUS WITH STAGE 3A CHRONIC KIDNEY DISEASE, WITH LONG-TERM CURRENT USE OF INSULIN: ICD-10-CM

## 2025-04-23 DIAGNOSIS — Z78.0 OSTEOPENIA AFTER MENOPAUSE: ICD-10-CM

## 2025-04-23 DIAGNOSIS — E78.5 HYPERLIPIDEMIA LDL GOAL <100: ICD-10-CM

## 2025-04-23 DIAGNOSIS — E03.9 HYPOTHYROIDISM (ACQUIRED): ICD-10-CM

## 2025-04-23 DIAGNOSIS — N18.30 BENIGN HYPERTENSION WITH CHRONIC KIDNEY DISEASE, STAGE III: ICD-10-CM

## 2025-04-23 DIAGNOSIS — E11.40 POORLY CONTROLLED TYPE 2 DIABETES MELLITUS WITH NEUROPATHY: ICD-10-CM

## 2025-04-23 DIAGNOSIS — E11.22 TYPE 2 DIABETES MELLITUS WITH STAGE 3A CHRONIC KIDNEY DISEASE, WITH LONG-TERM CURRENT USE OF INSULIN: ICD-10-CM

## 2025-04-23 DIAGNOSIS — K21.9 GASTROESOPHAGEAL REFLUX DISEASE WITHOUT ESOPHAGITIS: Chronic | ICD-10-CM

## 2025-04-23 DIAGNOSIS — Z29.11 NEED FOR PROPHYLACTIC VACCINATION AND INOCULATION AGAINST RESPIRATORY SYNCYTIAL VIRUS (RSV): ICD-10-CM

## 2025-04-23 DIAGNOSIS — Z00.00 ROUTINE MEDICAL EXAM: Primary | ICD-10-CM

## 2025-04-23 DIAGNOSIS — E11.65 POORLY CONTROLLED TYPE 2 DIABETES MELLITUS WITH NEUROPATHY: ICD-10-CM

## 2025-04-23 DIAGNOSIS — Z17.0 MALIGNANT NEOPLASM OF LEFT BREAST IN FEMALE, ESTROGEN RECEPTOR POSITIVE, UNSPECIFIED SITE OF BREAST: ICD-10-CM

## 2025-04-23 LAB
ALBUMIN/CREAT UR: 39.7 UG/MG
CREAT UR-MCNC: 131 MG/DL (ref 15–325)
MICROALBUMIN UR-MCNC: 52 UG/ML (ref ?–5000)

## 2025-04-23 PROCEDURE — 82043 UR ALBUMIN QUANTITATIVE: CPT

## 2025-04-23 PROCEDURE — 1101F PT FALLS ASSESS-DOCD LE1/YR: CPT | Mod: CPTII,S$GLB,, | Performed by: INTERNAL MEDICINE

## 2025-04-23 PROCEDURE — 1125F AMNT PAIN NOTED PAIN PRSNT: CPT | Mod: CPTII,S$GLB,, | Performed by: INTERNAL MEDICINE

## 2025-04-23 PROCEDURE — 99397 PER PM REEVAL EST PAT 65+ YR: CPT | Mod: S$GLB,,, | Performed by: INTERNAL MEDICINE

## 2025-04-23 PROCEDURE — 99999 PR PBB SHADOW E&M-EST. PATIENT-LVL V: CPT | Mod: PBBFAC,,, | Performed by: INTERNAL MEDICINE

## 2025-04-23 PROCEDURE — 3078F DIAST BP <80 MM HG: CPT | Mod: CPTII,S$GLB,, | Performed by: INTERNAL MEDICINE

## 2025-04-23 PROCEDURE — 1159F MED LIST DOCD IN RCRD: CPT | Mod: CPTII,S$GLB,, | Performed by: INTERNAL MEDICINE

## 2025-04-23 PROCEDURE — 91320 SARSCV2 VAC 30MCG TRS-SUC IM: CPT | Mod: S$GLB,,, | Performed by: INTERNAL MEDICINE

## 2025-04-23 PROCEDURE — 1160F RVW MEDS BY RX/DR IN RCRD: CPT | Mod: CPTII,S$GLB,, | Performed by: INTERNAL MEDICINE

## 2025-04-23 PROCEDURE — 3288F FALL RISK ASSESSMENT DOCD: CPT | Mod: CPTII,S$GLB,, | Performed by: INTERNAL MEDICINE

## 2025-04-23 PROCEDURE — 1157F ADVNC CARE PLAN IN RCRD: CPT | Mod: CPTII,S$GLB,, | Performed by: INTERNAL MEDICINE

## 2025-04-23 PROCEDURE — 3074F SYST BP LT 130 MM HG: CPT | Mod: CPTII,S$GLB,, | Performed by: INTERNAL MEDICINE

## 2025-04-23 PROCEDURE — 90480 ADMN SARSCOV2 VAC 1/ONLY CMP: CPT | Mod: S$GLB,,, | Performed by: INTERNAL MEDICINE

## 2025-04-23 RX ORDER — PEN NEEDLE, DIABETIC 32GX 5/32"
NEEDLE, DISPOSABLE MISCELLANEOUS
COMMUNITY
Start: 2025-02-18

## 2025-04-23 NOTE — PROGRESS NOTES
The beneficiary has a mobility limitation that significantly impairs her ability to participate in one or more mobility-related activities of daily living (MRADL) in the home ; AND  The beneficiary is able to safely use a cane or crutch; AND  The functional mobility deficit can be sufficiently resolved by use of a cane or crutch.    CHIEF COMPLAINT:   Chief Complaint   Patient presents with    Annual Exam          HISTORY OF PRESENT ILLNESS:  Joel Condon is a 84 y.o. female who presents to the clinic today for a routine physical exam. Her last physical exam was approximately 1 years(s) ago.          Patient, an 84-year-old individual, reports ongoing issues with blood sugar control. She had a fall in the kitchen last year due to hypoglycemia, which was alleviated when her son provided orange juice. Her morning fasting blood sugar sometimes exceed 200 mg/dL, despite not eating overnight. She does report occasional low blood sugars.    She reports left hip pain, radiating down the leg and causing difficulty with sleep and mobility. She has trouble with ambulation, especially when alone, and expresses concern about the impact on her daily activities. Her daughters live far away and cannot provide regular assistance.    She manages her diabetes through diet, consuming fruits like cantaloupe, watermelon, oranges, and grapes, and drinking sugar-free cranberry juice. She also likes to eat vegetables.  For pain management, she takes Tylenol for arthritis.             Subjective    PAST MEDICAL HISTORY:  Past Medical History:   Diagnosis Date    Arthritis     Breast cancer     Cataract     Colon polyp     Diabetes mellitus     Diabetic retinopathy     Hyperlipidemia LDL goal < 100     Hypertension     Hypothyroidism     Osteoporosis, post-menopausal     Overweight(278.02)     Proteinuria     Type II or unspecified type diabetes mellitus with renal manifestations, uncontrolled(250.42)        PAST SURGICAL HISTORY:  Past  Surgical History:   Procedure Laterality Date    APPENDECTOMY      BREAST BIOPSY      BREAST LUMPECTOMY      BREAST SURGERY Left 12/13/2017    tumor removal    CATARACT EXTRACTION W/  INTRAOCULAR LENS IMPLANT Left 4/24/14    OS (Dr. Arce)    CATARACT EXTRACTION W/  INTRAOCULAR LENS IMPLANT Right 06/12/2014    OD (Dr. Arce)    CHOLECYSTECTOMY      COLONOSCOPY N/A 4/21/2017    Procedure: COLONOSCOPY;  Surgeon: Homar Payan MD;  Location: [facility] ENDO;  Service: Endoscopy;  Laterality: N/A;    COLONOSCOPY N/A 6/29/2018    Procedure: COLONOSCOPY;  Surgeon: Mykel Boles MD;  Location: [facility] ENDO;  Service: Endoscopy;  Laterality: N/A;    EYE SURGERY  04/24/2014 & 06/12/2014    HYSTERECTOMY      total    left eye cataract surgery  4/24/14    OOPHORECTOMY         SOCIAL HISTORY:  Social History[1]    FAMILY HISTORY:  Family History   Problem Relation Name Age of Onset    Diabetes Mother      Heart disease Mother      Hypertension Mother      Stroke Mother      Diabetes Sister Ana     Hypertension Sister Ana     Diabetes Sister Mehalpaulie     Hypertension Sister Mehalpaulie     Diabetes Sister Merari     Hypertension Sister Merari     Hypertension Sister Marifer Bernal     Diabetes Sister Marifer Bernal     Diabetes Brother Reed     Diabetes Brother Tutu     Diabetes Brother [place]     Prostate cancer Brother Blade     Amblyopia Neg Hx      Blindness Neg Hx      Cataracts Neg Hx      Glaucoma Neg Hx      Macular degeneration Neg Hx      Retinal detachment Neg Hx      Strabismus Neg Hx         ALLERGIES AND MEDICATIONS: updated and reviewed.  Review of patient's allergies indicates:   Allergen Reactions    Lisinopril Other (See Comments)     Cough      Hydrochlorothiazide (bulk) Other (See Comments)     Elevated pt's blood pressure making her feel bad    Pravastatin Other (See Comments)     headaches     Medication List with Changes/Refills   Current Medications    ASCORBIC ACID, VITAMIN C, (VITAMIN C) 100 MG  "TABLET    Take 100 mg by mouth once daily.    ASPIRIN (ECOTRIN) 81 MG EC TABLET    Take 1 tablet (81 mg total) by mouth once daily.    ATORVASTATIN (LIPITOR) 10 MG TABLET    Take 1 tablet (10 mg total) by mouth once daily.    BLOOD SUGAR DIAGNOSTIC STRP    To check BG 4 times daily, to use with insurance preferred meter. e11.65    CHOLECALCIFEROL, VITAMIN D3, (VITAMIN D3) 100 MCG (4,000 UNIT) CAP    Take by mouth.    CYANOCOBALAMIN, VITAMIN B-12, MISC    by Misc.(Non-Drug; Combo Route) route.    DROPLET PEN NEEDLE 31 GAUGE X 3/16" NDLE    SMARTSI pen needle 4 Times Daily    DULAGLUTIDE (TRULICITY) 0.75 MG/0.5 ML PEN INJECTOR    INJECT 0.75MG UNDER THE SKIN EVERY 7 DAYS    INSULIN DEGLUDEC (TRESIBA FLEXTOUCH U-100) 100 UNIT/ML (3 ML) INSULIN PEN    Inject 40 Units into the skin once daily.    INSULIN LISPRO (HUMALOG KWIKPEN INSULIN) 100 UNIT/ML PEN    INJECT 18 UNITS UNDER THE SKIN THREE TIMES DAILY BEFORE MEALS    LANCETS MISC    To check BG 4 times daily, to use with insurance preferred meter. e11.65    LETROZOLE (FEMARA) 2.5 MG TAB    TAKE 1 TABLET BY MOUTH DAILY    LEVOTHYROXINE (SYNTHROID) 50 MCG TABLET    Take 1 tablet (50 mcg total) by mouth before breakfast.    LOSARTAN (COZAAR) 100 MG TABLET    TAKE 1 TABLET BY MOUTH DAILY    METFORMIN (GLUCOPHAGE-XR) 500 MG ER 24HR TABLET    TAKE 1 TABLET BY MOUTH TWICE DAILY WITH FOOD    METOPROLOL SUCCINATE (TOPROL-XL) 200 MG 24 HR TABLET    Take 1 tablet (200 mg total) by mouth once daily.    NIFEDIPINE (PROCARDIA-XL) 90 MG (OSM) 24 HR TABLET    TAKE 1 TABLET(90 MG) BY MOUTH EVERY DAY    OMEPRAZOLE (PRILOSEC) 20 MG CAPSULE    TAKE 1 CAPSULE(20 MG) BY MOUTH DAILY AS NEEDED FOR HEARTBURN    PEN NEEDLE, DIABETIC (BD ULTRA-FINE SHORT PEN NEEDLE) 31 GAUGE X 5/16" NDLE    USE FOUR TIMES DAILY    SPIRONOLACTONE (ALDACTONE) 50 MG TABLET    Take 1 tablet (50 mg total) by mouth once daily.    VITAMIN E ACETATE (VITAMIN E ORAL)    Take by mouth.          CARE TEAM:  Patient Care " "Team:  Shelby Ley MD as PCP - General (Internal Medicine)  Deborah Parr MD as Consulting Physician (Hematology and Oncology)  Rosy Mcknight NP as Nurse Practitioner (Endocrinology)  Juan Pablo Ordoñez MD as Consulting Physician (Cardiology)  Mary Waterman LPN as Care Coordinator       SCREENING HISTORY:  Health Maintenance         Date Due Completion Date    RSV Vaccine (Age 60+ and Pregnant patients) (1 - 1-dose 75+ series) Never done ---    Diabetic Eye Exam 06/13/2024 6/13/2023    Override on 5/2/2017: Done    Override on 11/4/2012: Done    Diabetes Urine Screening 04/17/2025 4/17/2024    COVID-19 Vaccine (5 - 2024-25 season) 06/18/2025 4/23/2025    Hemoglobin A1c 07/17/2025 4/17/2025    Mammogram 11/04/2025 11/4/2024    DEXA Scan 02/12/2026 2/12/2024    Lipid Panel 04/17/2026 4/17/2025    TETANUS VACCINE 06/15/2026 6/15/2016              REVIEW OF SYSTEMS:   The patient reports : good dietary habits.  The patient reports  : that they do not exercise.  Review of Systems   Constitutional:  Negative for chills and fever.   Respiratory:  Negative for cough and shortness of breath.    Cardiovascular:  Negative for chest pain and leg swelling.   Gastrointestinal:  Negative for abdominal pain.   Genitourinary:  Negative for dysuria.   Musculoskeletal:  Positive for arthralgias.       ROS (Optional)-: no pelvic pain  Breast ROS (Optional)-: negative for breast lumps/discharge          Objective    PHYSICAL EXAMINATION/VITALS:  Vitals:    04/23/25 1012   BP: 126/70   Patient Position: Sitting   Pulse: 77   Resp: 18   Temp: 98.7 °F (37.1 °C)   TempSrc: Oral   SpO2: 96%   Weight: 75.5 kg (166 lb 7.2 oz)   Height: 5' 4" (1.626 m)        Body mass index is 28.57 kg/m².      General appearance - alert, well appearing, and in no distress, overweight  Psychiatric - alert, oriented to person, place, and time, normal behavior, speech, dress, motor activity and thought process  Eyes - pupils equal and " reactive, extraocular eye movements intact, sclera anicteric  Neck - supple, no significant adenopathy, carotids upstroke normal bilaterally, no bruits  Lymphatics - no palpable cervical lymphadenopathy  Chest - clear to auscultation, no wheezes, rales or rhonchi, symmetric air entry  Heart - normal rate and regular rhythm  Neurological - alert, normal speech, no focal findings; cranial nerves II through XII intact  Musculoskeletal - discomfort with palpation of left hip area; slow to arise from a seated position; some decrease in muscle mass of LEs.  Extremities - no pedal edema noted  Skin - normal coloration, no suspicious skin lesions      LABS:  Lab Results   Component Value Date    HGBA1C 8.7 (H) 04/17/2025    HGBA1C 7.5 (H) 10/04/2024    HGBA1C 7.9 (H) 02/12/2024      Lab Results   Component Value Date    CHOL 111 (L) 04/17/2025    CHOL 143 04/17/2024    CHOL 110 (L) 03/01/2023     Lab Results   Component Value Date    LDLCALC 62.6 (L) 04/17/2024    LDLCALC 47.0 (L) 03/01/2023    LDLCALC 50.8 (L) 03/08/2022               ASSESSMENT AND PLAN:   1. Routine medical exam  Counseled on age appropriate medical preventative services including age appropriate cancer screenings, age appropriate eye and dental exams, over all nutritional health, need for a consistent exercise regimen, and an over all push towards maintaining a vigorous and active lifestyle.  Counseled on age appropriate vaccines and discussed upcoming health care needs based on age/gender. Discussed good sleep hygiene and stress management.    2. Poorly controlled type 2 diabetes mellitus with neuropathy/3. 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 Type 2 diabetes mellitus with stage 3a chronic kidney disease, with long-term current use of insulin  Lab Results   Component Value Date    HGBA1C 8.7 (H) 04/17/2025     Diabetes is not controlled at this time (due to hyperglycemia) for age and comorbid conditions.   Overall diabetes  control has worsened since last check.  We discussed diabetic diet and regular exercise.   We discussed home blood sugar monitoring, if appropriate - the patient should test twice daily and as needed.   Continue current medication regimen.  She will do better with diabetic diet and regular physical activity. Advised patient to decrease fruit intake and increase protein intake (try yoghurt)  Recheck A1c in 3 months. Will need to make medication adjustments at that time if her numbers do not improve (want to try and minimize low blood sugars)  Diabetic complications addressed: Stable decreased kidney function. Observe. Patient counseled to avoid/minimize the use of anti-inflammatory  Medication. Discussed to stay well hydrated. Also discussed with patient that good control of blood pressure and/or diabetes, if present, will help to prevent progression. Neuropathy pain controlled.   Patient was counseled on the need for yearly eye exam to screen for/monitor diabetic retinopathy and yearly diabetic foot exam.  -     Hemoglobin A1C; Future; Expected date: 07/23/2025  -     Hemoglobin A1C; Future; Expected date: 10/23/2025          5. Benign hypertension with chronic kidney disease, stage III  BP Readings from Last 1 Encounters:   04/23/25 126/70      Discussed sodium restriction, maintaining ideal body weight and regular exercise program as physiologic means to achieve blood pressure control. The patient will strive towards this.   The current medical regimen is effective;  continue present plan and medications. Recommended patient to check home readings to monitor and see me for followup as scheduled or sooner as needed.   Patient was educated that both decongestant and anti-inflammatory medication may raise blood pressure.  Stable decreased kidney function. Observe. Patient counseled to avoid/minimize the use of anti-inflammatory  Medication. Discussed to stay well hydrated. Also discussed with patient that good  control of blood pressure and/or diabetes, if present, will help to prevent progression.  The patient is not active on the digital hypertension program.    6. Hyperlipidemia LDL goal <100  Lab Results   Component Value Date    CHOL 111 (L) 04/17/2025     Lab Results   Component Value Date    HDL 45 04/17/2025     Lab Results   Component Value Date    LDLCALC 62.6 (L) 04/17/2024     Lab Results   Component Value Date    TRIG 102 04/17/2025     Lab Results   Component Value Date    LDLCALC 62.6 (L) 04/17/2024     We discussed low fat diet and regular exercise.The current medical regimen is effective;  continue present plan and medications.   Overview:  - cannot tolerate pravastatin      7. Hypothyroidism (acquired)  Lab Results   Component Value Date    TSH 3.635 04/17/2025     Patient is clinically euthyroid. Continue current regimen.    8. Gastroesophageal reflux disease without esophagitis  Symptoms controlled: yes - takes medication occasionally as needed.   Reflux precautions discussed (eliminate tobacco if a smoker; minimize caffeine, chocolate and red/white peppermint intake; avoid heavy and spicy meals; don't lay down within 2-3 hours after eating; minimize the intake of NSAIDs). Medication as needed.   Patient asked to take medication breaks, if possible - discussed chronic use can limit calcium absorption (which can lead to osteopenia/osteoporosis), increases the risk for intestinal infections, and can cause kidney damage. There are also some newer studies that show possible increased risk of mortality.    9. Osteopenia after menopause  We discussed adequate calcium intake (preferably from diet) and vitamin D supplementation. We discussed fall precautions. She is up to date on her BMD.  She was previously receiving Prolia injections per Hematology/Oncology.  I will reach out to them to see if they would like to continue ordering this medication.  Overview:  - s/p Prolia 5/2018, 11/2018, 5/2019, 11/2019,  5/2020, 11/2020, 5/31/2021, 12/14/2021, 6/2/2022, 6/16/23      10. Malignant neoplasm of left breast in female, estrogen receptor positive, unspecified site of breast  The current medical regimen is effective;  continue present plan and medications.   Followed by: Hematology/Oncology.   Overview:  -diagnosed 11/2017  -Stage 1b pT1b (6mm) N1mi M0 grade 1 ER + [place] + DWI5oew infiltrating ductal carcinoma  of the left breast status post left partial mastectomy and SLNB  on 12/13/2017.  1 of 2 lymph nodes positive for micromet. She is Stage IA per AJCC 8th ed. pathologic prognostic staging system.  - completed radiation with Dr. Wray on 3/2018  - on femara  -followed by oncology, Dr. Parr        11. Need for COVID-19 vaccine    -     COVID-19 (Pfizer) 30 mcg/0.3 mL IM vaccine (>/= 11 yo) 0.3 mL    12. Need for prophylactic vaccination and inoculation against respiratory syncytial virus (RSV)  Patient was advised to get immunization at the pharmacy.    13. Osteoarthrosis, hip/14. Left hip pain  Discussed with patient that her pain is likely due to arthritis.  She is requesting a cane.  We discussed proper use of a cane.  I strongly encouraged her to see orthopedics for possible injection.  She is reluctant.  We discussed that even hit replacement is generally a surgery that people recover from fairly quickly.  I do not have any concerns that she would have poor healing if we can get her A1c less than 8.  She voiced understanding and will let me know if she changes her mind regarding referral.  Continue Tylenol as needed for pain.  She can also try heat and topical rubs.  -     CANE FOR HOME USE               PATIENT EDUCATION:  Explained importance of balancing sugar intake from fruits with protein to prevent glucose spikes and drops.  Discussed differences between various types of bread and their impact on blood sugar.  Provided information on hip replacement surgery, emphasizing quicker recovery compared to knee  surgery and same-day mobilization.    ACTION ITEMS/LIFESTYLE:  Patient to replace honey wheat bread with 100% whole wheat bread.  Patient to reduce fruit intake and balance with protein (e.g., plain Greek yogurt).  Patient to increase vegetable intake.  Patient can consider using heat and topical rubs for hip pain management.          Orders Placed This Encounter   Procedures    CANE FOR HOME USE    Hemoglobin A1C    Hemoglobin A1C      FOLLOW UP: Follow up in about 6 months (around 10/23/2025), or if symptoms worsen or fail to improve, for follow up chronic medical conditions.. or sooner as needed.    This note was generated with the assistance of ambient listening technology. Verbal consent was obtained by the patient and accompanying visitor(s) for the recording of patient appointment to facilitate this note. I attest to having reviewed and edited the generated note for accuracy, though some syntax or spelling errors may persist. Please contact the author of this note for any clarification.            [1]   Social History  Socioeconomic History    Marital status:     Number of children: 3   Occupational History    Occupation: retired   Tobacco Use    Smoking status: Never    Smokeless tobacco: Never   Substance and Sexual Activity    Alcohol use: No    Drug use: No    Sexual activity: Not Currently     Partners: Male

## 2025-04-24 ENCOUNTER — RESULTS FOLLOW-UP (OUTPATIENT)
Dept: FAMILY MEDICINE | Facility: CLINIC | Age: 85
End: 2025-04-24
Payer: MEDICARE

## 2025-04-24 ENCOUNTER — TELEPHONE (OUTPATIENT)
Dept: FAMILY MEDICINE | Facility: CLINIC | Age: 85
End: 2025-04-24
Payer: MEDICARE

## 2025-04-24 DIAGNOSIS — Z78.0 OSTEOPENIA AFTER MENOPAUSE: Primary | ICD-10-CM

## 2025-04-24 DIAGNOSIS — M85.80 OSTEOPENIA AFTER MENOPAUSE: Primary | ICD-10-CM

## 2025-04-24 PROBLEM — Z85.3 HISTORY OF BREAST CANCER: Status: ACTIVE | Noted: 2017-12-07

## 2025-04-24 NOTE — TELEPHONE ENCOUNTER
Please call patient: I noticed that she has not had a Prolia injection for her osteopenia since 6/2023. This was previously handled by her oncologist, Dr. Parr.   I did message with Dr. Parr who recommends she be evaluated by endocrinology for further treatment recommendations as she already took the Prolia shots for 5 years and her last bone mineral density test showed that prescription treatment was still recommended. Generally we do not do Prolia for more than 5 years so we will need the recommendation from endocrinology for next steps.  Consult order placed.

## 2025-04-28 NOTE — TELEPHONE ENCOUNTER
Please call patient: potassium level is back in the normal range. Continue over the counter supplement indefinitely.  
Spoke w/patient, informed of result and recommendation. Verbalized understanding.  
None known

## 2025-04-30 ENCOUNTER — TELEPHONE (OUTPATIENT)
Dept: FAMILY MEDICINE | Facility: CLINIC | Age: 85
End: 2025-04-30
Payer: MEDICARE

## 2025-04-30 DIAGNOSIS — E11.65 TYPE 2 DIABETES MELLITUS WITH HYPERGLYCEMIA, WITH LONG-TERM CURRENT USE OF INSULIN: ICD-10-CM

## 2025-04-30 DIAGNOSIS — I10 ESSENTIAL HYPERTENSION: ICD-10-CM

## 2025-04-30 DIAGNOSIS — I12.9 BENIGN HYPERTENSION WITH CHRONIC KIDNEY DISEASE, STAGE III: ICD-10-CM

## 2025-04-30 DIAGNOSIS — E78.5 HYPERLIPIDEMIA LDL GOAL <100: ICD-10-CM

## 2025-04-30 DIAGNOSIS — Z78.0 OSTEOPENIA AFTER MENOPAUSE: Primary | ICD-10-CM

## 2025-04-30 DIAGNOSIS — E03.9 HYPOTHYROIDISM (ACQUIRED): ICD-10-CM

## 2025-04-30 DIAGNOSIS — N18.30 BENIGN HYPERTENSION WITH CHRONIC KIDNEY DISEASE, STAGE III: ICD-10-CM

## 2025-04-30 DIAGNOSIS — M85.80 OSTEOPENIA AFTER MENOPAUSE: Primary | ICD-10-CM

## 2025-04-30 DIAGNOSIS — Z79.4 TYPE 2 DIABETES MELLITUS WITH HYPERGLYCEMIA, WITH LONG-TERM CURRENT USE OF INSULIN: ICD-10-CM

## 2025-04-30 DIAGNOSIS — K21.9 GASTROESOPHAGEAL REFLUX DISEASE WITHOUT ESOPHAGITIS: Chronic | ICD-10-CM

## 2025-04-30 RX ORDER — ATORVASTATIN CALCIUM 10 MG/1
10 TABLET, FILM COATED ORAL DAILY
Qty: 90 TABLET | Refills: 1 | Status: SHIPPED | OUTPATIENT
Start: 2025-04-30

## 2025-04-30 RX ORDER — LOSARTAN POTASSIUM 100 MG/1
100 TABLET ORAL DAILY
Qty: 90 TABLET | Refills: 1 | Status: SHIPPED | OUTPATIENT
Start: 2025-04-30

## 2025-04-30 RX ORDER — DULAGLUTIDE 0.75 MG/.5ML
0.75 INJECTION, SOLUTION SUBCUTANEOUS
Qty: 4 PEN | Refills: 5 | Status: SHIPPED | OUTPATIENT
Start: 2025-04-30

## 2025-04-30 RX ORDER — METOPROLOL SUCCINATE 200 MG/1
200 TABLET, EXTENDED RELEASE ORAL DAILY
Qty: 90 TABLET | Refills: 1 | Status: SHIPPED | OUTPATIENT
Start: 2025-04-30

## 2025-04-30 RX ORDER — INSULIN LISPRO 100 [IU]/ML
INJECTION, SOLUTION INTRAVENOUS; SUBCUTANEOUS
Qty: 60 ML | Refills: 1 | Status: SHIPPED | OUTPATIENT
Start: 2025-04-30

## 2025-04-30 RX ORDER — SPIRONOLACTONE 50 MG/1
50 TABLET, FILM COATED ORAL DAILY
Qty: 90 TABLET | Refills: 1 | Status: SHIPPED | OUTPATIENT
Start: 2025-04-30

## 2025-04-30 RX ORDER — LEVOTHYROXINE SODIUM 50 UG/1
50 TABLET ORAL
Qty: 90 TABLET | Refills: 1 | Status: SHIPPED | OUTPATIENT
Start: 2025-04-30

## 2025-04-30 NOTE — TELEPHONE ENCOUNTER
Spoke with patient. Patient requesting refill on all medication. Patient states that she has been out of medication for a week. Patient was advised per last message regarding osteopenia. Patient declines scheduling appointment. Patient states that she has transportation issues and will not be doing anything for it.

## 2025-04-30 NOTE — TELEPHONE ENCOUNTER
----- Message from Baldev sent at 4/30/2025  1:57 PM CDT -----  Regarding: self  Type: Patient Call BackWho called:selfWhat is the request in detail:Patient calling trying to get refills and needs to speak with Nelson Lucas MA to return missed callCan the clinic reply by [facility]? NoWould the patient rather a call back or a response via [facility]? Call [facility] call back number:[phone] Information:Thank you.

## 2025-04-30 NOTE — TELEPHONE ENCOUNTER
----- Message from Med Assistant Gutierrez sent at 4/30/2025 12:10 PM CDT -----  Regarding: Return Call  Message Type: Return Call Who Called:JONG VALENTIN [[id number]]  Who Left Message for Patient:Nelson Lucas MA  Does the patient know what this is regarding?  yes  Best Call Back Number: [phone]   Additional Information: Patient is returning a call.  Please assist.

## 2025-04-30 NOTE — TELEPHONE ENCOUNTER
----- Message from Anabel sent at 4/29/2025 12:14 PM CDT -----  Who called:self  What is the request in detail:pt is calling in regards of her refill for her medication she would like for you to call the pharmacy asap pt said last pill she took was Saturday  Can the clinic reply by [facility]? Would the patient rather a call back or a response via [facility]?callback  Best call back number:.[phone] Additional Information:

## 2025-05-02 DIAGNOSIS — E11.65 TYPE 2 DIABETES MELLITUS WITH HYPERGLYCEMIA, WITH LONG-TERM CURRENT USE OF INSULIN: ICD-10-CM

## 2025-05-02 DIAGNOSIS — Z79.4 TYPE 2 DIABETES MELLITUS WITH HYPERGLYCEMIA, WITH LONG-TERM CURRENT USE OF INSULIN: ICD-10-CM

## 2025-05-02 RX ORDER — METFORMIN HYDROCHLORIDE 500 MG/1
500 TABLET, EXTENDED RELEASE ORAL 2 TIMES DAILY WITH MEALS
Qty: 180 TABLET | Refills: 1 | Status: SHIPPED | OUTPATIENT
Start: 2025-05-02

## 2025-05-12 NOTE — TELEPHONE ENCOUNTER
No care due was identified.  [facility] Embedded Care Due Messages. Reference number: [id number].   5/12/2025 3:57:36 PM CDT

## 2025-05-13 RX ORDER — INSULIN DEGLUDEC 100 U/ML
INJECTION, SOLUTION SUBCUTANEOUS
Qty: 36 ML | Refills: 1 | Status: SHIPPED | OUTPATIENT
Start: 2025-05-13

## 2025-05-13 NOTE — TELEPHONE ENCOUNTER
Refill Decision Note   Hunterasia Taurus  is requesting a refill authorization.  Brief Assessment and Rationale for Refill:  Approve     Medication Therapy Plan:        Comments:     Note composed:5:13 AM 05/13/2025

## 2025-06-06 ENCOUNTER — TELEPHONE (OUTPATIENT)
Dept: FAMILY MEDICINE | Facility: CLINIC | Age: 85
End: 2025-06-06
Payer: MEDICARE

## 2025-06-28 DIAGNOSIS — I12.9 BENIGN HYPERTENSION WITH CHRONIC KIDNEY DISEASE, STAGE III: ICD-10-CM

## 2025-06-28 DIAGNOSIS — N18.30 BENIGN HYPERTENSION WITH CHRONIC KIDNEY DISEASE, STAGE III: ICD-10-CM

## 2025-06-28 NOTE — TELEPHONE ENCOUNTER
No care due was identified.  [facility] Embedded Care Due Messages. Reference number: [id number].   6/28/2025 5:32:04 AM CDT

## 2025-06-30 RX ORDER — NIFEDIPINE 90 MG/1
90 TABLET, EXTENDED RELEASE ORAL
Qty: 90 TABLET | Refills: 3 | Status: SHIPPED | OUTPATIENT
Start: 2025-06-30

## 2025-06-30 NOTE — TELEPHONE ENCOUNTER
Refill Decision Note   Hunterasia Taurus  is requesting a refill authorization.  Brief Assessment and Rationale for Refill:  Approve     Medication Therapy Plan:         Comments:     Note composed:8:31 AM 06/30/2025

## (undated) DEVICE — SYS LABLNG CORECT MED 4 FLG

## (undated) DEVICE — ELECTRODE BLADE INSULATED 1 IN

## (undated) DEVICE — SEE MEDLINE ITEM 152622

## (undated) DEVICE — PACK DRAPE UNIVERSAL CONVERTOR

## (undated) DEVICE — SUT MONOCRYL 4-0 PS-2

## (undated) DEVICE — SEE MEDLINE ITEM 146417

## (undated) DEVICE — NDL HYPO REG 25G X 1 1/2

## (undated) DEVICE — BLANKET LOWER BODY 55.9X40.2IN

## (undated) DEVICE — Device

## (undated) DEVICE — SEE MEDLINE ITEM 157117

## (undated) DEVICE — ADHESIVE DERMABOND ADVANCED

## (undated) DEVICE — APPLICATOR CHLORAPREP ORN 26ML

## (undated) DEVICE — GAUZE FLUFF XXLG 36X36 2 PLY

## (undated) DEVICE — COVER OVERHEAD SURG LT BLUE

## (undated) DEVICE — STAPLER SKIN ROTATING HEAD

## (undated) DEVICE — ELECTRODE REM PLYHSV RETURN 9

## (undated) DEVICE — GLOVE SURGICAL LATEX SZ 6

## (undated) DEVICE — SYR 10CC LUER LOCK